# Patient Record
Sex: MALE | Race: WHITE | NOT HISPANIC OR LATINO | Employment: OTHER | URBAN - METROPOLITAN AREA
[De-identification: names, ages, dates, MRNs, and addresses within clinical notes are randomized per-mention and may not be internally consistent; named-entity substitution may affect disease eponyms.]

---

## 2021-12-28 VITALS
RESPIRATION RATE: 18 BRPM | DIASTOLIC BLOOD PRESSURE: 98 MMHG | HEART RATE: 84 BPM | SYSTOLIC BLOOD PRESSURE: 193 MMHG | TEMPERATURE: 97.9 F | OXYGEN SATURATION: 95 %

## 2021-12-28 LAB
ALBUMIN SERPL BCP-MCNC: 3.2 G/DL (ref 3.5–5)
ALP SERPL-CCNC: 111 U/L (ref 46–116)
ALT SERPL W P-5'-P-CCNC: 23 U/L (ref 12–78)
ANION GAP SERPL CALCULATED.3IONS-SCNC: 6 MMOL/L (ref 4–13)
APTT PPP: 32 SECONDS (ref 23–37)
AST SERPL W P-5'-P-CCNC: 19 U/L (ref 5–45)
BASOPHILS # BLD AUTO: 0.04 THOUSANDS/ΜL (ref 0–0.1)
BASOPHILS NFR BLD AUTO: 0 % (ref 0–1)
BILIRUB SERPL-MCNC: 0.4 MG/DL (ref 0.2–1)
BUN SERPL-MCNC: 15 MG/DL (ref 5–25)
CALCIUM ALBUM COR SERPL-MCNC: 8.8 MG/DL (ref 8.3–10.1)
CALCIUM SERPL-MCNC: 8.2 MG/DL (ref 8.3–10.1)
CHLORIDE SERPL-SCNC: 101 MMOL/L (ref 100–108)
CO2 SERPL-SCNC: 29 MMOL/L (ref 21–32)
CREAT SERPL-MCNC: 0.69 MG/DL (ref 0.6–1.3)
EOSINOPHIL # BLD AUTO: 0.12 THOUSAND/ΜL (ref 0–0.61)
EOSINOPHIL NFR BLD AUTO: 1 % (ref 0–6)
ERYTHROCYTE [DISTWIDTH] IN BLOOD BY AUTOMATED COUNT: 15.6 % (ref 11.6–15.1)
GFR SERPL CREATININE-BSD FRML MDRD: 104 ML/MIN/1.73SQ M
GLUCOSE SERPL-MCNC: 91 MG/DL (ref 65–140)
HCT VFR BLD AUTO: 38.8 % (ref 36.5–49.3)
HGB BLD-MCNC: 12.4 G/DL (ref 12–17)
IMM GRANULOCYTES # BLD AUTO: 0.02 THOUSAND/UL (ref 0–0.2)
IMM GRANULOCYTES NFR BLD AUTO: 0 % (ref 0–2)
INR PPP: 0.97 (ref 0.84–1.19)
LACTATE SERPL-SCNC: 1 MMOL/L (ref 0.5–2)
LYMPHOCYTES # BLD AUTO: 1.47 THOUSANDS/ΜL (ref 0.6–4.47)
LYMPHOCYTES NFR BLD AUTO: 16 % (ref 14–44)
MCH RBC QN AUTO: 27.6 PG (ref 26.8–34.3)
MCHC RBC AUTO-ENTMCNC: 32 G/DL (ref 31.4–37.4)
MCV RBC AUTO: 86 FL (ref 82–98)
MONOCYTES # BLD AUTO: 0.87 THOUSAND/ΜL (ref 0.17–1.22)
MONOCYTES NFR BLD AUTO: 9 % (ref 4–12)
NEUTROPHILS # BLD AUTO: 6.78 THOUSANDS/ΜL (ref 1.85–7.62)
NEUTS SEG NFR BLD AUTO: 74 % (ref 43–75)
NRBC BLD AUTO-RTO: 0 /100 WBCS
PLATELET # BLD AUTO: 366 THOUSANDS/UL (ref 149–390)
PMV BLD AUTO: 8.5 FL (ref 8.9–12.7)
POTASSIUM SERPL-SCNC: 3.1 MMOL/L (ref 3.5–5.3)
PROT SERPL-MCNC: 7.4 G/DL (ref 6.4–8.2)
PROTHROMBIN TIME: 12.8 SECONDS (ref 11.6–14.5)
RBC # BLD AUTO: 4.5 MILLION/UL (ref 3.88–5.62)
SODIUM SERPL-SCNC: 136 MMOL/L (ref 136–145)
WBC # BLD AUTO: 9.3 THOUSAND/UL (ref 4.31–10.16)

## 2021-12-28 PROCEDURE — 36415 COLL VENOUS BLD VENIPUNCTURE: CPT

## 2021-12-28 PROCEDURE — 87040 BLOOD CULTURE FOR BACTERIA: CPT

## 2021-12-28 PROCEDURE — 85730 THROMBOPLASTIN TIME PARTIAL: CPT

## 2021-12-28 PROCEDURE — 80053 COMPREHEN METABOLIC PANEL: CPT

## 2021-12-28 PROCEDURE — 85610 PROTHROMBIN TIME: CPT

## 2021-12-28 PROCEDURE — 84145 PROCALCITONIN (PCT): CPT

## 2021-12-28 PROCEDURE — 85025 COMPLETE CBC W/AUTO DIFF WBC: CPT

## 2021-12-28 PROCEDURE — 93005 ELECTROCARDIOGRAM TRACING: CPT

## 2021-12-28 PROCEDURE — 99283 EMERGENCY DEPT VISIT LOW MDM: CPT

## 2021-12-28 PROCEDURE — 83605 ASSAY OF LACTIC ACID: CPT

## 2021-12-29 ENCOUNTER — HOSPITAL ENCOUNTER (INPATIENT)
Facility: HOSPITAL | Age: 56
LOS: 71 days | Discharge: NURSING, REHAB OR LONG-TERM CARE | DRG: 603 | End: 2022-03-11
Attending: EMERGENCY MEDICINE | Admitting: STUDENT IN AN ORGANIZED HEALTH CARE EDUCATION/TRAINING PROGRAM
Payer: COMMERCIAL

## 2021-12-29 ENCOUNTER — HOSPITAL ENCOUNTER (EMERGENCY)
Facility: HOSPITAL | Age: 56
Discharge: HOME | End: 2021-12-29
Admitting: EMERGENCY MEDICINE
Payer: COMMERCIAL

## 2021-12-29 DIAGNOSIS — E87.6 HYPOKALEMIA: ICD-10-CM

## 2021-12-29 DIAGNOSIS — K59.00 CONSTIPATION, UNSPECIFIED CONSTIPATION TYPE: ICD-10-CM

## 2021-12-29 DIAGNOSIS — L40.9 PSORIASIS, UNSPECIFIED: ICD-10-CM

## 2021-12-29 DIAGNOSIS — H92.09 EAR PAIN: ICD-10-CM

## 2021-12-29 DIAGNOSIS — Z59.00 HOMELESS: ICD-10-CM

## 2021-12-29 DIAGNOSIS — B35.1 ONYCHOMYCOSIS: ICD-10-CM

## 2021-12-29 DIAGNOSIS — Z72.0 TOBACCO ABUSE: ICD-10-CM

## 2021-12-29 DIAGNOSIS — I10 ESSENTIAL HYPERTENSION: ICD-10-CM

## 2021-12-29 DIAGNOSIS — L03.90 CELLULITIS: Primary | ICD-10-CM

## 2021-12-29 DIAGNOSIS — N48.89 PENILE PAIN: ICD-10-CM

## 2021-12-29 DIAGNOSIS — R11.0 NAUSEA: ICD-10-CM

## 2021-12-29 DIAGNOSIS — R46.2 STRANGE AND INEXPLICABLE BEHAVIOR: ICD-10-CM

## 2021-12-29 DIAGNOSIS — L73.9 FOLLICULITIS: ICD-10-CM

## 2021-12-29 LAB
ALBUMIN SERPL BCP-MCNC: 3.8 G/DL (ref 3–5.2)
ALP SERPL-CCNC: 121 U/L (ref 43–122)
ALT SERPL W P-5'-P-CCNC: 16 U/L
ANION GAP SERPL CALCULATED.3IONS-SCNC: 3 MMOL/L (ref 5–14)
APTT PPP: 33 SECONDS (ref 23–37)
AST SERPL W P-5'-P-CCNC: 29 U/L (ref 17–59)
BASOPHILS # BLD AUTO: 0 THOUSANDS/ΜL (ref 0–0.1)
BASOPHILS NFR BLD AUTO: 1 % (ref 0–1)
BILIRUB SERPL-MCNC: 1.04 MG/DL
BUN SERPL-MCNC: 14 MG/DL (ref 5–25)
CALCIUM SERPL-MCNC: 8.5 MG/DL (ref 8.4–10.2)
CHLORIDE SERPL-SCNC: 100 MMOL/L (ref 97–108)
CO2 SERPL-SCNC: 33 MMOL/L (ref 22–30)
CREAT SERPL-MCNC: 1 MG/DL (ref 0.7–1.5)
EOSINOPHIL # BLD AUTO: 0.1 THOUSAND/ΜL (ref 0–0.4)
EOSINOPHIL NFR BLD AUTO: 1 % (ref 0–6)
ERYTHROCYTE [DISTWIDTH] IN BLOOD BY AUTOMATED COUNT: 16.2 %
GFR SERPL CREATININE-BSD FRML MDRD: 82 ML/MIN/1.73SQ M
GLUCOSE SERPL-MCNC: 92 MG/DL (ref 70–99)
HCT VFR BLD AUTO: 40.8 % (ref 41–53)
HGB BLD-MCNC: 13.2 G/DL (ref 13.5–17.5)
INR PPP: 1.08 (ref 0.84–1.19)
LACTATE SERPL-SCNC: 1.3 MMOL/L (ref 0.7–2)
LYMPHOCYTES # BLD AUTO: 1 THOUSANDS/ΜL (ref 0.5–4)
LYMPHOCYTES NFR BLD AUTO: 13 % (ref 25–45)
MCH RBC QN AUTO: 27.4 PG (ref 26–34)
MCHC RBC AUTO-ENTMCNC: 32.4 G/DL (ref 31–36)
MCV RBC AUTO: 85 FL (ref 80–100)
MONOCYTES # BLD AUTO: 0.7 THOUSAND/ΜL (ref 0.2–0.9)
MONOCYTES NFR BLD AUTO: 10 % (ref 1–10)
NEUTROPHILS # BLD AUTO: 5.8 THOUSANDS/ΜL (ref 1.8–7.8)
NEUTS SEG NFR BLD AUTO: 76 % (ref 45–65)
PLATELET # BLD AUTO: 367 THOUSANDS/UL (ref 150–450)
PMV BLD AUTO: 6.7 FL (ref 8.9–12.7)
POTASSIUM SERPL-SCNC: 3.6 MMOL/L (ref 3.6–5)
PROCALCITONIN SERPL-MCNC: <0.05 NG/ML
PROT SERPL-MCNC: 7.5 G/DL (ref 5.9–8.4)
PROTHROMBIN TIME: 13.6 SECONDS (ref 11.6–14.5)
RBC # BLD AUTO: 4.82 MILLION/UL (ref 4.5–5.9)
SODIUM SERPL-SCNC: 136 MMOL/L (ref 137–147)
WBC # BLD AUTO: 7.6 THOUSAND/UL (ref 4.5–11)

## 2021-12-29 PROCEDURE — 85025 COMPLETE CBC W/AUTO DIFF WBC: CPT | Performed by: EMERGENCY MEDICINE

## 2021-12-29 PROCEDURE — 87040 BLOOD CULTURE FOR BACTERIA: CPT | Performed by: EMERGENCY MEDICINE

## 2021-12-29 PROCEDURE — 99284 EMERGENCY DEPT VISIT MOD MDM: CPT

## 2021-12-29 PROCEDURE — 85730 THROMBOPLASTIN TIME PARTIAL: CPT | Performed by: EMERGENCY MEDICINE

## 2021-12-29 PROCEDURE — 80053 COMPREHEN METABOLIC PANEL: CPT | Performed by: EMERGENCY MEDICINE

## 2021-12-29 PROCEDURE — 96365 THER/PROPH/DIAG IV INF INIT: CPT

## 2021-12-29 PROCEDURE — 83605 ASSAY OF LACTIC ACID: CPT | Performed by: EMERGENCY MEDICINE

## 2021-12-29 PROCEDURE — 85610 PROTHROMBIN TIME: CPT | Performed by: EMERGENCY MEDICINE

## 2021-12-29 PROCEDURE — 93005 ELECTROCARDIOGRAM TRACING: CPT

## 2021-12-29 PROCEDURE — 99284 EMERGENCY DEPT VISIT MOD MDM: CPT | Performed by: EMERGENCY MEDICINE

## 2021-12-29 PROCEDURE — 84145 PROCALCITONIN (PCT): CPT | Performed by: EMERGENCY MEDICINE

## 2021-12-29 PROCEDURE — 36415 COLL VENOUS BLD VENIPUNCTURE: CPT | Performed by: EMERGENCY MEDICINE

## 2021-12-29 RX ORDER — POTASSIUM CHLORIDE 20 MEQ/1
20 TABLET, EXTENDED RELEASE ORAL ONCE
Status: COMPLETED | OUTPATIENT
Start: 2021-12-29 | End: 2021-12-29

## 2021-12-29 RX ORDER — CEPHALEXIN 500 MG/1
500 CAPSULE ORAL EVERY 6 HOURS SCHEDULED
Qty: 40 CAPSULE | Refills: 0 | Status: SHIPPED | OUTPATIENT
Start: 2021-12-29 | End: 2022-03-11 | Stop reason: HOSPADM

## 2021-12-29 RX ORDER — SULFAMETHOXAZOLE AND TRIMETHOPRIM 800; 160 MG/1; MG/1
1 TABLET ORAL ONCE
Status: COMPLETED | OUTPATIENT
Start: 2021-12-29 | End: 2021-12-29

## 2021-12-29 RX ORDER — CEPHALEXIN 250 MG/1
500 CAPSULE ORAL ONCE
Status: COMPLETED | OUTPATIENT
Start: 2021-12-29 | End: 2021-12-29

## 2021-12-29 RX ORDER — POTASSIUM CHLORIDE 20 MEQ/1
20 TABLET, EXTENDED RELEASE ORAL 2 TIMES DAILY
Qty: 20 TABLET | Refills: 0 | Status: SHIPPED | OUTPATIENT
Start: 2021-12-29 | End: 2022-03-11 | Stop reason: HOSPADM

## 2021-12-29 RX ORDER — SULFAMETHOXAZOLE AND TRIMETHOPRIM 800; 160 MG/1; MG/1
1 TABLET ORAL 2 TIMES DAILY
Qty: 14 TABLET | Refills: 0 | Status: SHIPPED | OUTPATIENT
Start: 2021-12-29 | End: 2022-03-11 | Stop reason: HOSPADM

## 2021-12-29 RX ADMIN — SULFAMETHOXAZOLE AND TRIMETHOPRIM 1 TABLET: 800; 160 TABLET ORAL at 01:27

## 2021-12-29 RX ADMIN — CEPHALEXIN 500 MG: 250 CAPSULE ORAL at 01:28

## 2021-12-29 RX ADMIN — POTASSIUM CHLORIDE 20 MEQ: 1500 TABLET, EXTENDED RELEASE ORAL at 01:28

## 2021-12-29 RX ADMIN — AMPICILLIN SODIUM AND SULBACTAM SODIUM 3 G: 2; 1 INJECTION, POWDER, FOR SOLUTION INTRAMUSCULAR; INTRAVENOUS at 19:57

## 2021-12-29 SDOH — ECONOMIC STABILITY - HOUSING INSECURITY: HOMELESSNESS UNSPECIFIED: Z59.00

## 2021-12-29 NOTE — CASE MANAGEMENT
Case Management Discharge Planning Note    Patient name Denny   Location Z1 H3/Z1 Mariola Da Silva MRN [id number]  : 1965 Date 2021       Current Admission Date: 2021  Current Admission Diagnosis:Multiple open wounds   There are no problems to display for this patient  LOS (days): 0  Geometric Mean LOS (GMLOS) (days):   Days to GMLOS:     OBJECTIVE:            Current admission status: Emergency   Preferred Pharmacy:   [facility], [address]  [address] [place] [address]  Phone: [phone] Fax: [phone]    Primary Care Provider: No primary care provider on file  Primary Insurance:   Secondary Insurance:     DISCHARGE DETAILS:    Additional Comments: Entry for today, 21  CM notified by security that Pt is homeless, has been waiting in the ED waiting room for 12 hours and has no place to go  211 was attemped but no response  Call placed to [facility], spoke with Danis Lee informed CM that he spoke with someone earlier who informed him that Pt needs assistance and would need commode for toileting  Danis Lee reports he is not sure who called him earlier about Pt  CM spoke with Pt and Pt reports that he can transfer independently from wheelchair and can dress and shower himself  Call placed to Danis Lee at [facility], informed Danis Lee of [address] conversation with Pt  Danis Lee reports that Pt can come to their facility with wheelchair but since there is a step into the door with the ramp, staff cannot always help Pt go into and out of facility  Danis Lee reports that Pt can come any time  Call placed to [facility] for [place] [facility], spoke with Damian Almazan who informed CM that  [employer] cannot transport wheelchairs  Referral sent to [facility] via [facility] for wheelchair transport  Await determination

## 2021-12-29 NOTE — CASE MANAGEMENT
Case Management Progress Note    Patient name Na Eduardo  Location Z1 H3/Z1 H3 MRN [id number]  : 1965 Date 2021       LOS (days): 0  Geometric Mean LOS (GMLOS) (days):   Days to GMLOS:        OBJECTIVE:        Current admission status: Emergency  Preferred Pharmacy:   [facility], [address]  [address] [facility] [place] [address]  Phone: [phone] Fax: [phone]    Primary Care Provider: No primary care provider on file  Primary Insurance:   Secondary Insurance:     PROGRESS NOTE:    Call to patient cell [phone]  Discussed with patient DC disposition  Patient reports his Richad Payor is picking him up at 1830  Call to [address] [place] [address] who refuses to  brother and hung up on nurse  Call to Booker Gracia [phone] who hung up on CM  Call to sister Sd Velasco [phone] and she is estranged from brother and lives in [facility]  Call received from sister Felton David who is asking why patient is not being placed in SNF however she is estranged from brother  Provided Zoie patient's cell number to discuss with him  Call to patient and notified same

## 2021-12-30 PROBLEM — Z59.00 HOMELESS: Status: ACTIVE | Noted: 2021-12-30

## 2021-12-30 PROBLEM — L03.113 RIGHT ARM CELLULITIS: Status: ACTIVE | Noted: 2021-12-30

## 2021-12-30 LAB
ATRIAL RATE: 67 BPM
ATRIAL RATE: 82 BPM
BILIRUB UR QL STRIP: NEGATIVE
CLARITY UR: ABNORMAL
COLOR UR: YELLOW
GLUCOSE UR STRIP-MCNC: NEGATIVE MG/DL
HGB UR QL STRIP.AUTO: NEGATIVE
KETONES UR STRIP-MCNC: ABNORMAL MG/DL
LEUKOCYTE ESTERASE UR QL STRIP: NEGATIVE
NITRITE UR QL STRIP: NEGATIVE
P AXIS: 69 DEGREES
P AXIS: 75 DEGREES
PH UR STRIP.AUTO: 7 [PH]
PR INTERVAL: 144 MS
PR INTERVAL: 146 MS
PROCALCITONIN SERPL-MCNC: <0.05 NG/ML
PROCALCITONIN SERPL-MCNC: <0.05 NG/ML
PROT UR STRIP-MCNC: NEGATIVE MG/DL
QRS AXIS: 28 DEGREES
QRS AXIS: 45 DEGREES
QRSD INTERVAL: 90 MS
QRSD INTERVAL: 98 MS
QT INTERVAL: 432 MS
QT INTERVAL: 484 MS
QTC INTERVAL: 504 MS
QTC INTERVAL: 511 MS
SP GR UR STRIP.AUTO: 1.02 (ref 1–1.04)
T WAVE AXIS: 64 DEGREES
T WAVE AXIS: 73 DEGREES
UROBILINOGEN UA: NEGATIVE MG/DL
VENTRICULAR RATE: 67 BPM
VENTRICULAR RATE: 82 BPM

## 2021-12-30 PROCEDURE — 93010 ELECTROCARDIOGRAM REPORT: CPT | Performed by: INTERNAL MEDICINE

## 2021-12-30 PROCEDURE — 99219 PR INITIAL OBSERVATION CARE/DAY 50 MINUTES: CPT | Performed by: STUDENT IN AN ORGANIZED HEALTH CARE EDUCATION/TRAINING PROGRAM

## 2021-12-30 PROCEDURE — 36415 COLL VENOUS BLD VENIPUNCTURE: CPT | Performed by: EMERGENCY MEDICINE

## 2021-12-30 PROCEDURE — 84145 PROCALCITONIN (PCT): CPT | Performed by: EMERGENCY MEDICINE

## 2021-12-30 PROCEDURE — 81003 URINALYSIS AUTO W/O SCOPE: CPT | Performed by: EMERGENCY MEDICINE

## 2021-12-30 RX ORDER — ACETAMINOPHEN 325 MG/1
650 TABLET ORAL EVERY 4 HOURS PRN
Status: DISCONTINUED | OUTPATIENT
Start: 2021-12-30 | End: 2022-03-11 | Stop reason: HOSPADM

## 2021-12-30 RX ORDER — ONDANSETRON 2 MG/ML
4 INJECTION INTRAMUSCULAR; INTRAVENOUS EVERY 6 HOURS PRN
Status: DISCONTINUED | OUTPATIENT
Start: 2021-12-30 | End: 2022-01-01

## 2021-12-30 RX ORDER — NICOTINE 21 MG/24HR
1 PATCH, TRANSDERMAL 24 HOURS TRANSDERMAL DAILY
Status: DISCONTINUED | OUTPATIENT
Start: 2021-12-30 | End: 2022-03-11 | Stop reason: HOSPADM

## 2021-12-30 RX ORDER — AMPICILLIN AND SULBACTAM 1; .5 G/1; G/1
INJECTION, POWDER, FOR SOLUTION INTRAMUSCULAR; INTRAVENOUS
Status: COMPLETED
Start: 2021-12-30 | End: 2021-12-30

## 2021-12-30 RX ORDER — HYDRALAZINE HYDROCHLORIDE 20 MG/ML
10 INJECTION INTRAMUSCULAR; INTRAVENOUS EVERY 6 HOURS PRN
Status: DISCONTINUED | OUTPATIENT
Start: 2021-12-30 | End: 2022-01-01

## 2021-12-30 RX ADMIN — ENOXAPARIN SODIUM 40 MG: 40 INJECTION SUBCUTANEOUS at 08:49

## 2021-12-30 RX ADMIN — ACETAMINOPHEN 650 MG: 325 TABLET ORAL at 17:22

## 2021-12-30 RX ADMIN — AMPICILLIN SODIUM AND SULBACTAM SODIUM 3 G: 2; 1 INJECTION, POWDER, FOR SOLUTION INTRAMUSCULAR; INTRAVENOUS at 17:18

## 2021-12-30 RX ADMIN — AMPICILLIN SODIUM AND SULBACTAM SODIUM 3 G: 2; 1 INJECTION, POWDER, FOR SOLUTION INTRAMUSCULAR; INTRAVENOUS at 11:36

## 2021-12-30 RX ADMIN — HYDRALAZINE HYDROCHLORIDE 10 MG: 20 INJECTION INTRAMUSCULAR; INTRAVENOUS at 06:08

## 2021-12-30 RX ADMIN — AMPICILLIN AND SULBACTAM 3000 MG: 1; .5 INJECTION, POWDER, FOR SOLUTION INTRAMUSCULAR; INTRAVENOUS at 05:30

## 2021-12-30 RX ADMIN — AMPICILLIN SODIUM AND SULBACTAM SODIUM 3 G: 2; 1 INJECTION, POWDER, FOR SOLUTION INTRAMUSCULAR; INTRAVENOUS at 22:57

## 2021-12-30 RX ADMIN — ACETAMINOPHEN 650 MG: 325 TABLET ORAL at 23:10

## 2021-12-30 RX ADMIN — ACETAMINOPHEN 650 MG: 325 TABLET ORAL at 06:07

## 2021-12-30 RX ADMIN — AMPICILLIN SODIUM AND SULBACTAM SODIUM 3 G: 2; 1 INJECTION, POWDER, FOR SOLUTION INTRAMUSCULAR; INTRAVENOUS at 05:31

## 2021-12-30 NOTE — ASSESSMENT & PLAN NOTE
· Patient was to be discharged to the [facility] however they would not take him back  · Case management for discharge planning

## 2021-12-30 NOTE — UTILIZATION REVIEW
Initial Clinical Review    Pt initially admitted as Observation on 12/29/21 @ 2124 Changed to Inpatient on 12/30/21 @ 1517  Pt requiring continued stay d/t cellulitis requiring IV ABX  Admission: Date/Time/Statement:   Admission Orders (From admission, onward)     Ordered        12/30/21 1517  Inpatient Admission  Once            12/29/21 2124  Place in Observation  Once                      Orders Placed This Encounter   Procedures    Inpatient Admission     Standing Status:   Standing     Number of Occurrences:   1     Order Specific Question:   Level of Care     Answer:   Med Surg [16]     Order Specific Question:   Estimated length of stay     Answer:   More than 2 Midnights     Order Specific Question:   Certification     Answer:   I certify that inpatient services are medically necessary for this patient for a duration of greater than two midnights  See H&P and MD Progress Notes for additional information about the patient's course of treatment  ED Arrival Information     Expected Arrival Acuity    - 12/29/2021 17:38 Urgent         Means of arrival Escorted by Service Admission type    [facility] [place] ([address]) Hospitalist Urgent         Arrival complaint    wound problem        Chief Complaint   Patient presents with    Wound Check     Pt reports he was discharged from [facility]  [facility] would not take pt back  Pt reports he has several wounds on arms and legs  Initial Presentation: 60 y o  male who presented by EMS to [address] [facility]  Admitted in observation status for evaluation and treatment of R arm cellulitis  PMHx: Arthritis, Hypertension, and Sciatica  Presented w/ redness & tenderness to R arm  Pt recently seen in another ED and discharged to [facility] who was unable to accept pt d/t wheelchair use  On exam, R forearm lesion w/ surrounding redness and tenderness to palpation, red streaking noted       Plan IV ABX,prn hydralazine, trend labs  Consult Case Management for disposition planning  12/30/21 Change to Inpatient Status  Internal Medicine: Pt w/ R arm cellullitis  On exam, R forearm lesion w/ redness and streaking  Plan: IV ABX, trend labs, follow blood cultures  Date:  12/31/21  Day 2  Internal Medicine: Pt w/ R arm cellulitis  On exam, lesion w/ redness  Plan: IV ABX, trend labs, follow blood cultures, start amlodipine for HTN  Consult ID  Infectious Disease Consult: Pt w/ hx of psoriasis presented w/ relapse of psoriasis and R forearm cellulitis  On exam, R forearm healing laceration w/ erythema, warmth and redness; diffuse scaly rash worse on legs  Plan: IV ABX, recommend follow-up outpatient w/ dermatology          ED Triage Vitals  12/29/21 1742   Temperature Pulse Respirations Blood Pressure SpO2   98 5 °F (36 9 °C) 75 16 (!) 198/105 97 %      Wt Readings from Last 1 Encounters:   12/29/21 77 3 kg (170 lb 6 4 oz)     Additional Vital Signs:   Date/Time Temp Pulse Resp BP SpO2 O2 Device   12/31/21 2250 97 5 °F (36 4 °C) 75 18 162/88 98 % None (Room air)   12/31/21 1557 97 7 °F (36 5 °C) 78 18 159/72 100 % None (Room air)   12/31/21 1014 -- -- -- 193/105 (Abnormal)   -- --   12/31/21 0732 97 7 °F (36 5 °C) 72 20 178/85 (Abnormal)   97 % None (Room air)   12/31/21 0224 97 8 °F (36 6 °C) 66 18 160/79 100 % None (Room air)   12/31/21 0113 -- 70 -- 176/90 (Abnormal)   -- --   12/30/21 2310 97 8 °F (36 6 °C) 73 18 177/92 (Abnormal)   99 % None (Room air)   12/30/21 1941 98 2 °F (36 8 °C) 78 18 152/79 -- --   12/30/21 1510 98 7 °F (37 1 °C) 54 (Abnormal)   18 164/74 99 % None (Room air)     Date/Time Temp Pulse Resp BP SpO2 O2 Device   12/30/21 0939 97 5 °F (36 4 °C) 73 20 168/87 99 % None (Room air)   12/30/21 0843 -- -- -- 167/90 -- --   12/30/21 0839 98 2 °F (36 8 °C) 75 16 187/100 Abnormal  100 % None (Room air)   12/30/21 0734 -- -- -- 166/92 -- --   12/30/21 0654 -- -- -- 180/98 Abnormal  -- -- 12/30/21 0559 -- 78 16 187/95 Abnormal  98 % None (Room air)   12/29/21 1954 -- 75 16 186/99 Abnormal  100 % None (Room air)       Pertinent Labs/Diagnostic Test Results:   12/29 EKG  Normal sinus rhythm  Left atrial abnormality  voltage lvh present  Nonspecific T wave abnormality  Prolonged QT    Results from last 7 days   Lab Units 12/31/21  0451 12/29/21  1823 12/28/21  2241   WBC Thousand/uL 7 20 7 60 9 30   HEMOGLOBIN g/dL 12 7* 13 2* 12 4   HEMATOCRIT % 37 7* 40 8* 38 8   PLATELETS Thousands/uL 353 367 366   NEUTROS ABS Thousands/µL  --  5 80 6 78     Results from last 7 days   Lab Units 12/31/21  0451 12/29/21 1823 12/28/21  2241   SODIUM mmol/L 136* 136* 136   POTASSIUM mmol/L 3 1* 3 6 3 1*   CHLORIDE mmol/L 103 100 101   CO2 mmol/L 30 33* 29   ANION GAP mmol/L 3* 3* 6   BUN mg/dL 13 14 15   CREATININE mg/dL 0 77 1 00 0 69   EGFR ml/min/1 73sq m 100 82 104   CALCIUM mg/dL 7 9* 8 5 8 2*   MAGNESIUM mg/dL  --   --   --    PHOSPHORUS mg/dL  --   --   --      Results from last 7 days   Lab Units 12/29/21 1823 12/28/21  2241   AST U/L 29 19   ALT U/L 16 23   ALK PHOS U/L 121 111   TOTAL PROTEIN g/dL 7 5 7 4   ALBUMIN g/dL 3 8 3 2*   TOTAL BILIRUBIN mg/dL 1 04 0 40     Results from last 7 days   Lab Units 12/31/21  0451 12/29/21  1823 12/28/21  2241   GLUCOSE RANDOM mg/dL 97 92 91     Results from last 7 days   Lab Units 12/29/21  1823 12/28/21  2241   PROTIME seconds 13 6 12 8   INR  1 08 0 97   PTT seconds 33 32     Results from last 7 days   Lab Units 12/30/21  0554 12/29/21  1823 12/28/21  2241   PROCALCITONIN ng/ml <0 05 <0 05 <0 05     Results from last 7 days   Lab Units 12/29/21 1823 12/28/21  2241   LACTIC ACID mmol/L 1 3 1 0     Results from last 7 days   Lab Units 12/30/21  0554   CLARITY UA  Slightly Cloudy*   COLOR UA  Yellow   SPEC GRAV UA  1 020   PH UA  7 0   GLUCOSE UA mg/dl Negative   KETONES UA mg/dl 5 (Trace)*   BLOOD UA  Negative   PROTEIN UA mg/dl Negative   NITRITE UA  Negative BILIRUBIN UA  Negative   UROBILINOGEN UA mg/dL Negative   LEUKOCYTES UA  Negative     Results from last 7 days   Lab Units 12/29/21  1845 12/29/21  1823 12/28/21  2241   BLOOD CULTURE  No Growth After 4 Days  No Growth After 4 Days  No Growth After 5 Days  No Growth After 5 Days  ED Treatment:   Medication Administration from 12/29/2021 1738 to 12/30/2021 0830       Date/Time Order Dose Route Action     12/29/2021 1957 ampicillin-sulbactam (UNASYN) 3 g in sodium chloride 0 9 % 100 mL IVPB 3 g Intravenous New Bag     12/30/2021 0527 acetaminophen (TYLENOL) tablet 650 mg 650 mg Oral Given     12/30/2021 0531 ampicillin-sulbactam (UNASYN) 3 g in sodium chloride 0 9 % 100 mL IVPB 3 g Intravenous New Bag     12/30/2021 0608 hydrALAZINE (APRESOLINE) injection 10 mg 10 mg Intravenous Given        Past Medical History:   Diagnosis Date    Arthritis     Hypertension     Sciatica      Present on Admission:   Right arm cellulitis      Admitting Diagnosis: Wound check, abscess [Z51 89]  Age/Sex: 60 y o  male  Admission Orders:   Regular Diet  Scheduled Medications:  amLODIPine, 10 mg, Oral, Daily   ampicillin-sulbactam, 3 g, Intravenous, Q6H  enoxaparin, 40 mg, Subcutaneous, Daily  nicotine, 1 patch, Transdermal, Daily    Continuous IV Infusions: none    PRN Meds:  acetaminophen, 650 mg, Oral, Q4H PRN; 12/30 x2, 12/31 x1  Diphenhydramine, 25 mg, Oral, Q6H PRN; 12/31 x1  hydrALAZINE, 10 mg, Intravenous, Q6H PRN; 12/31 x2  ondansetron, 4 mg, Intravenous, Q6H PRN  Potassium chloride, 40 mEq, Oral, 12/31 x1      Network Utilization Review Department  ATTENTION: Please call with any questions or concerns to [phone] and carefully listen to the prompts so that you are directed to the right person   All voicemails are confidential   Kacie Ornelas all requests for admission clinical reviews, approved or denied determinations and any other requests to dedicated fax number below belonging to the campus where the patient is receiving treatment   List of dedicated fax numbers for the Facilities:  [address] [place] [address] DENIALS (Administrative/Medical Necessity) [phone]   [address]  [facility] (Maternity/NICU/Pediatrics) [phone]   [address] [place] [address]  [address] [address] [place] [address] [address] [place] [address] [phone]

## 2021-12-30 NOTE — CASE MANAGEMENT
Case Management Progress Note    Patient name Jose Carlos Villareal  Location 7T /7T -01 MRN [id number]  : 1965 Date 2021       LOS (days): 0  Geometric Mean LOS (GMLOS) (days):   Days to GMLOS:        OBJECTIVE:    Current admission status: Observation  Preferred Pharmacy:   [address], [address]  [address]  [address]  Phone: [phone] Fax: [phone] [address] [facility],[address], [address]  [address]  Phone: [phone] Fax: [phone]    Primary Care Provider: Rachael Georges MD    Primary Insurance: [address] [place] [address] REP  Secondary Insurance: [address] [place] [address]    PROGRESS NOTE:      CM met w/ APS  Geronimo Mcdonald to discuss Pt-CM provided Latrice w/ H&P  Latrice has completed her assessment and opened her investigation  CM will assist as appropriate & collaborate Pts dcp w/ APS  CM will continue to follow

## 2021-12-30 NOTE — PLAN OF CARE
Problem: PAIN - ADULT  Goal: Verbalizes/displays adequate comfort level or baseline comfort level  Description: Interventions:  - Encourage patient to monitor pain and request assistance  - Assess pain using appropriate pain scale  - Administer analgesics based on type and severity of pain and evaluate response  - Implement non-pharmacological measures as appropriate and evaluate response  - Consider cultural and social influences on pain and pain management  - Notify physician/advanced practitioner if interventions unsuccessful or patient reports new pain  Outcome: Progressing     Problem: INFECTION - ADULT  Goal: Absence or prevention of progression during hospitalization  Description: INTERVENTIONS:  - Assess and monitor for signs and symptoms of infection  - Monitor lab/diagnostic results  - Monitor all insertion sites, i e  indwelling lines, tubes, and drains  - Monitor endotracheal if appropriate and nasal secretions for changes in amount and color  - Smoot appropriate cooling/warming therapies per order  - Administer medications as ordered  - Instruct and encourage patient and family to use good hand hygiene technique  - Identify and instruct in appropriate isolation precautions for identified infection/condition  Outcome: Progressing     Problem: SAFETY ADULT  Goal: Patient will remain free of falls  Description: INTERVENTIONS:  - Educate patient/family on patient safety including physical limitations  - Instruct patient to call for assistance with activity   - Consult OT/PT to assist with strengthening/mobility   - Keep Call bell within reach  - Keep bed low and locked with side rails adjusted as appropriate  - Keep care items and personal belongings within reach  - Initiate and maintain comfort rounds  - Make Fall Risk Sign visible to staff    - Apply yellow socks and bracelet for high fall risk patients  - Consider moving patient to room near nurses station  Outcome: Progressing     Problem: DISCHARGE PLANNING  Goal: Discharge to home or other facility with appropriate resources  Description: INTERVENTIONS:  - Identify barriers to discharge w/patient and caregiver  - Arrange for needed discharge resources and transportation as appropriate  - Identify discharge learning needs (meds, wound care, etc )  - Arrange for interpretive services to assist at discharge as needed  - Refer to Case Management Department for coordinating discharge planning if the patient needs post-hospital services based on physician/advanced practitioner order or complex needs related to functional status, cognitive ability, or social support system  Outcome: Progressing

## 2021-12-30 NOTE — ED NOTES
Pt given sandwich,water,ginger ale,chips and granola bar @ bedside  Tolerating well  Pt is comfor bell @ bedsidetable with no other needs @ this time  Call bell @ bedside       [address] [facility] [place] [facility]  12/30/21 6312

## 2021-12-30 NOTE — ED PROVIDER NOTES
History  No chief complaint on file  Patient with PMH chronic rashes, chronic ambulatory dysfunction presents with concern for infection all over his skin  Symptoms have been constant for 3 months now  He had cellulitis in his legs in the past and thinks that is what is going on now  It has moved to his abdomen and right forearm  He has difficulty ambulating that he states has been for 3 months now due to generalized weakness in the legs  Denies fever or chills  Denies pain  Did not take any medicine for these symptoms  Patient presents in his own wheelchair  None       Past Medical History:   Diagnosis Date    Arthritis     Hypertension     Sciatica        Past Surgical History:   Procedure Laterality Date    BACK SURGERY         History reviewed  No pertinent family history  I have reviewed and agree with the history as documented  E-Cigarette/Vaping     E-Cigarette/Vaping Substances     Social History     Tobacco Use    Smoking status: Current Every Day Smoker    Smokeless tobacco: Not on file   Substance Use Topics    Alcohol use: Yes    Drug use: Yes     Types: Heroin, Methamphetamines, Cocaine, Fentanyl       Review of Systems   Constitutional: Negative for chills and fever  HENT: Negative for rhinorrhea and sore throat  Respiratory: Negative for shortness of breath  Cardiovascular: Negative for chest pain  Gastrointestinal: Negative for constipation, diarrhea, nausea and vomiting  Genitourinary: Negative for dysuria and frequency  Skin: Positive for rash  All other systems reviewed and are negative  Physical Exam  Physical Exam  Vitals and nursing note reviewed  Constitutional:       Appearance: He is well-developed  HENT:      Head: Normocephalic and atraumatic        Right Ear: External ear normal       Left Ear: External ear normal       Nose: Nose normal    Eyes:      Conjunctiva/sclera: Conjunctivae normal       Pupils: Pupils are equal, round, and reactive to light  Cardiovascular:      Rate and Rhythm: Normal rate and regular rhythm  Heart sounds: Normal heart sounds  Pulmonary:      Effort: Pulmonary effort is normal  No respiratory distress  Breath sounds: Normal breath sounds  No wheezing  Abdominal:      General: Bowel sounds are normal  There is no distension  Palpations: Abdomen is soft  Tenderness: There is no abdominal tenderness  Musculoskeletal:         General: No deformity  Normal range of motion  Cervical back: Normal range of motion and neck supple  No spinous process tenderness  Right foot: Normal capillary refill  No tenderness  Left foot: Normal capillary refill  No tenderness  Comments: Generalized weakness lower extremity and torso    Bilateral lower extremities cool, pallor noted, +1 DP and PT pulses  Sensation intact to light touch  Skin:     General: Skin is warm and dry  Findings: Rash present  Comments: See media   Neurological:      General: No focal deficit present  Mental Status: He is alert and oriented to person, place, and time  GCS: GCS eye subscore is 4  GCS verbal subscore is 5  GCS motor subscore is 6  Sensory: No sensory deficit     Psychiatric:         Mood and Affect: Mood normal          Vital Signs  ED Triage Vitals [12/28/21 2201]   Temperature Pulse Respirations Blood Pressure SpO2   97 9 °F (36 6 °C) 84 18 (!) 193/98 95 %      Temp Source Heart Rate Source Patient Position - Orthostatic VS BP Location FiO2 (%)   Temporal Monitor Sitting Right arm --      Pain Score       --           Vitals:    12/28/21 2201   BP: (!) 193/98   Pulse: 84   Patient Position - Orthostatic VS: Sitting         Visual Acuity      ED Medications  Medications   cephalexin (KEFLEX) capsule 500 mg (500 mg Oral Given 12/29/21 0128)   sulfamethoxazole-trimethoprim (BACTRIM DS) 800-160 mg per tablet 1 tablet (1 tablet Oral Given 12/29/21 0127)   potassium chloride (K-DUR,KLOR-CON) CR tablet 20 mEq (20 mEq Oral Given 12/29/21 0128)       Diagnostic Studies  Results Reviewed     Procedure Component Value Units Date/Time    Blood culture #1 [[id number]] Collected: 12/28/21 2241    Lab Status: Preliminary result Specimen: Blood from Arm, Right Updated: 12/30/21 0701     Blood Culture No Growth at 24 hrs  Blood culture #2 [[id number]] Collected: 12/28/21 2241    Lab Status: Preliminary result Specimen: Blood from Arm, Right Updated: 12/30/21 0701     Blood Culture No Growth at 24 hrs  Procalcitonin with AM Reflex [[id number]]  (Normal) Collected: 12/28/21 2241    Lab Status: Final result Specimen: Blood from Arm, Right Updated: 12/29/21 0753     Procalcitonin <0 05 ng/ml     Procalcitonin Reflex [[id number]]     Lab Status: No result Specimen: Blood     Lactic acid [[id number]]  (Normal) Collected: 12/28/21 2241    Lab Status: Final result Specimen: Blood from Arm, Right Updated: 12/28/21 2325     LACTIC ACID 1 0 mmol/L     Narrative:      Result may be elevated if tourniquet was used during collection      Comprehensive metabolic panel [[id number]]  (Abnormal) Collected: 12/28/21 2241    Lab Status: Final result Specimen: Blood from Arm, Right Updated: 12/28/21 2322     Sodium 136 mmol/L      Potassium 3 1 mmol/L      Chloride 101 mmol/L      CO2 29 mmol/L      ANION GAP 6 mmol/L      BUN 15 mg/dL      Creatinine 0 69 mg/dL      Glucose 91 mg/dL      Calcium 8 2 mg/dL      Corrected Calcium 8 8 mg/dL      AST 19 U/L      ALT 23 U/L      Alkaline Phosphatase 111 U/L      Total Protein 7 4 g/dL      Albumin 3 2 g/dL      Total Bilirubin 0 40 mg/dL      eGFR 104 ml/min/1 73sq m     Narrative:      [place] guidelines for Chronic Kidney Disease (CKD):     Stage 1 with normal or high GFR (GFR > 90 mL/min/1 73 square meters)    Stage 2 Mild CKD (GFR = 60-89 mL/min/1 73 square meters)    Stage 3A Moderate CKD (GFR = 45-59 mL/min/1 73 square meters)    Stage 3B Moderate CKD (GFR = 30-44 mL/min/1 73 square meters)    Stage 4 Severe CKD (GFR = 15-29 mL/min/1 73 square meters)    Stage 5 End Stage CKD (GFR <15 mL/min/1 73 square meters)  Note: GFR calculation is accurate only with a steady state creatinine    APTT [[id number]]  (Normal) Collected: 12/28/21 2241    Lab Status: Final result Specimen: Blood from Arm, Right Updated: 12/28/21 2318     PTT 32 seconds     Protime-INR [[id number]]  (Normal) Collected: 12/28/21 2241    Lab Status: Final result Specimen: Blood from Arm, Right Updated: 12/28/21 2318     Protime 12 8 seconds      INR 0 97    CBC and differential [[id number]]  (Abnormal) Collected: 12/28/21 2241    Lab Status: Final result Specimen: Blood from Arm, Right Updated: 12/28/21 2302     WBC 9 30 Thousand/uL      RBC 4 50 Million/uL      Hemoglobin 12 4 g/dL      Hematocrit 38 8 %      MCV 86 fL      MCH 27 6 pg      MCHC 32 0 g/dL      RDW 15 6 %      MPV 8 5 fL      Platelets 193 Thousands/uL      nRBC 0 /100 WBCs      Neutrophils Relative 74 %      Immat GRANS % 0 %      Lymphocytes Relative 16 %      Monocytes Relative 9 %      Eosinophils Relative 1 %      Basophils Relative 0 %      Neutrophils Absolute 6 78 Thousands/µL      Immature Grans Absolute 0 02 Thousand/uL      Lymphocytes Absolute 1 47 Thousands/µL      Monocytes Absolute 0 87 Thousand/µL      Eosinophils Absolute 0 12 Thousand/µL      Basophils Absolute 0 04 Thousands/µL                  No orders to display              Procedures  Procedures         ED Course                                             MDM  Number of Diagnoses or Management Options  Cellulitis: new and requires workup  Folliculitis: new and requires workup  Hypokalemia: new and requires workup     Amount and/or Complexity of Data Reviewed  Clinical lab tests: reviewed  Obtain history from someone other than the patient: yes    Patient Progress  Patient progress: stable      Disposition  Final diagnoses:   Cellulitis - perineal and right forearm   Folliculitis - abdomen and chest   Hypokalemia     Time reflects when diagnosis was documented in both MDM as applicable and the Disposition within this note     Time User Action Codes Description Comment    12/29/2021 12:15 AM Mosetta Lank Add [L03 90] Cellulitis     12/29/2021 12:15 AM Mosetta Lank Modify [L03 90] Cellulitis systemic    12/29/2021 12:16 AM Mosetta Lank Modify [L03 90] Cellulitis perineal and right forearm    12/29/2021 12:16 AM Mosetta Lank Add [E67 2] Folliculitis     50/04/4572 12:16 AM Mosetta Lank Modify [N53 6] Folliculitis abdomen and chest    12/29/2021 12:17 AM Mosetta Lank Add [E87 6] Hypokalemia       ED Disposition     ED Disposition Condition Date/Time Comment    Discharge  Wed Dec 29, 2021  1:51 AM Marissa Nunes discharge to home/self care  Follow-up Information     Follow up With Specialties Details Why Contact Info    [facility]  Call   [phone]            Discharge Medication List as of 12/29/2021 12:19 AM      START taking these medications    Details   cephalexin (KEFLEX) 500 mg capsule Take 1 capsule (500 mg total) by mouth every 6 (six) hours for 10 days, Starting Wed 12/29/2021, Until Sat 1/8/2022, Normal      potassium chloride (K-DUR,KLOR-CON) 20 mEq tablet Take 1 tablet (20 mEq total) by mouth 2 (two) times a day, Starting Wed 12/29/2021, Normal      sulfamethoxazole-trimethoprim (BACTRIM DS) 800-160 mg per tablet Take 1 tablet by mouth 2 (two) times a day for 7 days smx-tmp DS (BACTRIM) 800-160 mg tabs (1tab q12 D10), Starting Wed 12/29/2021, Until Wed 1/5/2022, Normal             No discharge procedures on file      PDMP Review     None          ED Provider  Electronically Signed by           Irma Giraldo DO  12/30/21 2726

## 2021-12-30 NOTE — ASSESSMENT & PLAN NOTE
· Patient with right forearm lesion with redness and streaking noted in ED  · Started on Unasyn  · Lactic acid normal  · WBC normal  · Check procalcitonin level

## 2021-12-30 NOTE — ED PROVIDER NOTES
History  Chief Complaint   Patient presents with    Wound Check     Pt reports he was discharged from [address] [place] [address] to the [facility]  [facility] would not take pt back  Pt reports he has several wounds on arms and legs  History provided by:  Patient  Rash  Location: right arm redness and pain   Severity:  Mild  Onset quality:  Gradual  Timing:  Constant  Progression:  Worsening  Chronicity:  New  Relieved by:  Nothing  Worsened by:  Nothing  Ineffective treatments:  None tried  Associated symptoms: no abdominal pain, no diarrhea, no fever, no headaches, no myalgias, no nausea, no shortness of breath, no sore throat and not wheezing        Prior to Admission Medications   Prescriptions Last Dose Informant Patient Reported? Taking? cephalexin (KEFLEX) 500 mg capsule Not Taking at Unknown time  No No   Sig: Take 1 capsule (500 mg total) by mouth every 6 (six) hours for 10 days   Patient not taking: Reported on 12/29/2021    potassium chloride (K-DUR,KLOR-CON) 20 mEq tablet Not Taking at Unknown time  No No   Sig: Take 1 tablet (20 mEq total) by mouth 2 (two) times a day   Patient not taking: Reported on 12/29/2021    sulfamethoxazole-trimethoprim (BACTRIM DS) 800-160 mg per tablet Not Taking at Unknown time  No No   Sig: Take 1 tablet by mouth 2 (two) times a day for 7 days smx-tmp DS (BACTRIM) 800-160 mg tabs (1tab q12 D10)   Patient not taking: Reported on 12/29/2021       Facility-Administered Medications: None       Past Medical History:   Diagnosis Date    Arthritis     Hypertension     Sciatica        Past Surgical History:   Procedure Laterality Date    BACK SURGERY         History reviewed  No pertinent family history  I have reviewed and agree with the history as documented  E-Cigarette/Vaping     E-Cigarette/Vaping Substances     Social History     Tobacco Use    Smoking status: Current Every Day Smoker    Smokeless tobacco: Not on file   Substance Use Topics    Alcohol use:  Yes    Drug use: Yes     Types: Heroin, Methamphetamines, Cocaine, Fentanyl       Review of Systems   Constitutional: Negative for chills and fever  HENT: Negative for rhinorrhea, sore throat and trouble swallowing  Eyes: Negative for pain  Respiratory: Negative for cough, shortness of breath, wheezing and stridor  Cardiovascular: Negative for chest pain and leg swelling  Gastrointestinal: Negative for abdominal pain, diarrhea and nausea  Endocrine: Negative for polyuria  Genitourinary: Negative for dysuria, flank pain and urgency  Musculoskeletal: Negative for joint swelling, myalgias and neck stiffness  Skin: Positive for rash  Allergic/Immunologic: Negative for immunocompromised state  Neurological: Negative for dizziness, syncope, weakness, numbness and headaches  Psychiatric/Behavioral: Negative for confusion and suicidal ideas  All other systems reviewed and are negative  Physical Exam  Physical Exam  Vitals and nursing note reviewed  Constitutional:       Appearance: He is well-developed  HENT:      Head: Normocephalic and atraumatic  Eyes:      Pupils: Pupils are equal, round, and reactive to light  Cardiovascular:      Rate and Rhythm: Normal rate and regular rhythm  Heart sounds: Normal heart sounds  No murmur heard  No friction rub  Pulmonary:      Effort: Pulmonary effort is normal  No respiratory distress  Breath sounds: No wheezing or rales  Abdominal:      General: Bowel sounds are normal       Palpations: Abdomen is soft  There is no mass  Tenderness: There is no abdominal tenderness  There is no guarding  Musculoskeletal:         General: Tenderness present  Cervical back: Normal range of motion and neck supple  Comments: Right forearm lesion with noted surrounding redness and tenderness to palpation consistent with cellulitis some red streaking noted  Skin:     General: Skin is warm  Findings: No rash     Neurological:      Mental Status: He is alert and oriented to person, place, and time  Vital Signs  ED Triage Vitals   Temperature Pulse Respirations Blood Pressure SpO2   12/29/21 1742 12/29/21 1954 12/29/21 1954 12/29/21 1742 12/29/21 1742   98 5 °F (36 9 °C) 75 16 (!) 198/105 97 %      Temp Source Heart Rate Source Patient Position - Orthostatic VS BP Location FiO2 (%)   12/29/21 1742 12/29/21 1954 12/29/21 1742 12/29/21 1742 --   Oral Monitor Lying Right arm       Pain Score       --                  Vitals:    12/29/21 1742 12/29/21 1954   BP: (!) 198/105 (!) 186/99   Pulse:  75   Patient Position - Orthostatic VS: Lying Lying         Visual Acuity      ED Medications  Medications   ampicillin-sulbactam (UNASYN) 3 g in sodium chloride 0 9 % 100 mL IVPB (0 g Intravenous Stopped 12/29/21 2144)       Diagnostic Studies  Results Reviewed     Procedure Component Value Units Date/Time    Blood culture #2 [[id number]] Collected: 12/29/21 1845    Lab Status: In process Specimen: Blood from Arm, Left Updated: 12/29/21 1919    Lactic acid [[id number]]  (Normal) Collected: 12/29/21 1823    Lab Status: Final result Specimen: Blood from Arm, Right Updated: 12/29/21 1855     LACTIC ACID 1 3 mmol/L     Narrative:      Result may be elevated if tourniquet was used during collection      Comprehensive metabolic panel [[id number]]  (Abnormal) Collected: 12/29/21 1823    Lab Status: Final result Specimen: Blood from Arm, Right Updated: 12/29/21 1847     Sodium 136 mmol/L      Potassium 3 6 mmol/L      Chloride 100 mmol/L      CO2 33 mmol/L      ANION GAP 3 mmol/L      BUN 14 mg/dL      Creatinine 1 00 mg/dL      Glucose 92 mg/dL      Calcium 8 5 mg/dL      AST 29 U/L      ALT 16 U/L      Alkaline Phosphatase 121 U/L      Total Protein 7 5 g/dL      Albumin 3 8 g/dL      Total Bilirubin 1 04 mg/dL      eGFR 82 ml/min/1 73sq m     Narrative:      [place] guidelines for Chronic Kidney Disease (CKD):     Stage 1 with normal or high GFR (GFR > 90 mL/min/1 73 square meters)    Stage 2 Mild CKD (GFR = 60-89 mL/min/1 73 square meters)    Stage 3A Moderate CKD (GFR = 45-59 mL/min/1 73 square meters)    Stage 3B Moderate CKD (GFR = 30-44 mL/min/1 73 square meters)    Stage 4 Severe CKD (GFR = 15-29 mL/min/1 73 square meters)    Stage 5 End Stage CKD (GFR <15 mL/min/1 73 square meters)  Note: GFR calculation is accurate only with a steady state creatinine    Protime-INR [[id number]]  (Normal) Collected: 12/29/21 1823    Lab Status: Final result Specimen: Blood from Arm, Right Updated: 12/29/21 1842     Protime 13 6 seconds      INR 1 08    APTT [[id number]]  (Normal) Collected: 12/29/21 1823    Lab Status: Final result Specimen: Blood from Arm, Right Updated: 12/29/21 1842     PTT 33 seconds     CBC and differential [[id number]]  (Abnormal) Collected: 12/29/21 1823    Lab Status: Final result Specimen: Blood from Arm, Right Updated: 12/29/21 1832     WBC 7 60 Thousand/uL      RBC 4 82 Million/uL      Hemoglobin 13 2 g/dL      Hematocrit 40 8 %      MCV 85 fL      MCH 27 4 pg      MCHC 32 4 g/dL      RDW 16 2 %      MPV 6 7 fL      Platelets 690 Thousands/uL      Neutrophils Relative 76 %      Lymphocytes Relative 13 %      Monocytes Relative 10 %      Eosinophils Relative 1 %      Basophils Relative 1 %      Neutrophils Absolute 5 80 Thousands/µL      Lymphocytes Absolute 1 00 Thousands/µL      Monocytes Absolute 0 70 Thousand/µL      Eosinophils Absolute 0 10 Thousand/µL      Basophils Absolute 0 00 Thousands/µL     Blood culture #1 [[id number]] Collected: 12/29/21 1823    Lab Status: In process Specimen: Blood from Arm, Right Updated: 12/29/21 1829    Procalcitonin with AM Reflex [[id number]] Collected: 12/29/21 1823    Lab Status:  In process Specimen: Blood from Arm, Right Updated: 12/29/21 1829    UA w Reflex to Microscopic w Reflex to Culture [[id number]]     Lab Status: No result Specimen: Urine                  No orders to display Procedures  Procedures         ED Course  ED Course as of 12/29/21 2149   Wed Dec 29, 2021   1924 Right arm cellultitis noted, will start IV abx  Admit and need case management  SBIRT 22yo+      Most Recent Value   SBIRT (22 yo +)    In order to provide better care to our patients, we are screening all of our patients for alcohol and drug use  Would it be okay to ask you these screening questions? No Filed at: 12/29/2021 2793                    [facility]  Number of Diagnoses or Management Options  Cellulitis: new and requires workup  Homeless: new and requires workup  Diagnosis management comments: 25-year-old male presents emergency department currently homeless right arm pain have redness tenderness to palpation recently seen at [facility] emergency department in sent to [facility] but unfortunately was not able to be admitted there secondary to being in a chronic wheelchair  He was sent to the nearest emergency department here in the Emergency Department was evaluated will need IV antibiotics and placement to long-term nursing facility         Amount and/or Complexity of Data Reviewed  Clinical lab tests: ordered and reviewed  Decide to obtain previous medical records or to obtain history from someone other than the patient: yes  Review and summarize past medical records: yes  Independent visualization of images, tracings, or specimens: yes        Disposition  Final diagnoses:   Cellulitis   Homeless     Time reflects when diagnosis was documented in both MDM as applicable and the Disposition within this note     Time User Action Codes Description Comment    12/29/2021  9:24 PM Americo Pallas Add [L03 90] Cellulitis     12/29/2021  9:24 PM Americo Pallas Add [Z59 00] Homeless       ED Disposition     ED Disposition Condition Date/Time Comment    Admit Stable Wed Dec 29, 2021  9:24 PM         Follow-up Information    None         Patient's Medications   Discharge Prescriptions    No medications on file       No discharge procedures on file      PDMP Review     None          ED Provider  Electronically Signed by           Taty Casper DO  12/29/21 8582

## 2021-12-30 NOTE — H&P
[facility]  H&P- Michael Guillen 1965, 60 y o  male MRN: [id number]  Unit/Bed#: ED 03 Encounter: [id number]  Primary Care Provider: Eric Tyler MD   Date and time admitted to hospital: 12/29/2021  5:38 PM    * Right arm cellulitis  Assessment & Plan  · Patient with right forearm lesion with redness and streaking noted in ED  · Started on Unasyn  · Lactic acid normal  · WBC normal  · Check procalcitonin level    Homeless  Assessment & Plan  · Patient was to be discharged to the [facility] however they would not take him back  · Case management for discharge planning    H&P - [facility] Internal Medicine    Patient Information: Michael Guillen 60 y o  male MRN: [id number]  Unit/Bed#: ED 03 Encounter: [id number]  Admitting Physician: Helen Palma DO  PCP: Eric Tyler MD  Date of Admission:  12/30/21      VTE Prophylaxis: Enoxaparin (Lovenox)    Code Status: full code  Discussion with patient    Anticipated Length of Stay:  Patient will be admitted on an Observation basis with an anticipated length of stay of  < 2 midnights  Justification for Hospital Stay: IV abx, d/c planning    Chief Complaint:   Wound check    History of Present Illness:    Michael Guillen is a 60 y o  male who has past medical history of hypertension, drug abuse, homeless presents with wound check  Patient was seen at [facility] ED 12/28  And d/c 12/29  and was prescribed antibiotics for arm cellulitis  He was homeless and had case management arrange for discharge to the [facility]  When he arrived at the facility, was reported they would not take the patient he then returned to [facility] ER  There he was given IV antibiotics  Blood cultures from 12/28 pending    Review of Systems:    Review of Systems   Constitutional: Negative  HENT: Negative  Eyes: Negative  Respiratory: Negative  Cardiovascular: Negative  Gastrointestinal: Negative      Genitourinary: Negative  Musculoskeletal: Negative  Skin: Positive for rash and wound  Neurological: Negative  Hematological: Negative  Psychiatric/Behavioral: Negative  Past Medical and Surgical History:     Past Medical History:   Diagnosis Date    Arthritis     Hypertension     Sciatica        Past Surgical History:   Procedure Laterality Date    BACK SURGERY         Meds/Allergies:    Prior to Admission medications    Medication Sig Start Date End Date Taking?  Authorizing Provider   cephalexin (KEFLEX) 500 mg capsule Take 1 capsule (500 mg total) by mouth every 6 (six) hours for 10 days  Patient not taking: Reported on 12/29/2021 12/29/21 1/8/22  Pammariangel Fane, DO   potassium chloride (K-DUR,KLOR-CON) 20 mEq tablet Take 1 tablet (20 mEq total) by mouth 2 (two) times a day  Patient not taking: Reported on 12/29/2021 12/29/21   Wendy Fane, DO   sulfamethoxazole-trimethoprim (BACTRIM DS) 800-160 mg per tablet Take 1 tablet by mouth 2 (two) times a day for 7 days smx-tmp DS (BACTRIM) 800-160 mg tabs (1tab q12 D10)  Patient not taking: Reported on 12/29/2021 12/29/21 1/5/22  Pammariangel Dorantes, DO     all medications and allergies reviewed    Allergies: No Known Allergies    Social History:     Marital Status:    Occupation:  Unemployed  Patient Pre-hospital Living Situation:  Homeless  Patient Pre-hospital Level of Mobility:  wheelchair  Patient Pre-hospital Diet Restrictions:  None  Substance Use History:   Social History     Substance and Sexual Activity   Alcohol Use Yes     Social History     Tobacco Use   Smoking Status Current Every Day Smoker   Smokeless Tobacco Not on file     Social History     Substance and Sexual Activity   Drug Use Yes    Types: Heroin, Methamphetamines, Cocaine, Fentanyl       Family History:  I have reviewed the patients family history     Physical Exam:     Vitals:   Blood Pressure: (!) 186/99 (12/29/21 1954)  Pulse: 75 (12/29/21 1954)  Temperature: 98 5 °F (36 9 °C) (12/29/21 1742)  Temp Source: Oral (12/29/21 1742)  Respirations: 16 (12/29/21 1954)  Weight - Scale: 77 3 kg (170 lb 6 4 oz) (12/29/21 1742)  SpO2: 100 % (12/29/21 1954)    Physical Exam  Vitals reviewed  Constitutional:       General: He is not in acute distress  Appearance: Normal appearance  HENT:      Head: Normocephalic and atraumatic  Right Ear: External ear normal       Left Ear: External ear normal       Nose: Nose normal    Eyes:      General:         Right eye: No discharge  Left eye: No discharge  Conjunctiva/sclera: Conjunctivae normal    Cardiovascular:      Rate and Rhythm: Normal rate and regular rhythm  Heart sounds: Normal heart sounds  No murmur heard  Pulmonary:      Effort: Pulmonary effort is normal  No respiratory distress  Breath sounds: Normal breath sounds  No wheezing or rales  Abdominal:      General: Bowel sounds are normal  There is no distension  Palpations: Abdomen is soft  Tenderness: There is no abdominal tenderness  There is no guarding  Musculoskeletal:         General: Normal range of motion  Arms:       Cervical back: Normal range of motion  Skin:     General: Skin is warm and dry  Findings: Lesion present  Neurological:      Mental Status: He is alert and oriented to person, place, and time  Mental status is at baseline  Psychiatric:         Mood and Affect: Mood normal          Behavior: Behavior normal          Thought Content: Thought content normal          Judgment: Judgment normal            Additional Data:     Lab Results: I have personally reviewed pertinent reports        Results from last 7 days   Lab Units 12/29/21  1823   WBC Thousand/uL 7 60   HEMOGLOBIN g/dL 13 2*   HEMATOCRIT % 40 8*   PLATELETS Thousands/uL 367   NEUTROS PCT % 76*   LYMPHS PCT % 13*   MONOS PCT % 10   EOS PCT % 1     Results from last 7 days   Lab Units 12/29/21  1823   SODIUM mmol/L 136*   POTASSIUM mmol/L 3 6   CHLORIDE mmol/L 100   CO2 mmol/L 33*   BUN mg/dL 14   CREATININE mg/dL 1 00   ANION GAP mmol/L 3*   CALCIUM mg/dL 8 5   ALBUMIN g/dL 3 8   TOTAL BILIRUBIN mg/dL 1 04   ALK PHOS U/L 121   ALT U/L 16   AST U/L 29   GLUCOSE RANDOM mg/dL 92     Results from last 7 days   Lab Units 12/29/21  1823   INR  1 08             Results from last 7 days   Lab Units 12/29/21  1823 12/28/21  2241   LACTIC ACID mmol/L 1 3 1 0   PROCALCITONIN ng/ml <0 05 <0 05       Epic / Care Everywhere Records Reviewed: Yes    ** Please Note: This note has been constructed using a voice recognition system   **

## 2021-12-30 NOTE — CASE MANAGEMENT
Case Management Assessment & Discharge Planning Note    Patient name Sacha Baker  Location 7T [facility] 706/7T [facility] 706-01 MRN [id number]  : 1965 Date 2021       Current Admission Date: 2021  Current Admission Diagnosis:Right arm cellulitis   Patient Active Problem List    Diagnosis Date Noted    Right arm cellulitis 2021    Homeless 2021      LOS (days): 0  Geometric Mean LOS (GMLOS) (days):   Days to GMLOS:     OBJECTIVE:    Risk of Unplanned Readmission Score: 8         Current admission status: Inpatient  Referral Reason:  (Discharge Planning)    Preferred Pharmacy:   [address]  [address] [facility]  [address] [facility] [address]  Phone: [phone] Fax: [phone] [address] [facility],[address], [address]  [address] [address]  Phone: [phone] Fax: [phone]    Primary Care Provider: Nabil Jett MD    Primary Insurance: [facility]  Secondary Insurance: [address] [place] [address]:  Active Health Care Agents    There are no active Health Care Agents on file  Readmission Root Cause  30 Day Readmission: No    Patient Information  Admitted from[de-identified] Other (comment) (Community-Pt is homeless)  Mental Status: Alert  During Assessment patient was accompanied by: Not accompanied during assessment  Assessment information provided by[de-identified] Patient  Primary Caregiver: Self  Support Systems: [address] [facility] Residence: [address] [place] [address] do you live in?: [address] residing in [place] living at the [facility]Ev[facility] for drug use  Home entry access options   Select all that apply : Other access (Comment) (Homeless)  Type of Current Residence: Homeless  Living Arrangements: Other (Comment) (Homeless)    Activities of Daily Living Prior to Admission  Functional Status: Independent  Completes ADLs independently?: Yes  Ambulates independently?: No  Level of ambulatory dependence: Total Dependent  Does patient use assisted devices?: Yes  Assisted Devices (DME) used: Wheelchair  Does patient currently own DME?: Yes  What DME does the patient currently own?: Wheelchair  Does patient have a history of Outpatient Therapy (PT/OT)?: No  Does the patient have a history of Short-Term Rehab?: Yes [facility]-Does not want to return to this facility)  Does patient have a history of [address]?: No  Does patient currently have [address]?: No    Patient Information Continued  Income Source: SSI/SSD  Does patient have prescription coverage?: Yes  Does patient receive dialysis treatments?: No  Does patient have a history of substance abuse?: Yes  Historical substance use preference: Marijuana  History of Withdrawal Symptoms: Denies past symptoms  Is patient currently in treatment for substance abuse?: No  Patient declined treatment information  Does patient have a history of Mental Health Diagnosis?: No    Means of Transportation  Means of Transport to Appts[de-identified] Friends  In the past 12 months, has lack of transportation kept you from medical appointments or from getting medications?: Patient refused  In the past 12 months, has lack of transportation kept you from meetings, work, or from getting things needed for daily living?: Patient refused  Was application for public transport provided?: No        DISCHARGE DETAILS:    Discharge planning discussed with[de-identified] Patient  Freedom of Choice: Yes  Comments - Freedom of Choice: Pt reports he wants to participate in IP PT/OT program  Pt wants to walk again  Pt states that at this time he is wheelchair dependent and wants to change his mobility  Pt also identified his ultimate goal-to acquire permanent housing in the community    CM contacted family/caregiver?: No- see comments (No family available to assist Pt with care-although Pt states he does communicate with his adult children )    Requested [address]         [facility] the [facility] at [facility]?: No    DME Referral Provided  Referral made for DME?: No    Other Referral/Resources/Interventions Provided:  Interventions: Short Term Rehab  Referral Comments: Pt is requesting STR-CM will confirm there are PT/OT orders in place

## 2021-12-31 LAB
ANION GAP SERPL CALCULATED.3IONS-SCNC: 3 MMOL/L (ref 5–14)
BUN SERPL-MCNC: 13 MG/DL (ref 5–25)
CALCIUM SERPL-MCNC: 7.9 MG/DL (ref 8.4–10.2)
CHLORIDE SERPL-SCNC: 103 MMOL/L (ref 97–108)
CO2 SERPL-SCNC: 30 MMOL/L (ref 22–30)
CREAT SERPL-MCNC: 0.77 MG/DL (ref 0.7–1.5)
ERYTHROCYTE [DISTWIDTH] IN BLOOD BY AUTOMATED COUNT: 16.6 %
GFR SERPL CREATININE-BSD FRML MDRD: 100 ML/MIN/1.73SQ M
GLUCOSE SERPL-MCNC: 97 MG/DL (ref 70–99)
HCT VFR BLD AUTO: 37.7 % (ref 41–53)
HGB BLD-MCNC: 12.7 G/DL (ref 13.5–17.5)
MCH RBC QN AUTO: 28.6 PG (ref 26–34)
MCHC RBC AUTO-ENTMCNC: 33.7 G/DL (ref 31–36)
MCV RBC AUTO: 85 FL (ref 80–100)
PLATELET # BLD AUTO: 353 THOUSANDS/UL (ref 150–450)
PMV BLD AUTO: 7 FL (ref 8.9–12.7)
POTASSIUM SERPL-SCNC: 3.1 MMOL/L (ref 3.6–5)
RBC # BLD AUTO: 4.44 MILLION/UL (ref 4.5–5.9)
SODIUM SERPL-SCNC: 136 MMOL/L (ref 137–147)
WBC # BLD AUTO: 7.2 THOUSAND/UL (ref 4.5–11)

## 2021-12-31 PROCEDURE — 80048 BASIC METABOLIC PNL TOTAL CA: CPT | Performed by: STUDENT IN AN ORGANIZED HEALTH CARE EDUCATION/TRAINING PROGRAM

## 2021-12-31 PROCEDURE — 99222 1ST HOSP IP/OBS MODERATE 55: CPT | Performed by: INTERNAL MEDICINE

## 2021-12-31 PROCEDURE — 97167 OT EVAL HIGH COMPLEX 60 MIN: CPT

## 2021-12-31 PROCEDURE — 99232 SBSQ HOSP IP/OBS MODERATE 35: CPT | Performed by: STUDENT IN AN ORGANIZED HEALTH CARE EDUCATION/TRAINING PROGRAM

## 2021-12-31 PROCEDURE — 85027 COMPLETE CBC AUTOMATED: CPT | Performed by: STUDENT IN AN ORGANIZED HEALTH CARE EDUCATION/TRAINING PROGRAM

## 2021-12-31 PROCEDURE — 87389 HIV-1 AG W/HIV-1&-2 AB AG IA: CPT | Performed by: STUDENT IN AN ORGANIZED HEALTH CARE EDUCATION/TRAINING PROGRAM

## 2021-12-31 PROCEDURE — 97163 PT EVAL HIGH COMPLEX 45 MIN: CPT

## 2021-12-31 RX ORDER — CEFAZOLIN SODIUM 1 G/50ML
1000 SOLUTION INTRAVENOUS EVERY 8 HOURS
Status: COMPLETED | OUTPATIENT
Start: 2021-12-31 | End: 2022-01-01

## 2021-12-31 RX ORDER — CEFAZOLIN SODIUM 1 G/50ML
1000 SOLUTION INTRAVENOUS EVERY 8 HOURS
Status: DISCONTINUED | OUTPATIENT
Start: 2021-12-31 | End: 2021-12-31

## 2021-12-31 RX ORDER — POTASSIUM CHLORIDE 20 MEQ/1
40 TABLET, EXTENDED RELEASE ORAL ONCE
Status: COMPLETED | OUTPATIENT
Start: 2021-12-31 | End: 2021-12-31

## 2021-12-31 RX ORDER — CEPHALEXIN 500 MG/1
500 CAPSULE ORAL EVERY 6 HOURS SCHEDULED
Status: COMPLETED | OUTPATIENT
Start: 2022-01-01 | End: 2022-01-05

## 2021-12-31 RX ORDER — DIPHENHYDRAMINE HCL 25 MG
25 TABLET ORAL EVERY 6 HOURS PRN
Status: DISCONTINUED | OUTPATIENT
Start: 2021-12-31 | End: 2022-03-11 | Stop reason: HOSPADM

## 2021-12-31 RX ORDER — AMLODIPINE BESYLATE 10 MG/1
10 TABLET ORAL DAILY
Status: DISCONTINUED | OUTPATIENT
Start: 2021-12-31 | End: 2022-03-11 | Stop reason: HOSPADM

## 2021-12-31 RX ADMIN — ACETAMINOPHEN 650 MG: 325 TABLET ORAL at 16:41

## 2021-12-31 RX ADMIN — CEFAZOLIN SODIUM 1000 MG: 1 SOLUTION INTRAVENOUS at 11:40

## 2021-12-31 RX ADMIN — HYDRALAZINE HYDROCHLORIDE 10 MG: 20 INJECTION INTRAMUSCULAR; INTRAVENOUS at 01:16

## 2021-12-31 RX ADMIN — HYDRALAZINE HYDROCHLORIDE 10 MG: 20 INJECTION INTRAMUSCULAR; INTRAVENOUS at 10:15

## 2021-12-31 RX ADMIN — AMPICILLIN SODIUM AND SULBACTAM SODIUM 3 G: 2; 1 INJECTION, POWDER, FOR SOLUTION INTRAMUSCULAR; INTRAVENOUS at 10:15

## 2021-12-31 RX ADMIN — CEFAZOLIN SODIUM 1000 MG: 1 SOLUTION INTRAVENOUS at 18:00

## 2021-12-31 RX ADMIN — POTASSIUM CHLORIDE 40 MEQ: 1500 TABLET, EXTENDED RELEASE ORAL at 16:41

## 2021-12-31 RX ADMIN — AMLODIPINE BESYLATE 10 MG: 10 TABLET ORAL at 10:14

## 2021-12-31 RX ADMIN — DIPHENHYDRAMINE HCL 25 MG: 25 TABLET ORAL at 11:43

## 2021-12-31 RX ADMIN — AMPICILLIN SODIUM AND SULBACTAM SODIUM 3 G: 2; 1 INJECTION, POWDER, FOR SOLUTION INTRAMUSCULAR; INTRAVENOUS at 04:09

## 2021-12-31 NOTE — PLAN OF CARE
Problem: OCCUPATIONAL THERAPY ADULT  Goal: Performs self-care activities at highest level of function for planned discharge setting  See evaluation for individualized goals  Description: Treatment Interventions: ADL retraining,Functional transfer training,UE strengthening/ROM,Endurance training,Cognitive reorientation,Patient/family training,Equipment evaluation/education,Compensatory technique education,Fine motor coordination activities,Continued evaluation,Energy conservation,Activityengagement          See flowsheet documentation for full assessment, interventions and recommendations     Outcome: Progressing  Note: Limitation: Decreased ADL status,Decreased UE ROM,Decreased UE strength,Decreased Safe judgement during ADL,Decreased cognition,Decreased endurance,Decreased self-care trans,Decreased high-level ADLs (SEVERE PAIN )  Prognosis: Fair,Good  Assessment: see note      OT Discharge Recommendation: Post acute rehabilitation services

## 2021-12-31 NOTE — PLAN OF CARE
Problem: PAIN - ADULT  Goal: Verbalizes/displays adequate comfort level or baseline comfort level  Description: Interventions:  - Encourage patient to monitor pain and request assistance  - Assess pain using appropriate pain scale  - Administer analgesics based on type and severity of pain and evaluate response  - Implement non-pharmacological measures as appropriate and evaluate response  - Consider cultural and social influences on pain and pain management  - Notify physician/advanced practitioner if interventions unsuccessful or patient reports new pain  Outcome: Progressing     Problem: INFECTION - ADULT  Goal: Absence or prevention of progression during hospitalization  Description: INTERVENTIONS:  - Assess and monitor for signs and symptoms of infection  - Monitor lab/diagnostic results  - Monitor all insertion sites, i e  indwelling lines, tubes, and drains  - Monitor endotracheal if appropriate and nasal secretions for changes in amount and color  - [place] appropriate cooling/warming therapies per order  - Administer medications as ordered  - Instruct and encourage patient and family to use good hand hygiene technique  - Identify and instruct in appropriate isolation precautions for identified infection/condition  Outcome: Progressing     Problem: SAFETY ADULT  Goal: Patient will remain free of falls  Description: INTERVENTIONS:  - Educate patient/family on patient safety including physical limitations  - Instruct patient to call for assistance with activity   - Consult OT/PT to assist with strengthening/mobility   - Keep Call bell within reach  - Keep bed low and locked with side rails adjusted as appropriate  - Keep care items and personal belongings within reach  - Initiate and maintain comfort rounds  - Make Fall Risk Sign visible to staff    - Apply yellow socks and bracelet for high fall risk patients  - Consider moving patient to room near nurses station  Outcome: Progressing     Problem: DISCHARGE PLANNING  Goal: Discharge to home or other facility with appropriate resources  Description: INTERVENTIONS:  - Identify barriers to discharge w/patient and caregiver  - Arrange for needed discharge resources and transportation as appropriate  - Identify discharge learning needs (meds, wound care, etc )  - Arrange for interpretive services to assist at discharge as needed  - Refer to Case Management Department for coordinating discharge planning if the patient needs post-hospital services based on physician/advanced practitioner order or complex needs related to functional status, cognitive ability, or social support system  Outcome: Progressing

## 2021-12-31 NOTE — PLAN OF CARE
Problem: PAIN - ADULT  Goal: Verbalizes/displays adequate comfort level or baseline comfort level  Description: Interventions:  - Encourage patient to monitor pain and request assistance  - Assess pain using appropriate pain scale  - Administer analgesics based on type and severity of pain and evaluate response  - Implement non-pharmacological measures as appropriate and evaluate response  - Consider cultural and social influences on pain and pain management  - Notify physician/advanced practitioner if interventions unsuccessful or patient reports new pain  Outcome: Progressing     Problem: INFECTION - ADULT  Goal: Absence or prevention of progression during hospitalization  Description: INTERVENTIONS:  - Assess and monitor for signs and symptoms of infection  - Monitor lab/diagnostic results  - Monitor all insertion sites, i e  indwelling lines, tubes, and drains  - Monitor endotracheal if appropriate and nasal secretions for changes in amount and color  - [place] appropriate cooling/warming therapies per order  - Administer medications as ordered  - Instruct and encourage patient and family to use good hand hygiene technique  - Identify and instruct in appropriate isolation precautions for identified infection/condition  Outcome: Progressing  Goal: Absence of fever/infection during neutropenic period  Description: INTERVENTIONS:  - Monitor WBC    Outcome: Progressing     Problem: SAFETY ADULT  Goal: Patient will remain free of falls  Description: INTERVENTIONS:  - Educate patient/family on patient safety including physical limitations  - Instruct patient to call for assistance with activity   - Consult OT/PT to assist with strengthening/mobility   - Keep Call bell within reach  - Keep bed low and locked with side rails adjusted as appropriate  - Keep care items and personal belongings within reach  - Initiate and maintain comfort rounds  - Make Fall Risk Sign visible to staff  - Offer Toileting every 2 Hours, in advance of need  - Initiate/Maintain   - Obtain necessary fall risk management equipment:   - Apply yellow socks and bracelet for high fall risk patients  - Consider moving patient to room near nurses station  Outcome: Progressing  Goal: Maintain or return to baseline ADL function  Description: INTERVENTIONS:  -  Assess patient's ability to carry out ADLs; assess patient's baseline for ADL function and identify physical deficits which impact ability to perform ADLs (bathing, care of mouth/teeth, toileting, grooming, dressing, etc )  - Assess/evaluate cause of self-care deficits   - Assess range of motion  - Assess patient's mobility; develop plan if impaired  - Assess patient's need for assistive devices and provide as appropriate  - Encourage maximum independence but intervene and supervise when necessary  - Involve family in performance of ADLs  - Assess for home care needs following discharge   - Consider OT consult to assist with ADL evaluation and planning for discharge  - Provide patient education as appropriate  Outcome: Progressing  Goal: Maintains/Returns to pre admission functional level  Description: INTERVENTIONS:  - Perform BMAT or MOVE assessment daily    - Set and communicate daily mobility goal to care team and patient/family/caregiver  - Collaborate with rehabilitation services on mobility goals if consulted  - Perform Range of Motion 2 times a day  - Reposition patient every 2 hours    - Dangle patient 2 times a day  - Stand patient 2 times a day  - Out of bed to chair 2 times a day   - Out of bed for meals 2 times a day  - Out of bed for toileting  - Record patient progress and toleration of activity level   Outcome: Progressing     Problem: DISCHARGE PLANNING  Goal: Discharge to home or other facility with appropriate resources  Description: INTERVENTIONS:  - Identify barriers to discharge w/patient and caregiver  - Arrange for needed discharge resources and transportation as appropriate  - Identify discharge learning needs (meds, wound care, etc )  - Arrange for interpretive services to assist at discharge as needed  - Refer to Case Management Department for coordinating discharge planning if the patient needs post-hospital services based on physician/advanced practitioner order or complex needs related to functional status, cognitive ability, or social support system  Outcome: Progressing     Problem: Knowledge Deficit  Goal: Patient/family/caregiver demonstrates understanding of disease process, treatment plan, medications, and discharge instructions  Description: Complete learning assessment and assess knowledge base  Interventions:  - Provide teaching at level of understanding  - Provide teaching via preferred learning methods  Outcome: Progressing     Problem: MOBILITY - ADULT  Goal: Maintain or return to baseline ADL function  Description: INTERVENTIONS:  -  Assess patient's ability to carry out ADLs; assess patient's baseline for ADL function and identify physical deficits which impact ability to perform ADLs (bathing, care of mouth/teeth, toileting, grooming, dressing, etc )  - Assess/evaluate cause of self-care deficits   - Assess range of motion  - Assess patient's mobility; develop plan if impaired  - Assess patient's need for assistive devices and provide as appropriate  - Encourage maximum independence but intervene and supervise when necessary  - Involve family in performance of ADLs  - Assess for home care needs following discharge   - Consider OT consult to assist with ADL evaluation and planning for discharge  - Provide patient education as appropriate  Outcome: Progressing  Goal: Maintains/Returns to pre admission functional level  Description: INTERVENTIONS:  - Perform BMAT or MOVE assessment daily    - Set and communicate daily mobility goal to care team and patient/family/caregiver     - Collaborate with rehabilitation services on mobility goals if consulted  - Perform Range of Motion 2 times a day  - Reposition patient every 2 hours    - Dangle patient 2 times a day  - Stand patient 2 times a day  - Out of bed to chair 2 times a day   - Out of bed for meals 2 times a day  - Out of bed for toileting  - Record patient progress and toleration of activity level   Outcome: Progressing     Problem: Potential for Falls  Goal: Patient will remain free of falls  Description: INTERVENTIONS:  - Educate patient/family on patient safety including physical limitations  - Instruct patient to call for assistance with activity   - Consult OT/PT to assist with strengthening/mobility   - Keep Call bell within reach  - Keep bed low and locked with side rails adjusted as appropriate  - Keep care items and personal belongings within reach  - Initiate and maintain comfort rounds  - Make Fall Risk Sign visible to staff  - Offer Toileting every 2 Hours, in advance of need  - Initiate/Maintain   - Obtain necessary fall risk management equipment:   - Apply yellow socks and bracelet for high fall risk patients  - Consider moving patient to room near nurses station  Outcome: Progressing     Problem: Prexisting or High Potential for Compromised Skin Integrity  Goal: Skin integrity is maintained or improved  Description: INTERVENTIONS:  - Identify patients at risk for skin breakdown  - Assess and monitor skin integrity  - Assess and monitor nutrition and hydration status  - Monitor labs   - Assess for incontinence   - Turn and reposition patient  - Assist with mobility/ambulation  - Relieve pressure over bony prominences  - Avoid friction and shearing  - Provide appropriate hygiene as needed including keeping skin clean and dry  - Evaluate need for skin moisturizer/barrier cream  - Collaborate with interdisciplinary team   - Patient/family teaching  - Consider wound care consult   Outcome: Progressing

## 2021-12-31 NOTE — OCCUPATIONAL THERAPY NOTE
Occupational Therapy Evaluation     Patient Name: Ama Turner  Today's Date: 12/31/2021  Problem List  Principal Problem:    Right arm cellulitis  Active Problems:    Homeless    Past Medical History  Past Medical History:   Diagnosis Date    Arthritis     Hypertension     Sciatica      Past Surgical History  Past Surgical History:   Procedure Laterality Date    BACK SURGERY          12/31/21 1315   OT Last Visit   OT Visit Date 12/31/21   Note Type   Note type Evaluation   Restrictions/Precautions   Other Precautions Fall Risk;Pain;Multiple lines   Pain Assessment   Pain Assessment Tool 0-10   Pain Score 10 - Worst Possible Pain   Pain Location/Orientation Location: Generalized   Home Living   Type of [address]   Prior Function   Level of [place] Independent with ADLs and functional mobility; Needs assistance with IADLs  (Independent until recent functional decline )   ADL Assistance Independent   IADLs Needs assistance   Comments Pt reports functional decline over the last few months; recent utilization of w/c    Lifestyle   Reciprocal Relationships no home support    Subjective   Subjective "The infection is coming out of me"    ADL   Eating Assistance 4  Minimal Assistance  (2* FM limitations )   Grooming Assistance 4  Minimal Assistance  (2* FM limitations )   UB Bathing Assistance 3  Moderate Assistance   LB Bathing Assistance 2  Maximal Assistance   UB Dressing Assistance 3  Moderate Assistance   LB Dressing Assistance 2  Maximal [address]  3  Moderate Assistance   Bed Mobility   Rolling R 5  Supervision   Additional items Bedrails; Increased time required   Rolling L 5  Supervision   Additional items Bedrails; Increased time required   Supine to Sit Unable to assess   Sit to Supine Unable to assess   Additional Comments Pt declined sup<>sit 2* pain    Transfers   Sit to Stand Unable to assess   Stand to Sit Unable to assess   Additional Comments unable to tolerate 2* severe pain    Functional Mobility   Additional Comments unable to assess - pt unable to tolerate 2* pain    Activity Tolerance   Activity Tolerance Patient limited by pain   Medical Staff Made Aware spoke to [address] to RN    RUE Assessment   RUE Assessment X  (unable to formally assess 2* pain)   LUE Assessment   LUE Assessment X  (ROM pain limited; able to adjust position in bed )   Hand Function   Gross Motor Coordination Impaired   Fine Motor Coordination Impaired   Perception   Inattention/Neglect Appears intact   Cognition   Overall Cognitive Status Impaired   Arousal/Participation Alert   Attention Attends with cues to redirect   Orientation Level Oriented X4   Memory Decreased recall of precautions;Decreased recall of recent events;Decreased short term memory   Following Commands Follows one step commands with increased time or repetition   Comments poor health literacy; decreased insight    Assessment   Limitation Decreased ADL status; Decreased UE ROM; Decreased UE strength;Decreased Safe judgement during ADL;Decreased cognition;Decreased endurance;Decreased self-care trans;Decreased high-level ADLs  (SEVERE PAIN )   Prognosis Fair;Good   Assessment Pt is a 60 y o  male seen for OT evaluation s/p admit to [facility] on 12/29/2021 w/ Right arm cellulitis  OT completed an extensive review of pt's medical and social history  Pt  has a past medical history of Arthritis, Hypertension, and Sciatica  See additional PMH in pt chart  Personal factors affecting pt at time of IE include:limited home support, difficulty performing ADLS, difficulty performing IADLS , limited insight into deficits, health management  and homeless  As per pt report, Independent with ADLs/mobility until recent functional decline caused by severe pain, utilizing w/c over the last few months  Pt does not have a permanent residence, homeless with no social support   Upon evaluation, pt with c/o severe pain, unable to tolerate any OOB activity  Exhibits decreased strength to B/L UE's limiting performance in ADLs/transfers  Unable to formally assess RUE ROM 2* pain, noted FM deficits  Based on pt's current limitations, Pt currently  requires Mod A UB ADLs, Max A LB ADLs, and Mod A toileting  Pt presents to OT below baseline due to the following performance deficits: weakness, decreased ROM, decreased strength, decreased balance, decreased tolerance, impaired GMC, impaired [address], decreased safety awareness, increased pain and impaired cognition   Pt to benefit from continued skilled OT services while in the hospital to address deficits as defined above and maximize level of functional independence w ADL's and functional mobility  Occupational performance areas to address include: grooming, bathing/shower, toilet hygiene, dressing, health maintenance, functional mobility, community mobility and clothing management  The patient's raw score on the -PAC Daily Activity inpatient short form is 14, standardized score is 33 39, less than 39 4  Patients at this level are likely to benefit from discharge to post-acute rehabilitation services  Please refer to the recommendation of the Occupational Therapist for safe discharge planning  Based on OT evaluation, assessment(s), performance deficits listed, and current level of function, pt identified as a HIGH complexity evaluation  From OT standpoint, recommendation at time of d/c would be IP rehab to further progress independence with ADLs, mobility, and safety at home to reduce risk of falls/readmisison  Goals   Patient Goals to have less pain    Plan   Treatment Interventions ADL retraining;Functional transfer training;UE strengthening/ROM; Endurance training;Cognitive reorientation;Patient/family training;Equipment evaluation/education; Compensatory technique education; Fine motor coordination activities;Continued evaluation; Energy conservation; Activityengagement Goal Expiration Date 01/07/22   OT Treatment Day 0   OT Frequency 2-3x/wk   Recommendation   OT Discharge Recommendation Post acute rehabilitation services   AM-PAC Daily Activity Inpatient   Lower Body Dressing 2   Bathing 2   Toileting 2   Upper Body Dressing 2   Grooming 3   Eating 3   Daily Activity Raw Score 14   Daily Activity Standardized Score (Calc for Raw Score >=11) 33 39       Pt will complete the following goals in 7 days        1) Pt will complete LB dressing/self care MI using adaptive device and DME as needed     2) Pt will complete bathroom mobility MI using AD as needed  3) Pt will complete toileting MI w/ G hygiene/thoroughness using DME as needed     4) Pt will improve functional transfers to MI on/off all surfaces using DME as needed w/ G balance/safety      5) Pt will complete bed mobility Ind  with HOB flat/no BRs       6) Pt will demonstrate F+ dynamic standing balance and increase standing tolerance to 4-7 min for inc'd safety with standing purposeful tasks           Manuel Guthrie, MS, OTR/L

## 2021-12-31 NOTE — CONSULTS
Consultation - Infectious Disease   Heaven Brand 60 y o  male MRN: [id number]  Unit/Bed#: 7T [facility] 706-01 Encounter: [id number]      IMPRESSION & RECOMMENDATIONS:   Impression/Recommendations: This is a 60 y o  male, with history of relatively severe psoriasis, with no treatment recently, presented with relapse of psoriasis and right forearm cellulitis  1  Right forearm cellulitis, secondary to laceration versus skin breakage from psoriasis rash  Cellulitis appears to be quite mild and superficial   No evidence of involvement of deeper structures on exam   Patient has no fever or leukocytosis  He is clinically and systemically well  Change antibiotic to IV cefazolin  Treat x 24 hours  Transition to p o  Keflex in a m  Kenton Ingles Treat x7 days total antibiotic    2  Psoriasis flare  Patient has history of extensive psoriasis, previously on Enbrel but has been lost to follow up on these for years  He may benefit from outpatient dermatology evaluation  Recommend outpatient dermatology evaluation  3  Homelessness  ER records reviewed in detail  Discussed with patient in detail regarding above plan  Discussed with Dr Carla Velazco from [facility]  Thank you for this consultation  We will follow along with you  HISTORY OF PRESENT ILLNESS:  Reason for Consult:  Right arm cellulitis  HPI: Heaven Brand is a 60 y o  male, homeless with no medical care, presented to [facility] ED on 12/29 with 3 month history of progressing a rash and a few day history of swelling and pain in right arm  Patient was discharged to [facility] on p o  Keflex  However, on presenting to [facility], due to open wound, patient was sent to [facility] ER later that day  He was admitted and started on IV Unasyn for cellulitis  We are asked to evaluate the patient  At present, patient states that the pain and swelling in this right arm is a little better  No fever or chills  Patient does not know how he had the laceration on that arm  Patient has longstanding history of psoriasis, previously on Enbrel  However, he has been lost to follow-up and has not taken any medication for psoriasis for at least 4 years  For last 3 months, he started having psoriasis rash again, worse in legs and arms  Patient is currently homeless  REVIEW OF SYSTEMS:  A complete system-based review was done  Except for what is noted in HPI above, ROS of systems is otherwise negative  PAST MEDICAL HISTORY:  Past Medical History:   Diagnosis Date    Arthritis     Hypertension     Sciatica      Past Surgical History:   Procedure Laterality Date    BACK SURGERY       Problem list reviewed  FAMILY HISTORY:  Non-contributory    SOCIAL HISTORY:  Social History     Substance and Sexual Activity   Alcohol Use Yes     Social History     Substance and Sexual Activity   Drug Use Yes    Types: Heroin, Methamphetamines, Cocaine, Fentanyl     Social History     Tobacco Use   Smoking Status Current Every Day Smoker   Smokeless Tobacco Not on file       ALLERGIES:  No Known Allergies    MEDICATIONS:  All current active medications have been reviewed  Patient is currently on IV Unasyn  PHYSICAL EXAM:  Vitals:  Temp:  [97 7 °F (36 5 °C)-98 7 °F (37 1 °C)] 97 7 °F (36 5 °C)  HR:  [54-78] 72  Resp:  [18-20] 20  BP: (152-178)/(74-92) 178/85  SpO2:  [97 %-100 %] 97 %  Temp (24hrs), Av °F (36 7 °C), Min:97 7 °F (36 5 °C), Max:98 7 °F (37 1 °C)  Current: Temperature: 97 7 °F (36 5 °C)     Physical Exam:  General:  Well-nourished, well-developed, in no acute distress  Awake, alert and oriented x 3    Eyes:  Conjunctive clear with no hemorrhages or effusions  Oropharynx:  No ulcers, no lesions, pharynx benign, no tonsillitis  Neck:  Supple, no lymphadenopathy, no mass, nontender  Lungs:  Expansion symmetric, no rales, no wheezing, no accessory muscle use  Cardiac:  Regular rate and rhythm, normal S1, normal S2, no murmurs  Abdomen:  Soft, nondistended, non-tender, no HSM  Extremities:  Right forearm with healing laceration  Mild surrounding edema, erythema, warmth and tenderness  No fluctuance  No drainage  Skin:  Diffuse scaly rash, worse on legs  No purulent drainage  No erythema/warmth, other than right arm above  Nontender  Neurological:  Moves all four extremities spontaneously, sensation grossly intact    LABS, IMAGING, & OTHER STUDIES:  Lab Results:  I have personally reviewed pertinent labs  Results from last 7 days   Lab Units 12/31/21  0451 12/29/21  1823 12/28/21  2241   POTASSIUM mmol/L 3 1* 3 6 3 1*   CHLORIDE mmol/L 103 100 101   CO2 mmol/L 30 33* 29   BUN mg/dL 13 14 15   CREATININE mg/dL 0 77 1 00 0 69   EGFR ml/min/1 73sq m 100 82 104   CALCIUM mg/dL 7 9* 8 5 8 2*   AST U/L  --  29 19   ALT U/L  --  16 23   ALK PHOS U/L  --  121 111     Results from last 7 days   Lab Units 12/31/21  0451 12/29/21  1823 12/28/21  2241   WBC Thousand/uL 7 20 7 60 9 30   HEMOGLOBIN g/dL 12 7* 13 2* 12 4   PLATELETS Thousands/uL 353 367 366     Results from last 7 days   Lab Units 12/29/21  1845 12/29/21  1823 12/28/21  2241   BLOOD CULTURE  No Growth at 24 hrs  No Growth at 24 hrs  No Growth at 48 hrs  No Growth at 48 hrs  Imaging Studies:   I have personally reviewed pertinent imaging study reports and images in PACS  EKG, Pathology, and Other Studies:   I have personally reviewed pertinent reports

## 2022-01-01 PROBLEM — L40.9 PSORIASIS, UNSPECIFIED: Status: ACTIVE | Noted: 2022-01-01

## 2022-01-01 PROBLEM — R26.2 AMBULATORY DYSFUNCTION: Status: ACTIVE | Noted: 2022-01-01

## 2022-01-01 PROCEDURE — 99232 SBSQ HOSP IP/OBS MODERATE 35: CPT | Performed by: STUDENT IN AN ORGANIZED HEALTH CARE EDUCATION/TRAINING PROGRAM

## 2022-01-01 RX ORDER — ONDANSETRON 4 MG/1
4 TABLET, ORALLY DISINTEGRATING ORAL EVERY 6 HOURS PRN
Status: DISCONTINUED | OUTPATIENT
Start: 2022-01-01 | End: 2022-03-11 | Stop reason: HOSPADM

## 2022-01-01 RX ADMIN — ACETAMINOPHEN 650 MG: 325 TABLET ORAL at 09:46

## 2022-01-01 RX ADMIN — CEFAZOLIN SODIUM 1000 MG: 1 SOLUTION INTRAVENOUS at 03:04

## 2022-01-01 RX ADMIN — CEPHALEXIN 500 MG: 500 CAPSULE ORAL at 17:57

## 2022-01-01 RX ADMIN — AMLODIPINE BESYLATE 10 MG: 10 TABLET ORAL at 09:46

## 2022-01-01 RX ADMIN — CEPHALEXIN 500 MG: 500 CAPSULE ORAL at 12:01

## 2022-01-01 RX ADMIN — DIPHENHYDRAMINE HCL 25 MG: 25 TABLET ORAL at 17:59

## 2022-01-01 RX ADMIN — ACETAMINOPHEN 650 MG: 325 TABLET ORAL at 17:57

## 2022-01-01 RX ADMIN — CEPHALEXIN 500 MG: 500 CAPSULE ORAL at 05:18

## 2022-01-01 NOTE — PROGRESS NOTES
[facility]  Progress Note - Na Eduardo 1965, 60 y o  male MRN: [id number]  Unit/Bed#: 7T [facility] 706-01 Encounter: [id number]  Primary Care Provider: Myles Cm MD   Date and time admitted to hospital: 2021  5:38 PM    * Right arm cellulitis  Assessment & Plan  · Patient with right forearm lesion with redness and streaking noted in ED  · Id consulted, appreciate recommendations  · Patient will be transitioned to Keflex today    Ambulatory dysfunction  Assessment & Plan  PT OT recommending post acute rehab    Psoriasis, unspecified  Assessment & Plan  Patient with psoriasis flare, was on Enbrel on past however was lost to follow-up  -recommend Dermatology referral on outpatient basis      [address] [facility]  · Patient was to be discharged to the [facility] however they would not take him back  · Case management for discharge planning        VTE Pharmacologic Prophylaxis:   Moderate Risk (Score 3-4) - Pharmacological DVT Prophylaxis Ordered: enoxaparin (Lovenox)  Patient Centered Rounds: I performed bedside rounds with nursing staff today  Discussions with Specialists or Other Care Team Provider:  None    Education and Discussions with Family / Patient: Patient declined call to   Time Spent for Care: 30 minutes  More than 50% of total time spent on counseling and coordination of care as described above  Current Length of Stay: 2 day(s)  Current Patient Status: Inpatient   Certification Statement: The patient will continue to require additional inpatient hospital stay due to Pending rehab placement  Discharge Plan: Pending placement to post acute rehab    Code Status: Level 1 - Full Code    Subjective:   Patient seen examined at bedside  Patient reports right arm cellulitis appears improved today    States he still has difficulty ambulating    Objective:     Vitals:   Temp (24hrs), Av 3 °F (36 3 °C), Min:96 6 °F (35 9 °C), Max:97 7 °F (36 5 °C)    Temp:  [96 6 °F (35 9 °C)-97 7 °F (36 5 °C)] 96 6 °F (35 9 °C)  HR:  [72-78] 72  Resp:  [18-20] 20  BP: (159-169)/(72-88) 169/83  SpO2:  [98 %-100 %] 98 %  Body mass index is 23 82 kg/m²  Input and Output Summary (last 24 hours): Intake/Output Summary (Last 24 hours) at 1/1/2022 1102  Last data filed at 1/1/2022 0701  Gross per 24 hour   Intake 600 ml   Output 1200 ml   Net -600 ml       Physical Exam:   Physical Exam  HENT:      Head: Normocephalic and atraumatic  Cardiovascular:      Rate and Rhythm: Normal rate and regular rhythm  Pulses: Normal pulses  Heart sounds: Normal heart sounds  Pulmonary:      Effort: Pulmonary effort is normal       Breath sounds: Normal breath sounds  Abdominal:      General: Abdomen is flat  Bowel sounds are normal       Palpations: Abdomen is soft  Musculoskeletal:      Cervical back: Normal range of motion  Comments: Generalized weakness   Skin:     General: Skin is warm  Comments: Sporadic rash on extremities consistent with psoriasis   Neurological:      General: No focal deficit present  Mental Status: He is oriented to person, place, and time  Mental status is at baseline  Psychiatric:         Mood and Affect: Mood normal          Behavior: Behavior normal          Thought Content: Thought content normal          Judgment: Judgment normal           Additional Data:     Labs:  Results from last 7 days   Lab Units 12/31/21 0451 12/29/21 1823 12/29/21 1823   WBC Thousand/uL 7 20   < > 7 60   HEMOGLOBIN g/dL 12 7*   < > 13 2*   HEMATOCRIT % 37 7*   < > 40 8*   PLATELETS Thousands/uL 353   < > 367   NEUTROS PCT %  --   --  76*   LYMPHS PCT %  --   --  13*   MONOS PCT %  --   --  10   EOS PCT %  --   --  1    < > = values in this interval not displayed       Results from last 7 days   Lab Units 12/31/21 0451 12/29/21 1823 12/29/21 1823   SODIUM mmol/L 136*   < > 136*   POTASSIUM mmol/L 3 1*   < > 3 6 CHLORIDE mmol/L 103   < > 100   CO2 mmol/L 30   < > 33*   BUN mg/dL 13   < > 14   CREATININE mg/dL 0 77   < > 1 00   ANION GAP mmol/L 3*   < > 3*   CALCIUM mg/dL 7 9*   < > 8 5   ALBUMIN g/dL  --   --  3 8   TOTAL BILIRUBIN mg/dL  --   --  1 04   ALK PHOS U/L  --   --  121   ALT U/L  --   --  16   AST U/L  --   --  29   GLUCOSE RANDOM mg/dL 97   < > 92    < > = values in this interval not displayed  Results from last 7 days   Lab Units 12/29/21  1823   INR  1 08             Results from last 7 days   Lab Units 12/30/21  0554 12/29/21  1823 12/28/21  2241   LACTIC ACID mmol/L  --  1 3 1 0   PROCALCITONIN ng/ml <0 05 <0 05 <0 05       Lines/Drains:  Invasive Devices  Report    Peripheral Intravenous Line            Peripheral IV 12/30/21 Left Hand 2 days                      Imaging: No pertinent imaging reviewed  Recent Cultures (last 7 days):   Results from last 7 days   Lab Units 12/29/21  1845 12/29/21  1823 12/28/21  2241   BLOOD CULTURE  No Growth at 48 hrs  No Growth at 48 hrs  No Growth at 72 hrs  No Growth at 72 hrs  Last 24 Hours Medication List:   Current Facility-Administered Medications   Medication Dose Route Frequency Provider Last Rate    acetaminophen  650 mg Oral Q4H PRN Lorna Miles PA-C      amLODIPine  10 mg Oral Daily Lj Regan DO      cephalexin  500 mg Oral Q6H [address] Shahzad Keyes MD      diphenhydrAMINE  25 mg Oral Q6H PRN Lj Regan DO      enoxaparin  40 mg Subcutaneous Daily Billy Powell      nicotine  1 patch Transdermal Daily [facility] [place], [place]      ondansetron  4 mg Oral Q6H PRN Lj Regan DO          Today, Patient Was Seen By: Lj Regan DO    **Please Note: This note may have been constructed using a voice recognition system  **

## 2022-01-01 NOTE — ASSESSMENT & PLAN NOTE
· Patient with right forearm lesion with redness and streaking noted in ED  · Id consulted, appreciate recommendations  · Patient will be transitioned to Keflex today

## 2022-01-01 NOTE — ASSESSMENT & PLAN NOTE
· Patient was to be discharged to the [facility] however they would not take him back  · Case management for discharge planning

## 2022-01-01 NOTE — PHYSICAL THERAPY NOTE
Physical Therapy Evaluation    Patient's Name: Sonido Wilson    Admitting Diagnosis  Cellulitis [L03 90]  Homeless [Z59 00]  Wound check, abscess [Z51 89]    Problem List  Patient Active Problem List   Diagnosis    Right arm cellulitis    Homeless    Psoriasis, unspecified    Ambulatory dysfunction       Past Medical History  Past Medical History:   Diagnosis Date    Arthritis     Hypertension     Sciatica        Past Surgical History  Past Surgical History:   Procedure Laterality Date    BACK SURGERY         Recent Imaging  No orders to display       Recent Vital Signs  Vitals:    12/31/21 1557 12/31/21 2250 01/01/22 0743 01/01/22 0946   BP: 159/72 162/88 167/76 169/83   BP Location: Right arm Right arm Right arm    Pulse: 78 75 72    Resp: 18 18 20    Temp: 97 7 °F (36 5 °C) 97 5 °F (36 4 °C) (!) 96 6 °F (35 9 °C)    TempSrc: Temporal Temporal Temporal    SpO2: 100% 98% 98%    Weight:       Height:            12/31/21 1310   PT Last Visit   PT Visit Date 12/31/21   Note Type   Note type Evaluation   Pain Assessment   Pain Assessment Tool 0-10   Pain Score 10 - Worst Possible Pain   Restrictions/Precautions   Other Precautions Pain; Fall Risk   Home Living   Type of [address]   Prior Function   Level of [place] Independent with ADLs and functional mobility; Needs assistance with IADLs   ADL Assistance Independent   IADLs Needs assistance   Comments Pt reports functional decline over the last few months; recent utilization of w/c    Cognition   Overall Cognitive Status Impaired   Arousal/Participation Arousable   Attention Attends with cues to redirect   Orientation Level Oriented X4   Memory Decreased recall of recent events;Decreased recall of precautions   Following Commands Follows one step commands without difficulty   Comments poor health literacy; decreased insight    RLE Assessment   RLE Assessment   (3-/5)   LLE Assessment   LLE Assessment   (3-/5) Coordination   Movements are Fluid and Coordinated 0   Coordination and Movement Description very stiff and antalgic movements   Light Touch   RLE Light Touch Impaired   LLE Light Touch Impaired   Bed Mobility   Rolling R 5  Supervision   Rolling L 5  Supervision   Supine to Sit Unable to assess   Sit to Supine Unable to assess   Activity Tolerance   Activity Tolerance Patient limited by pain; Patient limited by fatigue   Medical Staff Made Aware spoke to [facility]   Nurse Made Aware spoke to RN   Assessment   Prognosis Fair   Problem List Decreased strength;Decreased range of motion;Decreased endurance; Impaired balance;Decreased mobility; Decreased coordination;Decreased cognition; Impaired judgement;Decreased safety awareness; Impaired sensation;Pain;Decreased skin integrity   Barriers to Discharge Inaccessible home environment;Decreased caregiver support   Goals   Patient Goals less pain   STG Expiration Date 01/11/22   Short Term Goal #1 see eval note   Plan   Treatment/Interventions ADL retraining;Functional transfer training;LE strengthening/ROM; Elevations; Therapeutic exercise; Endurance training;Patient/family training;Equipment eval/education; Bed mobility;Gait training;Spoke to nursing;Spoke to case management;OT   PT Frequency 2-3x/wk   Recommendation   PT Discharge Recommendation Post acute rehabilitation services   AM-PAC Basic Mobility Inpatient   Turning in Bed Without Bedrails 4   Lying on Back to Sitting on Edge of Flat Bed 2   Moving Bed to Chair 2   Standing Up From Chair 2   Walk in Room 2   Climb 3-5 Stairs 1   Basic Mobility Inpatient Raw Score 13   Basic Mobility Standardized Score 33 99   Highest Level Of Mobility   JH-HLM Goal 4: Move to chair/commode   JH-HLM Highest Level of Mobility 2: Bed activities/Dependent transfer   JH-HLM Goal Achieved No   End of Consult   Patient Position at End of Consult Supine; All needs within reach         ASSESSMENT Lalo Tovar is a 60 y o  male admitted to [facility] on 12/29/2021 for Right arm cellulitis  Pt  has a past medical history of Arthritis, Hypertension, and Sciatica    PT was consulted and pt was seen on 1/1/2022 for mobility assessment and d/c planning  Pt presents supine in bed alert and agreeable to assessment  He is reporting severe pain generally  Demonstrates weakness and stiffness in BLE and UE  Endurance is limited due to pain and fatigue  Unable to assess mobility due to pt not tolerating due to pain  The patient's AM-PAC Basic Mobility Inpatient Short Form Raw Score is 13  A Raw score of less than or equal to 16 suggests the patient may benefit from discharge to post-acute rehabilitation services  Please also refer to the recommendation of the Physical Therapist for safe discharge planning  Goals                                                                                                                                    1) Bed mobility skills with modified independent assistance to facilitate safe return to previous living environment 2) Functional transfers with modified independent assistance to facilitate safe return to previous living environment  3) Ambulation with least restrictive AD modified independent assistance without LOB and stable vitals for safe ambulation home/ community distances  5)Improve LE strength grades by 1 to increase independence w/ transfers and gait  6) PT for ongoing pt and family education; DME needs and D/C planning to promote highest level of function in least restrictive environment  Recommendations                                                                                                              Pt will benefit from continued skilled IP PT to address the above mentioned impairments in order to maximize recovery and increase functional independence when completing mobility and ADLs   See flow sheet for goals and POC       DME:  manual wheelchair    Discharge Disposition:  Post Acute Rehab Services      Maxine Larkin PT, DPT

## 2022-01-01 NOTE — ASSESSMENT & PLAN NOTE
· Patient with right forearm lesion with redness and streaking noted in ED  · Started on Unasyn  · Lactic acid normal  · WBC normal  · Procal negative

## 2022-01-01 NOTE — ASSESSMENT & PLAN NOTE
Patient with psoriasis flare, was on Enbrel on past however was lost to follow-up  -recommend Dermatology referral on outpatient basis

## 2022-01-01 NOTE — PLAN OF CARE
Problem: PHYSICAL THERAPY ADULT  Goal: Performs mobility at highest level of function for planned discharge setting  See evaluation for individualized goals  Description: Treatment/Interventions: ADL retraining,Functional transfer training,LE strengthening/ROM,Elevations,Therapeutic exercise,Endurance training,Patient/family training,Equipment eval/education,Bed mobility,Gait training,Spoke to nursing,Spoke to case management,OT          See flowsheet documentation for full assessment, interventions and recommendations  Outcome: Progressing  Note: Prognosis: Fair  Problem List: Decreased strength,Decreased range of motion,Decreased endurance,Impaired balance,Decreased mobility,Decreased coordination,Decreased cognition,Impaired judgement,Decreased safety awareness,Impaired sensation,Pain,Decreased skin integrity     Barriers to Discharge: Inaccessible home environment,Decreased caregiver support        PT Discharge Recommendation: Post acute rehabilitation services          See flowsheet documentation for full assessment

## 2022-01-01 NOTE — ASSESSMENT & PLAN NOTE
· Patient was to be discharged to the [facility] however they would not take him back  · Case management for discharge planning

## 2022-01-01 NOTE — PROGRESS NOTES
[facility]  Progress Note - [facility] 1965, 60 y o  male MRN: [id number]  Unit/Bed#: 7T [facility] 706-01 Encounter: [id number]  Primary Care Provider: Sebastian Simons MD   Date and time admitted to hospital: 2021  5:38 PM    [address]  · Patient was to be discharged to the [facility] however they would not take him back  · Case management for discharge planning    * Right arm cellulitis  Assessment & Plan  · Patient with right forearm lesion with redness and streaking noted in ED  · Started on Unasyn  · Lactic acid normal  · WBC normal  · Procal negative          VTE Pharmacologic Prophylaxis:   Low Risk (Score 0-2) - Encourage Ambulation  Patient Centered Rounds: I performed bedside rounds with nursing staff today  Discussions with Specialists or Other Care Team Provider: ID    Education and Discussions with Family / Patient: Patient declined call to   Time Spent for Care: 45 minutes  More than 50% of total time spent on counseling and coordination of care as described above  Current Length of Stay: 2 day(s)  Current Patient Status: Inpatient   Certification Statement: The patient will continue to require additional inpatient hospital stay due to Pending placement IV antibiotics  Discharge Plan: Anticipate discharge in 48-72 hrs to group home  Code Status: Level 1 - Full Code    Subjective:   Patient seen examined bedside  Patient no acute events    Objective:     Vitals:   Temp (24hrs), Av 6 °F (36 4 °C), Min:97 5 °F (36 4 °C), Max:97 7 °F (36 5 °C)    Temp:  [97 5 °F (36 4 °C)-97 7 °F (36 5 °C)] 97 5 °F (36 4 °C)  HR:  [72-78] 75  Resp:  [18-20] 18  BP: (159-193)/() 162/88  SpO2:  [97 %-100 %] 98 %  Body mass index is 23 82 kg/m²  Input and Output Summary (last 24 hours):      Intake/Output Summary (Last 24 hours) at 2022 0728  Last data filed at 2021 1706  Gross per 24 hour   Intake 1080 ml   Output 650 ml   Net 430 ml       Physical Exam:   Physical Exam  Cardiovascular:      Rate and Rhythm: Normal rate and regular rhythm  Pulses: Normal pulses  Heart sounds: Normal heart sounds  Pulmonary:      Effort: Pulmonary effort is normal    Abdominal:      General: Abdomen is flat  Bowel sounds are normal    Musculoskeletal:         General: Normal range of motion  Cervical back: Normal range of motion  Skin:     General: Skin is warm  Neurological:      General: No focal deficit present  Mental Status: He is alert and oriented to person, place, and time  Mental status is at baseline  Psychiatric:         Mood and Affect: Mood normal          Behavior: Behavior normal          Thought Content: Thought content normal          Judgment: Judgment normal           Additional Data:     Labs:  Results from last 7 days   Lab Units 12/31/21 0451 12/29/21 1823 12/29/21 1823   WBC Thousand/uL 7 20   < > 7 60   HEMOGLOBIN g/dL 12 7*   < > 13 2*   HEMATOCRIT % 37 7*   < > 40 8*   PLATELETS Thousands/uL 353   < > 367   NEUTROS PCT %  --   --  76*   LYMPHS PCT %  --   --  13*   MONOS PCT %  --   --  10   EOS PCT %  --   --  1    < > = values in this interval not displayed  Results from last 7 days   Lab Units 12/31/21 0451 12/29/21 1823 12/29/21 1823   SODIUM mmol/L 136*   < > 136*   POTASSIUM mmol/L 3 1*   < > 3 6   CHLORIDE mmol/L 103   < > 100   CO2 mmol/L 30   < > 33*   BUN mg/dL 13   < > 14   CREATININE mg/dL 0 77   < > 1 00   ANION GAP mmol/L 3*   < > 3*   CALCIUM mg/dL 7 9*   < > 8 5   ALBUMIN g/dL  --   --  3 8   TOTAL BILIRUBIN mg/dL  --   --  1 04   ALK PHOS U/L  --   --  121   ALT U/L  --   --  16   AST U/L  --   --  29   GLUCOSE RANDOM mg/dL 97   < > 92    < > = values in this interval not displayed       Results from last 7 days   Lab Units 12/29/21 1823   INR  1 08             Results from last 7 days   Lab Units 12/30/21  0554 12/29/21 1823 12/28/21  2241 LACTIC ACID mmol/L  --  1 3 1 0   PROCALCITONIN ng/ml <0 05 <0 05 <0 05       Lines/Drains:  Invasive Devices  Report    Peripheral Intravenous Line            Peripheral IV 12/30/21 Left Hand 2 days                      Imaging: No pertinent imaging reviewed  Recent Cultures (last 7 days):   Results from last 7 days   Lab Units 12/29/21  1845 12/29/21  1823 12/28/21  2241   BLOOD CULTURE  No Growth at 48 hrs  No Growth at 48 hrs  No Growth at 72 hrs  No Growth at 72 hrs  Last 24 Hours Medication List:   Current Facility-Administered Medications   Medication Dose Route Frequency Provider Last Rate    acetaminophen  650 mg Oral Q4H PRN Marry West PA-C      amLODIPine  10 mg Oral Daily Mariana Prather DO      cephalexin  500 mg Oral Q6H [address] Dillan Krause MD      diphenhydrAMINE  25 mg Oral Q6H PRN Mariana Prather DO      enoxaparin  40 mg Subcutaneous Daily [place], [place]      hydrALAZINE  10 mg Intravenous Q6H PRN Marry West PA-C      nicotine  1 patch Transdermal Daily [place], [place]      ondansetron  4 mg Intravenous Q6H PRN Marry West PA-C          Today, Patient Was Seen By: Mariana Prather DO    **Please Note: This note may have been constructed using a voice recognition system  **

## 2022-01-01 NOTE — PLAN OF CARE
Problem: PAIN - ADULT  Goal: Verbalizes/displays adequate comfort level or baseline comfort level  Description: Interventions:  - Encourage patient to monitor pain and request assistance  - Assess pain using appropriate pain scale  - Administer analgesics based on type and severity of pain and evaluate response  - Implement non-pharmacological measures as appropriate and evaluate response  - Consider cultural and social influences on pain and pain management  - Notify physician/advanced practitioner if interventions unsuccessful or patient reports new pain  Outcome: Progressing     Problem: INFECTION - ADULT  Goal: Absence or prevention of progression during hospitalization  Description: INTERVENTIONS:  - Assess and monitor for signs and symptoms of infection  - Monitor lab/diagnostic results  - Monitor all insertion sites, i e  indwelling lines, tubes, and drains  - Monitor endotracheal if appropriate and nasal secretions for changes in amount and color  - [place] appropriate cooling/warming therapies per order  - Administer medications as ordered  - Instruct and encourage patient and family to use good hand hygiene technique  - Identify and instruct in appropriate isolation precautions for identified infection/condition  Outcome: Progressing  Goal: Absence of fever/infection during neutropenic period  Description: INTERVENTIONS:  - Monitor WBC    Outcome: Progressing     Problem: SAFETY ADULT  Goal: Patient will remain free of falls  Description: INTERVENTIONS:  - Educate patient/family on patient safety including physical limitations  - Instruct patient to call for assistance with activity   - Consult OT/PT to assist with strengthening/mobility   - Keep Call bell within reach  - Keep bed low and locked with side rails adjusted as appropriate  - Keep care items and personal belongings within reach  - Initiate and maintain comfort rounds  - Make Fall Risk Sign visible to staff  - Apply yellow socks and bracelet for high fall risk patients  - Consider moving patient to room near nurses station  Outcome: Progressing  Goal: Maintain or return to baseline ADL function  Description: INTERVENTIONS:  -  Assess patient's ability to carry out ADLs; assess patient's baseline for ADL function and identify physical deficits which impact ability to perform ADLs (bathing, care of mouth/teeth, toileting, grooming, dressing, etc )  - Assess/evaluate cause of self-care deficits   - Assess range of motion  - Assess patient's mobility; develop plan if impaired  - Assess patient's need for assistive devices and provide as appropriate  - Encourage maximum independence but intervene and supervise when necessary  - Involve family in performance of ADLs  - Assess for home care needs following discharge   - Consider OT consult to assist with ADL evaluation and planning for discharge  - Provide patient education as appropriate  Outcome: Progressing  Goal: Maintains/Returns to pre admission functional level  Description: INTERVENTIONS:  - Perform BMAT or MOVE assessment daily    - Set and communicate daily mobility goal to care team and patient/family/caregiver     - Collaborate with rehabilitation services on mobility goals if consulted  - Out of bed for toileting  - Record patient progress and toleration of activity level   Outcome: Progressing     Problem: DISCHARGE PLANNING  Goal: Discharge to home or other facility with appropriate resources  Description: INTERVENTIONS:  - Identify barriers to discharge w/patient and caregiver  - Arrange for needed discharge resources and transportation as appropriate  - Identify discharge learning needs (meds, wound care, etc )  - Arrange for interpretive services to assist at discharge as needed  - Refer to Case Management Department for coordinating discharge planning if the patient needs post-hospital services based on physician/advanced practitioner order or complex needs related to functional status, cognitive ability, or social support system  Outcome: Progressing     Problem: Knowledge Deficit  Goal: Patient/family/caregiver demonstrates understanding of disease process, treatment plan, medications, and discharge instructions  Description: Complete learning assessment and assess knowledge base  Interventions:  - Provide teaching at level of understanding  - Provide teaching via preferred learning methods  Outcome: Progressing     Problem: MOBILITY - ADULT  Goal: Maintain or return to baseline ADL function  Description: INTERVENTIONS:  -  Assess patient's ability to carry out ADLs; assess patient's baseline for ADL function and identify physical deficits which impact ability to perform ADLs (bathing, care of mouth/teeth, toileting, grooming, dressing, etc )  - Assess/evaluate cause of self-care deficits   - Assess range of motion  - Assess patient's mobility; develop plan if impaired  - Assess patient's need for assistive devices and provide as appropriate  - Encourage maximum independence but intervene and supervise when necessary  - Involve family in performance of ADLs  - Assess for home care needs following discharge   - Consider OT consult to assist with ADL evaluation and planning for discharge  - Provide patient education as appropriate  Outcome: Progressing  Goal: Maintains/Returns to pre admission functional level  Description: INTERVENTIONS:  - Perform BMAT or MOVE assessment daily    - Set and communicate daily mobility goal to care team and patient/family/caregiver     - Collaborate with rehabilitation services on mobility goals if consulted  - Out of bed for toileting  - Record patient progress and toleration of activity level   Outcome: Progressing     Problem: Potential for Falls  Goal: Patient will remain free of falls  Description: INTERVENTIONS:  - Educate patient/family on patient safety including physical limitations  - Instruct patient to call for assistance with activity   - Consult OT/PT to assist with strengthening/mobility   - Keep Call bell within reach  - Keep bed low and locked with side rails adjusted as appropriate  - Keep care items and personal belongings within reach  - Initiate and maintain comfort rounds  - Make Fall Risk Sign visible to staff  - Apply yellow socks and bracelet for high fall risk patients  - Consider moving patient to room near nurses station  Outcome: Progressing     Problem: Prexisting or High Potential for Compromised Skin Integrity  Goal: Skin integrity is maintained or improved  Description: INTERVENTIONS:  - Identify patients at risk for skin breakdown  - Assess and monitor skin integrity  - Assess and monitor nutrition and hydration status  - Monitor labs   - Assess for incontinence   - Turn and reposition patient  - Assist with mobility/ambulation  - Relieve pressure over bony prominences  - Avoid friction and shearing  - Provide appropriate hygiene as needed including keeping skin clean and dry  - Evaluate need for skin moisturizer/barrier cream  - Collaborate with interdisciplinary team   - Patient/family teaching  - Consider wound care consult   Outcome: Progressing

## 2022-01-02 LAB
ANION GAP SERPL CALCULATED.3IONS-SCNC: 3 MMOL/L (ref 5–14)
BUN SERPL-MCNC: 11 MG/DL (ref 5–25)
CALCIUM SERPL-MCNC: 8.2 MG/DL (ref 8.4–10.2)
CHLORIDE SERPL-SCNC: 102 MMOL/L (ref 97–108)
CO2 SERPL-SCNC: 32 MMOL/L (ref 22–30)
CREAT SERPL-MCNC: 0.69 MG/DL (ref 0.7–1.5)
GFR SERPL CREATININE-BSD FRML MDRD: 104 ML/MIN/1.73SQ M
GLUCOSE SERPL-MCNC: 106 MG/DL (ref 70–99)
MAGNESIUM SERPL-MCNC: 2 MG/DL (ref 1.6–2.3)
PHOSPHATE SERPL-MCNC: 3.1 MG/DL (ref 2.5–4.8)
POTASSIUM SERPL-SCNC: 3.7 MMOL/L (ref 3.6–5)
SODIUM SERPL-SCNC: 137 MMOL/L (ref 137–147)

## 2022-01-02 PROCEDURE — 84100 ASSAY OF PHOSPHORUS: CPT | Performed by: STUDENT IN AN ORGANIZED HEALTH CARE EDUCATION/TRAINING PROGRAM

## 2022-01-02 PROCEDURE — 83735 ASSAY OF MAGNESIUM: CPT | Performed by: STUDENT IN AN ORGANIZED HEALTH CARE EDUCATION/TRAINING PROGRAM

## 2022-01-02 PROCEDURE — 99232 SBSQ HOSP IP/OBS MODERATE 35: CPT | Performed by: STUDENT IN AN ORGANIZED HEALTH CARE EDUCATION/TRAINING PROGRAM

## 2022-01-02 PROCEDURE — 80048 BASIC METABOLIC PNL TOTAL CA: CPT | Performed by: STUDENT IN AN ORGANIZED HEALTH CARE EDUCATION/TRAINING PROGRAM

## 2022-01-02 RX ADMIN — DIPHENHYDRAMINE HCL 25 MG: 25 TABLET ORAL at 10:59

## 2022-01-02 RX ADMIN — CEPHALEXIN 500 MG: 500 CAPSULE ORAL at 17:12

## 2022-01-02 RX ADMIN — CEPHALEXIN 500 MG: 500 CAPSULE ORAL at 11:46

## 2022-01-02 RX ADMIN — AMLODIPINE BESYLATE 10 MG: 10 TABLET ORAL at 09:00

## 2022-01-02 RX ADMIN — CEPHALEXIN 500 MG: 500 CAPSULE ORAL at 00:04

## 2022-01-02 RX ADMIN — CEPHALEXIN 500 MG: 500 CAPSULE ORAL at 05:11

## 2022-01-02 NOTE — ASSESSMENT & PLAN NOTE
· Patient was to be discharged to the [facility] however they would not take him back  · Case management for discharge planning

## 2022-01-02 NOTE — ASSESSMENT & PLAN NOTE
· Patient with right forearm lesion with redness and streaking noted in ED  · Id consulted, appreciate recommendations  · Continue Keflex through 1/5

## 2022-01-02 NOTE — PLAN OF CARE
Problem: INFECTION - ADULT  Goal: Absence or prevention of progression during hospitalization  Description: INTERVENTIONS:  - Assess and monitor for signs and symptoms of infection  - Monitor lab/diagnostic results  - [place] appropriate cooling/warming therapies per order  - Administer medications as ordered  - Instruct and encourage patient and family to use good hand hygiene technique  - Identify and instruct in appropriate isolation precautions for identified infection/condition  Outcome: Progressing  Goal: Absence of fever/infection during neutropenic period  Description: INTERVENTIONS:  - Monitor WBC    Outcome: Progressing     Problem: Prexisting or High Potential for Compromised Skin Integrity  Goal: Skin integrity is maintained or improved  Description: INTERVENTIONS:  - Identify patients at risk for skin breakdown  - Assess and monitor skin integrity  - Assess and monitor nutrition and hydration status  - Monitor labs   - Assess for incontinence   - Turn and reposition patient  - Assist with mobility/ambulation  - Relieve pressure over bony prominences  - Avoid friction and shearing  - Provide appropriate hygiene as needed including keeping skin clean and dry  - Evaluate need for skin moisturizer/barrier cream  - Collaborate with interdisciplinary team   - Patient/family teaching  - Consider wound care consult   Outcome: Progressing

## 2022-01-02 NOTE — PROGRESS NOTES
[facility]  Progress Note - Roberto Haywood 1965, 60 y o  male MRN: [id number]  Unit/Bed#: 7T [facility] 706-01 Encounter: [id number]  Primary Care Provider: Supriya Chan MD   Date and time admitted to hospital: 2021  5:38 PM    * Ambulatory dysfunction  Assessment & Plan  PT OT recommending post acute rehab    Right arm cellulitis  Assessment & Plan  · Patient with right forearm lesion with redness and streaking noted in ED  · Id consulted, appreciate recommendations  · Continue Keflex through     Psoriasis, unspecified  Assessment & Plan  Patient with psoriasis flare, was on Enbrel on past however was lost to follow-up  -recommend Dermatology referral on outpatient basis      [facility]  · Patient was to be discharged to the [facility] however they would not take him back  · Case management for discharge planning          VTE Pharmacologic Prophylaxis:   Moderate Risk (Score 3-4) - Pharmacological DVT Prophylaxis Ordered: enoxaparin (Lovenox)  Patient Centered Rounds: I performed bedside rounds with nursing staff today  Discussions with Specialists or Other Care Team Provider: none    Education and Discussions with Family / Patient: Patient declined call to   Time Spent for Care: 30 minutes  More than 50% of total time spent on counseling and coordination of care as described above  Current Length of Stay: 3 day(s)  Current Patient Status: Inpatient   Certification Statement: The patient will continue to require additional inpatient hospital stay due to Pending placement into inpatient rehab  Discharge Plan: Pending placement into inpatient rehab    Code Status: Level 1 - Full Code    Subjective:   Patient seen and examined at bedside  Patient resting comfortably in bed in no apparent distress    Patient with no acute events overnight    Objective:     Vitals:   Temp (24hrs), Av 6 °F (36 4 °C), Min:97 3 °F (36 3 °C), Max:98 °F (36 7 °C)    Temp:  [97 3 °F (36 3 °C)-98 °F (36 7 °C)] 97 3 °F (36 3 °C)  HR:  [68-72] 72  Resp:  [18-20] 20  BP: (149-167)/(80-87) 149/84  SpO2:  [99 %-100 %] 100 %  Body mass index is 23 82 kg/m²  Input and Output Summary (last 24 hours): Intake/Output Summary (Last 24 hours) at 1/2/2022 1058  Last data filed at 1/2/2022 0900  Gross per 24 hour   Intake 840 ml   Output 1300 ml   Net -460 ml       Physical Exam:   Physical Exam  Cardiovascular:      Rate and Rhythm: Normal rate and regular rhythm  Pulses: Normal pulses  Heart sounds: Normal heart sounds  Pulmonary:      Effort: Pulmonary effort is normal       Breath sounds: Normal breath sounds  Abdominal:      General: Abdomen is flat  Bowel sounds are normal       Palpations: Abdomen is soft  Musculoskeletal:         General: Normal range of motion  Cervical back: Normal range of motion  Skin:     General: Skin is warm and dry  Comments: Cellulitis right distal upper extremity  Sporadic psoriatic rash   Neurological:      General: No focal deficit present  Mental Status: He is alert and oriented to person, place, and time  Mental status is at baseline  Psychiatric:         Mood and Affect: Mood normal           Additional Data:     Labs:  Results from last 7 days   Lab Units 12/31/21  0451 12/29/21  1823 12/29/21  1823   WBC Thousand/uL 7 20   < > 7 60   HEMOGLOBIN g/dL 12 7*   < > 13 2*   HEMATOCRIT % 37 7*   < > 40 8*   PLATELETS Thousands/uL 353   < > 367   NEUTROS PCT %  --   --  76*   LYMPHS PCT %  --   --  13*   MONOS PCT %  --   --  10   EOS PCT %  --   --  1    < > = values in this interval not displayed       Results from last 7 days   Lab Units 01/02/22  0503 12/31/21  0451 12/29/21  1823   SODIUM mmol/L 137   < > 136*   POTASSIUM mmol/L 3 7   < > 3 6   CHLORIDE mmol/L 102   < > 100   CO2 mmol/L 32*   < > 33*   BUN mg/dL 11   < > 14   CREATININE mg/dL 0 69*   < > 1 00   ANION GAP mmol/L 3*   < > 3*   CALCIUM mg/dL 8 2*   < > 8 5   ALBUMIN g/dL  --   --  3 8   TOTAL BILIRUBIN mg/dL  --   --  1 04   ALK PHOS U/L  --   --  121   ALT U/L  --   --  16   AST U/L  --   --  29   GLUCOSE RANDOM mg/dL 106*   < > 92    < > = values in this interval not displayed  Results from last 7 days   Lab Units 12/29/21  1823   INR  1 08             Results from last 7 days   Lab Units 12/30/21  0554 12/29/21  1823 12/28/21  2241   LACTIC ACID mmol/L  --  1 3 1 0   PROCALCITONIN ng/ml <0 05 <0 05 <0 05       Lines/Drains:  Invasive Devices  Report    None                       Imaging: No pertinent imaging reviewed  Recent Cultures (last 7 days):   Results from last 7 days   Lab Units 12/29/21  1845 12/29/21  1823 12/28/21  2241   BLOOD CULTURE  No Growth at 72 hrs  No Growth at 72 hrs  No Growth After 4 Days  No Growth After 4 Days  Last 24 Hours Medication List:   Current Facility-Administered Medications   Medication Dose Route Frequency Provider Last Rate    acetaminophen  650 mg Oral Q4H PRN Mellissa Alcaraz PA-C      amLODIPine  10 mg Oral Daily Peggy Vaca DO      cephalexin  500 mg Oral Q6H [facility] Peggy Vaca DO      diphenhydrAMINE  25 mg Oral Q6H PRN Peggy Vaca DO      enoxaparin  40 mg Subcutaneous Daily [place]      nicotine  1 patch Transdermal Daily [place], [place]      ondansetron  4 mg Oral Q6H PRN Peggy Vaca DO          Today, Patient Was Seen By: Peggy Vaca DO    **Please Note: This note may have been constructed using a voice recognition system  **

## 2022-01-02 NOTE — PLAN OF CARE
Problem: PAIN - ADULT  Goal: Verbalizes/displays adequate comfort level or baseline comfort level  Description: Interventions:  - Encourage patient to monitor pain and request assistance  - Assess pain using appropriate pain scale  - Administer analgesics based on type and severity of pain and evaluate response  - Implement non-pharmacological measures as appropriate and evaluate response  - Consider cultural and social influences on pain and pain management  - Notify physician/advanced practitioner if interventions unsuccessful or patient reports new pain  Outcome: Progressing     Problem: INFECTION - ADULT  Goal: Absence or prevention of progression during hospitalization  Description: INTERVENTIONS:  - Assess and monitor for signs and symptoms of infection  - Monitor lab/diagnostic results  - Monitor all insertion sites, i e  indwelling lines, tubes, and drains  - Monitor endotracheal if appropriate and nasal secretions for changes in amount and color  - [place] appropriate cooling/warming therapies per order  - Administer medications as ordered  - Instruct and encourage patient and family to use good hand hygiene technique  - Identify and instruct in appropriate isolation precautions for identified infection/condition  Outcome: Progressing  Goal: Absence of fever/infection during neutropenic period  Description: INTERVENTIONS:  - Monitor WBC    Outcome: Progressing     Problem: SAFETY ADULT  Goal: Patient will remain free of falls  Description: INTERVENTIONS:  - Educate patient/family on patient safety including physical limitations  - Instruct patient to call for assistance with activity   - Consult OT/PT to assist with strengthening/mobility   - Keep Call bell within reach  - Keep bed low and locked with side rails adjusted as appropriate  - Keep care items and personal belongings within reach  - Initiate and maintain comfort rounds  - Make Fall Risk Sign visible to staff  - Apply yellow socks and bracelet for high fall risk patients  - Consider moving patient to room near nurses station  Outcome: Progressing  Goal: Maintain or return to baseline ADL function  Description: INTERVENTIONS:  -  Assess patient's ability to carry out ADLs; assess patient's baseline for ADL function and identify physical deficits which impact ability to perform ADLs (bathing, care of mouth/teeth, toileting, grooming, dressing, etc )  - Assess/evaluate cause of self-care deficits   - Assess range of motion  - Assess patient's mobility; develop plan if impaired  - Assess patient's need for assistive devices and provide as appropriate  - Encourage maximum independence but intervene and supervise when necessary  - Involve family in performance of ADLs  - Assess for home care needs following discharge   - Consider OT consult to assist with ADL evaluation and planning for discharge  - Provide patient education as appropriate  Outcome: Progressing  Goal: Maintains/Returns to pre admission functional level  Description: INTERVENTIONS:  - Perform BMAT or MOVE assessment daily    - Set and communicate daily mobility goal to care team and patient/family/caregiver     - Collaborate with rehabilitation services on mobility goals if consulted  - Out of bed for toileting  - Record patient progress and toleration of activity level   Outcome: Progressing     Problem: DISCHARGE PLANNING  Goal: Discharge to home or other facility with appropriate resources  Description: INTERVENTIONS:  - Identify barriers to discharge w/patient and caregiver  - Arrange for needed discharge resources and transportation as appropriate  - Identify discharge learning needs (meds, wound care, etc )  - Arrange for interpretive services to assist at discharge as needed  - Refer to Case Management Department for coordinating discharge planning if the patient needs post-hospital services based on physician/advanced practitioner order or complex needs related to functional status, cognitive ability, or social support system  Outcome: Progressing     Problem: Knowledge Deficit  Goal: Patient/family/caregiver demonstrates understanding of disease process, treatment plan, medications, and discharge instructions  Description: Complete learning assessment and assess knowledge base  Interventions:  - Provide teaching at level of understanding  - Provide teaching via preferred learning methods  Outcome: Progressing     Problem: MOBILITY - ADULT  Goal: Maintain or return to baseline ADL function  Description: INTERVENTIONS:  -  Assess patient's ability to carry out ADLs; assess patient's baseline for ADL function and identify physical deficits which impact ability to perform ADLs (bathing, care of mouth/teeth, toileting, grooming, dressing, etc )  - Assess/evaluate cause of self-care deficits   - Assess range of motion  - Assess patient's mobility; develop plan if impaired  - Assess patient's need for assistive devices and provide as appropriate  - Encourage maximum independence but intervene and supervise when necessary  - Involve family in performance of ADLs  - Assess for home care needs following discharge   - Consider OT consult to assist with ADL evaluation and planning for discharge  - Provide patient education as appropriate  Outcome: Progressing  Goal: Maintains/Returns to pre admission functional level  Description: INTERVENTIONS:  - Perform BMAT or MOVE assessment daily    - Set and communicate daily mobility goal to care team and patient/family/caregiver     - Collaborate with rehabilitation services on mobility goals if consulted  - Out of bed for toileting  - Record patient progress and toleration of activity level   Outcome: Progressing     Problem: Potential for Falls  Goal: Patient will remain free of falls  Description: INTERVENTIONS:  - Educate patient/family on patient safety including physical limitations  - Instruct patient to call for assistance with activity   - Consult OT/PT to assist with strengthening/mobility   - Keep Call bell within reach  - Keep bed low and locked with side rails adjusted as appropriate  - Keep care items and personal belongings within reach  - Initiate and maintain comfort rounds  - Make Fall Risk Sign visible to staff  - Apply yellow socks and bracelet for high fall risk patients  - Consider moving patient to room near nurses station  Outcome: Progressing     Problem: Prexisting or High Potential for Compromised Skin Integrity  Goal: Skin integrity is maintained or improved  Description: INTERVENTIONS:  - Identify patients at risk for skin breakdown  - Assess and monitor skin integrity  - Assess and monitor nutrition and hydration status  - Monitor labs   - Assess for incontinence   - Turn and reposition patient  - Assist with mobility/ambulation  - Relieve pressure over bony prominences  - Avoid friction and shearing  - Provide appropriate hygiene as needed including keeping skin clean and dry  - Evaluate need for skin moisturizer/barrier cream  - Collaborate with interdisciplinary team   - Patient/family teaching  - Consider wound care consult   Outcome: Progressing

## 2022-01-03 LAB
BACTERIA BLD CULT: NORMAL
BACTERIA BLD CULT: NORMAL
HIV 1+2 AB+HIV1 P24 AG SERPL QL IA: NORMAL

## 2022-01-03 PROCEDURE — 99232 SBSQ HOSP IP/OBS MODERATE 35: CPT | Performed by: INTERNAL MEDICINE

## 2022-01-03 PROCEDURE — 99231 SBSQ HOSP IP/OBS SF/LOW 25: CPT | Performed by: INTERNAL MEDICINE

## 2022-01-03 PROCEDURE — 97535 SELF CARE MNGMENT TRAINING: CPT

## 2022-01-03 RX ADMIN — CEPHALEXIN 500 MG: 500 CAPSULE ORAL at 00:16

## 2022-01-03 RX ADMIN — DIPHENHYDRAMINE HCL 25 MG: 25 TABLET ORAL at 23:42

## 2022-01-03 RX ADMIN — DIPHENHYDRAMINE HCL 25 MG: 25 TABLET ORAL at 17:40

## 2022-01-03 RX ADMIN — CEPHALEXIN 500 MG: 500 CAPSULE ORAL at 05:25

## 2022-01-03 RX ADMIN — ACETAMINOPHEN 650 MG: 325 TABLET ORAL at 23:42

## 2022-01-03 RX ADMIN — ACETAMINOPHEN 650 MG: 325 TABLET ORAL at 17:40

## 2022-01-03 RX ADMIN — ACETAMINOPHEN 650 MG: 325 TABLET ORAL at 09:37

## 2022-01-03 RX ADMIN — DIPHENHYDRAMINE HCL 25 MG: 25 TABLET ORAL at 09:37

## 2022-01-03 RX ADMIN — CEPHALEXIN 500 MG: 500 CAPSULE ORAL at 17:39

## 2022-01-03 RX ADMIN — CEPHALEXIN 500 MG: 500 CAPSULE ORAL at 13:00

## 2022-01-03 RX ADMIN — CEPHALEXIN 500 MG: 500 CAPSULE ORAL at 23:10

## 2022-01-03 RX ADMIN — AMLODIPINE BESYLATE 10 MG: 10 TABLET ORAL at 09:37

## 2022-01-03 NOTE — PROGRESS NOTES
[facility]  Progress Note - Marissa Nunes 1965, 60 y o  male MRN: [id number]  Unit/Bed#: 7T [facility] 706-01 Encounter: [id number]  Primary Care Provider: Yolanda Haywood MD   Date and time admitted to hospital: 12/29/2021  5:38 PM    * Ambulatory dysfunction  Assessment & Plan  PT OT recommending post acute rehab    Psoriasis, unspecified  Assessment & Plan  Patient with psoriasis flare, was on Enbrel on past however was lost to follow-up  -recommend Dermatology referral on outpatient basis      [address]  · Patient was to be discharged to the [facility] however they would not take him back  · Case management for discharge planning    Right arm cellulitis  Assessment & Plan  · Patient with right forearm lesion with redness and streaking noted in ED  · Id consulted, appreciate recommendations  · Continue Keflex through 1/5      VTE Pharmacologic Prophylaxis:   Pharmacologic: Enoxaparin (Lovenox)  Mechanical VTE Prophylaxis in Place: Yes    Patient Centered Rounds: I have performed bedside rounds with nursing staff today  Discussions with Specialists or Other Care Team Provider:  Case Management, nursing, PT/OT    Education and Discussions with Family / Patient:  Spoke with patient ; will call patient's family later today    Time Spent for Care: 45 minutes  More than 50% of total time spent on counseling and coordination of care as described above  Current Length of Stay: 4 day(s)    Current Patient Status: Inpatient   Certification Statement: The patient will continue to require additional inpatient hospital stay due to Pending placement to short-term rehab    Discharge Plan:  Medically stable for discharge to short-term rehab    Code Status: Level 1 - Full Code      Subjective:   Patient seen and examined at bedside  No acute events overnight  Denies chest pain, SOB, diaphoresis, nausea/vomiting/diarrhea, fevers/chills       Objective: Vitals:   Temp (24hrs), Av 9 °F (36 6 °C), Min:97 1 °F (36 2 °C), Max:99 °F (37 2 °C)    Temp:  [97 1 °F (36 2 °C)-99 °F (37 2 °C)] 99 °F (37 2 °C)  HR:  [71-75] 72  Resp:  [18-20] 18  BP: (153-170)/(88-95) 153/91  SpO2:  [99 %] 99 %  Body mass index is 23 82 kg/m²  Input and Output Summary (last 24 hours): Intake/Output Summary (Last 24 hours) at 1/3/2022 0837  Last data filed at 2022 1900  Gross per 24 hour   Intake 2271 ml   Output 2200 ml   Net 71 ml       Physical Exam:     Physical Exam  Vitals and nursing note reviewed  Constitutional:       Appearance: He is well-developed  HENT:      Head: Normocephalic and atraumatic  Eyes:      Conjunctiva/sclera: Conjunctivae normal    Cardiovascular:      Rate and Rhythm: Normal rate and regular rhythm  Heart sounds: No murmur heard  Pulmonary:      Effort: Pulmonary effort is normal  No respiratory distress  Breath sounds: Normal breath sounds  Abdominal:      Palpations: Abdomen is soft  Tenderness: There is no abdominal tenderness  Musculoskeletal:      Cervical back: Neck supple  Skin:     General: Skin is warm and dry  Neurological:      Mental Status: He is alert  Additional Data:     Labs: I have reviewed pertinent results     Results from last 7 days   Lab Units 21  0451 21  1823 21  1823   WBC Thousand/uL 7 20   < > 7 60   HEMOGLOBIN g/dL 12 7*   < > 13 2*   HEMATOCRIT % 37 7*   < > 40 8*   PLATELETS Thousands/uL 353   < > 367   NEUTROS PCT %  --   --  76*   LYMPHS PCT %  --   --  13*   MONOS PCT %  --   --  10   EOS PCT %  --   --  1    < > = values in this interval not displayed       Results from last 7 days   Lab Units 22  0503 21  0451 21  1823   SODIUM mmol/L 137   < > 136*   POTASSIUM mmol/L 3 7   < > 3 6   CHLORIDE mmol/L 102   < > 100   CO2 mmol/L 32*   < > 33*   BUN mg/dL 11   < > 14   CREATININE mg/dL 0 69*   < > 1 00   ANION GAP mmol/L 3*   < > 3* CALCIUM mg/dL 8 2*   < > 8 5   ALBUMIN g/dL  --   --  3 8   TOTAL BILIRUBIN mg/dL  --   --  1 04   ALK PHOS U/L  --   --  121   ALT U/L  --   --  16   AST U/L  --   --  29   GLUCOSE RANDOM mg/dL 106*   < > 92    < > = values in this interval not displayed  Results from last 7 days   Lab Units 12/29/21  1823   INR  1 08             Results from last 7 days   Lab Units 12/30/21  0554 12/29/21  1823 12/28/21  2241   LACTIC ACID mmol/L  --  1 3 1 0   PROCALCITONIN ng/ml <0 05 <0 05 <0 05         Imaging: I have reviewed pertinent imaging       Recent Cultures (last 7 days):     Results from last 7 days   Lab Units 12/29/21  1845 12/29/21  1823 12/28/21  2241   BLOOD CULTURE  No Growth After 4 Days  No Growth After 4 Days  No Growth After 5 Days  No Growth After 5 Days  Last 24 Hours Medication List:   Current Facility-Administered Medications   Medication Dose Route Frequency Provider Last Rate    acetaminophen  650 mg Oral Q4H PRN Milad Frey PA-C      amLODIPine  10 mg Oral Daily Lisa Henrry, DO      cephalexin  500 mg Oral Q6H [address] Lisa Henrry, DO      diphenhydrAMINE  25 mg Oral Q6H PRN Lisa Henrry, DO      enoxaparin  40 mg Subcutaneous Daily Tara Barbosa Means      nicotine  1 patch Transdermal Daily Rollen Tara Greenwood Means      ondansetron  4 mg Oral Q6H PRN Lisa Henrry DO          Today, Patient Was Seen By: Selina Mckinnye DO    ** Please Note: Dictation voice to text software may have been used in the creation of this document   **

## 2022-01-03 NOTE — OCCUPATIONAL THERAPY NOTE
Occupational Therapy Treatment Note    Name:  Sadi Nazario   MRN:   [id number]  Age:     60 y o  Patient Active Problem List   Diagnosis    Right arm cellulitis    Homeless    Psoriasis, unspecified    Ambulatory dysfunction     Cellulitis [L03 90]  Homeless [Z59 00]  Wound check, abscess [Z51 89]      Subjective/Goals: "infection is leaving me"    Vitals: appears stable     Pain: patient without expression of pain but rather fixated on infection and needing to leave his body before being able to do things again     Treatment Time: (7881-0037) 16 minutes    Assessment: Patient seen this date for OT with focus on goals as set by OTR  Patient agreeable to skilled OT session with focus on ADL's (bathing, dressing, toileting), Strength/ROM/HEP, Cognition, Activity tolerance, Education on safety, fall prevention and energy conservation techniques and Fine motor coordination/hand strength  Barriers to treatment include fatigue, pain, volition, cognition, safety, home environment (management in/out or throughout home), social/family support, decreased strength/coordination, balance , activity tolerance and standing tolerance  Patient educated on safe functional transfers techniques, the appropriate use of AE/DME to improve functional performance, and activity modification techniques for energy conservation  Plans for d/c are post acute rehab  Patient is making gains toward OT goals with continued OT recommended at this time to max tolerance, safety and function for appropriate d/c planning  At end of session patient remains in bed with all needs within reach  01/03/22 1436   OT Last Visit   OT Visit Date 01/03/22   Note Type   Note Type Treatment   Restrictions/Precautions   Other Precautions Cognitive; Fall Risk;Pain   Pain Assessment   Pain Assessment Tool 0-10   Pain Score 3   Pain Location/Orientation Location: Generalized;Orientation: Bilateral  (no specific pain expressed stating "its getting out of me")   ADL   Where Assessed Supine, bed  (sitting in long set at bed)   Eating Comments patient asked for a beverage and a snack  Difficulty noted with [address] bilaterally for management of wrappers on crackers and neded additional time to manage crackers therefore he shoved entire cracker in mouth as a result  Ed provided on risk overall of choking and modifications he can complete to increase management- patient not 100% receptive stating again he will be good when "the infection gets out"-- he rubbs bilateral UE arms and LE legs numerious times then indicating "see it is coming out"-- CAMPA noted just flakes of skin however no insight by patient  For beverages he needed increased cues to attempt to  and manage cup needing mod assist   Small cup then provided with increased management noted however would beneot from a sturdy cup due to gross grap and increased potential for spillage  With beverages he will also drink an entire glass while he was able to manage vs smaller sips  Grooming Comments refused   UB Bathing Comments patient naked in bed upin arrival stating clothing sticks to him and unable to stand (sheet used only)  He was able to rub bilateral arms simulating bath needs with supervision however due to  and grasp question ablility to squeeze or hold washcloth     LB Bathing Comments supervision with simulated motions to legs only-- question full management with adriane/backside management and throughness along with ability to squeeze and manage washcloth with decreased [address] and strength   UB Dressing Comments refused 2* clothes sicking to him   LB Dressing Comments refused 2* reports of clothing sticking to him    Toileting Comments refused need   Functional Standing Tolerance   Comments unable to asses- patient refused stating he has not got out of bed since he got here    Bed Mobility   Supine to Sit 5  Supervision   Sit to Supine 5  Supervision   Additional Comments patient declined to get out of bed but sit sit up in long sit  Transfers   Sit to Stand Unable to assess   Stand to Sit Unable to assess   Stand pivot Unable to assess   Additional Comments patient refused at this time    Functional Mobility   Additional Comments unable to assess    Therapeutic Excerise-Strength   UE Strength   (ref UE exercise-- [facility] appears intact, [facility] impaired)   Coordination   Gross Motor appears intact    Fine Motor impaired    Dexterity impaired   In Hand Manipulation impaired   Cognition   Overall Cognitive Status Impaired   Arousal/Participation Alert   Attention Attends with cues to redirect   Orientation Level Oriented to person;Oriented to place   Memory Decreased recall of recent events;Decreased recall of precautions;Decreased short term memory   Following Commands Follows one step commands inconsistently   Comments preservates on infection needing to leave him-- rubbing UE and LE arms and legs with great friction indicating "see you saw that it is leaving me, that is good"   Additional Activities   Additional Activities Comments sitting balance appears fair    Assessment   Assessment see note    Plan   Treatment Interventions ADL retraining; Activityengagement; Energy conservation;UE strengthening/ROM   Goal Expiration Date 01/07/22   OT Treatment Day 1   OT Frequency 2-3x/wk   Recommendation   OT Discharge Recommendation Post acute rehabilitation services   AM-PAC Daily Activity Inpatient   Lower Body Dressing 2   Bathing 2   Toileting 2   Upper Body Dressing 2   Grooming 2   Eating 2   Daily Activity Raw Score 12   Daily Activity Standardized Score (Calc for Raw Score >=11) 30 6   Marjered   1/3/2022

## 2022-01-03 NOTE — ASSESSMENT & PLAN NOTE
· Patient was to be discharged to the [facility] however they would not take him back  · Case management for discharge planning

## 2022-01-03 NOTE — PLAN OF CARE
Problem: PAIN - ADULT  Goal: Verbalizes/displays adequate comfort level or baseline comfort level  Description: Interventions:  - Encourage patient to monitor pain and request assistance  - Assess pain using appropriate pain scale  - Administer analgesics based on type and severity of pain and evaluate response  - Implement non-pharmacological measures as appropriate and evaluate response  - Consider cultural and social influences on pain and pain management  - Notify physician/advanced practitioner if interventions unsuccessful or patient reports new pain  Outcome: Progressing     Problem: INFECTION - ADULT  Goal: Absence or prevention of progression during hospitalization  Description: INTERVENTIONS:  - Assess and monitor for signs and symptoms of infection  - Monitor lab/diagnostic results  - Monitor all insertion sites, i e  indwelling lines, tubes, and drains  - Monitor endotracheal if appropriate and nasal secretions for changes in amount and color  - [place] appropriate cooling/warming therapies per order  - Administer medications as ordered  - Instruct and encourage patient and family to use good hand hygiene technique  - Identify and instruct in appropriate isolation precautions for identified infection/condition  Outcome: Progressing  Goal: Absence of fever/infection during neutropenic period  Description: INTERVENTIONS:  - Monitor WBC    Outcome: Progressing     Problem: SAFETY ADULT  Goal: Patient will remain free of falls  Description: INTERVENTIONS:  - Educate patient/family on patient safety including physical limitations  - Instruct patient to call for assistance with activity   - Consult OT/PT to assist with strengthening/mobility   - Keep Call bell within reach  - Keep bed low and locked with side rails adjusted as appropriate  - Keep care items and personal belongings within reach  - Initiate and maintain comfort rounds  - Make Fall Risk Sign visible to staff  - Apply yellow socks and bracelet for high fall risk patients  - Consider moving patient to room near nurses station  Outcome: Progressing  Goal: Maintain or return to baseline ADL function  Description: INTERVENTIONS:  -  Assess patient's ability to carry out ADLs; assess patient's baseline for ADL function and identify physical deficits which impact ability to perform ADLs (bathing, care of mouth/teeth, toileting, grooming, dressing, etc )  - Assess/evaluate cause of self-care deficits   - Assess range of motion  - Assess patient's mobility; develop plan if impaired  - Assess patient's need for assistive devices and provide as appropriate  - Encourage maximum independence but intervene and supervise when necessary  - Involve family in performance of ADLs  - Assess for home care needs following discharge   - Consider OT consult to assist with ADL evaluation and planning for discharge  - Provide patient education as appropriate  Outcome: Progressing  Goal: Maintains/Returns to pre admission functional level  Description: INTERVENTIONS:  - Perform BMAT or MOVE assessment daily    - Set and communicate daily mobility goal to care team and patient/family/caregiver     - Collaborate with rehabilitation services on mobility goals if consulted  - Out of bed for toileting  - Record patient progress and toleration of activity level   Outcome: Progressing     Problem: DISCHARGE PLANNING  Goal: Discharge to home or other facility with appropriate resources  Description: INTERVENTIONS:  - Identify barriers to discharge w/patient and caregiver  - Arrange for needed discharge resources and transportation as appropriate  - Identify discharge learning needs (meds, wound care, etc )  - Arrange for interpretive services to assist at discharge as needed  - Refer to Case Management Department for coordinating discharge planning if the patient needs post-hospital services based on physician/advanced practitioner order or complex needs related to functional status, cognitive ability, or social support system  Outcome: Progressing     Problem: Knowledge Deficit  Goal: Patient/family/caregiver demonstrates understanding of disease process, treatment plan, medications, and discharge instructions  Description: Complete learning assessment and assess knowledge base  Interventions:  - Provide teaching at level of understanding  - Provide teaching via preferred learning methods  Outcome: Progressing     Problem: MOBILITY - ADULT  Goal: Maintain or return to baseline ADL function  Description: INTERVENTIONS:  -  Assess patient's ability to carry out ADLs; assess patient's baseline for ADL function and identify physical deficits which impact ability to perform ADLs (bathing, care of mouth/teeth, toileting, grooming, dressing, etc )  - Assess/evaluate cause of self-care deficits   - Assess range of motion  - Assess patient's mobility; develop plan if impaired  - Assess patient's need for assistive devices and provide as appropriate  - Encourage maximum independence but intervene and supervise when necessary  - Involve family in performance of ADLs  - Assess for home care needs following discharge   - Consider OT consult to assist with ADL evaluation and planning for discharge  - Provide patient education as appropriate  Outcome: Progressing  Goal: Maintains/Returns to pre admission functional level  Description: INTERVENTIONS:  - Perform BMAT or MOVE assessment daily    - Set and communicate daily mobility goal to care team and patient/family/caregiver     - Collaborate with rehabilitation services on mobility goals if consulted  - Out of bed for toileting  - Record patient progress and toleration of activity level   Outcome: Progressing     Problem: Potential for Falls  Goal: Patient will remain free of falls  Description: INTERVENTIONS:  - Educate patient/family on patient safety including physical limitations  - Instruct patient to call for assistance with activity   - Consult OT/PT to assist with strengthening/mobility   - Keep Call bell within reach  - Keep bed low and locked with side rails adjusted as appropriate  - Keep care items and personal belongings within reach  - Initiate and maintain comfort rounds  - Make Fall Risk Sign visible to staff  - Apply yellow socks and bracelet for high fall risk patients  - Consider moving patient to room near nurses station  Outcome: Progressing     Problem: Prexisting or High Potential for Compromised Skin Integrity  Goal: Skin integrity is maintained or improved  Description: INTERVENTIONS:  - Identify patients at risk for skin breakdown  - Assess and monitor skin integrity  - Assess and monitor nutrition and hydration status  - Monitor labs   - Assess for incontinence   - Turn and reposition patient  - Assist with mobility/ambulation  - Relieve pressure over bony prominences  - Avoid friction and shearing  - Provide appropriate hygiene as needed including keeping skin clean and dry  - Evaluate need for skin moisturizer/barrier cream  - Collaborate with interdisciplinary team   - Patient/family teaching  - Consider wound care consult   Outcome: Progressing

## 2022-01-03 NOTE — PLAN OF CARE
Problem: INFECTION - ADULT  Goal: Absence or prevention of progression during hospitalization  Description: INTERVENTIONS:  - Assess and monitor for signs and symptoms of infection  - Monitor lab/diagnostic results  - Monitor all insertion sites, i e  indwelling lines, tubes, and drains  - Monitor endotracheal if appropriate and nasal secretions for changes in amount and color  - [place] appropriate cooling/warming therapies per order  - Administer medications as ordered  - Instruct and encourage patient and family to use good hand hygiene technique  - Identify and instruct in appropriate isolation precautions for identified infection/condition  Outcome: Progressing     Problem: SAFETY ADULT  Goal: Patient will remain free of falls  Description: INTERVENTIONS:  - Educate patient/family on patient safety including physical limitations  - Instruct patient to call for assistance with activity   - Consult OT/PT to assist with strengthening/mobility   - Keep Call bell within reach  - Keep bed low and locked with side rails adjusted as appropriate  - Keep care items and personal belongings within reach  - Initiate and maintain comfort rounds  - Make Fall Risk Sign visible to staff    - Apply yellow socks and bracelet for high fall risk patients  - Consider moving patient to room near nurses station  Outcome: Progressing     Problem: DISCHARGE PLANNING  Goal: Discharge to home or other facility with appropriate resources  Description: INTERVENTIONS:  - Identify barriers to discharge w/patient and caregiver  - Arrange for needed discharge resources and transportation as appropriate  - Identify discharge learning needs (meds, wound care, etc )  - Arrange for interpretive services to assist at discharge as needed  - Refer to Case Management Department for coordinating discharge planning if the patient needs post-hospital services based on physician/advanced practitioner order or complex needs related to functional status, cognitive ability, or social support system  Outcome: Progressing

## 2022-01-03 NOTE — PROGRESS NOTES
Progress Note - Infectious Disease   Jose Carlos Villareal 60 y o  male MRN: [id number]  Unit/Bed#: 7T [facility] 706-01 Encounter: [id number]      IMPRESSION & RECOMMENDATIONS:   Impression/Recommendations: This is a 60 y o  male, with history of relatively severe psoriasis, with no treatment recently, presented with relapse of psoriasis and right forearm cellulitis      1  Right forearm cellulitis, secondary to laceration versus skin breakage from psoriasis rash  Cellulitis appears to be quite mild and superficial   No evidence of involvement of deeper structures on exam   Patient has no fever or leukocytosis  He is clinically and systemically well  Clinically improving on oral Keflex  Continue Keflex  Treat x7 days total antibiotic, through       2  Psoriasis flare  Patient has history of extensive psoriasis, previously on Enbrel but has been lost to follow up on these for years  He may benefit from outpatient dermatology evaluation  Recommend outpatient dermatology evaluation      3  Homelessness  Antibiotic 5  Keflex 3    I discussed above plan with Dr Satinder Bazan from Internal Medicine Service  Subjective:  Patient states his arm is still bothering him  Denies fevers, chills  Tolerating oral intake  Objective:  Vitals:  Temp:  [97 1 °F (36 2 °C)-99 °F (37 2 °C)] 99 °F (37 2 °C)  HR:  [71-75] 72  Resp:  [18-20] 18  BP: (153-170)/(88-95) 153/91  SpO2:  [99 %] 99 %  Temp (24hrs), Av 9 °F (36 6 °C), Min:97 1 °F (36 2 °C), Max:99 °F (37 2 °C)  Current: Temperature: 99 °F (37 2 °C)    Physical Exam:   General:  No acute distress, poor general hygiene  HEENT:  Atraumatic normocephalic  Psychiatric:  Awake and alert  Pulmonary:  Normal respiratory excursion without accessory muscle use  Abdomen:  Soft, nontender  Extremities:  No edema  Right arm scabbed over lesion with no surrounding erythema, fluctuance, tenderness or drainage  Skin:  No rashes  Neuro: Moves all extremities spontaneously    Lab Results:   I have personally reviewed pertinent labs  Results from last 7 days   Lab Units 01/02/22  0503 12/31/21  0451 12/29/21  1823 12/28/21  2241 12/28/21  2241   POTASSIUM mmol/L 3 7 3 1* 3 6   < > 3 1*   CHLORIDE mmol/L 102 103 100   < > 101   CO2 mmol/L 32* 30 33*   < > 29   BUN mg/dL 11 13 14   < > 15   CREATININE mg/dL 0 69* 0 77 1 00   < > 0 69   EGFR ml/min/1 73sq m 104 100 82   < > 104   CALCIUM mg/dL 8 2* 7 9* 8 5   < > 8 2*   AST U/L  --   --  29  --  19   ALT U/L  --   --  16  --  23   ALK PHOS U/L  --   --  121  --  111    < > = values in this interval not displayed  Results from last 7 days   Lab Units 12/31/21  0451 12/29/21  1823 12/28/21  2241   WBC Thousand/uL 7 20 7 60 9 30   HEMOGLOBIN g/dL 12 7* 13 2* 12 4   PLATELETS Thousands/uL 353 367 366     Results from last 7 days   Lab Units 12/29/21  1845 12/29/21  1823 12/28/21  2241   BLOOD CULTURE  No Growth After 4 Days  No Growth After 4 Days  No Growth After 5 Days  No Growth After 5 Days  Imaging Studies:   I have personally reviewed pertinent imaging study reports and images in PACS  EKG, Pathology, and Other Studies:   I have personally reviewed pertinent reports

## 2022-01-03 NOTE — UTILIZATION REVIEW
Inpatient Admission Authorization Request   NOTIFICATION OF INPATIENT ADMISSION/INPATIENT AUTHORIZATION REQUEST   SERVICING FACILITY:   [address] [place] [address]  [address] , [address], [address] [facility]  Tax ID: [id number]  NPI: [id number]  Place of Service: Inpatient [address] [facility]  [address]  Place of Service Code: 24     ATTENDING PROVIDER:  Attending Name and NPI#: Ryanne Araiza [[id number]]  Address: [address] , [address], [address] [place] [address]  Phone: [phone]     UTILIZATION REVIEW CONTACT:  [facility] Utilization   Network Utilization Review Department  Phone: [phone]  Fax [phone]  Email: [email or web address]     PHYSICIAN ADVISORY SERVICES:  FOR NFUB-KD-KDIB REVIEW - MEDICAL NECESSITY DENIAL  Phone: [phone]  Fax: [phone]  Email: [email or web address]     TYPE OF REQUEST:  Inpatient Status     ADMISSION INFORMATION:  ADMISSION DATE/TIME: 12/30/21  3:17 PM  PATIENT DIAGNOSIS CODE/DESCRIPTION:  Cellulitis [S66 14]  Homeless [Z59 00]  Wound check, abscess [Z51 89]  DISCHARGE DATE/TIME: No discharge date for patient encounter  DISCHARGE DISPOSITION (IF DISCHARGED): Home/Self Care     IMPORTANT INFORMATION:  Please contact the [facility] directly with any questions or concerns regarding this request  Department voicemails are confidential     Send requests for admission clinical reviews, concurrent reviews, approvals, and administrative denials due to lack of clinical to fax [phone]

## 2022-01-04 LAB
ANION GAP SERPL CALCULATED.3IONS-SCNC: 2 MMOL/L (ref 5–14)
ANISOCYTOSIS BLD QL SMEAR: PRESENT
BACTERIA BLD CULT: NORMAL
BACTERIA BLD CULT: NORMAL
BASOPHILS # BLD AUTO: 0.05 THOUSAND/UL (ref 0–0.1)
BASOPHILS NFR MAR MANUAL: 1 % (ref 0–1)
BUN SERPL-MCNC: 15 MG/DL (ref 5–25)
CALCIUM SERPL-MCNC: 8.2 MG/DL (ref 8.4–10.2)
CHLORIDE SERPL-SCNC: 104 MMOL/L (ref 97–108)
CO2 SERPL-SCNC: 31 MMOL/L (ref 22–30)
CREAT SERPL-MCNC: 0.71 MG/DL (ref 0.7–1.5)
EOSINOPHIL # BLD AUTO: 0.22 THOUSAND/UL (ref 0–0.4)
EOSINOPHIL NFR BLD MANUAL: 4 % (ref 0–6)
ERYTHROCYTE [DISTWIDTH] IN BLOOD BY AUTOMATED COUNT: 16.8 %
GFR SERPL CREATININE-BSD FRML MDRD: 103 ML/MIN/1.73SQ M
GLUCOSE SERPL-MCNC: 95 MG/DL (ref 70–99)
HCT VFR BLD AUTO: 38.6 % (ref 41–53)
HGB BLD-MCNC: 12.8 G/DL (ref 13.5–17.5)
LYMPHOCYTES # BLD AUTO: 1.19 THOUSAND/UL (ref 0.5–4)
LYMPHOCYTES # BLD AUTO: 22 % (ref 25–45)
MCH RBC QN AUTO: 28.3 PG (ref 26–34)
MCHC RBC AUTO-ENTMCNC: 33.3 G/DL (ref 31–36)
MCV RBC AUTO: 85 FL (ref 80–100)
MONOCYTES # BLD AUTO: 0.86 THOUSAND/UL (ref 0.2–0.9)
MONOCYTES NFR BLD AUTO: 16 % (ref 1–10)
NEUTS SEG # BLD: 3.08 THOUSAND/UL (ref 1.8–7.8)
NEUTS SEG NFR BLD AUTO: 57 %
PLATELET # BLD AUTO: 347 THOUSANDS/UL (ref 150–450)
PLATELET BLD QL SMEAR: ADEQUATE
PMV BLD AUTO: 6.9 FL (ref 8.9–12.7)
POTASSIUM SERPL-SCNC: 3.8 MMOL/L (ref 3.6–5)
RBC # BLD AUTO: 4.54 MILLION/UL (ref 4.5–5.9)
RBC MORPH BLD: PRESENT
SODIUM SERPL-SCNC: 137 MMOL/L (ref 137–147)
TOTAL CELLS COUNTED SPEC: 100
WBC # BLD AUTO: 5.4 THOUSAND/UL (ref 4.5–11)

## 2022-01-04 PROCEDURE — 99232 SBSQ HOSP IP/OBS MODERATE 35: CPT | Performed by: INTERNAL MEDICINE

## 2022-01-04 PROCEDURE — 99231 SBSQ HOSP IP/OBS SF/LOW 25: CPT | Performed by: INTERNAL MEDICINE

## 2022-01-04 PROCEDURE — 97530 THERAPEUTIC ACTIVITIES: CPT

## 2022-01-04 PROCEDURE — 85007 BL SMEAR W/DIFF WBC COUNT: CPT | Performed by: INTERNAL MEDICINE

## 2022-01-04 PROCEDURE — 80048 BASIC METABOLIC PNL TOTAL CA: CPT | Performed by: INTERNAL MEDICINE

## 2022-01-04 PROCEDURE — 85027 COMPLETE CBC AUTOMATED: CPT | Performed by: INTERNAL MEDICINE

## 2022-01-04 RX ORDER — POLYETHYLENE GLYCOL 3350 17 G/17G
17 POWDER, FOR SOLUTION ORAL DAILY PRN
Status: DISCONTINUED | OUTPATIENT
Start: 2022-01-04 | End: 2022-01-28

## 2022-01-04 RX ADMIN — CEPHALEXIN 500 MG: 500 CAPSULE ORAL at 05:41

## 2022-01-04 RX ADMIN — DIPHENHYDRAMINE HCL 25 MG: 25 TABLET ORAL at 09:01

## 2022-01-04 RX ADMIN — POLYETHYLENE GLYCOL 3350 17 G: 17 POWDER, FOR SOLUTION ORAL at 09:07

## 2022-01-04 RX ADMIN — CEPHALEXIN 500 MG: 500 CAPSULE ORAL at 19:01

## 2022-01-04 RX ADMIN — AMLODIPINE BESYLATE 10 MG: 10 TABLET ORAL at 09:01

## 2022-01-04 RX ADMIN — CEPHALEXIN 500 MG: 500 CAPSULE ORAL at 12:38

## 2022-01-04 RX ADMIN — DIPHENHYDRAMINE HCL 25 MG: 25 TABLET ORAL at 19:01

## 2022-01-04 RX ADMIN — ACETAMINOPHEN 650 MG: 325 TABLET ORAL at 19:01

## 2022-01-04 RX ADMIN — ACETAMINOPHEN 650 MG: 325 TABLET ORAL at 09:01

## 2022-01-04 NOTE — PROGRESS NOTES
Progress Note - Infectious Disease   Ama Turner 60 y o  male MRN: [id number]  Unit/Bed#: 7T [facility] 706-01 Encounter: [id number]       IMPRESSION & RECOMMENDATIONS:   Impression/Recommendations: This is a 57 y  o  male, with history of relatively severe psoriasis, with no treatment recently, presented with relapse of psoriasis and right forearm cellulitis      1  Right forearm cellulitis, secondary to laceration versus skin breakage from psoriasis rash   Cellulitis appears to be quite mild and superficial   No evidence of involvement of deeper structures on exam   Patient has no fever or leukocytosis   He is clinically and systemically well  Clinically improving on oral Keflex  Continue Keflex  Treat x7 days total antibiotic, through   Last day is tomorrow      2  Psoriasis flare   Patient has history of extensive psoriasis, previously on Enbrel but has been lost to follow up on these for years  Kendy Lavernepatti may benefit from outpatient dermatology evaluation  Recommend outpatient dermatology evaluation      3  Homelessness      Antibiotic 6  Keflex 4     I discussed with Dr Abida Rush from Internal Medicine Service  Pending placement  Subjective:  No reported acute overnight events or complaints  No fevers  Objective:  Vitals:  Temp:  [97 4 °F (36 3 °C)-98 4 °F (36 9 °C)] 97 4 °F (36 3 °C)  HR:  [69-72] 69  Resp:  [18] 18  BP: (160-170)/(78-79) 160/79  SpO2:  [97 %-100 %] 100 %  Temp (24hrs), Av °F (36 7 °C), Min:97 4 °F (36 3 °C), Max:98 4 °F (36 9 °C)  Current: Temperature: (!) 97 4 °F (36 3 °C)    Physical Exam:   General:  No acute distress, nontoxic  Psychiatric:  Awake and alert  Pulmonary:  Normal respiratory excursion without accessory muscle use  Abdomen:  Soft, nontender  Extremities:  No edema  Stable right arm scabbed lesion with no fluctuance, surrounding erythema or drainage  Skin:  No new rashes  No new ulceration    Lab Results:  I have personally reviewed pertinent labs    Results from last 7 days   Lab Units 01/04/22  0502 01/02/22  0503 12/31/21  0451 12/29/21  1823 12/29/21  1823 12/28/21  2241 12/28/21  2241   POTASSIUM mmol/L 3 8 3 7 3 1*   < > 3 6   < > 3 1*   CHLORIDE mmol/L 104 102 103   < > 100   < > 101   CO2 mmol/L 31* 32* 30   < > 33*   < > 29   BUN mg/dL 15 11 13   < > 14   < > 15   CREATININE mg/dL 0 71 0 69* 0 77   < > 1 00   < > 0 69   EGFR ml/min/1 73sq m 103 104 100   < > 82   < > 104   CALCIUM mg/dL 8 2* 8 2* 7 9*   < > 8 5   < > 8 2*   AST U/L  --   --   --   --  29  --  19   ALT U/L  --   --   --   --  16  --  23   ALK PHOS U/L  --   --   --   --  121  --  111    < > = values in this interval not displayed  Results from last 7 days   Lab Units 01/04/22  0502 12/31/21 0451 12/29/21  1823   WBC Thousand/uL 5 40 7 20 7 60   HEMOGLOBIN g/dL 12 8* 12 7* 13 2*   PLATELETS Thousands/uL 347 353 367     Results from last 7 days   Lab Units 12/29/21  1845 12/29/21  1823 12/28/21  2241   BLOOD CULTURE  No Growth After 5 Days  No Growth After 5 Days  No Growth After 5 Days  No Growth After 5 Days  Imaging Studies:   I have personally reviewed pertinent imaging study reports and images in PACS  EKG, Pathology, and Other Studies:   I have personally reviewed pertinent reports

## 2022-01-04 NOTE — PLAN OF CARE
Problem: PAIN - ADULT  Goal: Verbalizes/displays adequate comfort level or baseline comfort level  Description: Interventions:  - Encourage patient to monitor pain and request assistance  - Assess pain using appropriate pain scale  - Administer analgesics based on type and severity of pain and evaluate response  - Implement non-pharmacological measures as appropriate and evaluate response  - Consider cultural and social influences on pain and pain management  - Notify physician/advanced practitioner if interventions unsuccessful or patient reports new pain  Outcome: Progressing     Problem: INFECTION - ADULT  Goal: Absence or prevention of progression during hospitalization  Description: INTERVENTIONS:  - Assess and monitor for signs and symptoms of infection  - Monitor lab/diagnostic results  - Monitor all insertion sites, i e  indwelling lines, tubes, and drains  - Monitor endotracheal if appropriate and nasal secretions for changes in amount and color  - [place] appropriate cooling/warming therapies per order  - Administer medications as ordered  - Instruct and encourage patient and family to use good hand hygiene technique  - Identify and instruct in appropriate isolation precautions for identified infection/condition  Outcome: Progressing  Goal: Absence of fever/infection during neutropenic period  Description: INTERVENTIONS:  - Monitor WBC    Outcome: Progressing     Problem: SAFETY ADULT  Goal: Patient will remain free of falls  Description: INTERVENTIONS:  - Educate patient/family on patient safety including physical limitations  - Instruct patient to call for assistance with activity   - Consult OT/PT to assist with strengthening/mobility   - Keep Call bell within reach  - Keep bed low and locked with side rails adjusted as appropriate  - Keep care items and personal belongings within reach  - Initiate and maintain comfort rounds  - Make Fall Risk Sign visible to staff    - Apply yellow socks and bracelet for high fall risk patients  - Consider moving patient to room near nurses station  Outcome: Progressing  Goal: Maintain or return to baseline ADL function  Description: INTERVENTIONS:  -  Assess patient's ability to carry out ADLs; assess patient's baseline for ADL function and identify physical deficits which impact ability to perform ADLs (bathing, care of mouth/teeth, toileting, grooming, dressing, etc )  - Assess/evaluate cause of self-care deficits   - Assess range of motion  - Assess patient's mobility; develop plan if impaired  - Assess patient's need for assistive devices and provide as appropriate  - Encourage maximum independence but intervene and supervise when necessary  - Involve family in performance of ADLs  - Assess for home care needs following discharge   - Consider OT consult to assist with ADL evaluation and planning for discharge  - Provide patient education as appropriate  Outcome: Progressing  Goal: Maintains/Returns to pre admission functional level  Description: INTERVENTIONS:  - Perform BMAT or MOVE assessment daily    - Set and communicate daily mobility goal to care team and patient/family/caregiver     - Collaborate with rehabilitation services on mobility goals if consulted    - Out of bed for toileting  - Record patient progress and toleration of activity level   Outcome: Progressing     Problem: DISCHARGE PLANNING  Goal: Discharge to home or other facility with appropriate resources  Description: INTERVENTIONS:  - Identify barriers to discharge w/patient and caregiver  - Arrange for needed discharge resources and transportation as appropriate  - Identify discharge learning needs (meds, wound care, etc )  - Arrange for interpretive services to assist at discharge as needed  - Refer to Case Management Department for coordinating discharge planning if the patient needs post-hospital services based on physician/advanced practitioner order or complex needs related to functional status, cognitive ability, or social support system  Outcome: Progressing     Problem: Knowledge Deficit  Goal: Patient/family/caregiver demonstrates understanding of disease process, treatment plan, medications, and discharge instructions  Description: Complete learning assessment and assess knowledge base  Interventions:  - Provide teaching at level of understanding  - Provide teaching via preferred learning methods  Outcome: Progressing     Problem: MOBILITY - ADULT  Goal: Maintain or return to baseline ADL function  Description: INTERVENTIONS:  -  Assess patient's ability to carry out ADLs; assess patient's baseline for ADL function and identify physical deficits which impact ability to perform ADLs (bathing, care of mouth/teeth, toileting, grooming, dressing, etc )  - Assess/evaluate cause of self-care deficits   - Assess range of motion  - Assess patient's mobility; develop plan if impaired  - Assess patient's need for assistive devices and provide as appropriate  - Encourage maximum independence but intervene and supervise when necessary  - Involve family in performance of ADLs  - Assess for home care needs following discharge   - Consider OT consult to assist with ADL evaluation and planning for discharge  - Provide patient education as appropriate  Outcome: Progressing  Goal: Maintains/Returns to pre admission functional level  Description: INTERVENTIONS:  - Perform BMAT or MOVE assessment daily    - Set and communicate daily mobility goal to care team and patient/family/caregiver     - Collaborate with rehabilitation services on mobility goals if consulted    - Out of bed for toileting  - Record patient progress and toleration of activity level   Outcome: Progressing     Problem: Potential for Falls  Goal: Patient will remain free of falls  Description: INTERVENTIONS:  - Educate patient/family on patient safety including physical limitations  - Instruct patient to call for assistance with activity   - Consult OT/PT to assist with strengthening/mobility   - Keep Call bell within reach  - Keep bed low and locked with side rails adjusted as appropriate  - Keep care items and personal belongings within reach  - Initiate and maintain comfort rounds  - Make Fall Risk Sign visible to staff    - Apply yellow socks and bracelet for high fall risk patients  - Consider moving patient to room near nurses station  Outcome: Progressing     Problem: Prexisting or High Potential for Compromised Skin Integrity  Goal: Skin integrity is maintained or improved  Description: INTERVENTIONS:  - Identify patients at risk for skin breakdown  - Assess and monitor skin integrity  - Assess and monitor nutrition and hydration status  - Monitor labs   - Assess for incontinence   - Turn and reposition patient  - Assist with mobility/ambulation  - Relieve pressure over bony prominences  - Avoid friction and shearing  - Provide appropriate hygiene as needed including keeping skin clean and dry  - Evaluate need for skin moisturizer/barrier cream  - Collaborate with interdisciplinary team   - Patient/family teaching  - Consider wound care consult   Outcome: Progressing

## 2022-01-04 NOTE — ASSESSMENT & PLAN NOTE
· Patient was to be discharged to the [facility] however they would not take him back  · Case management for discharge planning

## 2022-01-04 NOTE — PLAN OF CARE
Problem: PHYSICAL THERAPY ADULT  Goal: Performs mobility at highest level of function for planned discharge setting  See evaluation for individualized goals  Outcome: Progressing  Note: Prognosis: Fair  Problem List: Decreased strength,Decreased endurance,Impaired balance,Decreased mobility,Impaired sensation,Pain  Assessment: Pt in bed- reports fatigue but agreeable to participate - tamiko to get OOB with sit pivot transfer with min A then to BR for the same - pt stood at bedrail with S/ CG for safety but luna at R LE when lifting L   Pt able to propel wc around room with all 4 extrem but with some difficulty - decrease use of hands - needed A with hygiene after BM   Pt encouraged to stay OOB for a little while   Barriers to Discharge: Inaccessible home environment,Decreased caregiver support        PT Discharge Recommendation: Post acute rehabilitation services          See flowsheet documentation for full assessment

## 2022-01-04 NOTE — PROGRESS NOTES
[facility]  Progress Note - Denny  1965, 60 y o  male MRN: [id number]  Unit/Bed#: 7T [facility] 706-01 Encounter: [id number]  Primary Care Provider: Bettie Howard MD   Date and time admitted to hospital: 12/29/2021  5:38 PM    * Ambulatory dysfunction  Assessment & Plan  PT/OT recommending post acute rehab  Medically stable for discharge    Right arm cellulitis  Assessment & Plan  · Patient with right forearm lesion with redness and streaking noted in ED  · ID consulted, appreciate recommendations  · Continue Keflex through 1/5/2022    Homeless  Assessment & Plan  · Patient was to be discharged to the [facility] however they would not take him back  · Case management for discharge planning    Psoriasis, unspecified  Assessment & Plan  Patient with psoriasis flare, was on Enbrel on past however was lost to follow-up  -recommend Dermatology referral on outpatient basis      VTE Pharmacologic Prophylaxis:   Pharmacologic: Enoxaparin (Lovenox)  Mechanical VTE Prophylaxis in Place: Yes    Patient Centered Rounds: I have performed bedside rounds with nursing staff today  Discussions with Specialists or Other Care Team Provider:  Case Management, nursing, PT/OT    Education and Discussions with Family / Patient:  Spoke with patient ; will call patient's family later today    Time Spent for Care: 45 minutes  More than 50% of total time spent on counseling and coordination of care as described above  Current Length of Stay: 5 day(s)    Current Patient Status: Inpatient   Certification Statement: The patient will continue to require additional inpatient hospital stay due to Pending placement to short-term rehab    Discharge Plan:  Medically stable for discharge to short-term rehab    Code Status: Level 1 - Full Code      Subjective:   Patient seen and examined at bedside  No acute events overnight   Denies chest pain, SOB, diaphoresis, nausea/vomiting/diarrhea, fevers/chills  Objective:     Vitals:   Temp (24hrs), Av °F (36 7 °C), Min:97 4 °F (36 3 °C), Max:98 4 °F (36 9 °C)    Temp:  [97 4 °F (36 3 °C)-98 4 °F (36 9 °C)] 97 4 °F (36 3 °C)  HR:  [69-72] 69  Resp:  [18] 18  BP: (160-170)/(78-79) 160/79  SpO2:  [97 %-100 %] 100 %  Body mass index is 23 82 kg/m²  Input and Output Summary (last 24 hours): Intake/Output Summary (Last 24 hours) at 2022 0856  Last data filed at 2022 0001  Gross per 24 hour   Intake 1077 ml   Output 1500 ml   Net -423 ml       Physical Exam:     Physical Exam  Vitals and nursing note reviewed  Constitutional:       Appearance: He is well-developed  HENT:      Head: Normocephalic and atraumatic  Eyes:      Conjunctiva/sclera: Conjunctivae normal    Cardiovascular:      Rate and Rhythm: Normal rate and regular rhythm  Heart sounds: No murmur heard  Pulmonary:      Effort: Pulmonary effort is normal  No respiratory distress  Breath sounds: Normal breath sounds  Abdominal:      Palpations: Abdomen is soft  Tenderness: There is no abdominal tenderness  Musculoskeletal:      Cervical back: Neck supple  Skin:     General: Skin is warm and dry  Neurological:      Mental Status: He is alert  Additional Data:     Labs: I have reviewed pertinent results     Results from last 7 days   Lab Units 22  0502 21  1823   WBC Thousand/uL 5 40   < > 7 60   HEMOGLOBIN g/dL 12 8*   < > 13 2*   HEMATOCRIT % 38 6*   < > 40 8*   PLATELETS Thousands/uL 347   < > 367   NEUTROS PCT %  --   --  76*   LYMPHS PCT %  --   --  13*   LYMPHO PCT % 22*  --   --    MONOS PCT %  --   --  10   MONO PCT % 16*  --   --    EOS PCT % 4  --  1    < > = values in this interval not displayed       Results from last 7 days   Lab Units 22  0502 21  0451 21  1823   SODIUM mmol/L 137   < > 136*   POTASSIUM mmol/L 3 8   < > 3 6   CHLORIDE mmol/L 104   < > 100   CO2 mmol/L 31*   < > 33* BUN mg/dL 15   < > 14   CREATININE mg/dL 0 71   < > 1 00   ANION GAP mmol/L 2*   < > 3*   CALCIUM mg/dL 8 2*   < > 8 5   ALBUMIN g/dL  --   --  3 8   TOTAL BILIRUBIN mg/dL  --   --  1 04   ALK PHOS U/L  --   --  121   ALT U/L  --   --  16   AST U/L  --   --  29   GLUCOSE RANDOM mg/dL 95   < > 92    < > = values in this interval not displayed  Results from last 7 days   Lab Units 12/29/21  1823   INR  1 08             Results from last 7 days   Lab Units 12/30/21  0554 12/29/21  1823 12/28/21  2241   LACTIC ACID mmol/L  --  1 3 1 0   PROCALCITONIN ng/ml <0 05 <0 05 <0 05         Imaging: I have reviewed pertinent imaging       Recent Cultures (last 7 days):     Results from last 7 days   Lab Units 12/29/21  1845 12/29/21  1823 12/28/21  2241   BLOOD CULTURE  No Growth After 5 Days  No Growth After 5 Days  No Growth After 5 Days  No Growth After 5 Days  Last 24 Hours Medication List:   Current Facility-Administered Medications   Medication Dose Route Frequency Provider Last Rate    acetaminophen  650 mg Oral Q4H PRN Marc Headley PA-C      amLODIPine  10 mg Oral Daily [facility], DO      cephalexin  500 mg Oral Q6H [facility],       diphenhydrAMINE  25 mg Oral Q6H PRN [facility], DO      enoxaparin  40 mg Subcutaneous Daily [place], [place]      nicotine  1 patch Transdermal Daily [place], [place]      ondansetron  4 mg Oral Q6H PRN [facility], DO      polyethylene glycol  17 g Oral Daily PRN Donnell Robins PA-C          Today, Patient Was Seen By: Thelma Chandler DO    ** Please Note: Dictation voice to text software may have been used in the creation of this document   **

## 2022-01-04 NOTE — ASSESSMENT & PLAN NOTE
PT/OT recommending post acute rehab  [facility] deny the patient, Pending placement  Medically stable for discharge

## 2022-01-04 NOTE — PHYSICAL THERAPY NOTE
44' session     01/04/22 1424   PT Last Visit   PT Visit Date 01/04/22   Note Type   Note Type Treatment   Pain Assessment   Pain Assessment Tool 0-10   Pain Score 7   Pain Location/Orientation Location: Groin   Pain Onset/Description Descriptor: Sharp   Restrictions/Precautions   Other Precautions Pain; Fall Risk  (depression)   General   Chart Reviewed Yes   Cognition   Overall Cognitive Status WFL   Arousal/Participation Alert; Cooperative   Attention Within functional limits   Following Commands Follows one step commands without difficulty   Subjective   Subjective feels good about getting up  today - started to cry - but still reports feeling very despondent   Bed Mobility   Supine to Sit 6  Modified independent   Additional items Bedrails   Transfers   Sit to Stand 5  Supervision   Stand to Sit   (CG)   Sit pivot 4  Minimal assistance   Additional items   (from bed to w c to toilet  to wc)   Additional Comments pt stood at bed rail for 30" then sat adn rested then stod again for march with CG when lifting R LE nad mod A when lifting R as R knee buckled x 2   Wheelchair Activities   Propulsion   (pt propelled to window then to BR  to bed w/ 4 extrem - S )   Balance   Static Sitting Fair +   Dynamic Sitting Fair   Static Standing Fair -   Dynamic Standing Poor   Activity Tolerance   Activity Tolerance Patient limited by fatigue;Patient limited by pain   Exercises   Hip Flexion Bilateral;AROM;10 reps   Knee AROM Long Arc Quad Bilateral;AROM;20 reps   Assessment   Prognosis Fair   Problem List Decreased strength;Decreased endurance; Impaired balance;Decreased mobility; Impaired sensation;Pain   Assessment Pt in bed- reports fatigue but agreeable to participate - tamiko to get OOB with sit pivot transfer with min A then to BR for the same - pt stood at bedrail with S/ CG for safety but luna at R LE when lifting L    Pt able to propel wc around room with all 4 extrem but with some difficulty - decrease use of hands - needed A with hygiene after BM    Pt encouraged to stay OOB for a little while    Barriers to Discharge Inaccessible home environment;Decreased caregiver support   Goals   Patient Goals be able to get to rehab- maybe walk again   STG Expiration Date 01/11/22   PT Treatment Day 1   Plan   Treatment/Interventions   (cont as per pOC)   Progress Progressing toward goals   PT Frequency 2-3x/wk   Recommendation   PT Discharge Recommendation Post acute rehabilitation services   Equipment Recommended   (wc)   [place] 435   Turning in Bed Without Bedrails 4   Lying on Back to Sitting on Edge of Flat Bed 4   Moving Bed to Chair 3   Standing Up From Chair 3   Walk in Room 2   Climb 3-5 Stairs 1   Basic Mobility Inpatient Raw Score 17   Basic Mobility Standardized Score 39 67   Highest Level Of Mobility   -[facility] Goal 5: Stand one or more mins

## 2022-01-04 NOTE — ASSESSMENT & PLAN NOTE
· Resolved  · Patient with right forearm lesion with redness and streaking noted in ED  · ID consulted, appreciate recommendations  · Completed Keflex course  · Antibiotics have been discontinued

## 2022-01-04 NOTE — PLAN OF CARE
Problem: INFECTION - ADULT  Goal: Absence or prevention of progression during hospitalization  Description: INTERVENTIONS:  - Assess and monitor for signs and symptoms of infection  - Monitor lab/diagnostic results  - Monitor all insertion sites, i e  indwelling lines, tubes, and drains  - Monitor endotracheal if appropriate and nasal secretions for changes in amount and color  - [place] appropriate cooling/warming therapies per order  - Administer medications as ordered  - Instruct and encourage patient and family to use good hand hygiene technique  - Identify and instruct in appropriate isolation precautions for identified infection/condition  Outcome: Progressing     Problem: DISCHARGE PLANNING  Goal: Discharge to home or other facility with appropriate resources  Description: INTERVENTIONS:  - Identify barriers to discharge w/patient and caregiver  - Arrange for needed discharge resources and transportation as appropriate  - Identify discharge learning needs (meds, wound care, etc )  - Arrange for interpretive services to assist at discharge as needed  - Refer to Case Management Department for coordinating discharge planning if the patient needs post-hospital services based on physician/advanced practitioner order or complex needs related to functional status, cognitive ability, or social support system  Outcome: Progressing     Problem: Knowledge Deficit  Goal: Patient/family/caregiver demonstrates understanding of disease process, treatment plan, medications, and discharge instructions  Description: Complete learning assessment and assess knowledge base    Interventions:  - Provide teaching at level of understanding  - Provide teaching via preferred learning methods  Outcome: Progressing

## 2022-01-05 PROCEDURE — 99231 SBSQ HOSP IP/OBS SF/LOW 25: CPT | Performed by: INTERNAL MEDICINE

## 2022-01-05 PROCEDURE — 99232 SBSQ HOSP IP/OBS MODERATE 35: CPT | Performed by: INTERNAL MEDICINE

## 2022-01-05 RX ADMIN — CEPHALEXIN 500 MG: 500 CAPSULE ORAL at 17:28

## 2022-01-05 RX ADMIN — CEPHALEXIN 500 MG: 500 CAPSULE ORAL at 00:10

## 2022-01-05 RX ADMIN — DIPHENHYDRAMINE HCL 25 MG: 25 TABLET ORAL at 08:22

## 2022-01-05 RX ADMIN — CEPHALEXIN 500 MG: 500 CAPSULE ORAL at 05:37

## 2022-01-05 RX ADMIN — ACETAMINOPHEN 650 MG: 325 TABLET ORAL at 05:37

## 2022-01-05 RX ADMIN — AMLODIPINE BESYLATE 10 MG: 10 TABLET ORAL at 08:22

## 2022-01-05 RX ADMIN — POLYETHYLENE GLYCOL 3350 17 G: 17 POWDER, FOR SOLUTION ORAL at 08:22

## 2022-01-05 RX ADMIN — CEPHALEXIN 500 MG: 500 CAPSULE ORAL at 11:49

## 2022-01-05 RX ADMIN — ACETAMINOPHEN 650 MG: 325 TABLET ORAL at 00:11

## 2022-01-05 NOTE — PLAN OF CARE
Problem: PAIN - ADULT  Goal: Verbalizes/displays adequate comfort level or baseline comfort level  Description: Interventions:  - Encourage patient to monitor pain and request assistance  - Assess pain using appropriate pain scale  - Administer analgesics based on type and severity of pain and evaluate response  - Implement non-pharmacological measures as appropriate and evaluate response  - Consider cultural and social influences on pain and pain management  - Notify physician/advanced practitioner if interventions unsuccessful or patient reports new pain  Outcome: Progressing     Problem: INFECTION - ADULT  Goal: Absence or prevention of progression during hospitalization  Description: INTERVENTIONS:  - Assess and monitor for signs and symptoms of infection  - Monitor lab/diagnostic results  - Monitor all insertion sites, i e  indwelling lines, tubes, and drains  - Monitor endotracheal if appropriate and nasal secretions for changes in amount and color  - [place] appropriate cooling/warming therapies per order  - Administer medications as ordered  - Instruct and encourage patient and family to use good hand hygiene technique  - Identify and instruct in appropriate isolation precautions for identified infection/condition  Outcome: Progressing     Problem: DISCHARGE PLANNING  Goal: Discharge to home or other facility with appropriate resources  Description: INTERVENTIONS:  - Identify barriers to discharge w/patient and caregiver  - Arrange for needed discharge resources and transportation as appropriate  - Identify discharge learning needs (meds, wound care, etc )  - Arrange for interpretive services to assist at discharge as needed  - Refer to Case Management Department for coordinating discharge planning if the patient needs post-hospital services based on physician/advanced practitioner order or complex needs related to functional status, cognitive ability, or social support system  Outcome: Progressing

## 2022-01-05 NOTE — PROGRESS NOTES
[facility]  Progress Note - Evon Diallo 1965, 60 y o  male MRN: [id number]  Unit/Bed#: 7T [facility] 706-01 Encounter: [id number]  Primary Care Provider: Veronica Lewis MD   Date and time admitted to hospital: 12/29/2021  5:38 PM    * Ambulatory dysfunction  Assessment & Plan  PT/OT recommending post acute rehab  Medically stable for discharge    Right arm cellulitis  Assessment & Plan  · Patient with right forearm lesion with redness and streaking noted in ED  · ID consulted, appreciate recommendations  · Continue Keflex through 1/5/2022    Homeless  Assessment & Plan  · Patient was to be discharged to the [facility] however they would not take him back  · Case management for discharge planning    Psoriasis, unspecified  Assessment & Plan  Patient with psoriasis flare, was on Enbrel on past however was lost to follow-up  -recommend Dermatology referral on outpatient basis      VTE Pharmacologic Prophylaxis:   Pharmacologic: Enoxaparin (Lovenox)  Mechanical VTE Prophylaxis in Place: Yes    Patient Centered Rounds: I have performed bedside rounds with nursing staff today  Discussions with Specialists or Other Care Team Provider:  Case Management, nursing, PT/OT    Education and Discussions with Family / Patient:  Spoke with patient ; will call patient's family later today    Time Spent for Care: 45 minutes  More than 50% of total time spent on counseling and coordination of care as described above  Current Length of Stay: 6 day(s)    Current Patient Status: Inpatient   Certification Statement: The patient will continue to require additional inpatient hospital stay due to Pending placement to short-term rehab    Discharge Plan:  Medically stable for discharge to short-term rehab    Code Status: Level 1 - Full Code      Subjective:   Patient seen and examined at bedside  No acute events overnight   Denies chest pain, SOB, diaphoresis, nausea/vomiting/diarrhea, fevers/chills  Objective:     Vitals:   Temp (24hrs), Av 9 °F (36 6 °C), Min:97 4 °F (36 3 °C), Max:98 3 °F (36 8 °C)    Temp:  [97 4 °F (36 3 °C)-98 3 °F (36 8 °C)] 98 3 °F (36 8 °C)  HR:  [69-75] 75  Resp:  [18] 18  BP: (152-167)/(75-95) 152/75  SpO2:  [98 %-100 %] 99 %  Body mass index is 23 82 kg/m²  Input and Output Summary (last 24 hours): Intake/Output Summary (Last 24 hours) at 2022 0714  Last data filed at 2022 0501  Gross per 24 hour   Intake 1180 ml   Output 1950 ml   Net -770 ml       Physical Exam:     Physical Exam  Vitals and nursing note reviewed  Constitutional:       Appearance: He is well-developed  HENT:      Head: Normocephalic and atraumatic  Eyes:      Conjunctiva/sclera: Conjunctivae normal    Cardiovascular:      Rate and Rhythm: Normal rate and regular rhythm  Heart sounds: No murmur heard  Pulmonary:      Effort: Pulmonary effort is normal  No respiratory distress  Breath sounds: Normal breath sounds  Abdominal:      Palpations: Abdomen is soft  Tenderness: There is no abdominal tenderness  Musculoskeletal:      Cervical back: Neck supple  Skin:     General: Skin is warm and dry  Neurological:      Mental Status: He is alert  Additional Data:     Labs: I have reviewed pertinent results     Results from last 7 days   Lab Units 22  0502 21  04521  1823   WBC Thousand/uL 5 40   < > 7 60   HEMOGLOBIN g/dL 12 8*   < > 13 2*   HEMATOCRIT % 38 6*   < > 40 8*   PLATELETS Thousands/uL 347   < > 367   NEUTROS PCT %  --   --  76*   LYMPHS PCT %  --   --  13*   LYMPHO PCT % 22*  --   --    MONOS PCT %  --   --  10   MONO PCT % 16*  --   --    EOS PCT % 4  --  1    < > = values in this interval not displayed       Results from last 7 days   Lab Units 22  0502 21  0451 21  1823   SODIUM mmol/L 137   < > 136*   POTASSIUM mmol/L 3 8   < > 3 6   CHLORIDE mmol/L 104   < > 100   CO2 mmol/L 31*   < > 33* BUN mg/dL 15   < > 14   CREATININE mg/dL 0 71   < > 1 00   ANION GAP mmol/L 2*   < > 3*   CALCIUM mg/dL 8 2*   < > 8 5   ALBUMIN g/dL  --   --  3 8   TOTAL BILIRUBIN mg/dL  --   --  1 04   ALK PHOS U/L  --   --  121   ALT U/L  --   --  16   AST U/L  --   --  29   GLUCOSE RANDOM mg/dL 95   < > 92    < > = values in this interval not displayed  Results from last 7 days   Lab Units 12/29/21  1823   INR  1 08             Results from last 7 days   Lab Units 12/30/21  0554 12/29/21  1823   LACTIC ACID mmol/L  --  1 3   PROCALCITONIN ng/ml <0 05 <0 05         Imaging: I have reviewed pertinent imaging       Recent Cultures (last 7 days):     Results from last 7 days   Lab Units 12/29/21  1845 12/29/21  1823   BLOOD CULTURE  No Growth After 5 Days  No Growth After 5 Days  Last 24 Hours Medication List:   Current Facility-Administered Medications   Medication Dose Route Frequency Provider Last Rate    acetaminophen  650 mg Oral Q4H PRN Emily Austin PA-C      amLODIPine  10 mg Oral Daily Jorge Bhat DO      cephalexin  500 mg Oral Q6H [facility] Mery [place],       diphenhydrAMINE  25 mg Oral Q6H PRN Jorge Bhat DO      enoxaparin  40 mg Subcutaneous Daily [place] PA-C      nicotine  1 patch Transdermal Daily [place], [place]      ondansetron  4 mg Oral Q6H PRN Jorge Bhat DO      polyethylene glycol  17 g Oral Daily PRN Kitty Duong PA-C          Today, Patient Was Seen By: Mamie Cedillo DO    ** Please Note: Dictation voice to text software may have been used in the creation of this document   **

## 2022-01-05 NOTE — CASE MANAGEMENT
Case Management Progress Note    Patient name Eveline Garcia  Location 7T [facility] 706/7T [facility] 706-01 MRN [id number]  : 1965 Date 2022       LOS (days): 6  Geometric Mean LOS (GMLOS) (days): 3 20  Days to GMLOS:-2 6        OBJECTIVE:        Current admission status: Inpatient  Preferred Pharmacy:   [address]  [address] [facility]  [address]  Phone: [phone] Fax: [phone] [address] [facility],[address], [address]  [address]  Phone: [phone] Fax: [phone]    Primary Care Provider: Amish Talavera MD    Primary Insurance: [address] [place] [address]  Secondary Insurance:     PROGRESS NOTE:    Patient medical stable for discharge  Only provider considering acceptance is [facility] message left on admission voice mail requesting an update   ,

## 2022-01-05 NOTE — PLAN OF CARE
Problem: PAIN - ADULT  Goal: Verbalizes/displays adequate comfort level or baseline comfort level  Description: Interventions:  - Encourage patient to monitor pain and request assistance  - Assess pain using appropriate pain scale  - Administer analgesics based on type and severity of pain and evaluate response  - Implement non-pharmacological measures as appropriate and evaluate response  - Consider cultural and social influences on pain and pain management  - Notify physician/advanced practitioner if interventions unsuccessful or patient reports new pain  Outcome: Progressing     Problem: INFECTION - ADULT  Goal: Absence or prevention of progression during hospitalization  Description: INTERVENTIONS:  - Assess and monitor for signs and symptoms of infection  - Monitor lab/diagnostic results  - Monitor all insertion sites, i e  indwelling lines, tubes, and drains  - Monitor endotracheal if appropriate and nasal secretions for changes in amount and color  - [place] appropriate cooling/warming therapies per order  - Administer medications as ordered  - Instruct and encourage patient and family to use good hand hygiene technique  - Identify and instruct in appropriate isolation precautions for identified infection/condition  Outcome: Progressing  Goal: Absence of fever/infection during neutropenic period  Description: INTERVENTIONS:  - Monitor WBC    Outcome: Progressing     Problem: SAFETY ADULT  Goal: Patient will remain free of falls  Description: INTERVENTIONS:  - Educate patient/family on patient safety including physical limitations  - Instruct patient to call for assistance with activity   - Consult OT/PT to assist with strengthening/mobility   - Keep Call bell within reach  - Keep bed low and locked with side rails adjusted as appropriate  - Keep care items and personal belongings within reach  - Initiate and maintain comfort rounds  - Make Fall Risk Sign visible to staff    - Apply yellow socks and bracelet for high fall risk patients  - Consider moving patient to room near nurses station  Outcome: Progressing  Goal: Maintain or return to baseline ADL function  Description: INTERVENTIONS:  -  Assess patient's ability to carry out ADLs; assess patient's baseline for ADL function and identify physical deficits which impact ability to perform ADLs (bathing, care of mouth/teeth, toileting, grooming, dressing, etc )  - Assess/evaluate cause of self-care deficits   - Assess range of motion  - Assess patient's mobility; develop plan if impaired  - Assess patient's need for assistive devices and provide as appropriate  - Encourage maximum independence but intervene and supervise when necessary  - Involve family in performance of ADLs  - Assess for home care needs following discharge   - Consider OT consult to assist with ADL evaluation and planning for discharge  - Provide patient education as appropriate  Outcome: Progressing  Goal: Maintains/Returns to pre admission functional level  Description: INTERVENTIONS:  - Perform BMAT or MOVE assessment daily    - Set and communicate daily mobility goal to care team and patient/family/caregiver     - Collaborate with rehabilitation services on mobility goals if consulted    - Out of bed for toileting  - Record patient progress and toleration of activity level   Outcome: Progressing     Problem: DISCHARGE PLANNING  Goal: Discharge to home or other facility with appropriate resources  Description: INTERVENTIONS:  - Identify barriers to discharge w/patient and caregiver  - Arrange for needed discharge resources and transportation as appropriate  - Identify discharge learning needs (meds, wound care, etc )  - Arrange for interpretive services to assist at discharge as needed  - Refer to Case Management Department for coordinating discharge planning if the patient needs post-hospital services based on physician/advanced practitioner order or complex needs related to functional status, cognitive ability, or social support system  Outcome: Progressing     Problem: Knowledge Deficit  Goal: Patient/family/caregiver demonstrates understanding of disease process, treatment plan, medications, and discharge instructions  Description: Complete learning assessment and assess knowledge base  Interventions:  - Provide teaching at level of understanding  - Provide teaching via preferred learning methods  Outcome: Progressing     Problem: MOBILITY - ADULT  Goal: Maintain or return to baseline ADL function  Description: INTERVENTIONS:  -  Assess patient's ability to carry out ADLs; assess patient's baseline for ADL function and identify physical deficits which impact ability to perform ADLs (bathing, care of mouth/teeth, toileting, grooming, dressing, etc )  - Assess/evaluate cause of self-care deficits   - Assess range of motion  - Assess patient's mobility; develop plan if impaired  - Assess patient's need for assistive devices and provide as appropriate  - Encourage maximum independence but intervene and supervise when necessary  - Involve family in performance of ADLs  - Assess for home care needs following discharge   - Consider OT consult to assist with ADL evaluation and planning for discharge  - Provide patient education as appropriate  Outcome: Progressing  Goal: Maintains/Returns to pre admission functional level  Description: INTERVENTIONS:  - Perform BMAT or MOVE assessment daily    - Set and communicate daily mobility goal to care team and patient/family/caregiver     - Collaborate with rehabilitation services on mobility goals if consulted    - Out of bed for toileting  - Record patient progress and toleration of activity level   Outcome: Progressing     Problem: Potential for Falls  Goal: Patient will remain free of falls  Description: INTERVENTIONS:  - Educate patient/family on patient safety including physical limitations  - Instruct patient to call for assistance with activity   - Consult OT/PT to assist with strengthening/mobility   - Keep Call bell within reach  - Keep bed low and locked with side rails adjusted as appropriate  - Keep care items and personal belongings within reach  - Initiate and maintain comfort rounds  - Make Fall Risk Sign visible to staff    - Apply yellow socks and bracelet for high fall risk patients  - Consider moving patient to room near nurses station  Outcome: Progressing     Problem: Prexisting or High Potential for Compromised Skin Integrity  Goal: Skin integrity is maintained or improved  Description: INTERVENTIONS:  - Identify patients at risk for skin breakdown  - Assess and monitor skin integrity  - Assess and monitor nutrition and hydration status  - Monitor labs   - Assess for incontinence   - Turn and reposition patient  - Assist with mobility/ambulation  - Relieve pressure over bony prominences  - Avoid friction and shearing  - Provide appropriate hygiene as needed including keeping skin clean and dry  - Evaluate need for skin moisturizer/barrier cream  - Collaborate with interdisciplinary team   - Patient/family teaching  - Consider wound care consult   Outcome: Progressing

## 2022-01-05 NOTE — PROGRESS NOTES
Progress Note - Infectious Disease   Constance Cardona 60 y o  male MRN: [id number]  Unit/Bed#: 7T [facility] 706-01 Encounter: [id number]      IMPRESSION & RECOMMENDATIONS:   Impression/Recommendations: This is a 57 y  o  male, with history of relatively severe psoriasis, with no treatment recently, presented with relapse of psoriasis and right forearm cellulitis      1  Right forearm cellulitis, secondary to laceration versus skin breakage from psoriasis rash   Cellulitis appears to be quite mild and superficial   No evidence of involvement of deeper structures on exam   Patient has no fever or leukocytosis   He is clinically and systemically well   Clinically improved, remains on oral Keflex  Continue Keflex  Treat x7 days total antibiotic  Last day of antibiotic is today      2  Psoriasis flare   Patient has history of extensive psoriasis, previously on Enbrel but has been lost to follow up on these for years  [facility] may benefit from outpatient dermatology evaluation  Recommend outpatient dermatology evaluation      3  Homelessness      Antibiotic 7  Keflex 5    No ongoing acute ID issues  We will sign off  Please call us with any new questions  Subjective:  No reported acute overnight events  No fevers  Tolerating oral intake  Objective:  Vitals:  Temp:  [98 1 °F (36 7 °C)-98 3 °F (36 8 °C)] 98 3 °F (36 8 °C)  HR:  [69-75] 74  Resp:  [18] 18  BP: (152-174)/(75-95) 174/89  SpO2:  [98 %-99 %] 98 %  Temp (24hrs), Av 2 °F (36 8 °C), Min:98 1 °F (36 7 °C), Max:98 3 °F (36 8 °C)  Current: Temperature: 98 3 °F (36 8 °C)    Physical Exam:   General:  No acute distress  Psychiatric:  Awake and alert  Pulmonary:  Normal respiratory excursion without accessory muscle use  Abdomen:  Soft, nontender  Extremities:  No edema  Improved right upper extremity the redness  No fluctuance or drainage  Skin:  No rashes    Lab Results:  I have personally reviewed pertinent labs    Results from last 7 days   Lab Units 01/04/22  0502 01/02/22  0503 12/31/21  0451 12/29/21  1823 12/29/21  1823   POTASSIUM mmol/L 3 8 3 7 3 1*   < > 3 6   CHLORIDE mmol/L 104 102 103   < > 100   CO2 mmol/L 31* 32* 30   < > 33*   BUN mg/dL 15 11 13   < > 14   CREATININE mg/dL 0 71 0 69* 0 77   < > 1 00   EGFR ml/min/1 73sq m 103 104 100   < > 82   CALCIUM mg/dL 8 2* 8 2* 7 9*   < > 8 5   AST U/L  --   --   --   --  29   ALT U/L  --   --   --   --  16   ALK PHOS U/L  --   --   --   --  121    < > = values in this interval not displayed  Results from last 7 days   Lab Units 01/04/22  0502 12/31/21  0451 12/29/21  1823   WBC Thousand/uL 5 40 7 20 7 60   HEMOGLOBIN g/dL 12 8* 12 7* 13 2*   PLATELETS Thousands/uL 347 353 367     Results from last 7 days   Lab Units 12/29/21  1845 12/29/21  1823   BLOOD CULTURE  No Growth After 5 Days  No Growth After 5 Days  Imaging Studies:   I have personally reviewed pertinent imaging study reports and images in PACS  EKG, Pathology, and Other Studies:   I have personally reviewed pertinent reports

## 2022-01-06 PROCEDURE — 99232 SBSQ HOSP IP/OBS MODERATE 35: CPT | Performed by: INTERNAL MEDICINE

## 2022-01-06 RX ORDER — LANOLIN ALCOHOL/MO/W.PET/CERES
CREAM (GRAM) TOPICAL 3 TIMES DAILY PRN
Status: DISCONTINUED | OUTPATIENT
Start: 2022-01-06 | End: 2022-03-11 | Stop reason: HOSPADM

## 2022-01-06 RX ADMIN — AMLODIPINE BESYLATE 10 MG: 10 TABLET ORAL at 08:53

## 2022-01-06 RX ADMIN — DIPHENHYDRAMINE HCL 25 MG: 25 TABLET ORAL at 08:53

## 2022-01-06 NOTE — PLAN OF CARE
Problem: PAIN - ADULT  Goal: Verbalizes/displays adequate comfort level or baseline comfort level  Description: Interventions:  - Encourage patient to monitor pain and request assistance  - Assess pain using appropriate pain scale  - Administer analgesics based on type and severity of pain and evaluate response  - Implement non-pharmacological measures as appropriate and evaluate response  - Consider cultural and social influences on pain and pain management  - Notify physician/advanced practitioner if interventions unsuccessful or patient reports new pain  Outcome: Progressing     Problem: INFECTION - ADULT  Goal: Absence or prevention of progression during hospitalization  Description: INTERVENTIONS:  - Assess and monitor for signs and symptoms of infection  - Monitor lab/diagnostic results  - Monitor all insertion sites, i e  indwelling lines, tubes, and drains  - Monitor endotracheal if appropriate and nasal secretions for changes in amount and color  - [place] appropriate cooling/warming therapies per order  - Administer medications as ordered  - Instruct and encourage patient and family to use good hand hygiene technique  - Identify and instruct in appropriate isolation precautions for identified infection/condition  Outcome: Progressing  Goal: Absence of fever/infection during neutropenic period  Description: INTERVENTIONS:  - Monitor WBC    Outcome: Progressing     Problem: SAFETY ADULT  Goal: Patient will remain free of falls  Description: INTERVENTIONS:  - Educate patient/family on patient safety including physical limitations  - Instruct patient to call for assistance with activity   - Consult OT/PT to assist with strengthening/mobility   - Keep Call bell within reach  - Keep bed low and locked with side rails adjusted as appropriate  - Keep care items and personal belongings within reach  - Initiate and maintain comfort rounds  - Make Fall Risk Sign visible to staff  - Offer Toileting every 2 Hours, in advance of need  - Obtain necessary fall risk management equipment:   - Apply yellow socks and bracelet for high fall risk patients  - Consider moving patient to room near nurses station  Outcome: Progressing  Goal: Maintain or return to baseline ADL function  Description: INTERVENTIONS:  -  Assess patient's ability to carry out ADLs; assess patient's baseline for ADL function and identify physical deficits which impact ability to perform ADLs (bathing, care of mouth/teeth, toileting, grooming, dressing, etc )  - Assess/evaluate cause of self-care deficits   - Assess range of motion  - Assess patient's mobility; develop plan if impaired  - Assess patient's need for assistive devices and provide as appropriate  - Encourage maximum independence but intervene and supervise when necessary  - Involve family in performance of ADLs  - Assess for home care needs following discharge   - Consider OT consult to assist with ADL evaluation and planning for discharge  - Provide patient education as appropriate  Outcome: Progressing  Goal: Maintains/Returns to pre admission functional level  Description: INTERVENTIONS:  - Perform BMAT or MOVE assessment daily    - Set and communicate daily mobility goal to care team and patient/family/caregiver  - Collaborate with rehabilitation services on mobility goals if consulted  - Perform Range of Motion 2 times a day  - Reposition patient every 2 hours    - Dangle patient 2 times a day  - Stand patient 2 times a day  - Ambulate patient    - Out of bed to chair 2 times a day   - Out of bed for meal  - Out of bed for toileting  - Record patient progress and toleration of activity level   Outcome: Progressing     Problem: DISCHARGE PLANNING  Goal: Discharge to home or other facility with appropriate resources  Description: INTERVENTIONS:  - Identify barriers to discharge w/patient and caregiver  - Arrange for needed discharge resources and transportation as appropriate  - Identify discharge learning needs (meds, wound care, etc )  - Arrange for interpretive services to assist at discharge as needed  - Refer to Case Management Department for coordinating discharge planning if the patient needs post-hospital services based on physician/advanced practitioner order or complex needs related to functional status, cognitive ability, or social support system  Outcome: Progressing     Problem: Knowledge Deficit  Goal: Patient/family/caregiver demonstrates understanding of disease process, treatment plan, medications, and discharge instructions  Description: Complete learning assessment and assess knowledge base  Interventions:  - Provide teaching at level of understanding  - Provide teaching via preferred learning methods  Outcome: Progressing     Problem: MOBILITY - ADULT  Goal: Maintain or return to baseline ADL function  Description: INTERVENTIONS:  -  Assess patient's ability to carry out ADLs; assess patient's baseline for ADL function and identify physical deficits which impact ability to perform ADLs (bathing, care of mouth/teeth, toileting, grooming, dressing, etc )  - Assess/evaluate cause of self-care deficits   - Assess range of motion  - Assess patient's mobility; develop plan if impaired  - Assess patient's need for assistive devices and provide as appropriate  - Encourage maximum independence but intervene and supervise when necessary  - Involve family in performance of ADLs  - Assess for home care needs following discharge   - Consider OT consult to assist with ADL evaluation and planning for discharge  - Provide patient education as appropriate  Outcome: Progressing  Goal: Maintains/Returns to pre admission functional level  Description: INTERVENTIONS:  - Perform BMAT or MOVE assessment daily    - Set and communicate daily mobility goal to care team and patient/family/caregiver     - Collaborate with rehabilitation services on mobility goals if consulted  - Perform Range of Motion 2 times a day  - Reposition patient every 2 hours    - Dangle patient 2 times a day  - Stand patient 2 times a day  - Ambulate patient   - Out of bed to chair  - Out of bed for meals   - Out of bed for toileting  - Record patient progress and toleration of activity level   Outcome: Progressing     Problem: Potential for Falls  Goal: Patient will remain free of falls  Description: INTERVENTIONS:  - Educate patient/family on patient safety including physical limitations  - Instruct patient to call for assistance with activity   - Consult OT/PT to assist with strengthening/mobility   - Keep Call bell within reach  - Keep bed low and locked with side rails adjusted as appropriate  - Keep care items and personal belongings within reach  - Initiate and maintain comfort rounds  - Make Fall Risk Sign visible to staff  - Offer Toileting every 2 Hours, in advance of need  - Obtain necessary fall risk management equipment:   - Apply yellow socks and bracelet for high fall risk patients  - Consider moving patient to room near nurses station  Outcome: Progressing     Problem: Prexisting or High Potential for Compromised Skin Integrity  Goal: Skin integrity is maintained or improved  Description: INTERVENTIONS:  - Identify patients at risk for skin breakdown  - Assess and monitor skin integrity  - Assess and monitor nutrition and hydration status  - Monitor labs   - Assess for incontinence   - Turn and reposition patient  - Assist with mobility/ambulation  - Relieve pressure over bony prominences  - Avoid friction and shearing  - Provide appropriate hygiene as needed including keeping skin clean and dry  - Evaluate need for skin moisturizer/barrier cream  - Collaborate with interdisciplinary team   - Patient/family teaching  - Consider wound care consult   Outcome: Progressing

## 2022-01-06 NOTE — CASE MANAGEMENT
[facility] only SNF willing to accept when bed available bat this point- no beds this day    SW/CM to follow up in a m with same

## 2022-01-06 NOTE — PLAN OF CARE
Problem: PAIN - ADULT  Goal: Verbalizes/displays adequate comfort level or baseline comfort level  Description: Interventions:  - Encourage patient to monitor pain and request assistance  - Assess pain using appropriate pain scale  - Administer analgesics based on type and severity of pain and evaluate response  - Implement non-pharmacological measures as appropriate and evaluate response  - Consider cultural and social influences on pain and pain management  - Notify physician/advanced practitioner if interventions unsuccessful or patient reports new pain  Outcome: Progressing     Problem: INFECTION - ADULT  Goal: Absence or prevention of progression during hospitalization  Description: INTERVENTIONS:  - Assess and monitor for signs and symptoms of infection  - Monitor lab/diagnostic results  - Monitor all insertion sites, i e  indwelling lines, tubes, and drains  - Monitor endotracheal if appropriate and nasal secretions for changes in amount and color  - [place] appropriate cooling/warming therapies per order  - Administer medications as ordered  - Instruct and encourage patient and family to use good hand hygiene technique  - Identify and instruct in appropriate isolation precautions for identified infection/condition  Outcome: Progressing     Problem: DISCHARGE PLANNING  Goal: Discharge to home or other facility with appropriate resources  Description: INTERVENTIONS:  - Identify barriers to discharge w/patient and caregiver  - Arrange for needed discharge resources and transportation as appropriate  - Identify discharge learning needs (meds, wound care, etc )  - Arrange for interpretive services to assist at discharge as needed  - Refer to Case Management Department for coordinating discharge planning if the patient needs post-hospital services based on physician/advanced practitioner order or complex needs related to functional status, cognitive ability, or social support system  Outcome: Progressing

## 2022-01-06 NOTE — PROGRESS NOTES
[facility]  Progress Note - Tejinder Hercules 1965, 60 y o  male MRN: [id number]  Unit/Bed#: 7T [facility] 706-01 Encounter: [id number]  Primary Care Provider: Shyam Lion MD   Date and time admitted to hospital: 12/29/2021  5:38 PM    * Ambulatory dysfunction  Assessment & Plan  PT/OT recommending post acute rehab  Medically stable for discharge    Right arm cellulitis  Assessment & Plan  · Patient with right forearm lesion with redness and streaking noted in ED  · ID consulted, appreciate recommendations  · Continue Keflex through 1/5/2022    Homeless  Assessment & Plan  · Patient was to be discharged to the [facility] however they would not take him back  · Case management for discharge planning    Psoriasis, unspecified  Assessment & Plan  Patient with psoriasis flare, was on Enbrel on past however was lost to follow-up  -recommend Dermatology referral on outpatient basis      VTE Pharmacologic Prophylaxis:   Pharmacologic: Enoxaparin (Lovenox)  Mechanical VTE Prophylaxis in Place: Yes    Patient Centered Rounds: I have performed bedside rounds with nursing staff today  Discussions with Specialists or Other Care Team Provider:  Case Management, nursing, PT/OT    Education and Discussions with Family / Patient:  Spoke with patient ; will call patient's family later today    Time Spent for Care: 45 minutes  More than 50% of total time spent on counseling and coordination of care as described above  Current Length of Stay: 7 day(s)    Current Patient Status: Inpatient   Certification Statement: The patient will continue to require additional inpatient hospital stay due to Pending placement to short-term rehab    Discharge Plan:  Medically stable for discharge to short-term rehab    Code Status: Level 1 - Full Code      Subjective:   Patient seen and examined at bedside  No acute events overnight   Denies chest pain, SOB, diaphoresis, nausea/vomiting/diarrhea, fevers/chills  Objective:     Vitals:   Temp (24hrs), Av 8 °F (36 6 °C), Min:96 6 °F (35 9 °C), Max:99 1 °F (37 3 °C)    Temp:  [96 6 °F (35 9 °C)-99 1 °F (37 3 °C)] 96 6 °F (35 9 °C)  HR:  [67-76] 76  Resp:  [20] 20  BP: (144-165)/(74-92) 144/80  SpO2:  [97 %-100 %] 100 %  Body mass index is 23 82 kg/m²  Input and Output Summary (last 24 hours): Intake/Output Summary (Last 24 hours) at 2022 0750  Last data filed at 2022 0000  Gross per 24 hour   Intake 1680 ml   Output 800 ml   Net 880 ml       Physical Exam:     Physical Exam  Vitals and nursing note reviewed  Constitutional:       Appearance: He is well-developed  HENT:      Head: Normocephalic and atraumatic  Eyes:      Conjunctiva/sclera: Conjunctivae normal    Cardiovascular:      Rate and Rhythm: Normal rate and regular rhythm  Heart sounds: No murmur heard  Pulmonary:      Effort: Pulmonary effort is normal  No respiratory distress  Breath sounds: Normal breath sounds  Abdominal:      Palpations: Abdomen is soft  Tenderness: There is no abdominal tenderness  Musculoskeletal:      Cervical back: Neck supple  Skin:     General: Skin is warm and dry  Neurological:      Mental Status: He is alert  Additional Data:     Labs:  I have reviewed pertinent results     Results from last 7 days   Lab Units 22  0502   WBC Thousand/uL 5 40   HEMOGLOBIN g/dL 12 8*   HEMATOCRIT % 38 6*   PLATELETS Thousands/uL 347   LYMPHO PCT % 22*   MONO PCT % 16*   EOS PCT % 4     Results from last 7 days   Lab Units 22  0502   SODIUM mmol/L 137   POTASSIUM mmol/L 3 8   CHLORIDE mmol/L 104   CO2 mmol/L 31*   BUN mg/dL 15   CREATININE mg/dL 0 71   ANION GAP mmol/L 2*   CALCIUM mg/dL 8 2*   GLUCOSE RANDOM mg/dL 95                         Imaging: I have reviewed pertinent imaging       Recent Cultures (last 7 days):           Last 24 Hours Medication List:   Current Facility-Administered Medications   Medication Dose Route Frequency Provider Last Rate    acetaminophen  650 mg Oral Q4H PRN Tejinder Manzanares PA-C      amLODIPine  10 mg Oral Daily Yuliana Huddle, DO      diphenhydrAMINE  25 mg Oral Q6H PRN Yuliana Huddle, DO      enoxaparin  40 mg Subcutaneous Daily [place], [place]      nicotine  1 patch Transdermal Daily [place], [place]      ondansetron  4 mg Oral Q6H PRN Yuliana Huddle, DO      polyethylene glycol  17 g Oral Daily PRN Sreekanth Gutierrez PA-C          Today, Patient Was Seen By: Vicente Rodrigues DO    ** Please Note: Dictation voice to text software may have been used in the creation of this document   **

## 2022-01-07 PROCEDURE — 99232 SBSQ HOSP IP/OBS MODERATE 35: CPT | Performed by: INTERNAL MEDICINE

## 2022-01-07 RX ADMIN — Medication: at 13:42

## 2022-01-07 RX ADMIN — DIPHENHYDRAMINE HCL 25 MG: 25 TABLET ORAL at 15:02

## 2022-01-07 RX ADMIN — ACETAMINOPHEN 650 MG: 325 TABLET ORAL at 15:02

## 2022-01-07 RX ADMIN — AMLODIPINE BESYLATE 10 MG: 10 TABLET ORAL at 08:16

## 2022-01-07 NOTE — PROGRESS NOTES
[facility]  Progress Note - Vijaya Hadley 1965, 60 y o  male MRN: [id number]  Unit/Bed#: 7T [facility] 706-01 Encounter: [id number]  Primary Care Provider: Steph Barone MD   Date and time admitted to hospital: 12/29/2021  5:38 PM    * Ambulatory dysfunction  Assessment & Plan  PT/OT recommending post acute rehab  Medically stable for discharge    Right arm cellulitis  Assessment & Plan  · Patient with right forearm lesion with redness and streaking noted in ED  · ID consulted, appreciate recommendations  · Continue Keflex through 1/5/2022    Homeless  Assessment & Plan  · Patient was to be discharged to [facility] however they would not take him back  · Case management for discharge planning    Psoriasis, unspecified  Assessment & Plan  Patient with psoriasis flare, was on Enbrel on past however was lost to follow-up  -recommend Dermatology referral on outpatient basis      VTE Pharmacologic Prophylaxis:   Pharmacologic: Enoxaparin (Lovenox)  Mechanical VTE Prophylaxis in Place: Yes    Patient Centered Rounds: I have performed bedside rounds with nursing staff today  Discussions with Specialists or Other Care Team Provider:  Case Management, nursing, PT/OT    Education and Discussions with Family / Patient:  Spoke with patient ; will call patient's family later today    Time Spent for Care: 45 minutes  More than 50% of total time spent on counseling and coordination of care as described above  Current Length of Stay: 8 day(s)    Current Patient Status: Inpatient   Certification Statement: The patient will continue to require additional inpatient hospital stay due to Pending placement to short-term rehab    Discharge Plan:  Medically stable for discharge to short-term rehab    Code Status: Level 1 - Full Code      Subjective:   Patient seen and examined at bedside  No acute events overnight   Denies chest pain, SOB, diaphoresis, nausea/vomiting/diarrhea, fevers/chills  Objective:     Vitals:   Temp (24hrs), Av °F (36 7 °C), Min:97 5 °F (36 4 °C), Max:98 5 °F (36 9 °C)    Temp:  [97 5 °F (36 4 °C)-98 5 °F (36 9 °C)] 97 7 °F (36 5 °C)  HR:  [67-80] 68  Resp:  [16-18] 18  BP: (140-169)/(73-82) 146/78  SpO2:  [97 %-99 %] 99 %  Body mass index is 23 82 kg/m²  Input and Output Summary (last 24 hours): Intake/Output Summary (Last 24 hours) at 2022 1008  Last data filed at 2022 0737  Gross per 24 hour   Intake 720 ml   Output 2050 ml   Net -1330 ml       Physical Exam:     Physical Exam  Vitals and nursing note reviewed  Constitutional:       Appearance: He is well-developed  HENT:      Head: Normocephalic and atraumatic  Eyes:      Conjunctiva/sclera: Conjunctivae normal    Cardiovascular:      Rate and Rhythm: Normal rate and regular rhythm  Heart sounds: No murmur heard  Pulmonary:      Effort: Pulmonary effort is normal  No respiratory distress  Breath sounds: Normal breath sounds  Abdominal:      Palpations: Abdomen is soft  Tenderness: There is no abdominal tenderness  Musculoskeletal:      Cervical back: Neck supple  Skin:     General: Skin is warm and dry  Neurological:      Mental Status: He is alert  Additional Data:     Labs:  I have reviewed pertinent results     Results from last 7 days   Lab Units 22  0502   WBC Thousand/uL 5 40   HEMOGLOBIN g/dL 12 8*   HEMATOCRIT % 38 6*   PLATELETS Thousands/uL 347   LYMPHO PCT % 22*   MONO PCT % 16*   EOS PCT % 4     Results from last 7 days   Lab Units 22  0502   SODIUM mmol/L 137   POTASSIUM mmol/L 3 8   CHLORIDE mmol/L 104   CO2 mmol/L 31*   BUN mg/dL 15   CREATININE mg/dL 0 71   ANION GAP mmol/L 2*   CALCIUM mg/dL 8 2*   GLUCOSE RANDOM mg/dL 95                         Imaging: I have reviewed pertinent imaging       Recent Cultures (last 7 days):           Last 24 Hours Medication List:   Current Facility-Administered Medications Medication Dose Route Frequency Provider Last Rate    acetaminophen  650 mg Oral Q4H PRN Iwona Olivas PA-C      amLODIPine  10 mg Oral Daily Frederick Regulus, DO      diphenhydrAMINE  25 mg Oral Q6H PRN Frederick Regulus, DO      enoxaparin  40 mg Subcutaneous Daily [place], [place]      nicotine  1 patch Transdermal Daily [place], [place]      ondansetron  4 mg Oral Q6H PRN Frederick Regulus, DO      polyethylene glycol  17 g Oral Daily PRN Juana Mejía PA-C      white petrolatum-mineral oil   Topical TID PRN Raisa Hernandez DO          Today, Patient Was Seen By: Raisa Hernandez DO    ** Please Note: Dictation voice to text software may have been used in the creation of this document   **

## 2022-01-07 NOTE — PLAN OF CARE
Problem: INFECTION - ADULT  Goal: Absence or prevention of progression during hospitalization  Description: INTERVENTIONS:  - Assess and monitor for signs and symptoms of infection  - Monitor lab/diagnostic results  - Monitor all insertion sites, i e  indwelling lines, tubes, and drains  - Monitor endotracheal if appropriate and nasal secretions for changes in amount and color  - [place] appropriate cooling/warming therapies per order  - Administer medications as ordered  - Instruct and encourage patient and family to use good hand hygiene technique  - Identify and instruct in appropriate isolation precautions for identified infection/condition  Outcome: Progressing     Problem: INFECTION - ADULT  Goal: Absence of fever/infection during neutropenic period  Description: INTERVENTIONS:  - Monitor WBC    Outcome: Completed

## 2022-01-08 LAB
ANION GAP SERPL CALCULATED.3IONS-SCNC: 3 MMOL/L (ref 5–14)
BASOPHILS # BLD AUTO: 0.1 THOUSANDS/ΜL (ref 0–0.1)
BASOPHILS NFR BLD AUTO: 1 % (ref 0–1)
BUN SERPL-MCNC: 13 MG/DL (ref 5–25)
CALCIUM SERPL-MCNC: 8.4 MG/DL (ref 8.4–10.2)
CHLORIDE SERPL-SCNC: 105 MMOL/L (ref 97–108)
CO2 SERPL-SCNC: 30 MMOL/L (ref 22–30)
CREAT SERPL-MCNC: 0.66 MG/DL (ref 0.7–1.5)
EOSINOPHIL # BLD AUTO: 0.2 THOUSAND/ΜL (ref 0–0.4)
EOSINOPHIL NFR BLD AUTO: 3 % (ref 0–6)
ERYTHROCYTE [DISTWIDTH] IN BLOOD BY AUTOMATED COUNT: 16.4 %
GFR SERPL CREATININE-BSD FRML MDRD: 106 ML/MIN/1.73SQ M
GLUCOSE SERPL-MCNC: 90 MG/DL (ref 70–99)
HCT VFR BLD AUTO: 39.5 % (ref 41–53)
HGB BLD-MCNC: 12.8 G/DL (ref 13.5–17.5)
LYMPHOCYTES # BLD AUTO: 1.5 THOUSANDS/ΜL (ref 0.5–4)
LYMPHOCYTES NFR BLD AUTO: 25 % (ref 25–45)
MAGNESIUM SERPL-MCNC: 1.9 MG/DL (ref 1.6–2.3)
MCH RBC QN AUTO: 27.8 PG (ref 26–34)
MCHC RBC AUTO-ENTMCNC: 32.4 G/DL (ref 31–36)
MCV RBC AUTO: 86 FL (ref 80–100)
MONOCYTES # BLD AUTO: 0.6 THOUSAND/ΜL (ref 0.2–0.9)
MONOCYTES NFR BLD AUTO: 10 % (ref 1–10)
NEUTROPHILS # BLD AUTO: 3.7 THOUSANDS/ΜL (ref 1.8–7.8)
NEUTS SEG NFR BLD AUTO: 62 % (ref 45–65)
PHOSPHATE SERPL-MCNC: 3.6 MG/DL (ref 2.5–4.8)
PLATELET # BLD AUTO: 347 THOUSANDS/UL (ref 150–450)
PMV BLD AUTO: 7.6 FL (ref 8.9–12.7)
POTASSIUM SERPL-SCNC: 3.7 MMOL/L (ref 3.6–5)
RBC # BLD AUTO: 4.61 MILLION/UL (ref 4.5–5.9)
SODIUM SERPL-SCNC: 138 MMOL/L (ref 137–147)
TSH SERPL DL<=0.05 MIU/L-ACNC: 1.99 UIU/ML (ref 0.47–4.68)
WBC # BLD AUTO: 6 THOUSAND/UL (ref 4.5–11)

## 2022-01-08 PROCEDURE — 84100 ASSAY OF PHOSPHORUS: CPT | Performed by: INTERNAL MEDICINE

## 2022-01-08 PROCEDURE — 85025 COMPLETE CBC W/AUTO DIFF WBC: CPT | Performed by: INTERNAL MEDICINE

## 2022-01-08 PROCEDURE — 83735 ASSAY OF MAGNESIUM: CPT | Performed by: INTERNAL MEDICINE

## 2022-01-08 PROCEDURE — 80048 BASIC METABOLIC PNL TOTAL CA: CPT | Performed by: INTERNAL MEDICINE

## 2022-01-08 PROCEDURE — 99232 SBSQ HOSP IP/OBS MODERATE 35: CPT | Performed by: INTERNAL MEDICINE

## 2022-01-08 PROCEDURE — 84443 ASSAY THYROID STIM HORMONE: CPT | Performed by: INTERNAL MEDICINE

## 2022-01-08 RX ADMIN — Medication: at 08:11

## 2022-01-08 RX ADMIN — ACETAMINOPHEN 650 MG: 325 TABLET ORAL at 08:09

## 2022-01-08 RX ADMIN — AMLODIPINE BESYLATE 10 MG: 10 TABLET ORAL at 08:10

## 2022-01-08 RX ADMIN — DIPHENHYDRAMINE HCL 25 MG: 25 TABLET ORAL at 08:09

## 2022-01-08 NOTE — PROGRESS NOTES
51 [facility]  Progress Note - Jatinder Flow 1965, 60 y o  male MRN: [id number]  Unit/Bed#: 7T [facility] 706-01 Encounter: [id number]  Primary Care Provider: Roxy Read MD   Date and time admitted to hospital: 12/29/2021  5:38 PM    * Ambulatory dysfunction  Assessment & Plan  PT/OT recommending post acute rehab  [facility] ; awaiting bed availability  Medically stable for discharge    Right arm cellulitis  Assessment & Plan  · Resolved  · Patient with right forearm lesion with redness and streaking noted in ED  · ID consulted, appreciate recommendations  · Continue Keflex through 1/5/2022  · Antibiotics have been discontinued    [address] [facility]  · Patient was to be discharged to the [facility] however they would not take him back  · Case management for discharge planning    Psoriasis, unspecified  Assessment & Plan  Patient with psoriasis flare, was on Enbrel on past however was lost to follow-up  -recommend Dermatology referral on outpatient basis      VTE Pharmacologic Prophylaxis:   Pharmacologic: Enoxaparin (Lovenox)  Mechanical VTE Prophylaxis in Place: Yes    Patient Centered Rounds: I have performed bedside rounds with nursing staff today  Discussions with Specialists or Other Care Team Provider:  Case Management, nursing, PT/OT    Education and Discussions with Family / Patient:  Spoke with patient ; will call patient's family later today    Time Spent for Care: 45 minutes  More than 50% of total time spent on counseling and coordination of care as described above      Current Length of Stay: 9 day(s)    Current Patient Status: Inpatient   Certification Statement: The patient will continue to require additional inpatient hospital stay due to Pending placement to short-term rehab    Discharge Plan:  Medically stable for discharge to short-term rehab    Code Status: Level 1 - Full Code      Subjective:   Patient seen and examined at bedside  No acute events overnight  Denies chest pain, SOB, diaphoresis, nausea/vomiting/diarrhea, fevers/chills  Objective:     Vitals:   Temp (24hrs), Av 6 °F (36 4 °C), Min:97 5 °F (36 4 °C), Max:97 7 °F (36 5 °C)    Temp:  [97 5 °F (36 4 °C)-97 7 °F (36 5 °C)] 97 5 °F (36 4 °C)  HR:  [68-77] 77  Resp:  [17-18] 17  BP: (146-162)/(78-97) 153/87  SpO2:  [97 %-99 %] 99 %  Body mass index is 23 82 kg/m²  Input and Output Summary (last 24 hours): Intake/Output Summary (Last 24 hours) at 2022 2411  Last data filed at 2022 1700  Gross per 24 hour   Intake 1440 ml   Output 1100 ml   Net 340 ml       Physical Exam:     Physical Exam  Vitals and nursing note reviewed  Constitutional:       Appearance: He is well-developed  HENT:      Head: Normocephalic and atraumatic  Eyes:      Conjunctiva/sclera: Conjunctivae normal    Cardiovascular:      Rate and Rhythm: Normal rate and regular rhythm  Heart sounds: No murmur heard  Pulmonary:      Effort: Pulmonary effort is normal  No respiratory distress  Breath sounds: Normal breath sounds  Abdominal:      Palpations: Abdomen is soft  Tenderness: There is no abdominal tenderness  Musculoskeletal:      Cervical back: Neck supple  Skin:     General: Skin is warm and dry  Neurological:      Mental Status: He is alert  Additional Data:     Labs:  I have reviewed pertinent results     Results from last 7 days   Lab Units 22  0436   WBC Thousand/uL 6 00   HEMOGLOBIN g/dL 12 8*   HEMATOCRIT % 39 5*   PLATELETS Thousands/uL 347   NEUTROS PCT % 62   LYMPHS PCT % 25   MONOS PCT % 10   EOS PCT % 3     Results from last 7 days   Lab Units 22  0502   SODIUM mmol/L 137   POTASSIUM mmol/L 3 8   CHLORIDE mmol/L 104   CO2 mmol/L 31*   BUN mg/dL 15   CREATININE mg/dL 0 71   ANION GAP mmol/L 2*   CALCIUM mg/dL 8 2*   GLUCOSE RANDOM mg/dL 95                         Imaging: I have reviewed pertinent imaging Recent Cultures (last 7 days):           Last 24 Hours Medication List:   Current Facility-Administered Medications   Medication Dose Route Frequency Provider Last Rate    acetaminophen  650 mg Oral Q4H PRN Marry Perez PA-C      amLODIPine  10 mg Oral Daily Tova Chawla DO      diphenhydrAMINE  25 mg Oral Q6H PRN Tova Chawla, DO      enoxaparin  40 mg Subcutaneous Daily [facility]      nicotine  1 patch Transdermal Daily [place], [place]      ondansetron  4 mg Oral Q6H PRN Tova Chawla, DO      polyethylene glycol  17 g Oral Daily PRN Wilfredo Arroyo PA-C      white petrolatum-mineral oil   Topical TID PRN Adrien Lipscomb DO          Today, Patient Was Seen By: Adrien Lipscomb DO    ** Please Note: Dictation voice to text software may have been used in the creation of this document   **

## 2022-01-09 PROBLEM — I10 HYPERTENSION: Status: ACTIVE | Noted: 2022-01-09

## 2022-01-09 PROCEDURE — 99232 SBSQ HOSP IP/OBS MODERATE 35: CPT | Performed by: INTERNAL MEDICINE

## 2022-01-09 RX ADMIN — ACETAMINOPHEN 650 MG: 325 TABLET ORAL at 15:51

## 2022-01-09 RX ADMIN — DIPHENHYDRAMINE HCL 25 MG: 25 TABLET ORAL at 15:51

## 2022-01-09 RX ADMIN — DIPHENHYDRAMINE HCL 25 MG: 25 TABLET ORAL at 08:42

## 2022-01-09 RX ADMIN — AMLODIPINE BESYLATE 10 MG: 10 TABLET ORAL at 08:43

## 2022-01-09 RX ADMIN — ACETAMINOPHEN 650 MG: 325 TABLET ORAL at 08:42

## 2022-01-09 RX ADMIN — ENOXAPARIN SODIUM 40 MG: 40 INJECTION SUBCUTANEOUS at 08:42

## 2022-01-09 NOTE — PLAN OF CARE
Problem: SAFETY ADULT  Goal: Patient will remain free of falls  Description: INTERVENTIONS:  - Educate patient/family on patient safety including physical limitations  - Instruct patient to call for assistance with activity   - Consult OT/PT to assist with strengthening/mobility   - Keep Call bell within reach  - Keep bed low and locked with side rails adjusted as appropriate  - Keep care items and personal belongings within reach  - Initiate and maintain comfort rounds  - Make Fall Risk Sign visible to staff  - Apply yellow socks and bracelet for high fall risk patients  - Consider moving patient to room near nurses station  Outcome: Progressing     Problem: DISCHARGE PLANNING  Goal: Discharge to home or other facility with appropriate resources  Description: INTERVENTIONS:  - Identify barriers to discharge w/patient and caregiver  - Arrange for needed discharge resources and transportation as appropriate  - Identify discharge learning needs (meds, wound care, etc )  - Arrange for interpretive services to assist at discharge as needed  - Refer to Case Management Department for coordinating discharge planning if the patient needs post-hospital services based on physician/advanced practitioner order or complex needs related to functional status, cognitive ability, or social support system  Outcome: Progressing     Problem: Knowledge Deficit  Goal: Patient/family/caregiver demonstrates understanding of disease process, treatment plan, medications, and discharge instructions  Description: Complete learning assessment and assess knowledge base    Interventions:  - Provide teaching at level of understanding  - Provide teaching via preferred learning methods  Outcome: Progressing     Problem: MOBILITY - ADULT  Goal: Maintain or return to baseline ADL function  Description: INTERVENTIONS:  -  Assess patient's ability to carry out ADLs; assess patient's baseline for ADL function and identify physical deficits which impact ability to perform ADLs (bathing, care of mouth/teeth, toileting, grooming, dressing, etc )  - Assess/evaluate cause of self-care deficits   - Assess range of motion  - Assess patient's mobility; develop plan if impaired  - Assess patient's need for assistive devices and provide as appropriate  - Encourage maximum independence but intervene and supervise when necessary  - Involve family in performance of ADLs  - Assess for home care needs following discharge   - Consider OT consult to assist with ADL evaluation and planning for discharge  - Provide patient education as appropriate  Outcome: Progressing     Problem: Prexisting or High Potential for Compromised Skin Integrity  Goal: Skin integrity is maintained or improved  Description: INTERVENTIONS:  - Identify patients at risk for skin breakdown  - Assess and monitor skin integrity  - Assess and monitor nutrition and hydration status  - Monitor labs   - Assess for incontinence   - Turn and reposition patient  - Assist with mobility/ambulation  - Relieve pressure over bony prominences  - Avoid friction and shearing  - Provide appropriate hygiene as needed including keeping skin clean and dry  - Evaluate need for skin moisturizer/barrier cream  - Collaborate with interdisciplinary team   - Patient/family teaching  - Consider wound care consult   Outcome: Progressing

## 2022-01-09 NOTE — PROGRESS NOTES
51 [facility]  Progress Note - Manju Ley 1965, 60 y o  male MRN: [id number]  Unit/Bed#: 7T [facility] 706-01 Encounter: [id number]  Primary Care Provider: Reyna Blancas MD   Date and time admitted to hospital: 12/29/2021  5:38 PM    * Ambulatory dysfunction  Assessment & Plan  PT/OT recommending post acute rehab  [facility] to [facility] ; awaiting bed availability  Medically stable for discharge    Right arm cellulitis  Assessment & Plan  · Resolved  · Patient with right forearm lesion with redness and streaking noted in ED  · ID consulted, appreciate recommendations  · Continue Keflex through 1/5/2022  · Antibiotics have been discontinued    [address] [facility]  · Patient was to be discharged to the [facility] however they would not take him back  · Case management for discharge planning    Psoriasis, unspecified  Assessment & Plan  Patient with psoriasis flare, was on Enbrel on past however was lost to follow-up  -recommend Dermatology referral on outpatient basis      Hypertension  Assessment & Plan  Improved on current regimen  · Amlodipine 10 mg PO daily  · Monitor blood pressures    VTE Pharmacologic Prophylaxis:   Pharmacologic: Enoxaparin (Lovenox)  Mechanical VTE Prophylaxis in Place: Yes    Patient Centered Rounds: I have performed bedside rounds with nursing staff today  Discussions with Specialists or Other Care Team Provider:  Case Management, nursing, PT/OT    Education and Discussions with Family / Patient:  Spoke with patient ; will call patient's family later today    Time Spent for Care: 45 minutes  More than 50% of total time spent on counseling and coordination of care as described above      Current Length of Stay: 10 day(s)    Current Patient Status: Inpatient   Certification Statement: The patient will continue to require additional inpatient hospital stay due to Pending placement to short-term rehab    Discharge Plan: Medically stable for discharge to short-term rehab    Code Status: Level 1 - Full Code      Subjective:   Patient seen and examined at bedside  No acute events overnight  Denies chest pain, SOB, diaphoresis, nausea/vomiting/diarrhea, fevers/chills  Objective:     Vitals:   Temp (24hrs), Av 3 °F (37 4 °C), Min:98 7 °F (37 1 °C), Max:99 8 °F (37 7 °C)    Temp:  [98 7 °F (37 1 °C)-99 8 °F (37 7 °C)] 99 8 °F (37 7 °C)  HR:  [73-77] 77  Resp:  [16-18] 16  BP: (148-157)/(77-82) 148/77  SpO2:  [98 %-100 %] 100 %  Body mass index is 23 82 kg/m²  Input and Output Summary (last 24 hours): Intake/Output Summary (Last 24 hours) at 2022 6385  Last data filed at 2022 0601  Gross per 24 hour   Intake 1080 ml   Output 600 ml   Net 480 ml       Physical Exam:     Physical Exam  Vitals and nursing note reviewed  Constitutional:       Appearance: He is well-developed  HENT:      Head: Normocephalic and atraumatic  Eyes:      Conjunctiva/sclera: Conjunctivae normal    Cardiovascular:      Rate and Rhythm: Normal rate and regular rhythm  Heart sounds: No murmur heard  Pulmonary:      Effort: Pulmonary effort is normal  No respiratory distress  Breath sounds: Normal breath sounds  Abdominal:      Palpations: Abdomen is soft  Tenderness: There is no abdominal tenderness  Musculoskeletal:      Cervical back: Neck supple  Skin:     General: Skin is warm and dry  Neurological:      Mental Status: He is alert  Additional Data:     Labs:  I have reviewed pertinent results     Results from last 7 days   Lab Units 22  0436   WBC Thousand/uL 6 00   HEMOGLOBIN g/dL 12 8*   HEMATOCRIT % 39 5*   PLATELETS Thousands/uL 347   NEUTROS PCT % 62   LYMPHS PCT % 25   MONOS PCT % 10   EOS PCT % 3     Results from last 7 days   Lab Units 22  0448   SODIUM mmol/L 138   POTASSIUM mmol/L 3 7   CHLORIDE mmol/L 105   CO2 mmol/L 30   BUN mg/dL 13   CREATININE mg/dL 0 66*   ANION GAP mmol/L 3*   CALCIUM mg/dL 8 4   GLUCOSE RANDOM mg/dL 90                         Imaging: I have reviewed pertinent imaging       Recent Cultures (last 7 days):           Last 24 Hours Medication List:   Current Facility-Administered Medications   Medication Dose Route Frequency Provider Last Rate    acetaminophen  650 mg Oral Q4H PRN Martha Odell PA-C      amLODIPine  10 mg Oral Daily Dinah Leash, DO      diphenhydrAMINE  25 mg Oral Q6H PRN Dinah Leash, DO      enoxaparin  40 mg Subcutaneous [facility]      nicotine  1 patch Transdermal Daily [place], [place]      ondansetron  4 mg Oral Q6H PRN Dinah Leash, DO      polyethylene glycol  17 g Oral Daily PRN Ila March PA-C      white petrolatum-mineral oil   Topical TID PRN Shiv Dejesus DO          Today, Patient Was Seen By: Shiv Dejesus DO    ** Please Note: Dictation voice to text software may have been used in the creation of this document   **

## 2022-01-10 PROCEDURE — 97535 SELF CARE MNGMENT TRAINING: CPT

## 2022-01-10 PROCEDURE — 99232 SBSQ HOSP IP/OBS MODERATE 35: CPT | Performed by: INTERNAL MEDICINE

## 2022-01-10 RX ADMIN — ENOXAPARIN SODIUM 40 MG: 40 INJECTION SUBCUTANEOUS at 08:45

## 2022-01-10 RX ADMIN — AMLODIPINE BESYLATE 10 MG: 10 TABLET ORAL at 08:40

## 2022-01-10 RX ADMIN — POLYETHYLENE GLYCOL 3350 17 G: 17 POWDER, FOR SOLUTION ORAL at 08:45

## 2022-01-10 NOTE — PLAN OF CARE
Problem: SAFETY ADULT  Goal: Patient will remain free of falls  Description: INTERVENTIONS:  - Educate patient/family on patient safety including physical limitations  - Instruct patient to call for assistance with activity   - Consult OT/PT to assist with strengthening/mobility   - Keep Call bell within reach  - Keep bed low and locked with side rails adjusted as appropriate  - Keep care items and personal belongings within reach  - Initiate and maintain comfort rounds  - Make Fall Risk Sign visible to staff  - Apply yellow socks and bracelet for high fall risk patients  - Consider moving patient to room near nurses station  Outcome: Progressing     Problem: DISCHARGE PLANNING  Goal: Discharge to home or other facility with appropriate resources  Description: INTERVENTIONS:  - Identify barriers to discharge w/patient and caregiver  - Arrange for needed discharge resources and transportation as appropriate  - Identify discharge learning needs (meds, wound care, etc )  - Arrange for interpretive services to assist at discharge as needed  - Refer to Case Management Department for coordinating discharge planning if the patient needs post-hospital services based on physician/advanced practitioner order or complex needs related to functional status, cognitive ability, or social support system  Outcome: Progressing     Problem: Knowledge Deficit  Goal: Patient/family/caregiver demonstrates understanding of disease process, treatment plan, medications, and discharge instructions  Description: Complete learning assessment and assess knowledge base    Interventions:  - Provide teaching at level of understanding  - Provide teaching via preferred learning methods  Outcome: Progressing     Problem: Prexisting or High Potential for Compromised Skin Integrity  Goal: Skin integrity is maintained or improved  Description: INTERVENTIONS:  - Identify patients at risk for skin breakdown  - Assess and monitor skin integrity  - Assess and monitor nutrition and hydration status  - Monitor labs   - Assess for incontinence   - Turn and reposition patient  - Assist with mobility/ambulation  - Relieve pressure over bony prominences  - Avoid friction and shearing  - Provide appropriate hygiene as needed including keeping skin clean and dry  - Evaluate need for skin moisturizer/barrier cream  - Collaborate with interdisciplinary team   - Patient/family teaching  - Consider wound care consult   Outcome: Progressing

## 2022-01-10 NOTE — PROGRESS NOTES
51 [facility]  Progress Note Aamir Patient 1965, 60 y o  male MRN: [id number]  Unit/Bed#: 7T [facility] 706-01 Encounter: [id number]  Primary Care Provider: Supriya Chan MD   Date and time admitted to hospital: 12/29/2021  5:38 PM    * Ambulatory dysfunction  Assessment & Plan  PT/OT recommending post acute rehab  [facility], Pending placement  Medically stable for discharge    Hypertension  Assessment & Plan  Improved on current regimen  · Amlodipine 10 mg PO daily  · Monitor blood pressures    Psoriasis, unspecified  Assessment & Plan  Patient with psoriasis flare, was on Enbrel on past however was lost to follow-up  -recommend Dermatology referral on outpatient basis      [address] [facility]  · Patient was to be [facility] however they would not take him back  · Case management for discharge planning    Right arm cellulitis  Assessment & Plan  · Resolved  · Patient with right forearm lesion with redness and streaking noted in ED  · ID consulted, appreciate recommendations  · Completed Keflex course  · Antibiotics have been discontinued      VTE Pharmacologic Prophylaxis:   Pharmacologic: Enoxaparin (Lovenox)  Mechanical VTE Prophylaxis in Place: Yes    Patient Centered Rounds: I have performed bedside rounds with nursing staff today  Discussions with Specialists or Other Care Team Provider:  Discussed with Case Management, nurse    Education and Discussions with Family / Patient:  Discussed with patient    Time Spent for Care: 45 minutes  More than 50% of total time spent on counseling and coordination of care as described above      Current Length of Stay: 11 day(s)    Current Patient Status: Inpatient   Certification Statement: The patient will continue to require additional inpatient hospital stay due to Pending placement    Discharge Plan / Estimated Discharge Date:  Pending      Code Status: Level 1 - Full Code      Subjective:   Patient was seen and examined at bedside  The patient stated he is weak and would like to get into rehab  He denies any pain, headache, blurry vision, chest pain, palpitation, shortness of breath, N/V, abd pain  Objective:     Vitals:   Temp (24hrs), Av 8 °F (36 6 °C), Min:97 4 °F (36 3 °C), Max:98 1 °F (36 7 °C)    Temp:  [97 4 °F (36 3 °C)-98 1 °F (36 7 °C)] 98 1 °F (36 7 °C)  HR:  [61-84] 84  Resp:  [18] 18  BP: (136-179)/(58-96) 179/96  SpO2:  [96 %-98 %] 98 %  Body mass index is 23 82 kg/m²  Input and Output Summary (last 24 hours): Intake/Output Summary (Last 24 hours) at 1/10/2022 1808  Last data filed at 1/10/2022 1513  Gross per 24 hour   Intake 520 ml   Output 1500 ml   Net -980 ml       Physical Exam:     Physical Exam  General:  breathing well on room air, no acute distress  HEENT: NC/AT, PERRL, EOM - normal  Neck: Supple  Pulm/Chest: Normal chest wall expansion, clear breath sounds on both side, no wheezing/rhonchi or crackles appreciated  CVS: RRR, normal S1&S2, no murmur appreciated, capillary refill <2s  Abd: soft, non tender, non distended, bowel sounds +  MSK: move all 4 extremities spontaneously  Skin: warm  CNS: no acute focal neuro deficit      Additional Data:     Labs:    Results from last 7 days   Lab Units 22  0436   WBC Thousand/uL 6 00   HEMOGLOBIN g/dL 12 8*   HEMATOCRIT % 39 5*   PLATELETS Thousands/uL 347   NEUTROS PCT % 62   LYMPHS PCT % 25   MONOS PCT % 10   EOS PCT % 3     Results from last 7 days   Lab Units 22  0448   POTASSIUM mmol/L 3 7   CHLORIDE mmol/L 105   CO2 mmol/L 30   BUN mg/dL 13   CREATININE mg/dL 0 66*   CALCIUM mg/dL 8 4           * I Have Reviewed All Lab Data Listed Above  * Additional Pertinent Lab Tests Reviewed:  All Labs For Current Hospital Admission Reviewed    Imaging:    Imaging Reports Reviewed Today Include: None  Imaging Personally Reviewed by Myself Includes:  None      Recent Cultures (last 7 days): Last 24 Hours Medication List:   Current Facility-Administered Medications   Medication Dose Route Frequency Provider Last Rate    acetaminophen  650 mg Oral Q4H PRN Milad Frey PA-C      amLODIPine  10 mg Oral Daily Lisa Henrry, DO      diphenhydrAMINE  25 mg Oral Q6H PRN Lisa Henrry, DO      enoxaparin  40 mg Subcutaneous Daily [place], [place]      nicotine  1 patch Transdermal Daily [place], [place]      ondansetron  4 mg Oral Q6H PRN Lisa Henrry, DO      polyethylene glycol  17 g Oral Daily PRN Connie Mejia PA-C      white petrolatum-mineral oil   Topical TID PRN Marval [place], DO          Today, Patient Was Seen By: Michelle Mccullough MD    ** Please Note: Dragon 360 Dictation voice to text software may have been used in the creation of this document   **

## 2022-01-10 NOTE — OCCUPATIONAL THERAPY NOTE
Occupational Therapy         Patient Name: Trenton Leon  Today's Date: 1/10/2022            Patient's plan of care and case reviewed, OT goals/POC extended from 1/7/22 to 1/14/22 due to Pt progress

## 2022-01-10 NOTE — OCCUPATIONAL THERAPY NOTE
Occupational Therapy Treatment Note    Name:  Anil Gilbert   MRN:   [id number]  Age:     60 y o  Patient Active Problem List   Diagnosis    Right arm cellulitis    Homeless    Psoriasis, unspecified    Ambulatory dysfunction    Hypertension     Cellulitis [L03 90]  Homeless [Z59 00]  Wound check, abscess [Z51 89]      Subjective/Goals: "I would love to be able to walk out of here"    Vitals: stable     Pain: no pain reported this PM    Treatment Time: (0336-3781) 26 minutes     Assessment:  Patient seen this date for OT with focus on goals as set by OTR  Patient agreeable to skilled OT session with focus on ADL's (bathing, dressing, toileting), Transfers (STS, SPT), Standing tolerance/balance, Strength/ROM/HEP, Activity tolerance and Education on safety, fall prevention and energy conservation techniques  Barriers to treatment include fatigue, safety, home environment (management in/out or throughout home), social/family support, decreased strength/coordination, balance , activity tolerance and standing tolerance  Patient educated on safe functional transfers techniques, the appropriate use of AE/DME to improve functional performance, and activity modification techniques for energy conservation  Plans for d/c are post acute rehab  Patient is making gains toward OT goals with continued OT recommended at this time to max tolerance, safety and function for appropriate d/c planning    At end of session patient remains in bed with all needs within reach        01/10/22 6876   OT Last Visit   OT Visit Date 01/10/22   Note Type   Note Type Treatment   Restrictions/Precautions   Other Precautions Fall Risk;Pain   Pain Assessment   Pain Assessment Tool 0-10   Pain Score No Pain   ADL   Where Assessed Edge of bed   Grooming Comments refused need at this time    UB Bathing Comments patient applied lotion to simulate bathing with supervision with good thruoghness noted   LB Bathing Comments patient's skin very dry therefore to simulate bathing he was agreeable to application of lotion thtoughout UE and LE-- LE with supervision including management of feet    UB Dressing Comments patient naked upon arrival and agreeable to therapy and use of gown-- patient able to manage gown with min assist (needed assist to manage ties)   LB Dressing Comments patient able to manage bilateral socks with MI (increased time needed for management)   Toileting Comments refused toileting needs    Functional Standing Tolerance   Time 1 min tolerating 2 trails at walker    Activity static stance    Comments patient cooperative however when asked to take a step away from bed he dot a bit more nervous and doon sat    Bed Mobility   Rolling R 6  Modified independent   Rolling L 6  Modified independent   Supine to Sit 6  Modified independent   Sit to Supine 6  Modified independent   Additional Comments patient managed without issue this date    Transfers   Sit to Stand 5  Supervision   Stand to Sit 5  Supervision   Stand pivot Unable to assess   Sit pivot Unable to assess   Additional Comments stood at walker 2x this session    Functional Mobility   Additional Comments unable to assess as patient got nervous with attempts to take 1 step away from bed    Therapeutic Exercise - ROM   UE-ROM   (decreased hand strength and dexterity noted)   Therapeutic Excerise-Strength   UE Strength   (no formal exercise tolerated post "lotion" bath (fatigued))   Cognition   Overall Cognitive Status [facility]   Arousal/Participation Alert; Cooperative   Attention Within functional limits   Orientation Level Oriented X4   Memory Decreased recall of recent events   Following Commands Follows one step commands with increased time or repetition   Comments patient needs some encouragement for tasks and when he accomplishes something then he is proud of self  Patient ed on importance to daily activity    Patient also reports being hopeful for a rehab bed somewhere soon-- ed on goals and focus for therapy including increased activity tolerance and daily ADL's    Additional Activities   Additional Activities Comments sitting balance good, static stance at walker [place]   Activity Tolerance   Activity Tolerance Patient tolerated treatment well   Assessment   Assessment see note    Plan   Treatment Interventions ADL retraining;Functional transfer training;UE strengthening/ROM; Energy conservation; Activityengagement   Goal Expiration Date 01/10/22   OT Treatment Day 2   OT Frequency 2-3x/wk   Recommendation   OT Discharge Recommendation Post acute rehabilitation services   AM-PAC Daily Activity Inpatient   Lower Body Dressing 3   Bathing 3   Toileting 2   Upper Body Dressing 3   Grooming 2   Eating 2   Daily Activity Raw Score 15   Daily Activity Standardized Score (Calc for Raw Score >=11) 34 69   Maya Ellison  1/10/2022

## 2022-01-10 NOTE — PLAN OF CARE
Problem: PAIN - ADULT  Goal: Verbalizes/displays adequate comfort level or baseline comfort level  Description: Interventions:  - Encourage patient to monitor pain and request assistance  - Assess pain using appropriate pain scale  - Administer analgesics based on type and severity of pain and evaluate response  - Implement non-pharmacological measures as appropriate and evaluate response  - Consider cultural and social influences on pain and pain management  - Notify physician/advanced practitioner if interventions unsuccessful or patient reports new pain  Outcome: Progressing     Problem: INFECTION - ADULT  Goal: Absence or prevention of progression during hospitalization  Description: INTERVENTIONS:  - Assess and monitor for signs and symptoms of infection  - Monitor lab/diagnostic results  - Monitor all insertion sites, i e  indwelling lines, tubes, and drains  - Monitor endotracheal if appropriate and nasal secretions for changes in amount and color  - [place] appropriate cooling/warming therapies per order  - Administer medications as ordered  - Instruct and encourage patient and family to use good hand hygiene technique  - Identify and instruct in appropriate isolation precautions for identified infection/condition  Outcome: Progressing     Problem: DISCHARGE PLANNING  Goal: Discharge to home or other facility with appropriate resources  Description: INTERVENTIONS:  - Identify barriers to discharge w/patient and caregiver  - Arrange for needed discharge resources and transportation as appropriate  - Identify discharge learning needs (meds, wound care, etc )  - Arrange for interpretive services to assist at discharge as needed  - Refer to Case Management Department for coordinating discharge planning if the patient needs post-hospital services based on physician/advanced practitioner order or complex needs related to functional status, cognitive ability, or social support system  Outcome: Progressing Problem: Knowledge Deficit  Goal: Patient/family/caregiver demonstrates understanding of disease process, treatment plan, medications, and discharge instructions  Description: Complete learning assessment and assess knowledge base    Interventions:  - Provide teaching at level of understanding  - Provide teaching via preferred learning methods  Outcome: Progressing

## 2022-01-10 NOTE — OCCUPATIONAL THERAPY NOTE
Patient attempted this AM for therapy and found in room curled up in a ball in bed stating he is unable to tolerate therapy at this time  Patient asked [facility] to return later indicating pain in groin  Nursing made aware  Will return for therapy in PM schedule permitting        Meir Jett  1/10/2022

## 2022-01-10 NOTE — CASE MANAGEMENT
DISCHARGE DETAILS: [facility]  CM spoke with pt's sister Srinivasa Odell (Sister) [phone], to discuss discharge plan  Sunil Sherman stated that pt does not have a suitable place to return  She is worried about pt's disposition and stated she cannot accept the pt at her home, as they work full time and  no one is available to provide care for him  FOC was discussed PT recommendation is STR: CM expanded search to [address], pending bed availability  CM provided pt's sister Sunil Sherman with multiple phone numbers of [employer] and [employer]  Sunil Sherman said she will try to fill application for housing on behalf of the pt      CM department will continue to follow through pt's D/C

## 2022-01-11 PROBLEM — L30.4 INTERTRIGO: Status: ACTIVE | Noted: 2022-01-11

## 2022-01-11 PROCEDURE — 99232 SBSQ HOSP IP/OBS MODERATE 35: CPT | Performed by: INTERNAL MEDICINE

## 2022-01-11 RX ORDER — NYSTATIN 100000 [USP'U]/G
POWDER TOPICAL 2 TIMES DAILY
Status: DISCONTINUED | OUTPATIENT
Start: 2022-01-11 | End: 2022-03-11 | Stop reason: HOSPADM

## 2022-01-11 RX ORDER — TRIAMCINOLONE ACETONIDE 1 MG/G
CREAM TOPICAL 2 TIMES DAILY
Status: DISCONTINUED | OUTPATIENT
Start: 2022-01-11 | End: 2022-03-11 | Stop reason: HOSPADM

## 2022-01-11 RX ADMIN — AMLODIPINE BESYLATE 10 MG: 10 TABLET ORAL at 08:49

## 2022-01-11 RX ADMIN — HYDROCORTISONE: 25 CREAM TOPICAL at 18:17

## 2022-01-11 RX ADMIN — POLYETHYLENE GLYCOL 3350 17 G: 17 POWDER, FOR SOLUTION ORAL at 08:49

## 2022-01-11 RX ADMIN — ENOXAPARIN SODIUM 40 MG: 40 INJECTION SUBCUTANEOUS at 08:49

## 2022-01-11 RX ADMIN — NYSTATIN: 100000 POWDER TOPICAL at 18:16

## 2022-01-11 RX ADMIN — NYSTATIN: 100000 POWDER TOPICAL at 13:15

## 2022-01-11 RX ADMIN — TRIAMCINOLONE ACETONIDE: 1 CREAM TOPICAL at 18:17

## 2022-01-11 NOTE — PLAN OF CARE
Problem: PAIN - ADULT  Goal: Verbalizes/displays adequate comfort level or baseline comfort level  Description: Interventions:  - Encourage patient to monitor pain and request assistance  - Assess pain using appropriate pain scale  - Administer analgesics based on type and severity of pain and evaluate response  - Implement non-pharmacological measures as appropriate and evaluate response  - Consider cultural and social influences on pain and pain management  - Notify physician/advanced practitioner if interventions unsuccessful or patient reports new pain  Outcome: Progressing     Problem: INFECTION - ADULT  Goal: Absence or prevention of progression during hospitalization  Description: INTERVENTIONS:  - Assess and monitor for signs and symptoms of infection  - Monitor lab/diagnostic results  - Monitor all insertion sites, i e  indwelling lines, tubes, and drains  - Monitor endotracheal if appropriate and nasal secretions for changes in amount and color  - [place] appropriate cooling/warming therapies per order  - Administer medications as ordered  - Instruct and encourage patient and family to use good hand hygiene technique  - Identify and instruct in appropriate isolation precautions for identified infection/condition  Outcome: Progressing     Problem: DISCHARGE PLANNING  Goal: Discharge to home or other facility with appropriate resources  Description: INTERVENTIONS:  - Identify barriers to discharge w/patient and caregiver  - Arrange for needed discharge resources and transportation as appropriate  - Identify discharge learning needs (meds, wound care, etc )  - Arrange for interpretive services to assist at discharge as needed  - Refer to Case Management Department for coordinating discharge planning if the patient needs post-hospital services based on physician/advanced practitioner order or complex needs related to functional status, cognitive ability, or social support system  Outcome: Progressing Problem: Knowledge Deficit  Goal: Patient/family/caregiver demonstrates understanding of disease process, treatment plan, medications, and discharge instructions  Description: Complete learning assessment and assess knowledge base    Interventions:  - Provide teaching at level of understanding  - Provide teaching via preferred learning methods  Outcome: Progressing

## 2022-01-11 NOTE — ASSESSMENT & PLAN NOTE
· Patient was to be discharged to the [facility] however they would not take him back  · Case management for discharge planning

## 2022-01-11 NOTE — ASSESSMENT & PLAN NOTE
· Patient with psoriasis flare, was on Enbrel on past however was lost to follow-up  · Outpatient Dermatology follow-up

## 2022-01-11 NOTE — ASSESSMENT & PLAN NOTE
· Resolved  · Patient with right forearm lesion with redness and streaking noted in ED  · ID consulted, Completed Keflex course  · Antibiotics have been discontinued

## 2022-01-11 NOTE — ASSESSMENT & PLAN NOTE
· PT/OT recommending post acute rehab  · [facility] denied the patient as they are out of bed capacity, Pending placement  · Medically stable for discharge

## 2022-01-11 NOTE — PLAN OF CARE
Problem: PAIN - ADULT  Goal: Verbalizes/displays adequate comfort level or baseline comfort level  Description: Interventions:  - Encourage patient to monitor pain and request assistance  - Assess pain using appropriate pain scale  - Administer analgesics based on type and severity of pain and evaluate response  - Implement non-pharmacological measures as appropriate and evaluate response  - Consider cultural and social influences on pain and pain management  - Notify physician/advanced practitioner if interventions unsuccessful or patient reports new pain  Outcome: Progressing     Problem: INFECTION - ADULT  Goal: Absence or prevention of progression during hospitalization  Description: INTERVENTIONS:  - Assess and monitor for signs and symptoms of infection  - Monitor lab/diagnostic results  - Monitor all insertion sites, i e  indwelling lines, tubes, and drains  - Monitor endotracheal if appropriate and nasal secretions for changes in amount and color  - [place] appropriate cooling/warming therapies per order  - Administer medications as ordered  - Instruct and encourage patient and family to use good hand hygiene technique  - Identify and instruct in appropriate isolation precautions for identified infection/condition  Outcome: Progressing     Problem: SAFETY ADULT  Goal: Patient will remain free of falls  Description: INTERVENTIONS:  - Educate patient/family on patient safety including physical limitations  - Instruct patient to call for assistance with activity   - Consult OT/PT to assist with strengthening/mobility   - Keep Call bell within reach  - Keep bed low and locked with side rails adjusted as appropriate  - Keep care items and personal belongings within reach  - Initiate and maintain comfort rounds  - Make Fall Risk Sign visible to staff  - Apply yellow socks and bracelet for high fall risk patients  - Consider moving patient to room near nurses station  Outcome: Progressing  Goal: Maintain or return to baseline ADL function  Description: INTERVENTIONS:  -  Assess patient's ability to carry out ADLs; assess patient's baseline for ADL function and identify physical deficits which impact ability to perform ADLs (bathing, care of mouth/teeth, toileting, grooming, dressing, etc )  - Assess/evaluate cause of self-care deficits   - Assess range of motion  - Assess patient's mobility; develop plan if impaired  - Assess patient's need for assistive devices and provide as appropriate  - Encourage maximum independence but intervene and supervise when necessary  - Involve family in performance of ADLs  - Assess for home care needs following discharge   - Consider OT consult to assist with ADL evaluation and planning for discharge  - Provide patient education as appropriate  Outcome: Progressing  Goal: Maintains/Returns to pre admission functional level  Description: INTERVENTIONS:  - Perform BMAT or MOVE assessment daily    - Set and communicate daily mobility goal to care team and patient/family/caregiver     - Collaborate with rehabilitation services on mobility goals if consulted  - Out of bed for toileting  - Record patient progress and toleration of activity level   Outcome: Progressing     Problem: DISCHARGE PLANNING  Goal: Discharge to home or other facility with appropriate resources  Description: INTERVENTIONS:  - Identify barriers to discharge w/patient and caregiver  - Arrange for needed discharge resources and transportation as appropriate  - Identify discharge learning needs (meds, wound care, etc )  - Arrange for interpretive services to assist at discharge as needed  - Refer to Case Management Department for coordinating discharge planning if the patient needs post-hospital services based on physician/advanced practitioner order or complex needs related to functional status, cognitive ability, or social support system  Outcome: Progressing     Problem: Knowledge Deficit  Goal: Patient/family/caregiver demonstrates understanding of disease process, treatment plan, medications, and discharge instructions  Description: Complete learning assessment and assess knowledge base  Interventions:  - Provide teaching at level of understanding  - Provide teaching via preferred learning methods  Outcome: Progressing     Problem: MOBILITY - ADULT  Goal: Maintain or return to baseline ADL function  Description: INTERVENTIONS:  -  Assess patient's ability to carry out ADLs; assess patient's baseline for ADL function and identify physical deficits which impact ability to perform ADLs (bathing, care of mouth/teeth, toileting, grooming, dressing, etc )  - Assess/evaluate cause of self-care deficits   - Assess range of motion  - Assess patient's mobility; develop plan if impaired  - Assess patient's need for assistive devices and provide as appropriate  - Encourage maximum independence but intervene and supervise when necessary  - Involve family in performance of ADLs  - Assess for home care needs following discharge   - Consider OT consult to assist with ADL evaluation and planning for discharge  - Provide patient education as appropriate  Outcome: Progressing  Goal: Maintains/Returns to pre admission functional level  Description: INTERVENTIONS:  - Perform BMAT or MOVE assessment daily    - Set and communicate daily mobility goal to care team and patient/family/caregiver     - Collaborate with rehabilitation services on mobility goals if consulted  - Out of bed for toileting  - Record patient progress and toleration of activity level   Outcome: Progressing     Problem: Potential for Falls  Goal: Patient will remain free of falls  Description: INTERVENTIONS:  - Educate patient/family on patient safety including physical limitations  - Instruct patient to call for assistance with activity   - Consult OT/PT to assist with strengthening/mobility   - Keep Call bell within reach  - Keep bed low and locked with side rails adjusted as appropriate  - Keep care items and personal belongings within reach  - Initiate and maintain comfort rounds  - Make Fall Risk Sign visible to staff  - Apply yellow socks and bracelet for high fall risk patients  - Consider moving patient to room near nurses station  Outcome: Progressing     Problem: Prexisting or High Potential for Compromised Skin Integrity  Goal: Skin integrity is maintained or improved  Description: INTERVENTIONS:  - Identify patients at risk for skin breakdown  - Assess and monitor skin integrity  - Assess and monitor nutrition and hydration status  - Monitor labs   - Assess for incontinence   - Turn and reposition patient  - Assist with mobility/ambulation  - Relieve pressure over bony prominences  - Avoid friction and shearing  - Provide appropriate hygiene as needed including keeping skin clean and dry  - Evaluate need for skin moisturizer/barrier cream  - Collaborate with interdisciplinary team   - Patient/family teaching  - Consider wound care consult   Outcome: Progressing

## 2022-01-12 LAB
ANION GAP SERPL CALCULATED.3IONS-SCNC: 2 MMOL/L (ref 5–14)
BASOPHILS # BLD AUTO: 0.1 THOUSANDS/ΜL (ref 0–0.1)
BASOPHILS NFR BLD AUTO: 1 % (ref 0–1)
BUN SERPL-MCNC: 11 MG/DL (ref 5–25)
CALCIUM SERPL-MCNC: 8.3 MG/DL (ref 8.4–10.2)
CHLORIDE SERPL-SCNC: 103 MMOL/L (ref 97–108)
CO2 SERPL-SCNC: 32 MMOL/L (ref 22–30)
CREAT SERPL-MCNC: 0.59 MG/DL (ref 0.7–1.5)
EOSINOPHIL # BLD AUTO: 0.2 THOUSAND/ΜL (ref 0–0.4)
EOSINOPHIL NFR BLD AUTO: 3 % (ref 0–6)
ERYTHROCYTE [DISTWIDTH] IN BLOOD BY AUTOMATED COUNT: 16.1 %
GFR SERPL CREATININE-BSD FRML MDRD: 111 ML/MIN/1.73SQ M
GLUCOSE SERPL-MCNC: 98 MG/DL (ref 70–99)
HCT VFR BLD AUTO: 35.8 % (ref 41–53)
HGB BLD-MCNC: 12.3 G/DL (ref 13.5–17.5)
LYMPHOCYTES # BLD AUTO: 1.6 THOUSANDS/ΜL (ref 0.5–4)
LYMPHOCYTES NFR BLD AUTO: 21 % (ref 25–45)
MCH RBC QN AUTO: 29.1 PG (ref 26–34)
MCHC RBC AUTO-ENTMCNC: 34.4 G/DL (ref 31–36)
MCV RBC AUTO: 85 FL (ref 80–100)
MONOCYTES # BLD AUTO: 0.8 THOUSAND/ΜL (ref 0.2–0.9)
MONOCYTES NFR BLD AUTO: 10 % (ref 1–10)
NEUTROPHILS # BLD AUTO: 5 THOUSANDS/ΜL (ref 1.8–7.8)
NEUTS SEG NFR BLD AUTO: 65 % (ref 45–65)
PLATELET # BLD AUTO: 322 THOUSANDS/UL (ref 150–450)
PMV BLD AUTO: 7.1 FL (ref 8.9–12.7)
POTASSIUM SERPL-SCNC: 3.6 MMOL/L (ref 3.6–5)
RBC # BLD AUTO: 4.22 MILLION/UL (ref 4.5–5.9)
SODIUM SERPL-SCNC: 137 MMOL/L (ref 137–147)
WBC # BLD AUTO: 7.7 THOUSAND/UL (ref 4.5–11)

## 2022-01-12 PROCEDURE — 99232 SBSQ HOSP IP/OBS MODERATE 35: CPT | Performed by: INTERNAL MEDICINE

## 2022-01-12 PROCEDURE — 85025 COMPLETE CBC W/AUTO DIFF WBC: CPT | Performed by: INTERNAL MEDICINE

## 2022-01-12 PROCEDURE — 80048 BASIC METABOLIC PNL TOTAL CA: CPT | Performed by: INTERNAL MEDICINE

## 2022-01-12 RX ADMIN — AMLODIPINE BESYLATE 10 MG: 10 TABLET ORAL at 09:51

## 2022-01-12 RX ADMIN — TRIAMCINOLONE ACETONIDE: 1 CREAM TOPICAL at 17:52

## 2022-01-12 RX ADMIN — HYDROCORTISONE: 25 CREAM TOPICAL at 17:52

## 2022-01-12 RX ADMIN — Medication: at 17:59

## 2022-01-12 RX ADMIN — ENOXAPARIN SODIUM 40 MG: 40 INJECTION SUBCUTANEOUS at 09:51

## 2022-01-12 RX ADMIN — NYSTATIN: 100000 POWDER TOPICAL at 17:52

## 2022-01-12 RX ADMIN — Medication: at 09:51

## 2022-01-12 NOTE — PROGRESS NOTES
[facility]  Progress Note - Kari Chaparro 1965, 60 y o  male MRN: [id number]  Unit/Bed#: 7T [facility] 706-01 Encounter: [id number]  Primary Care Provider: Kevin Rascon MD   Date and time admitted to hospital: 12/29/2021  5:38 PM    * Ambulatory dysfunction  Assessment & Plan  · PT/OT recommending post acute rehab  · [facility] denied the patient as they are out of bed capacity, Pending placement  · Medically stable for discharge    Intertrigo  Assessment & Plan  · Patient has intertrigo of the groin area  · Continue nystatin powder b i d  Hypertension  Assessment & Plan  BP acceptable  · Amlodipine 10 mg PO daily  · Monitor blood pressures    Psoriasis, unspecified  Assessment & Plan  · Patient with psoriasis flare, was on Enbrel on past however was lost to follow-up  · Discussed with dermatology - appreciate recommendation for topical creams  · Patient will need outpatient Dermatology follow-up      Homeless  Assessment & Plan  · Patient was to be [facility] the [facility] however they would not take him back  · Case management for discharge planning    Right arm cellulitis  Assessment & Plan  · Resolved  · Patient with right forearm lesion with redness and streaking noted in ED  · ID consulted, Completed Keflex course  · Antibiotics have been discontinued      VTE Pharmacologic Prophylaxis:   Pharmacologic: Enoxaparin (Lovenox)  Mechanical VTE Prophylaxis in Place: Yes    Patient Centered Rounds: I have performed bedside rounds with nursing staff today  Discussions with Specialists or Other Care Team Provider:  Discussed with Case Management, nurse, dermatologist    Education and Discussions with Family / Patient:  Discussed with patient    Time Spent for Care: 45 minutes  More than 50% of total time spent on counseling and coordination of care as described above      Current Length of Stay: 13 day(s)    Current Patient Status: Inpatient   Certification Statement: The patient will continue to require additional inpatient hospital stay due to Pending placement    Discharge Plan / Estimated Discharge Date:  Pending      Code Status: Level 1 - Full Code      Subjective:   Patient was seen and examined at bedside  The patient is sleepy this morning and seems to be comfortable  No acute overnight changes  Objective:     Vitals:   Temp (24hrs), Av 4 °F (36 9 °C), Min:97 9 °F (36 6 °C), Max:98 7 °F (37 1 °C)    Temp:  [97 9 °F (36 6 °C)-98 7 °F (37 1 °C)] 98 6 °F (37 °C)  HR:  [66-79] 66  Resp:  [16-18] 18  BP: (136-170)/(63-80) 152/80  SpO2:  [99 %-100 %] 100 %  Body mass index is 23 82 kg/m²  Input and Output Summary (last 24 hours): Intake/Output Summary (Last 24 hours) at 2022 1105  Last data filed at 2022 0900  Gross per 24 hour   Intake 900 ml   Output 1650 ml   Net -750 ml       Physical Exam:     Physical Exam  General: breathing well on room air, no acute distress  HEENT: NC/AT, PERRL, EOM - normal  Neck: Supple  Pulm/Chest: Normal chest wall expansion, clear breath sounds on both side, no wheezing/rhonchi or crackles appreciated  CVS: RRR, normal S1&S2, no murmur appreciated, capillary refill <2s  Abd: soft, non tender, non distended, bowel sounds +  MSK: move all 4 extremities spontaneously, psoriatic rash over face, body and extremities  Skin: warm  CNS: no acute focal neuro deficit      Additional Data:     Labs:    Results from last 7 days   Lab Units 22  0500   WBC Thousand/uL 7 70   HEMOGLOBIN g/dL 12 3*   HEMATOCRIT % 35 8*   PLATELETS Thousands/uL 322   NEUTROS PCT % 65   LYMPHS PCT % 21*   MONOS PCT % 10   EOS PCT % 3     Results from last 7 days   Lab Units 22  0500   POTASSIUM mmol/L 3 6   CHLORIDE mmol/L 103   CO2 mmol/L 32*   BUN mg/dL 11   CREATININE mg/dL 0 59*   CALCIUM mg/dL 8 3*           * I Have Reviewed All Lab Data Listed Above  * Additional Pertinent Lab Tests Reviewed:  Bola Torrez Admission Reviewed    Imaging:    Imaging Reports Reviewed Today Include: None  Imaging Personally Reviewed by Myself Includes:  None      Recent Cultures (last 7 days):           Last 24 Hours Medication List:   Current Facility-Administered Medications   Medication Dose Route Frequency Provider Last Rate    acetaminophen  650 mg Oral Q4H PRN Jennifer Currie PA-C      amLODIPine  10 mg Oral Daily Jazmine Nando, DO      diphenhydrAMINE  25 mg Oral Q6H PRN Jazmineyareli Collier, DO      enoxaparin  40 mg Subcutaneous Daily Jennifer Currie PA-C      hydrocortisone   Topical BID Jack Samuels MD      nicotine  1 patch Transdermal Daily [place], [place]      nystatin   Topical BID Jack Samuels MD      ondansetron  4 mg Oral Q6H PRN Jazmineyareli Collier, DO      polyethylene glycol  17 g Oral Daily PRN Charissa Phipps PA-C      triamcinolone   Topical BID Jcak Samuels MD      white petrolatum-mineral oil   Topical TID PRN Carrie Del Rio DO          Today, Patient Was Seen By: Jack Samuels MD    ** Please Note: Dragon 360 Dictation voice to text software may have been used in the creation of this document   **

## 2022-01-12 NOTE — PLAN OF CARE
Problem: PAIN - ADULT  Goal: Verbalizes/displays adequate comfort level or baseline comfort level  Description: Interventions:  - Encourage patient to monitor pain and request assistance  - Assess pain using appropriate pain scale  - Administer analgesics based on type and severity of pain and evaluate response  - Implement non-pharmacological measures as appropriate and evaluate response  - Consider cultural and social influences on pain and pain management  - Notify physician/advanced practitioner if interventions unsuccessful or patient reports new pain  Outcome: Progressing     Problem: INFECTION - ADULT  Goal: Absence or prevention of progression during hospitalization  Description: INTERVENTIONS:  - Assess and monitor for signs and symptoms of infection  - Monitor lab/diagnostic results  - Monitor all insertion sites, i e  indwelling lines, tubes, and drains  - Monitor endotracheal if appropriate and nasal secretions for changes in amount and color  - [place] appropriate cooling/warming therapies per order  - Administer medications as ordered  - Instruct and encourage patient and family to use good hand hygiene technique  - Identify and instruct in appropriate isolation precautions for identified infection/condition  Outcome: Progressing     Problem: DISCHARGE PLANNING  Goal: Discharge to home or other facility with appropriate resources  Description: INTERVENTIONS:  - Identify barriers to discharge w/patient and caregiver  - Arrange for needed discharge resources and transportation as appropriate  - Identify discharge learning needs (meds, wound care, etc )  - Arrange for interpretive services to assist at discharge as needed  - Refer to Case Management Department for coordinating discharge planning if the patient needs post-hospital services based on physician/advanced practitioner order or complex needs related to functional status, cognitive ability, or social support system  Outcome: Progressing Problem: Knowledge Deficit  Goal: Patient/family/caregiver demonstrates understanding of disease process, treatment plan, medications, and discharge instructions  Description: Complete learning assessment and assess knowledge base    Interventions:  - Provide teaching at level of understanding  - Provide teaching via preferred learning methods  Outcome: Progressing

## 2022-01-12 NOTE — ASSESSMENT & PLAN NOTE
· Patient was to be discharged to the [facility] however they would not take him back  · Case management for discharge planning

## 2022-01-12 NOTE — QUICK NOTE
e-Consult (IP[facility])   Rachel Copeland 60 y o  male MRN: [id number]  Unit/Bed#: 7T [facility] 706-01 Encounter: [id number]      Reason for Consult / Principal Problem: psoriasis  Consults  01/12/22   Requested by Dr Castle Hair:  psoriasis    RECOMMENDATIONS:  Treatment recommendations limited 2/2 social situation (homeless) and follow up  Do not recommend starting anything systemic as an inpatient  Can start triamcinolone 0 1% cream 2x/day to body  Ketoconazole 2% cream 2x/day to face (not available in hospital --> so hydrocortisone 2 5% cream 2x/day for 7 days only to face)  Outpatient follow up    Total time spent in review of data, discussion with requesting provider and rendering advice was 5 minutes       Cristi Pinedo MD

## 2022-01-12 NOTE — ASSESSMENT & PLAN NOTE
· Patient with psoriasis flare, was on Enbrel on past however was lost to follow-up  · Discussed with dermatology - appreciate recommendation for topical creams  · Patient will need outpatient Dermatology follow-up

## 2022-01-13 PROCEDURE — 99232 SBSQ HOSP IP/OBS MODERATE 35: CPT | Performed by: INTERNAL MEDICINE

## 2022-01-13 RX ADMIN — ENOXAPARIN SODIUM 40 MG: 40 INJECTION SUBCUTANEOUS at 08:44

## 2022-01-13 RX ADMIN — AMLODIPINE BESYLATE 10 MG: 10 TABLET ORAL at 08:44

## 2022-01-13 RX ADMIN — Medication: at 08:43

## 2022-01-13 RX ADMIN — NYSTATIN: 100000 POWDER TOPICAL at 17:48

## 2022-01-13 RX ADMIN — Medication: at 17:53

## 2022-01-13 RX ADMIN — HYDROCORTISONE: 25 CREAM TOPICAL at 17:48

## 2022-01-13 RX ADMIN — ACETAMINOPHEN 650 MG: 325 TABLET ORAL at 17:57

## 2022-01-13 RX ADMIN — TRIAMCINOLONE ACETONIDE: 1 CREAM TOPICAL at 17:48

## 2022-01-13 RX ADMIN — HYDROCORTISONE: 25 CREAM TOPICAL at 08:44

## 2022-01-13 RX ADMIN — TRIAMCINOLONE ACETONIDE: 1 CREAM TOPICAL at 08:44

## 2022-01-13 RX ADMIN — NYSTATIN: 100000 POWDER TOPICAL at 08:45

## 2022-01-13 NOTE — PLAN OF CARE
Problem: PAIN - ADULT  Goal: Verbalizes/displays adequate comfort level or baseline comfort level  Description: Interventions:  - Encourage patient to monitor pain and request assistance  - Assess pain using appropriate pain scale  - Administer analgesics based on type and severity of pain and evaluate response  - Implement non-pharmacological measures as appropriate and evaluate response  - Consider cultural and social influences on pain and pain management  - Notify physician/advanced practitioner if interventions unsuccessful or patient reports new pain  Outcome: Progressing     Problem: INFECTION - ADULT  Goal: Absence or prevention of progression during hospitalization  Description: INTERVENTIONS:  - Assess and monitor for signs and symptoms of infection  - Monitor lab/diagnostic results  - Monitor all insertion sites, i e  indwelling lines, tubes, and drains  - Monitor endotracheal if appropriate and nasal secretions for changes in amount and color  - [place] appropriate cooling/warming therapies per order  - Administer medications as ordered  - Instruct and encourage patient and family to use good hand hygiene technique  - Identify and instruct in appropriate isolation precautions for identified infection/condition  Outcome: Progressing     Problem: SAFETY ADULT  Goal: Patient will remain free of falls  Description: INTERVENTIONS:  - Educate patient/family on patient safety including physical limitations  - Instruct patient to call for assistance with activity   - Consult OT/PT to assist with strengthening/mobility   - Keep Call bell within reach  - Keep bed low and locked with side rails adjusted as appropriate  - Keep care items and personal belongings within reach  - Initiate and maintain comfort rounds  - Make Fall Risk Sign visible to staff  - Apply yellow socks and bracelet for high fall risk patients  - Consider moving patient to room near nurses station  Outcome: Progressing  Goal: Maintain or return to baseline ADL function  Description: INTERVENTIONS:  -  Assess patient's ability to carry out ADLs; assess patient's baseline for ADL function and identify physical deficits which impact ability to perform ADLs (bathing, care of mouth/teeth, toileting, grooming, dressing, etc )  - Assess/evaluate cause of self-care deficits   - Assess range of motion  - Assess patient's mobility; develop plan if impaired  - Assess patient's need for assistive devices and provide as appropriate  - Encourage maximum independence but intervene and supervise when necessary  - Involve family in performance of ADLs  - Assess for home care needs following discharge   - Consider OT consult to assist with ADL evaluation and planning for discharge  - Provide patient education as appropriate  Outcome: Progressing  Goal: Maintains/Returns to pre admission functional level  Description: INTERVENTIONS:  - Perform BMAT or MOVE assessment daily    - Set and communicate daily mobility goal to care team and patient/family/caregiver     - Collaborate with rehabilitation services on mobility goals if consulted  - Out of bed for toileting  - Record patient progress and toleration of activity level   Outcome: Progressing     Problem: DISCHARGE PLANNING  Goal: Discharge to home or other facility with appropriate resources  Description: INTERVENTIONS:  - Identify barriers to discharge w/patient and caregiver  - Arrange for needed discharge resources and transportation as appropriate  - Identify discharge learning needs (meds, wound care, etc )  - Arrange for interpretive services to assist at discharge as needed  - Refer to Case Management Department for coordinating discharge planning if the patient needs post-hospital services based on physician/advanced practitioner order or complex needs related to functional status, cognitive ability, or social support system  Outcome: Progressing     Problem: Knowledge Deficit  Goal: Patient/family/caregiver demonstrates understanding of disease process, treatment plan, medications, and discharge instructions  Description: Complete learning assessment and assess knowledge base  Interventions:  - Provide teaching at level of understanding  - Provide teaching via preferred learning methods  Outcome: Progressing     Problem: MOBILITY - ADULT  Goal: Maintain or return to baseline ADL function  Description: INTERVENTIONS:  -  Assess patient's ability to carry out ADLs; assess patient's baseline for ADL function and identify physical deficits which impact ability to perform ADLs (bathing, care of mouth/teeth, toileting, grooming, dressing, etc )  - Assess/evaluate cause of self-care deficits   - Assess range of motion  - Assess patient's mobility; develop plan if impaired  - Assess patient's need for assistive devices and provide as appropriate  - Encourage maximum independence but intervene and supervise when necessary  - Involve family in performance of ADLs  - Assess for home care needs following discharge   - Consider OT consult to assist with ADL evaluation and planning for discharge  - Provide patient education as appropriate  Outcome: Progressing  Goal: Maintains/Returns to pre admission functional level  Description: INTERVENTIONS:  - Perform BMAT or MOVE assessment daily    - Set and communicate daily mobility goal to care team and patient/family/caregiver     - Collaborate with rehabilitation services on mobility goals if consulted  - Out of bed for toileting  - Record patient progress and toleration of activity level   Outcome: Progressing     Problem: Potential for Falls  Goal: Patient will remain free of falls  Description: INTERVENTIONS:  - Educate patient/family on patient safety including physical limitations  - Instruct patient to call for assistance with activity   - Consult OT/PT to assist with strengthening/mobility   - Keep Call bell within reach  - Keep bed low and locked with side rails adjusted as appropriate  - Keep care items and personal belongings within reach  - Initiate and maintain comfort rounds  - Make Fall Risk Sign visible to staff  - Apply yellow socks and bracelet for high fall risk patients  - Consider moving patient to room near nurses station  Outcome: Progressing     Problem: Prexisting or High Potential for Compromised Skin Integrity  Goal: Skin integrity is maintained or improved  Description: INTERVENTIONS:  - Identify patients at risk for skin breakdown  - Assess and monitor skin integrity  - Assess and monitor nutrition and hydration status  - Monitor labs   - Assess for incontinence   - Turn and reposition patient  - Assist with mobility/ambulation  - Relieve pressure over bony prominences  - Avoid friction and shearing  - Provide appropriate hygiene as needed including keeping skin clean and dry  - Evaluate need for skin moisturizer/barrier cream  - Collaborate with interdisciplinary team   - Patient/family teaching  - Consider wound care consult   Outcome: Progressing

## 2022-01-13 NOTE — PROGRESS NOTES
[facility]  Progress Note - Rachel Copeland 1965, 60 y o  male MRN: [id number]  Unit/Bed#: 7T [facility] 713-01 Encounter: [id number]  Primary Care Provider: Hortencia Rosales MD   Date and time admitted to hospital: 12/29/2021  5:38 PM    * Ambulatory dysfunction  Assessment & Plan  · PT/OT recommending post acute rehab  · [facility] denied the patient as they are out of bed capacity, Pending placement  · Medically stable for discharge    Intertrigo  Assessment & Plan  · Patient has intertrigo of the groin area  · Continue nystatin powder b i d  Hypertension  Assessment & Plan  BP acceptable  · Amlodipine 10 mg PO daily  · Monitor blood pressures    Psoriasis, unspecified  Assessment & Plan  · Patient with psoriasis flare, was on Enbrel on past however was lost to follow-up  · Discussed with dermatology - appreciate recommendation for topical creams  · Patient will need outpatient Dermatology follow-up      Homeless  Assessment & Plan  · Patient was to be discharged to the [facility] however they would not take him back  · Case management for discharge planning    Right arm cellulitis  Assessment & Plan  · Resolved  · Patient with right forearm lesion with redness and streaking noted in ED  · ID consulted, Completed Keflex course  · Antibiotics have been discontinued      VTE Pharmacologic Prophylaxis:   Pharmacologic: Enoxaparin (Lovenox)  Mechanical VTE Prophylaxis in Place: Yes    Patient Centered Rounds: I have performed bedside rounds with nursing staff today  Discussions with Specialists or Other Care Team Provider:  Discussed with Case Management, nurse    Education and Discussions with Family / Patient:  Discussed with patient    Time Spent for Care: 45 minutes  More than 50% of total time spent on counseling and coordination of care as described above  Current Length of Stay: 14 day(s)    Current Patient Status: Inpatient   Certification Statement:  The patient will continue to require additional inpatient hospital stay due to Pending placement    Discharge Plan / Estimated Discharge Date:  Pending      Code Status: Level 1 - Full Code      Subjective:   Patient was seen and examined at bedside  The patient denies any headache, blurry vision, chest pain, palpitation, shortness of breath  No acute overnight changes  Objective:     Vitals:   Temp (24hrs), Av 7 °F (36 5 °C), Min:97 5 °F (36 4 °C), Max:98 1 °F (36 7 °C)    Temp:  [97 5 °F (36 4 °C)-98 1 °F (36 7 °C)] 97 7 °F (36 5 °C)  HR:  [64-83] 64  Resp:  [18-19] 19  BP: (105-164)/(68-86) 149/81  SpO2:  [97 %-99 %] 97 %  Body mass index is 23 82 kg/m²  Input and Output Summary (last 24 hours): Intake/Output Summary (Last 24 hours) at 2022 1223  Last data filed at 2022 0900  Gross per 24 hour   Intake 1440 ml   Output 2750 ml   Net -1310 ml       Physical Exam:     Physical Exam  General: breathing well on room air, no acute distress  HEENT: NC/AT, PERRL, EOM - normal  Neck: Supple  Pulm/Chest: Normal chest wall expansion, clear breath sounds on both side, no wheezing/rhonchi or crackles appreciated  CVS: RRR, normal S1&S2, no murmur appreciated, capillary refill <2s  Abd: soft, non tender, non distended, bowel sounds +  MSK: move all 4 extremities spontaneously, psoriatic rash on body and extremities  Skin: warm  CNS: no acute focal neuro deficit      Additional Data:     Labs:    Results from last 7 days   Lab Units 22  0500   WBC Thousand/uL 7 70   HEMOGLOBIN g/dL 12 3*   HEMATOCRIT % 35 8*   PLATELETS Thousands/uL 322   NEUTROS PCT % 65   LYMPHS PCT % 21*   MONOS PCT % 10   EOS PCT % 3     Results from last 7 days   Lab Units 22  0500   POTASSIUM mmol/L 3 6   CHLORIDE mmol/L 103   CO2 mmol/L 32*   BUN mg/dL 11   CREATININE mg/dL 0 59*   CALCIUM mg/dL 8 3*           * I Have Reviewed All Lab Data Listed Above  * Additional Pertinent Lab Tests Reviewed:  All Labs For Current Hospital Admission Reviewed    Imaging:    Imaging Reports Reviewed Today Include:  None  Imaging Personally Reviewed by Myself Includes:  None      Recent Cultures (last 7 days):           Last 24 Hours Medication List:   Current Facility-Administered Medications   Medication Dose Route Frequency Provider Last Rate    acetaminophen  650 mg Oral Q4H PRN Jessica Elaine PA-C      amLODIPine  10 mg Oral Daily Dola Ape, DO      diphenhydrAMINE  25 mg Oral Q6H PRN Niranjan Ape, DO      enoxaparin  40 mg Subcutaneous Daily Jessica Elaine PA-C      hydrocortisone   Topical BID Hai Beckford MD      nicotine  1 patch Transdermal Daily [place], [place]      nystatin   Topical BID Hai Beckford MD      ondansetron  4 mg Oral Q6H PRN Niranjan Ape, DO      polyethylene glycol  17 g Oral Daily PRN Jones Pacheco PA-C      triamcinolone   Topical BID Hai Beckford MD      white petrolatum-mineral oil   Topical TID PRN Maria M Wang DO          Today, Patient Was Seen By: Hai Beckford MD    ** Please Note: Dragon 360 Dictation voice to text software may have been used in the creation of this document   **

## 2022-01-13 NOTE — PLAN OF CARE
Problem: PAIN - ADULT  Goal: Verbalizes/displays adequate comfort level or baseline comfort level  Description: Interventions:  - Encourage patient to monitor pain and request assistance  - Assess pain using appropriate pain scale  - Administer analgesics based on type and severity of pain and evaluate response  - Implement non-pharmacological measures as appropriate and evaluate response  - Consider cultural and social influences on pain and pain management  - Notify physician/advanced practitioner if interventions unsuccessful or patient reports new pain  Outcome: Progressing     Problem: INFECTION - ADULT  Goal: Absence or prevention of progression during hospitalization  Description: INTERVENTIONS:  - Assess and monitor for signs and symptoms of infection  - Monitor lab/diagnostic results  - Monitor all insertion sites, i e  indwelling lines, tubes, and drains  - Monitor endotracheal if appropriate and nasal secretions for changes in amount and color  - [place] appropriate cooling/warming therapies per order  - Administer medications as ordered  - Instruct and encourage patient and family to use good hand hygiene technique  - Identify and instruct in appropriate isolation precautions for identified infection/condition  Outcome: Progressing     Problem: DISCHARGE PLANNING  Goal: Discharge to home or other facility with appropriate resources  Description: INTERVENTIONS:  - Identify barriers to discharge w/patient and caregiver  - Arrange for needed discharge resources and transportation as appropriate  - Identify discharge learning needs (meds, wound care, etc )  - Arrange for interpretive services to assist at discharge as needed  - Refer to Case Management Department for coordinating discharge planning if the patient needs post-hospital services based on physician/advanced practitioner order or complex needs related to functional status, cognitive ability, or social support system  Outcome: Progressing Problem: Knowledge Deficit  Goal: Patient/family/caregiver demonstrates understanding of disease process, treatment plan, medications, and discharge instructions  Description: Complete learning assessment and assess knowledge base    Interventions:  - Provide teaching at level of understanding  - Provide teaching via preferred learning methods  Outcome: Progressing

## 2022-01-13 NOTE — ASSESSMENT & PLAN NOTE
· Patient was to be discharged to the [facility] however they would not take him back  · Case management for discharge planning

## 2022-01-14 PROCEDURE — 99232 SBSQ HOSP IP/OBS MODERATE 35: CPT | Performed by: INTERNAL MEDICINE

## 2022-01-14 RX ADMIN — NYSTATIN: 100000 POWDER TOPICAL at 17:50

## 2022-01-14 RX ADMIN — TRIAMCINOLONE ACETONIDE: 1 CREAM TOPICAL at 09:16

## 2022-01-14 RX ADMIN — NYSTATIN: 100000 POWDER TOPICAL at 09:16

## 2022-01-14 RX ADMIN — AMLODIPINE BESYLATE 10 MG: 10 TABLET ORAL at 09:10

## 2022-01-14 RX ADMIN — HYDROCORTISONE: 25 CREAM TOPICAL at 17:50

## 2022-01-14 RX ADMIN — ENOXAPARIN SODIUM 40 MG: 40 INJECTION SUBCUTANEOUS at 09:10

## 2022-01-14 RX ADMIN — HYDROCORTISONE: 25 CREAM TOPICAL at 09:16

## 2022-01-14 RX ADMIN — TRIAMCINOLONE ACETONIDE: 1 CREAM TOPICAL at 17:50

## 2022-01-14 RX ADMIN — NICOTINE 1 PATCH: 21 PATCH, EXTENDED RELEASE TRANSDERMAL at 09:07

## 2022-01-14 NOTE — PLAN OF CARE
Problem: PAIN - ADULT  Goal: Verbalizes/displays adequate comfort level or baseline comfort level  Description: Interventions:  - Encourage patient to monitor pain and request assistance  - Assess pain using appropriate pain scale  - Administer analgesics based on type and severity of pain and evaluate response  - Implement non-pharmacological measures as appropriate and evaluate response  - Consider cultural and social influences on pain and pain management  - Notify physician/advanced practitioner if interventions unsuccessful or patient reports new pain  Outcome: Progressing     Problem: INFECTION - ADULT  Goal: Absence or prevention of progression during hospitalization  Description: INTERVENTIONS:  - Assess and monitor for signs and symptoms of infection  - Monitor lab/diagnostic results  - Monitor all insertion sites, i e  indwelling lines, tubes, and drains  - Monitor endotracheal if appropriate and nasal secretions for changes in amount and color  - [place] appropriate cooling/warming therapies per order  - Administer medications as ordered  - Instruct and encourage patient and family to use good hand hygiene technique  - Identify and instruct in appropriate isolation precautions for identified infection/condition  Outcome: Progressing     Problem: SAFETY ADULT  Goal: Patient will remain free of falls  Description: INTERVENTIONS:  - Educate patient/family on patient safety including physical limitations  - Instruct patient to call for assistance with activity   - Consult OT/PT to assist with strengthening/mobility   - Keep Call bell within reach  - Keep bed low and locked with side rails adjusted as appropriate  - Keep care items and personal belongings within reach  - Initiate and maintain comfort rounds  - Make Fall Risk Sign visible to staff  - Apply yellow socks and bracelet for high fall risk patients  - Consider moving patient to room near nurses station  Outcome: Progressing  Goal: Maintain or return to baseline ADL function  Description: INTERVENTIONS:  -  Assess patient's ability to carry out ADLs; assess patient's baseline for ADL function and identify physical deficits which impact ability to perform ADLs (bathing, care of mouth/teeth, toileting, grooming, dressing, etc )  - Assess/evaluate cause of self-care deficits   - Assess range of motion  - Assess patient's mobility; develop plan if impaired  - Assess patient's need for assistive devices and provide as appropriate  - Encourage maximum independence but intervene and supervise when necessary  - Involve family in performance of ADLs  - Assess for home care needs following discharge   - Consider OT consult to assist with ADL evaluation and planning for discharge  - Provide patient education as appropriate  Outcome: Progressing  Goal: Maintains/Returns to pre admission functional level  Description: INTERVENTIONS:  - Perform BMAT or MOVE assessment daily    - Set and communicate daily mobility goal to care team and patient/family/caregiver     - Collaborate with rehabilitation services on mobility goals if consulted  - Out of bed for toileting  - Record patient progress and toleration of activity level   Outcome: Progressing     Problem: DISCHARGE PLANNING  Goal: Discharge to home or other facility with appropriate resources  Description: INTERVENTIONS:  - Identify barriers to discharge w/patient and caregiver  - Arrange for needed discharge resources and transportation as appropriate  - Identify discharge learning needs (meds, wound care, etc )  - Arrange for interpretive services to assist at discharge as needed  - Refer to Case Management Department for coordinating discharge planning if the patient needs post-hospital services based on physician/advanced practitioner order or complex needs related to functional status, cognitive ability, or social support system  Outcome: Progressing     Problem: Knowledge Deficit  Goal: Patient/family/caregiver demonstrates understanding of disease process, treatment plan, medications, and discharge instructions  Description: Complete learning assessment and assess knowledge base  Interventions:  - Provide teaching at level of understanding  - Provide teaching via preferred learning methods  Outcome: Progressing     Problem: MOBILITY - ADULT  Goal: Maintain or return to baseline ADL function  Description: INTERVENTIONS:  -  Assess patient's ability to carry out ADLs; assess patient's baseline for ADL function and identify physical deficits which impact ability to perform ADLs (bathing, care of mouth/teeth, toileting, grooming, dressing, etc )  - Assess/evaluate cause of self-care deficits   - Assess range of motion  - Assess patient's mobility; develop plan if impaired  - Assess patient's need for assistive devices and provide as appropriate  - Encourage maximum independence but intervene and supervise when necessary  - Involve family in performance of ADLs  - Assess for home care needs following discharge   - Consider OT consult to assist with ADL evaluation and planning for discharge  - Provide patient education as appropriate  Outcome: Progressing  Goal: Maintains/Returns to pre admission functional level  Description: INTERVENTIONS:  - Perform BMAT or MOVE assessment daily    - Set and communicate daily mobility goal to care team and patient/family/caregiver     - Collaborate with rehabilitation services on mobility goals if consulted  - Out of bed for toileting  - Record patient progress and toleration of activity level   Outcome: Progressing     Problem: Potential for Falls  Goal: Patient will remain free of falls  Description: INTERVENTIONS:  - Educate patient/family on patient safety including physical limitations  - Instruct patient to call for assistance with activity   - Consult OT/PT to assist with strengthening/mobility   - Keep Call bell within reach  - Keep bed low and locked with side rails adjusted as appropriate  - Keep care items and personal belongings within reach  - Initiate and maintain comfort rounds  - Make Fall Risk Sign visible to staff  - Apply yellow socks and bracelet for high fall risk patients  - Consider moving patient to room near nurses station  Outcome: Progressing     Problem: Prexisting or High Potential for Compromised Skin Integrity  Goal: Skin integrity is maintained or improved  Description: INTERVENTIONS:  - Identify patients at risk for skin breakdown  - Assess and monitor skin integrity  - Assess and monitor nutrition and hydration status  - Monitor labs   - Assess for incontinence   - Turn and reposition patient  - Assist with mobility/ambulation  - Relieve pressure over bony prominences  - Avoid friction and shearing  - Provide appropriate hygiene as needed including keeping skin clean and dry  - Evaluate need for skin moisturizer/barrier cream  - Collaborate with interdisciplinary team   - Patient/family teaching  - Consider wound care consult   Outcome: Progressing

## 2022-01-14 NOTE — ASSESSMENT & PLAN NOTE
· Patient with psoriasis flare, was on Enbrel on past however was lost to follow-up  · Discussed with dermatology - appreciate recommendation for topical creams  · Patient stated he had scabies before  · No clinical evidence of scabies for now    As patient is clinically improving with topical creams and nystatin powder continue to monitor  · Patient will need outpatient Dermatology follow-up

## 2022-01-14 NOTE — PROGRESS NOTES
[facility]  Progress Note Celeste Severiano 1965, 60 y o  male MRN: [id number]  Unit/Bed#: 7T [facility] 713-01 Encounter: [id number]  Primary Care Provider: Shyam Lion MD   Date and time admitted to hospital: 12/29/2021  5:38 PM    * Ambulatory dysfunction  Assessment & Plan  · PT/OT recommending post acute rehab  · Pending placement  · Medically stable for discharge    Intertrigo  Assessment & Plan  · Patient has intertrigo of the groin area  · Continue nystatin powder b i d  Hypertension  Assessment & Plan  BP acceptable  · Amlodipine 10 mg PO daily  · Monitor blood pressures    Psoriasis, unspecified  Assessment & Plan  · Patient with psoriasis flare, was on Enbrel on past however was lost to follow-up  · Discussed with dermatology - appreciate recommendation for topical creams  · Patient stated he had scabies before  · No clinical evidence of scabies for now  As patient is clinically improving with topical creams and nystatin powder continue to monitor  · Patient will need outpatient Dermatology follow-up      Homeless  Assessment & Plan  · Patient was to be discharged to the [facility] however they would not take him back  · Case management for discharge planning    Right arm cellulitis  Assessment & Plan  · Resolved  · Patient with right forearm lesion with redness and streaking noted in ED  · ID consulted, Completed Keflex course  · Antibiotics have been discontinued    VTE Pharmacologic Prophylaxis:   Pharmacologic: Enoxaparin (Lovenox)  Mechanical VTE Prophylaxis in Place: Yes    Patient Centered Rounds: I have performed bedside rounds with nursing staff today  Discussions with Specialists or Other Care Team Provider:  Discussed with Case Management, nurse    Education and Discussions with Family / Patient:  Discussed with patient    Time Spent for Care: 45 minutes    More than 50% of total time spent on counseling and coordination of care as described above     Current Length of Stay: 15 day(s)    Current Patient Status: Inpatient   Certification Statement: The patient will continue to require additional inpatient hospital stay due to Pending placement    Discharge Plan / Estimated Discharge Date:  Pending      Code Status: Level 1 - Full Code      Subjective:   Patient was seen and examined at bedside  The patient stated his pain around the skin rashes are getting better with powder and topical cream   He denies any  chest pain, palpitation, shortness of breath, N/V, abd pain  Objective:     Vitals:   Temp (24hrs), Av 7 °F (36 5 °C), Min:97 6 °F (36 4 °C), Max:97 7 °F (36 5 °C)    Temp:  [97 6 °F (36 4 °C)-97 7 °F (36 5 °C)] 97 7 °F (36 5 °C)  HR:  [62-65] 65  Resp:  [18] 18  BP: (127-142)/(67-77) 142/77  SpO2:  [97 %-98 %] 97 %  Body mass index is 23 82 kg/m²  Input and Output Summary (last 24 hours): Intake/Output Summary (Last 24 hours) at 2022 1444  Last data filed at 2022 1223  Gross per 24 hour   Intake 1560 ml   Output 2625 ml   Net -1065 ml       Physical Exam:     Physical Exam  General: breathing well on room air, no acute distress  HEENT: NC/AT, PERRL, EOM - normal  Neck: Supple  Pulm/Chest: Normal chest wall expansion, clear breath sounds on both side, no wheezing/rhonchi or crackles appreciated  CVS: RRR, normal S1&S2, no murmur appreciated, capillary refill <2s  Abd: soft, non tender, non distended, bowel sounds +  MSK: move all 4 extremities spontaneously, psoriatic rashes over face body and extremities    Skin: warm  CNS: no acute focal neuro deficit      Additional Data:     Labs:    Results from last 7 days   Lab Units 22  0500   WBC Thousand/uL 7 70   HEMOGLOBIN g/dL 12 3*   HEMATOCRIT % 35 8*   PLATELETS Thousands/uL 322   NEUTROS PCT % 65   LYMPHS PCT % 21*   MONOS PCT % 10   EOS PCT % 3     Results from last 7 days   Lab Units 22  0500   POTASSIUM mmol/L 3 6   CHLORIDE mmol/L 103   CO2 mmol/L 32* BUN mg/dL 11   CREATININE mg/dL 0 59*   CALCIUM mg/dL 8 3*           * I Have Reviewed All Lab Data Listed Above  * Additional Pertinent Lab Tests Reviewed: [facility] 64 Admission Reviewed    Imaging:    Imaging Reports Reviewed Today Include: None  Imaging Personally Reviewed by Myself Includes:  None      Recent Cultures (last 7 days):           Last 24 Hours Medication List:   Current Facility-Administered Medications   Medication Dose Route Frequency Provider Last Rate    acetaminophen  650 mg Oral Q4H PRN Tomas Cadena PA-C      amLODIPine  10 mg Oral Daily Florentin Hdz,       diphenhydrAMINE  25 mg Oral Q6H PRN Florentin Hdz, DO      enoxaparin  40 mg Subcutaneous Daily Tomas Cadena PA-C      hydrocortisone   Topical BID Altagracia Razo MD      nicotine  1 patch Transdermal Daily [place], [place]      nystatin   Topical BID Altagracia Razo MD      ondansetron  4 mg Oral Q6H PRN Florentin Hdz,       polyethylene glycol  17 g Oral Daily PRN Cristo Sadler PA-C      triamcinolone   Topical BID Altagracia Razo MD      white petrolatum-mineral oil   Topical TID PRN She Gay DO          Today, Patient Was Seen By: Altagracia Razo MD    ** Please Note: Dragon 360 Dictation voice to text software may have been used in the creation of this document   **

## 2022-01-14 NOTE — ASSESSMENT & PLAN NOTE
· Patient was to be discharged to the [facility] however they would not take him back  · Case management for discharge planning

## 2022-01-15 LAB
ANION GAP SERPL CALCULATED.3IONS-SCNC: 4 MMOL/L (ref 5–14)
BASOPHILS # BLD AUTO: 0.1 THOUSANDS/ΜL (ref 0–0.1)
BASOPHILS NFR BLD AUTO: 1 % (ref 0–1)
BUN SERPL-MCNC: 12 MG/DL (ref 5–25)
CALCIUM SERPL-MCNC: 8.4 MG/DL (ref 8.4–10.2)
CHLORIDE SERPL-SCNC: 103 MMOL/L (ref 97–108)
CO2 SERPL-SCNC: 32 MMOL/L (ref 22–30)
CREAT SERPL-MCNC: 0.59 MG/DL (ref 0.7–1.5)
EOSINOPHIL # BLD AUTO: 0.2 THOUSAND/ΜL (ref 0–0.4)
EOSINOPHIL NFR BLD AUTO: 3 % (ref 0–6)
ERYTHROCYTE [DISTWIDTH] IN BLOOD BY AUTOMATED COUNT: 16.5 %
GFR SERPL CREATININE-BSD FRML MDRD: 111 ML/MIN/1.73SQ M
GLUCOSE SERPL-MCNC: 97 MG/DL (ref 70–99)
HCT VFR BLD AUTO: 37.9 % (ref 41–53)
HGB BLD-MCNC: 12.4 G/DL (ref 13.5–17.5)
LYMPHOCYTES # BLD AUTO: 1.7 THOUSANDS/ΜL (ref 0.5–4)
LYMPHOCYTES NFR BLD AUTO: 22 % (ref 25–45)
MCH RBC QN AUTO: 27.7 PG (ref 26–34)
MCHC RBC AUTO-ENTMCNC: 32.7 G/DL (ref 31–36)
MCV RBC AUTO: 85 FL (ref 80–100)
MONOCYTES # BLD AUTO: 0.7 THOUSAND/ΜL (ref 0.2–0.9)
MONOCYTES NFR BLD AUTO: 9 % (ref 1–10)
NEUTROPHILS # BLD AUTO: 5 THOUSANDS/ΜL (ref 1.8–7.8)
NEUTS SEG NFR BLD AUTO: 65 % (ref 45–65)
PLATELET # BLD AUTO: 327 THOUSANDS/UL (ref 150–450)
PMV BLD AUTO: 7.1 FL (ref 8.9–12.7)
POTASSIUM SERPL-SCNC: 3.7 MMOL/L (ref 3.6–5)
RBC # BLD AUTO: 4.47 MILLION/UL (ref 4.5–5.9)
SODIUM SERPL-SCNC: 139 MMOL/L (ref 137–147)
WBC # BLD AUTO: 7.7 THOUSAND/UL (ref 4.5–11)

## 2022-01-15 PROCEDURE — 80048 BASIC METABOLIC PNL TOTAL CA: CPT | Performed by: INTERNAL MEDICINE

## 2022-01-15 PROCEDURE — 99232 SBSQ HOSP IP/OBS MODERATE 35: CPT | Performed by: INTERNAL MEDICINE

## 2022-01-15 PROCEDURE — 85025 COMPLETE CBC W/AUTO DIFF WBC: CPT | Performed by: INTERNAL MEDICINE

## 2022-01-15 RX ADMIN — ACETAMINOPHEN 650 MG: 325 TABLET ORAL at 10:07

## 2022-01-15 RX ADMIN — ENOXAPARIN SODIUM 40 MG: 40 INJECTION SUBCUTANEOUS at 10:00

## 2022-01-15 RX ADMIN — NICOTINE 1 PATCH: 21 PATCH, EXTENDED RELEASE TRANSDERMAL at 09:59

## 2022-01-15 RX ADMIN — NYSTATIN: 100000 POWDER TOPICAL at 09:59

## 2022-01-15 RX ADMIN — DIPHENHYDRAMINE HCL 25 MG: 25 TABLET ORAL at 10:07

## 2022-01-15 RX ADMIN — TRIAMCINOLONE ACETONIDE: 1 CREAM TOPICAL at 09:59

## 2022-01-15 RX ADMIN — NYSTATIN: 100000 POWDER TOPICAL at 17:50

## 2022-01-15 RX ADMIN — AMLODIPINE BESYLATE 10 MG: 10 TABLET ORAL at 09:59

## 2022-01-15 RX ADMIN — HYDROCORTISONE: 25 CREAM TOPICAL at 09:59

## 2022-01-15 RX ADMIN — HYDROCORTISONE: 25 CREAM TOPICAL at 17:50

## 2022-01-15 NOTE — PLAN OF CARE
Problem: PAIN - ADULT  Goal: Verbalizes/displays adequate comfort level or baseline comfort level  Description: Interventions:  - Encourage patient to monitor pain and request assistance  - Assess pain using appropriate pain scale  - Administer analgesics based on type and severity of pain and evaluate response  - Implement non-pharmacological measures as appropriate and evaluate response  - Consider cultural and social influences on pain and pain management  - Notify physician/advanced practitioner if interventions unsuccessful or patient reports new pain  Outcome: Progressing     Problem: INFECTION - ADULT  Goal: Absence or prevention of progression during hospitalization  Description: INTERVENTIONS:  - Assess and monitor for signs and symptoms of infection  - Monitor lab/diagnostic results  - Monitor all insertion sites, i e  indwelling lines, tubes, and drains  - Monitor endotracheal if appropriate and nasal secretions for changes in amount and color  - [place] appropriate cooling/warming therapies per order  - Administer medications as ordered  - Instruct and encourage patient and family to use good hand hygiene technique  - Identify and instruct in appropriate isolation precautions for identified infection/condition  Outcome: Progressing     Problem: SAFETY ADULT  Goal: Patient will remain free of falls  Description: INTERVENTIONS:  - Educate patient/family on patient safety including physical limitations  - Instruct patient to call for assistance with activity   - Consult OT/PT to assist with strengthening/mobility   - Keep Call bell within reach  - Keep bed low and locked with side rails adjusted as appropriate  - Keep care items and personal belongings within reach  - Initiate and maintain comfort rounds  - Make Fall Risk Sign visible to staff  - Apply yellow socks and bracelet for high fall risk patients  - Consider moving patient to room near nurses station  Outcome: Progressing  Goal: Maintain or return to baseline ADL function  Description: INTERVENTIONS:  -  Assess patient's ability to carry out ADLs; assess patient's baseline for ADL function and identify physical deficits which impact ability to perform ADLs (bathing, care of mouth/teeth, toileting, grooming, dressing, etc )  - Assess/evaluate cause of self-care deficits   - Assess range of motion  - Assess patient's mobility; develop plan if impaired  - Assess patient's need for assistive devices and provide as appropriate  - Encourage maximum independence but intervene and supervise when necessary  - Involve family in performance of ADLs  - Assess for home care needs following discharge   - Consider OT consult to assist with ADL evaluation and planning for discharge  - Provide patient education as appropriate  Outcome: Progressing  Goal: Maintains/Returns to pre admission functional level  Description: INTERVENTIONS:  - Perform BMAT or MOVE assessment daily    - Set and communicate daily mobility goal to care team and patient/family/caregiver     - Collaborate with rehabilitation services on mobility goals if consulted  - Out of bed for toileting  - Record patient progress and toleration of activity level   Outcome: Progressing     Problem: DISCHARGE PLANNING  Goal: Discharge to home or other facility with appropriate resources  Description: INTERVENTIONS:  - Identify barriers to discharge w/patient and caregiver  - Arrange for needed discharge resources and transportation as appropriate  - Identify discharge learning needs (meds, wound care, etc )  - Arrange for interpretive services to assist at discharge as needed  - Refer to Case Management Department for coordinating discharge planning if the patient needs post-hospital services based on physician/advanced practitioner order or complex needs related to functional status, cognitive ability, or social support system  Outcome: Progressing     Problem: Knowledge Deficit  Goal: Patient/family/caregiver demonstrates understanding of disease process, treatment plan, medications, and discharge instructions  Description: Complete learning assessment and assess knowledge base  Interventions:  - Provide teaching at level of understanding  - Provide teaching via preferred learning methods  Outcome: Progressing     Problem: MOBILITY - ADULT  Goal: Maintain or return to baseline ADL function  Description: INTERVENTIONS:  -  Assess patient's ability to carry out ADLs; assess patient's baseline for ADL function and identify physical deficits which impact ability to perform ADLs (bathing, care of mouth/teeth, toileting, grooming, dressing, etc )  - Assess/evaluate cause of self-care deficits   - Assess range of motion  - Assess patient's mobility; develop plan if impaired  - Assess patient's need for assistive devices and provide as appropriate  - Encourage maximum independence but intervene and supervise when necessary  - Involve family in performance of ADLs  - Assess for home care needs following discharge   - Consider OT consult to assist with ADL evaluation and planning for discharge  - Provide patient education as appropriate  Outcome: Progressing  Goal: Maintains/Returns to pre admission functional level  Description: INTERVENTIONS:  - Perform BMAT or MOVE assessment daily    - Set and communicate daily mobility goal to care team and patient/family/caregiver     - Collaborate with rehabilitation services on mobility goals if consulted  - Out of bed for toileting  - Record patient progress and toleration of activity level   Outcome: Progressing     Problem: Potential for Falls  Goal: Patient will remain free of falls  Description: INTERVENTIONS:  - Educate patient/family on patient safety including physical limitations  - Instruct patient to call for assistance with activity   - Consult OT/PT to assist with strengthening/mobility   - Keep Call bell within reach  - Keep bed low and locked with side rails adjusted as appropriate  - Keep care items and personal belongings within reach  - Initiate and maintain comfort rounds  - Make Fall Risk Sign visible to staff  - Apply yellow socks and bracelet for high fall risk patients  - Consider moving patient to room near nurses station  Outcome: Progressing     Problem: Prexisting or High Potential for Compromised Skin Integrity  Goal: Skin integrity is maintained or improved  Description: INTERVENTIONS:  - Identify patients at risk for skin breakdown  - Assess and monitor skin integrity  - Assess and monitor nutrition and hydration status  - Monitor labs   - Assess for incontinence   - Turn and reposition patient  - Assist with mobility/ambulation  - Relieve pressure over bony prominences  - Avoid friction and shearing  - Provide appropriate hygiene as needed including keeping skin clean and dry  - Evaluate need for skin moisturizer/barrier cream  - Collaborate with interdisciplinary team   - Patient/family teaching  - Consider wound care consult   Outcome: Progressing

## 2022-01-15 NOTE — PROGRESS NOTES
51 [facility]  Progress Note Michael Toth 1965, 60 y o  male MRN: [id number]  Unit/Bed#: 7T [facility] 713-01 Encounter: [id number]  Primary Care Provider: Nabil Jett MD   Date and time admitted to hospital: 12/29/2021  5:38 PM    * Ambulatory dysfunction  Assessment & Plan  · PT/OT recommending post acute rehab  · Pending placement  · Medically stable for discharge    Intertrigo  Assessment & Plan  · Patient has intertrigo of the groin area  · Continue nystatin powder b i d  Hypertension  Assessment & Plan  BP acceptable  · Amlodipine 10 mg PO daily  · Monitor blood pressures    Psoriasis, unspecified  Assessment & Plan  · Patient with psoriasis flare, was on Enbrel on past however was lost to follow-up  · Discussed with dermatology - appreciate recommendation for topical creams  · Patient stated he had scabies before  · No clinical evidence of scabies for now  As patient is clinically improving with topical creams and nystatin powder continue to monitor  · Patient will need outpatient Dermatology follow-up      Homeless  Assessment & Plan  · Patient was to be discharged to the [facility] however they would not take him back  · Case management for discharge planning    Right arm cellulitis  Assessment & Plan  · Resolved  · Patient with right forearm lesion with redness and streaking noted in ED  · ID consulted, Completed Keflex course  · Antibiotics have been discontinued      VTE Pharmacologic Prophylaxis:   Pharmacologic: Enoxaparin (Lovenox)  Mechanical VTE Prophylaxis in Place: Yes    Patient Centered Rounds: I have performed bedside rounds with nursing staff today  Discussions with Specialists or Other Care Team Provider:  Discussed with nurse    Education and Discussions with Family / Patient:  Discussed with patient    Time Spent for Care: 45 minutes    More than 50% of total time spent on counseling and coordination of care as described above     Current Length of Stay: 16 day(s)    Current Patient Status: Inpatient   Certification Statement: The patient will continue to require additional inpatient hospital stay due to Pending placement    Discharge Plan / Estimated Discharge Date:  Pending      Code Status: Level 1 - Full Code      Subjective:   Patient was seen and examined at bedside  The patient said the nystatin powder has been helping with his skin condition  No acute overnight changes  Objective:     Vitals:   Temp (24hrs), Av 5 °F (36 9 °C), Min:97 8 °F (36 6 °C), Max:99 2 °F (37 3 °C)    Temp:  [97 8 °F (36 6 °C)-99 2 °F (37 3 °C)] 99 2 °F (37 3 °C)  HR:  [64-76] 76  Resp:  [17-18] 17  BP: (163-175)/(90-95) 175/95  SpO2:  [99 %-100 %] 99 %  Body mass index is 23 82 kg/m²  Input and Output Summary (last 24 hours):        Intake/Output Summary (Last 24 hours) at 1/15/2022 0813  Last data filed at 1/15/2022 7862  Gross per 24 hour   Intake 1680 ml   Output 1625 ml   Net 55 ml       Physical Exam:     Physical Exam  General: breathing well on room air, no acute distress  HEENT: NC/AT, PERRL, EOM - normal  Neck: Supple  Pulm/Chest: Normal chest wall expansion, clear breath sounds on both side, no wheezing/rhonchi or crackles appreciated  CVS: RRR, normal S1&S2, no murmur appreciated, capillary refill <2s  Abd: soft, non tender, non distended, bowel sounds +  MSK: move all 4 extremities spontaneously, psoriatic rash on face body and extremities  Skin: warm  CNS: no acute focal neuro deficit      Additional Data:     Labs:    Results from last 7 days   Lab Units 01/15/22  0526   WBC Thousand/uL 7 70   HEMOGLOBIN g/dL 12 4*   HEMATOCRIT % 37 9*   PLATELETS Thousands/uL 327   NEUTROS PCT % 65   LYMPHS PCT % 22*   MONOS PCT % 9   EOS PCT % 3     Results from last 7 days   Lab Units 01/15/22  0526   POTASSIUM mmol/L 3 7   CHLORIDE mmol/L 103   CO2 mmol/L 32*   BUN mg/dL 12   CREATININE mg/dL 0 59*   CALCIUM mg/dL 8 4           * I Have Reviewed All Lab Data Listed Above  * Additional Pertinent Lab Tests Reviewed: Bola 66 Admission Reviewed    Imaging:    Imaging Reports Reviewed Today Include: None  Imaging Personally Reviewed by Myself Includes:  None      Recent Cultures (last 7 days):           Last 24 Hours Medication List:   Current Facility-Administered Medications   Medication Dose Route Frequency Provider Last Rate    acetaminophen  650 mg Oral Q4H PRN Froilan Solis PA-C      amLODIPine  10 mg Oral Daily Helen Palma,       diphenhydrAMINE  25 mg Oral Q6H PRN Helen Palma,       enoxaparin  40 mg Subcutaneous Daily Froilan Solis PA-C      hydrocortisone   Topical BID Sanjiv Harris MD      nicotine  1 patch Transdermal Daily [place], [place]      nystatin   Topical BID Sanjiv Harris MD      ondansetron  4 mg Oral Q6H PRN Helen Palma,       polyethylene glycol  17 g Oral Daily PRN Tyrone Haley PA-C      triamcinolone   Topical BID Sanjiv Harris MD      white petrolatum-mineral oil   Topical TID PRN Giselle Ball DO          Today, Patient Was Seen By: Sanjiv Harris MD    ** Please Note: Dragon 360 Dictation voice to text software may have been used in the creation of this document   **

## 2022-01-15 NOTE — ASSESSMENT & PLAN NOTE
· Patient was to be discharged to the [facility] however they would not take him back  · Case management for discharge planning

## 2022-01-16 PROCEDURE — 99232 SBSQ HOSP IP/OBS MODERATE 35: CPT | Performed by: INTERNAL MEDICINE

## 2022-01-16 RX ADMIN — ENOXAPARIN SODIUM 40 MG: 40 INJECTION SUBCUTANEOUS at 08:54

## 2022-01-16 RX ADMIN — NYSTATIN: 100000 POWDER TOPICAL at 18:07

## 2022-01-16 RX ADMIN — HYDROCORTISONE: 25 CREAM TOPICAL at 08:55

## 2022-01-16 RX ADMIN — HYDROCORTISONE: 25 CREAM TOPICAL at 18:07

## 2022-01-16 RX ADMIN — TRIAMCINOLONE ACETONIDE: 1 CREAM TOPICAL at 08:54

## 2022-01-16 RX ADMIN — POLYETHYLENE GLYCOL 3350 17 G: 17 POWDER, FOR SOLUTION ORAL at 10:10

## 2022-01-16 RX ADMIN — AMLODIPINE BESYLATE 10 MG: 10 TABLET ORAL at 08:54

## 2022-01-16 RX ADMIN — TRIAMCINOLONE ACETONIDE: 1 CREAM TOPICAL at 18:06

## 2022-01-16 RX ADMIN — NYSTATIN: 100000 POWDER TOPICAL at 08:54

## 2022-01-16 NOTE — ASSESSMENT & PLAN NOTE
· Patient was to be discharged to the [facility] however they would not take him back  · Case management for discharge planning

## 2022-01-16 NOTE — PLAN OF CARE
Problem: PAIN - ADULT  Goal: Verbalizes/displays adequate comfort level or baseline comfort level  Description: Interventions:  - Encourage patient to monitor pain and request assistance  - Assess pain using appropriate pain scale  - Administer analgesics based on type and severity of pain and evaluate response  - Implement non-pharmacological measures as appropriate and evaluate response  - Consider cultural and social influences on pain and pain management  - Notify physician/advanced practitioner if interventions unsuccessful or patient reports new pain  1/16/2022 1026 by Sabrina Deleon RN  Outcome: Progressing  1/16/2022 1026 by Sabrina Deleon RN  Outcome: Progressing     Problem: INFECTION - ADULT  Goal: Absence or prevention of progression during hospitalization  Description: INTERVENTIONS:  - Assess and monitor for signs and symptoms of infection  - Monitor lab/diagnostic results  - Monitor all insertion sites, i e  indwelling lines, tubes, and drains  - Monitor endotracheal if appropriate and nasal secretions for changes in amount and color  - [place] appropriate cooling/warming therapies per order  - Administer medications as ordered  - Instruct and encourage patient and family to use good hand hygiene technique  - Identify and instruct in appropriate isolation precautions for identified infection/condition  1/16/2022 1026 by Sabrina Deleon RN  Outcome: Progressing  1/16/2022 1026 by Sabrina Deleon RN  Outcome: Progressing     Problem: SAFETY ADULT  Goal: Patient will remain free of falls  Description: INTERVENTIONS:  - Educate patient/family on patient safety including physical limitations  - Instruct patient to call for assistance with activity   - Consult OT/PT to assist with strengthening/mobility   - Keep Call bell within reach  - Keep bed low and locked with side rails adjusted as appropriate  - Keep care items and personal belongings within reach  - Initiate and maintain comfort rounds  - Make Fall Risk Sign visible to staff    - Apply yellow socks and bracelet for high fall risk patients  - Consider moving patient to room near nurses station  1/16/2022 1026 by Noble Cline RN  Outcome: Progressing  1/16/2022 1026 by Noble Cline RN  Outcome: Progressing     Problem: DISCHARGE PLANNING  Goal: Discharge to home or other facility with appropriate resources  Description: INTERVENTIONS:  - Identify barriers to discharge w/patient and caregiver  - Arrange for needed discharge resources and transportation as appropriate  - Identify discharge learning needs (meds, wound care, etc )  - Arrange for interpretive services to assist at discharge as needed  - Refer to Case Management Department for coordinating discharge planning if the patient needs post-hospital services based on physician/advanced practitioner order or complex needs related to functional status, cognitive ability, or social support system  1/16/2022 1026 by Noble Cline RN  Outcome: Progressing  1/16/2022 1026 by Noble Cline RN  Outcome: Progressing

## 2022-01-16 NOTE — PROGRESS NOTES
[facility]  Progress Note Ammy Loser 1965, 60 y o  male MRN: [id number]  Unit/Bed#: 7T [facility] 711-01 Encounter: [id number]  Primary Care Provider: Amish Talavera MD   Date and time admitted to hospital: 12/29/2021  5:38 PM    * Ambulatory dysfunction  Assessment & Plan  · PT/OT recommending post acute rehab  · Pending placement  · Medically stable for discharge    Intertrigo  Assessment & Plan  · Patient has intertrigo of the groin area  · Continue nystatin powder b i d  Hypertension  Assessment & Plan  BP acceptable  · Amlodipine 10 mg PO daily  · Monitor blood pressures    Psoriasis, unspecified  Assessment & Plan  · Patient with psoriasis flare, was on Enbrel on past however was lost to follow-up  · Discussed with dermatology - appreciate recommendation for topical creams  · Patient stated he had scabies before  · No clinical evidence of scabies for now  As patient is clinically improving with topical creams and nystatin powder continue to monitor  · Patient will need outpatient Dermatology follow-up      Homeless  Assessment & Plan  · Patient was to be discharged to the [facility] however they would not take him back  · Case management for discharge planning    Right arm cellulitis  Assessment & Plan  · Resolved  · Patient with right forearm lesion with redness and streaking noted in ED  · ID consulted, Completed Keflex course  · Antibiotics have been discontinued      VTE Pharmacologic Prophylaxis:   Pharmacologic: Enoxaparin (Lovenox)  Mechanical VTE Prophylaxis in Place: Yes    Patient Centered Rounds: I have performed bedside rounds with nursing staff today  Discussions with Specialists or Other Care Team Provider:  Discussed with nurse    Education and Discussions with Family / Patient:  Discussed with patient    Time Spent for Care: 45 minutes    More than 50% of total time spent on counseling and coordination of care as described above     Current Length of Stay: 17 day(s)    Current Patient Status: Inpatient   Certification Statement: The patient will continue to require additional inpatient hospital stay due to Pending placement    Discharge Plan / Estimated Discharge Date:  Pending      Code Status: Level 1 - Full Code      Subjective:   Patient was seen and examined at bedside  The patient is sleeping soundly  Per nurse, patient has no medical complaints  No acute overnight changes  Objective:     Vitals:   Temp (24hrs), Av 9 °F (36 6 °C), Min:97 5 °F (36 4 °C), Max:98 5 °F (36 9 °C)    Temp:  [97 5 °F (36 4 °C)-98 5 °F (36 9 °C)] 97 5 °F (36 4 °C)  HR:  [67-72] 68  Resp:  [18-20] 18  BP: (146-167)/(75-86) 146/75  SpO2:  [99 %-100 %] 100 %  Body mass index is 23 82 kg/m²  Input and Output Summary (last 24 hours):        Intake/Output Summary (Last 24 hours) at 2022 1024  Last data filed at 2022 0517  Gross per 24 hour   Intake 1197 ml   Output 1700 ml   Net -503 ml       Physical Exam:     Physical Exam  General: breathing well on room air, no acute distress  HEENT: NC/AT, PERRL, EOM - normal  Neck: Supple  Pulm/Chest: Normal chest wall expansion, clear breath sounds on both side, no wheezing/rhonchi or crackles appreciated  CVS: RRR, normal S1&S2, no murmur appreciated, capillary refill <2s  Abd: soft, non tender, non distended, bowel sounds +  MSK: move all 4 extremities spontaneously  Skin: warm, psoriatic rash on face, body in extremities  CNS: no acute focal neuro deficit      Additional Data:     Labs:    Results from last 7 days   Lab Units 01/15/22  0526   WBC Thousand/uL 7 70   HEMOGLOBIN g/dL 12 4*   HEMATOCRIT % 37 9*   PLATELETS Thousands/uL 327   NEUTROS PCT % 65   LYMPHS PCT % 22*   MONOS PCT % 9   EOS PCT % 3     Results from last 7 days   Lab Units 01/15/22  0526   POTASSIUM mmol/L 3 7   CHLORIDE mmol/L 103   CO2 mmol/L 32*   BUN mg/dL 12   CREATININE mg/dL 0 59*   CALCIUM mg/dL 8 4           * I Have Reviewed All Lab Data Listed Above  * Additional Pertinent Lab Tests Reviewed: Bola 66 Admission Reviewed    Imaging:    Imaging Reports Reviewed Today Include: None  Imaging Personally Reviewed by Myself Includes:  None      Recent Cultures (last 7 days):           Last 24 Hours Medication List:   Current Facility-Administered Medications   Medication Dose Route Frequency Provider Last Rate    acetaminophen  650 mg Oral Q4H PRN [place] RUKHSANA Smith-C      amLODIPine  10 mg Oral Daily Barnet Bio, DO      diphenhydrAMINE  25 mg Oral Q6H PRN Barnet Bio, DO      enoxaparin  40 mg Subcutaneous Daily [place] Smith PA-C      hydrocortisone   Topical BID Melchor Montes MD      nicotine  1 patch Transdermal Daily [place], [place]      nystatin   Topical BID Melchor Montes MD      ondansetron  4 mg Oral Q6H PRN Barnet Bio, DO      polyethylene glycol  17 g Oral Daily PRN Emily August PA-C      triamcinolone   Topical BID Melchor Montes MD      white petrolatum-mineral oil   Topical TID PRN Sara Gonzalez DO          Today, Patient Was Seen By: Melchor Montes MD    ** Please Note: Dragon 360 Dictation voice to text software may have been used in the creation of this document   **

## 2022-01-17 PROCEDURE — 97116 GAIT TRAINING THERAPY: CPT

## 2022-01-17 PROCEDURE — 97530 THERAPEUTIC ACTIVITIES: CPT

## 2022-01-17 PROCEDURE — 99232 SBSQ HOSP IP/OBS MODERATE 35: CPT | Performed by: INTERNAL MEDICINE

## 2022-01-17 RX ADMIN — NYSTATIN: 100000 POWDER TOPICAL at 18:04

## 2022-01-17 RX ADMIN — AMLODIPINE BESYLATE 10 MG: 10 TABLET ORAL at 08:11

## 2022-01-17 RX ADMIN — NYSTATIN: 100000 POWDER TOPICAL at 08:12

## 2022-01-17 RX ADMIN — TRIAMCINOLONE ACETONIDE: 1 CREAM TOPICAL at 18:05

## 2022-01-17 RX ADMIN — HYDROCORTISONE: 25 CREAM TOPICAL at 18:04

## 2022-01-17 RX ADMIN — TRIAMCINOLONE ACETONIDE: 1 CREAM TOPICAL at 08:12

## 2022-01-17 RX ADMIN — ENOXAPARIN SODIUM 40 MG: 40 INJECTION SUBCUTANEOUS at 08:11

## 2022-01-17 RX ADMIN — HYDROCORTISONE: 25 CREAM TOPICAL at 08:12

## 2022-01-17 NOTE — PROGRESS NOTES
51 [facility]  Progress Note Aren Quintana 1965, 60 y o  male MRN: [id number]  Unit/Bed#: 7T [facility] 711-01 Encounter: [id number]  Primary Care Provider: Yolanda Cazares MD   Date and time admitted to hospital: 12/29/2021  5:38 PM    * Ambulatory dysfunction  Assessment & Plan  · PT/OT recommending post acute rehab  · Pending placement  · Medically stable for discharge    Intertrigo  Assessment & Plan  · Patient has intertrigo of the groin area  · Continue nystatin powder b i d  Hypertension  Assessment & Plan  BP acceptable  · Amlodipine 10 mg PO daily  · Monitor blood pressures    Psoriasis, unspecified  Assessment & Plan  · Patient with psoriasis flare, was on Enbrel on past however was lost to follow-up  · Discussed with dermatology - appreciate recommendation for topical creams  · Patient stated he had scabies before  · No clinical evidence of scabies for now  As patient is clinically improving with topical creams and nystatin powder continue to monitor  · Patient will need outpatient Dermatology and Rheumatology follow-up      Homeless  Assessment & Plan  · Patient was to be discharged to the [facility] however they would not take him back  · Case management for discharge planning    Right arm cellulitis  Assessment & Plan  · Resolved  · Patient with right forearm lesion with redness and streaking noted in ED  · ID consulted, Completed Keflex course  · Antibiotics have been discontinued      VTE Pharmacologic Prophylaxis:   Pharmacologic: Enoxaparin (Lovenox)  Mechanical VTE Prophylaxis in Place: Yes    Patient Centered Rounds: I have performed bedside rounds with nursing staff today  Discussions with Specialists or Other Care Team Provider:  Discussed with Case Management, nurse    Education and Discussions with Family / Patient:  Discussed with patient    Time Spent for Care: 45 minutes    More than 50% of total time spent on counseling and coordination of care as described above  Current Length of Stay: 18 day(s)    Current Patient Status: Inpatient   Certification Statement: The patient will continue to require additional inpatient hospital stay due to Pending placement    Discharge Plan / Estimated Discharge Date:  Pending      Code Status: Level 1 - Full Code      Subjective:   Patient was seen and examined at bedside  The patient stated nystatin powder is working for him  No acute overnight changes  Objective:     Vitals:   Temp (24hrs), Av 6 °F (36 4 °C), Min:97 4 °F (36 3 °C), Max:97 8 °F (36 6 °C)    Temp:  [97 4 °F (36 3 °C)-97 8 °F (36 6 °C)] 97 6 °F (36 4 °C)  HR:  [66-72] 71  Resp:  [18] 18  BP: (144-149)/(72-92) 144/88  SpO2:  [97 %] 97 %  Body mass index is 23 82 kg/m²  Input and Output Summary (last 24 hours): Intake/Output Summary (Last 24 hours) at 2022 0834  Last data filed at 2022 2230  Gross per 24 hour   Intake 240 ml   Output 600 ml   Net -360 ml       Physical Exam:     Physical Exam  General: breathing well on room air, no acute distress  HEENT: NC/AT, PERRL, EOM - normal  Neck: Supple  Pulm/Chest: Normal chest wall expansion, clear breath sounds on both side, no wheezing/rhonchi or crackles appreciated  CVS: RRR, normal S1&S2, no murmur appreciated, capillary refill <2s  Abd: soft, non tender, non distended, bowel sounds +  MSK: move all 4 extremities spontaneously  Skin: warm  CNS: no acute focal neuro deficit      Additional Data:     Labs:    Results from last 7 days   Lab Units 01/15/22  0526   WBC Thousand/uL 7 70   HEMOGLOBIN g/dL 12 4*   HEMATOCRIT % 37 9*   PLATELETS Thousands/uL 327   NEUTROS PCT % 65   LYMPHS PCT % 22*   MONOS PCT % 9   EOS PCT % 3     Results from last 7 days   Lab Units 01/15/22  0526   POTASSIUM mmol/L 3 7   CHLORIDE mmol/L 103   CO2 mmol/L 32*   BUN mg/dL 12   CREATININE mg/dL 0 59*   CALCIUM mg/dL 8 4           * I Have Reviewed All Lab Data Listed Above    * Additional Pertinent Lab Tests Reviewed: [address]i 66 Admission Reviewed    Imaging:    Imaging Reports Reviewed Today Include: None  Imaging Personally Reviewed by Myself Includes:  None      Recent Cultures (last 7 days):           Last 24 Hours Medication List:   Current Facility-Administered Medications   Medication Dose Route Frequency Provider Last Rate    acetaminophen  650 mg Oral Q4H PRN Marc Headley PA-C      amLODIPine  10 mg Oral Daily [place]i [place], DO      diphenhydrAMINE  25 mg Oral Q6H PRN [place], DO      enoxaparin  40 mg Subcutaneous Daily Marc Headley PA-C      hydrocortisone   Topical BID Taco Farah MD      nicotine  1 patch Transdermal Daily [place], [place]      nystatin   Topical BID Taco Farah MD      ondansetron  4 mg Oral Q6H PRN Jenni [place], DO      polyethylene glycol  17 g Oral Daily PRN Donnell Robins PA-C      triamcinolone   Topical BID Taco Farah MD      white petrolatum-mineral oil   Topical TID PRN Thelma Chandler, DO          Today, Patient Was Seen By: Taco Farah MD    ** Please Note: Dragon 360 Dictation voice to text software may have been used in the creation of this document   **

## 2022-01-17 NOTE — ASSESSMENT & PLAN NOTE
· Patient was to be discharged to the [facility] however they would not take him back  · Case management for discharge planning

## 2022-01-17 NOTE — PLAN OF CARE
Problem: PAIN - ADULT  Goal: Verbalizes/displays adequate comfort level or baseline comfort level  Description: Interventions:  - Encourage patient to monitor pain and request assistance  - Assess pain using appropriate pain scale  - Administer analgesics based on type and severity of pain and evaluate response  - Implement non-pharmacological measures as appropriate and evaluate response  - Consider cultural and social influences on pain and pain management  - Notify physician/advanced practitioner if interventions unsuccessful or patient reports new pain  Outcome: Progressing     Problem: INFECTION - ADULT  Goal: Absence or prevention of progression during hospitalization  Description: INTERVENTIONS:  - Assess and monitor for signs and symptoms of infection  - Monitor lab/diagnostic results  - Monitor all insertion sites, i e  indwelling lines, tubes, and drains  - Monitor endotracheal if appropriate and nasal secretions for changes in amount and color  - [place] appropriate cooling/warming therapies per order  - Administer medications as ordered  - Instruct and encourage patient and family to use good hand hygiene technique  - Identify and instruct in appropriate isolation precautions for identified infection/condition  Outcome: Progressing     Problem: SAFETY ADULT  Goal: Patient will remain free of falls  Description: INTERVENTIONS:  - Educate patient/family on patient safety including physical limitations  - Instruct patient to call for assistance with activity   - Consult OT/PT to assist with strengthening/mobility   - Keep Call bell within reach  - Keep bed low and locked with side rails adjusted as appropriate  - Keep care items and personal belongings within reach  - Initiate and maintain comfort rounds  - Make Fall Risk Sign visible to staff  - Offer Toileting   - Obtain necessary fall risk management equipment:   - Apply yellow socks and bracelet for high fall risk patients  - Consider moving patient to room near nurses station  Outcome: Progressing  Goal: Maintain or return to baseline ADL function  Description: INTERVENTIONS:  -  Assess patient's ability to carry out ADLs; assess patient's baseline for ADL function and identify physical deficits which impact ability to perform ADLs (bathing, care of mouth/teeth, toileting, grooming, dressing, etc )  - Assess/evaluate cause of self-care deficits   - Assess range of motion  - Assess patient's mobility; develop plan if impaired  - Assess patient's need for assistive devices and provide as appropriate  - Encourage maximum independence but intervene and supervise when necessary  - Involve family in performance of ADLs  - Assess for home care needs following discharge   - Consider OT consult to assist with ADL evaluation and planning for discharge  - Provide patient education as appropriate  Outcome: Progressing  Goal: Maintains/Returns to pre admission functional level  Description: INTERVENTIONS:  - Perform BMAT or MOVE assessment daily    - Set and communicate daily mobility goal to care team and patient/family/caregiver  - Collaborate with rehabilitation services on mobility goals if consulted  - Perform Range of Motion 2 times a day  - Reposition patient every 2 hours    - Dangle patient 2 times a day  - Stand patient 2 times a day  - Ambulate patient 2 times a day  - Out of bed to chair 2 times a day   - Out of bed for meals 2 times a day  - Out of bed for toileting  - Record patient progress and toleration of activity level   Outcome: Progressing     Problem: DISCHARGE PLANNING  Goal: Discharge to home or other facility with appropriate resources  Description: INTERVENTIONS:  - Identify barriers to discharge w/patient and caregiver  - Arrange for needed discharge resources and transportation as appropriate  - Identify discharge learning needs (meds, wound care, etc )  - Arrange for interpretive services to assist at discharge as needed  - Refer to Case Management Department for coordinating discharge planning if the patient needs post-hospital services based on physician/advanced practitioner order or complex needs related to functional status, cognitive ability, or social support system  Outcome: Progressing     Problem: Knowledge Deficit  Goal: Patient/family/caregiver demonstrates understanding of disease process, treatment plan, medications, and discharge instructions  Description: Complete learning assessment and assess knowledge base  Interventions:  - Provide teaching at level of understanding  - Provide teaching via preferred learning methods  Outcome: Progressing     Problem: MOBILITY - ADULT  Goal: Maintain or return to baseline ADL function  Description: INTERVENTIONS:  -  Assess patient's ability to carry out ADLs; assess patient's baseline for ADL function and identify physical deficits which impact ability to perform ADLs (bathing, care of mouth/teeth, toileting, grooming, dressing, etc )  - Assess/evaluate cause of self-care deficits   - Assess range of motion  - Assess patient's mobility; develop plan if impaired  - Assess patient's need for assistive devices and provide as appropriate  - Encourage maximum independence but intervene and supervise when necessary  - Involve family in performance of ADLs  - Assess for home care needs following discharge   - Consider OT consult to assist with ADL evaluation and planning for discharge  - Provide patient education as appropriate  Outcome: Progressing  Goal: Maintains/Returns to pre admission functional level  Description: INTERVENTIONS:  - Perform BMAT or MOVE assessment daily    - Set and communicate daily mobility goal to care team and patient/family/caregiver  - Collaborate with rehabilitation services on mobility goals if consulted  - Perform Range of Motion 2 times a day  - Reposition patient every 2 hours    - Dangle patient 2 times a day  - Stand patient 2 times a day  - Ambulate patient 2 times a day  - Out of bed to chair 2 times a day   - Out of bed for meals 2 times a day  - Out of bed for toileting  - Record patient progress and toleration of activity level   Outcome: Progressing     Problem: Potential for Falls  Goal: Patient will remain free of falls  Description: INTERVENTIONS:  - Educate patient/family on patient safety including physical limitations  - Instruct patient to call for assistance with activity   - Consult OT/PT to assist with strengthening/mobility   - Keep Call bell within reach  - Keep bed low and locked with side rails adjusted as appropriate  - Keep care items and personal belongings within reach  - Initiate and maintain comfort rounds  - Make Fall Risk Sign visible to staff  - Offer Toileting  - Obtain necessary fall risk management equipment:   - Apply yellow socks and bracelet for high fall risk patients  - Consider moving patient to room near nurses station  Outcome: Progressing     Problem: Prexisting or High Potential for Compromised Skin Integrity  Goal: Skin integrity is maintained or improved  Description: INTERVENTIONS:  - Identify patients at risk for skin breakdown  - Assess and monitor skin integrity  - Assess and monitor nutrition and hydration status  - Monitor labs   - Assess for incontinence   - Turn and reposition patient  - Assist with mobility/ambulation  - Relieve pressure over bony prominences  - Avoid friction and shearing  - Provide appropriate hygiene as needed including keeping skin clean and dry  - Evaluate need for skin moisturizer/barrier cream  - Collaborate with interdisciplinary team   - Patient/family teaching  - Consider wound care consult   Outcome: Progressing

## 2022-01-17 NOTE — ASSESSMENT & PLAN NOTE
· Patient with psoriasis flare, was on Enbrel on past however was lost to follow-up  · Discussed with dermatology - appreciate recommendation for topical creams  · Patient stated he had scabies before  · No clinical evidence of scabies for now    As patient is clinically improving with topical creams and nystatin powder continue to monitor  · Patient will need outpatient Dermatology and Rheumatology follow-up

## 2022-01-17 NOTE — PLAN OF CARE
Problem: PHYSICAL THERAPY ADULT  Goal: Performs mobility at highest level of function for planned discharge setting  See evaluation for individualized goals  Description: Treatment/Interventions: ADL retraining,Functional transfer training,LE strengthening/ROM,Elevations,Therapeutic exercise,Endurance training,Patient/family training,Equipment eval/education,Bed mobility,Gait training,Spoke to nursing,Spoke to case management,OT  Equipment Recommended: Gregory Concepcion       See flowsheet documentation for full assessment, interventions and recommendations  Outcome: Progressing  Note: Prognosis: Fair  Problem List: Decreased strength,Decreased endurance,Impaired balance,Decreased mobility,Impaired sensation,Pain,Decreased coordination,Decreased cognition,Impaired judgement,Decreased safety awareness  Assessment: Pt is demonstrating good improvement with mobility today  He is able to complete bed mobility well without assistance  Able to stand and ambulate short distances with RW without assistance  Pt perseverates on being "wrapped up with fungus" which he says is causing him to not be able to stand and walk, pt is self limiting  Barriers to Discharge: Inaccessible home environment,Decreased caregiver support        PT Discharge Recommendation: Post acute rehabilitation services (maybe able to D/C with home PT if appropriate D/C location)          See flowsheet documentation for full assessment

## 2022-01-17 NOTE — PLAN OF CARE
Problem: PAIN - ADULT  Goal: Verbalizes/displays adequate comfort level or baseline comfort level  Description: Interventions:  - Encourage patient to monitor pain and request assistance  - Assess pain using appropriate pain scale  - Administer analgesics based on type and severity of pain and evaluate response  - Implement non-pharmacological measures as appropriate and evaluate response  - Consider cultural and social influences on pain and pain management  - Notify physician/advanced practitioner if interventions unsuccessful or patient reports new pain  Outcome: Progressing     Problem: INFECTION - ADULT  Goal: Absence or prevention of progression during hospitalization  Description: INTERVENTIONS:  - Assess and monitor for signs and symptoms of infection  - Monitor lab/diagnostic results  - Monitor all insertion sites, i e  indwelling lines, tubes, and drains  - Monitor endotracheal if appropriate and nasal secretions for changes in amount and color  - [place] appropriate cooling/warming therapies per order  - Administer medications as ordered  - Instruct and encourage patient and family to use good hand hygiene technique  - Identify and instruct in appropriate isolation precautions for identified infection/condition  Outcome: Progressing     Problem: DISCHARGE PLANNING  Goal: Discharge to home or other facility with appropriate resources  Description: INTERVENTIONS:  - Identify barriers to discharge w/patient and caregiver  - Arrange for needed discharge resources and transportation as appropriate  - Identify discharge learning needs (meds, wound care, etc )  - Arrange for interpretive services to assist at discharge as needed  - Refer to Case Management Department for coordinating discharge planning if the patient needs post-hospital services based on physician/advanced practitioner order or complex needs related to functional status, cognitive ability, or social support system  Outcome: Progressing Problem: Knowledge Deficit  Goal: Patient/family/caregiver demonstrates understanding of disease process, treatment plan, medications, and discharge instructions  Description: Complete learning assessment and assess knowledge base    Interventions:  - Provide teaching at level of understanding  - Provide teaching via preferred learning methods  Outcome: Progressing

## 2022-01-17 NOTE — PHYSICAL THERAPY NOTE
Physical TherapyTreatment Note    Patient's Name: Mal Kaur    Admitting Diagnosis  Cellulitis [L03 90]  Homeless [Z59 00]  Wound check, abscess [Z51 89]    Problem List  Patient Active Problem List   Diagnosis    Right arm cellulitis    Homeless    Psoriasis, unspecified    Ambulatory dysfunction    Hypertension    Intertrigo       Past Medical History  Past Medical History:   Diagnosis Date    Arthritis     Hypertension     Sciatica        Past Surgical History  Past Surgical History:   Procedure Laterality Date    BACK SURGERY         Recent Imaging  No orders to display       Recent Vital Signs  Vitals:    01/16/22 0700 01/16/22 1509 01/16/22 2332 01/17/22 0722   BP: 146/75 149/92 148/72 144/88   BP Location: Left arm Left arm Left arm Left arm   Pulse: 68 72 66 71   Resp: 18 18 18 18   Temp: 97 5 °F (36 4 °C) (!) 97 4 °F (36 3 °C) 97 8 °F (36 6 °C) 97 6 °F (36 4 °C)   TempSrc: Temporal Temporal Temporal Temporal   SpO2: 100% 97% 97% 97%   Weight:       Height:           PT Treatment Time: 40 minutes       01/17/22 1115   PT Last Visit   PT Visit Date 01/17/22   Pain Assessment   Pain Assessment Tool 0-10   Pain Score 3   Pain Location/Orientation Location: Groin   Restrictions/Precautions   Other Precautions Fall Risk;Pain   General   Chart Reviewed Yes   Response to Previous Treatment Patient with no complaints from previous session  Family/Caregiver Present No   Cognition   Overall Cognitive Status WFL   Arousal/Participation Alert; Cooperative   Attention Attends with cues to redirect   Orientation Level Oriented X4   Memory Decreased recall of recent events;Decreased recall of precautions   Following Commands Follows one step commands with increased time or repetition   Bed Mobility   Rolling R 6  Modified independent   Rolling L 6  Modified independent   Supine to Sit 6  Modified independent   Sit to Supine 6  Modified independent   Additional Comments pt moving sup to long sit, sup to sit EOB, rolling multiple times both directions, demonstrates simulated floor to w/c using quadruped technique   Transfers   Sit to Stand 5  Supervision   Additional items Increased time required   Stand to Sit 5  Supervision   Additional items Increased time required   Additional Comments with RW   Ambulation/Elevation   Gait pattern Step through pattern;Decreased toe off;Decreased heel strike; Short stride;Decreased foot clearance   Gait Assistance 5  Supervision   Additional items Verbal cues   Assistive Device Rolling walker   Distance 20ft   Balance   Static Sitting Fair +   Dynamic Sitting Fair +   Static Standing Fair   Dynamic Standing Fair -   Ambulatory Fair -   Endurance Deficit   Endurance Deficit Yes   Endurance Deficit Description pain, self limiting   Activity Tolerance   Activity Tolerance Patient limited by fatigue;Patient limited by pain   Medical Staff Made Aware spoke to CM   Nurse Made Aware spoke to RN   Assessment   Prognosis Fair   Problem List Decreased strength;Decreased endurance; Impaired balance;Decreased mobility; Impaired sensation;Pain;Decreased coordination;Decreased cognition; Impaired judgement;Decreased safety awareness   Assessment Pt is demonstrating good improvement with mobility today  He is able to complete bed mobility well without assistance  Able to stand and ambulate short distances with RW without assistance  Pt perseverates on being "wrapped up with fungus" which he says is causing him to not be able to stand and walk, pt is self limiting  Barriers to Discharge Inaccessible home environment;Decreased caregiver support   Goals   Patient Goals to be able to ambulate better   STG Expiration Date 01/24/22   Short Term Goal #1 see eval note   Plan   Treatment/Interventions ADL retraining;Functional transfer training;LE strengthening/ROM; Elevations; Therapeutic exercise; Endurance training;Patient/family training;Equipment eval/education; Bed mobility;Gait training;Spoke to nursing;Spoke to case management;OT   Progress Progressing toward goals   PT Frequency 2-3x/wk   Recommendation   PT Discharge Recommendation Post acute rehabilitation services  (maybe able to D/C with home PT if appropriate D/C location)   Equipment Recommended [place] 435   Turning in Bed Without Bedrails 4   Lying on Back to Sitting on Edge of Flat Bed 4   Moving Bed to Chair 3   Standing Up From Chair 3   Walk in Room 3   Climb 3-5 Stairs 2   Basic Mobility Inpatient Raw Score 19   Basic Mobility Standardized Score 42 48   Highest Level Of Mobility   JH-HLM Goal 6: Walk 10 steps or more   JH-HLM Highest Level of Mobility 6: Walk 10 steps or more   JH-HLM Goal Achieved Yes   Education   Education Provided Mobility training;Assistive device   Patient Explanation/teachback used;Demonstrates verbal understanding   End of Consult   Patient Position at End of Consult Supine; All needs within reach     [facility] PT, DPT

## 2022-01-18 LAB
ANION GAP SERPL CALCULATED.3IONS-SCNC: 8 MMOL/L (ref 5–14)
BASOPHILS # BLD AUTO: 0.1 THOUSANDS/ΜL (ref 0–0.1)
BASOPHILS NFR BLD AUTO: 1 % (ref 0–1)
BUN SERPL-MCNC: 18 MG/DL (ref 5–25)
CALCIUM SERPL-MCNC: 8.3 MG/DL (ref 8.4–10.2)
CHLORIDE SERPL-SCNC: 103 MMOL/L (ref 97–108)
CO2 SERPL-SCNC: 29 MMOL/L (ref 22–30)
CREAT SERPL-MCNC: 0.73 MG/DL (ref 0.7–1.5)
EOSINOPHIL # BLD AUTO: 0.2 THOUSAND/ΜL (ref 0–0.4)
EOSINOPHIL NFR BLD AUTO: 3 % (ref 0–6)
ERYTHROCYTE [DISTWIDTH] IN BLOOD BY AUTOMATED COUNT: 16.6 %
GFR SERPL CREATININE-BSD FRML MDRD: 102 ML/MIN/1.73SQ M
GLUCOSE SERPL-MCNC: 101 MG/DL (ref 70–99)
HCT VFR BLD AUTO: 38.3 % (ref 41–53)
HGB BLD-MCNC: 12.6 G/DL (ref 13.5–17.5)
LYMPHOCYTES # BLD AUTO: 1.5 THOUSANDS/ΜL (ref 0.5–4)
LYMPHOCYTES NFR BLD AUTO: 22 % (ref 25–45)
MCH RBC QN AUTO: 27.8 PG (ref 26–34)
MCHC RBC AUTO-ENTMCNC: 33 G/DL (ref 31–36)
MCV RBC AUTO: 84 FL (ref 80–100)
MONOCYTES # BLD AUTO: 0.7 THOUSAND/ΜL (ref 0.2–0.9)
MONOCYTES NFR BLD AUTO: 10 % (ref 1–10)
NEUTROPHILS # BLD AUTO: 4.5 THOUSANDS/ΜL (ref 1.8–7.8)
NEUTS SEG NFR BLD AUTO: 64 % (ref 45–65)
PLATELET # BLD AUTO: 354 THOUSANDS/UL (ref 150–450)
PMV BLD AUTO: 7.3 FL (ref 8.9–12.7)
POTASSIUM SERPL-SCNC: 3.9 MMOL/L (ref 3.6–5)
RBC # BLD AUTO: 4.54 MILLION/UL (ref 4.5–5.9)
SODIUM SERPL-SCNC: 140 MMOL/L (ref 137–147)
WBC # BLD AUTO: 7 THOUSAND/UL (ref 4.5–11)

## 2022-01-18 PROCEDURE — 80048 BASIC METABOLIC PNL TOTAL CA: CPT | Performed by: INTERNAL MEDICINE

## 2022-01-18 PROCEDURE — 97530 THERAPEUTIC ACTIVITIES: CPT

## 2022-01-18 PROCEDURE — 97535 SELF CARE MNGMENT TRAINING: CPT

## 2022-01-18 PROCEDURE — 99231 SBSQ HOSP IP/OBS SF/LOW 25: CPT | Performed by: INTERNAL MEDICINE

## 2022-01-18 PROCEDURE — 85025 COMPLETE CBC W/AUTO DIFF WBC: CPT | Performed by: INTERNAL MEDICINE

## 2022-01-18 RX ADMIN — HYDROCORTISONE: 25 CREAM TOPICAL at 08:39

## 2022-01-18 RX ADMIN — NYSTATIN: 100000 POWDER TOPICAL at 17:28

## 2022-01-18 RX ADMIN — NYSTATIN: 100000 POWDER TOPICAL at 08:39

## 2022-01-18 RX ADMIN — TRIAMCINOLONE ACETONIDE: 1 CREAM TOPICAL at 08:39

## 2022-01-18 RX ADMIN — ENOXAPARIN SODIUM 40 MG: 40 INJECTION SUBCUTANEOUS at 08:38

## 2022-01-18 RX ADMIN — POLYETHYLENE GLYCOL 3350 17 G: 17 POWDER, FOR SOLUTION ORAL at 08:38

## 2022-01-18 RX ADMIN — AMLODIPINE BESYLATE 10 MG: 10 TABLET ORAL at 08:38

## 2022-01-18 RX ADMIN — TRIAMCINOLONE ACETONIDE: 1 CREAM TOPICAL at 17:28

## 2022-01-18 NOTE — ASSESSMENT & PLAN NOTE
· Resolved  · Patient with right forearm lesion with redness and streaking noted in ED  · Completed Keflex course

## 2022-01-18 NOTE — ASSESSMENT & PLAN NOTE
· Patient was to be discharged to the [facility] rescue mission however they would not take him back  · PT recommending post acute rehab placement  · The patient could potentially stay with his sister if he was able to care for himself but at this time is not able to

## 2022-01-18 NOTE — PLAN OF CARE
Problem: INFECTION - ADULT  Goal: Absence or prevention of progression during hospitalization  Description: INTERVENTIONS:  - Assess and monitor for signs and symptoms of infection  - Monitor lab/diagnostic results  - Monitor all insertion sites, i e  indwelling lines, tubes, and drains  - Monitor endotracheal if appropriate and nasal secretions for changes in amount and color  - [place] appropriate cooling/warming therapies per order  - Administer medications as ordered  - Instruct and encourage patient and family to use good hand hygiene technique  - Identify and instruct in appropriate isolation precautions for identified infection/condition  Outcome: Progressing     Problem: DISCHARGE PLANNING  Goal: Discharge to home or other facility with appropriate resources  Description: INTERVENTIONS:  - Identify barriers to discharge w/patient and caregiver  - Arrange for needed discharge resources and transportation as appropriate  - Identify discharge learning needs (meds, wound care, etc )  - Arrange for interpretive services to assist at discharge as needed  - Refer to Case Management Department for coordinating discharge planning if the patient needs post-hospital services based on physician/advanced practitioner order or complex needs related to functional status, cognitive ability, or social support system  Outcome: Progressing     Problem: Knowledge Deficit  Goal: Patient/family/caregiver demonstrates understanding of disease process, treatment plan, medications, and discharge instructions  Description: Complete learning assessment and assess knowledge base    Interventions:  - Provide teaching at level of understanding  - Provide teaching via preferred learning methods  Outcome: Progressing

## 2022-01-18 NOTE — ASSESSMENT & PLAN NOTE
· PT/OT recommending post acute rehab  · Case management following to aide in discharge planning  · Medically stable for discharge

## 2022-01-18 NOTE — PROGRESS NOTES
[facility]  Progress Note - Moon Sebastian 1965, 60 y o  male MRN: [id number]  Unit/Bed#: 7T [facility] 711-01 Encounter: [id number]  Primary Care Provider: Shanon Franks MD   Date and time admitted to hospital: 12/29/2021  5:38 PM    * Ambulatory dysfunction  Assessment & Plan  · PT/OT recommending post acute rehab  · Case management following to aide in discharge planning  · Medically stable for discharge    [facility]  · [facility] however they would not take him back  · PT recommending post acute rehab placement  · The patient could potentially stay with his sister if he was able to care for himself but at this time is not able to    [address]  · Patient has intertrigo of the groin area  · Continue nystatin powder b i d  Right arm cellulitis  Assessment & Plan  · Resolved  · Patient with right forearm lesion with redness and streaking noted in ED  · Completed Keflex course      Psoriasis, unspecified  Assessment & Plan  · Patient with psoriasis flare, was on Enbrel in the past however was lost to follow-up  · Discussed with dermatology- appreciate recommendation for topical creams  · Patient stated he had scabies before  · No clinical evidence of scabies for now  As patient is clinically improving with topical creams and nystatin powder continue to monitor  · Patient will need outpatient Dermatology and Rheumatology follow-up      Essential hypertension  Assessment & Plan  · Continue amlodipine 10 mg daily      VTE Pharmacologic Prophylaxis: VTE Score: 1 Low Risk (Score 0-2) - Encourage Ambulation  Lovenox    Patient Centered Rounds: I performed bedside rounds with nursing staff today  Discussions with Specialists or Other Care Team Provider:  Nursing, PT, and case management    Education and Discussions with Family / Patient: Patient declined call to        Time Spent for Care: 30 minutes  More than 50% of total time spent on counseling and coordination of care as described above  Current Length of Stay: 19 day(s)  Current Patient Status: Inpatient   Certification Statement: The patient will continue to require additional inpatient hospital stay due to Awaiting placement  Discharge Plan: To be determined based on accepting facility and bed availability    Code Status: Level 1 - Full Code    Subjective:   Patient resting in bed  He has no acute complaints but continues to complain that he has a "fungus wrapped around him" that is prohibiting him from walking and causing flank and groin pain  Objective:     Vitals:   Temp (24hrs), Av 8 °F (36 6 °C), Min:97 2 °F (36 2 °C), Max:98 2 °F (36 8 °C)    Temp:  [97 2 °F (36 2 °C)-98 2 °F (36 8 °C)] 97 9 °F (36 6 °C)  HR:  [65-66] 66  Resp:  [18] 18  BP: (142-170)/(77-91) 170/91  SpO2:  [96 %-98 %] 98 %  Body mass index is 23 82 kg/m²  Input and Output Summary (last 24 hours): Intake/Output Summary (Last 24 hours) at 2022 1401  Last data filed at 2022 1229  Gross per 24 hour   Intake 1440 ml   Output 800 ml   Net 640 ml       Physical Exam:   Physical Exam  Vitals and nursing note reviewed  Constitutional:       General: He is not in acute distress  Appearance: Normal appearance  HENT:      Head: Normocephalic and atraumatic  Mouth/Throat:      Mouth: Mucous membranes are moist       Pharynx: Oropharynx is clear  Eyes:      Extraocular Movements: Extraocular movements intact  Conjunctiva/sclera: Conjunctivae normal    Cardiovascular:      Rate and Rhythm: Normal rate and regular rhythm  Pulses: Normal pulses  Heart sounds: Normal heart sounds  Pulmonary:      Effort: Pulmonary effort is normal  No respiratory distress  Breath sounds: Normal breath sounds  No wheezing  Abdominal:      General: Bowel sounds are normal  There is no distension  Palpations: Abdomen is soft  Tenderness: There is no abdominal tenderness  Musculoskeletal:         General: Normal range of motion  Cervical back: Normal range of motion and neck supple  Skin:     General: Skin is warm  Comments: Groin erythema, diffuse psoriatic rash on all 4 extremities   Neurological:      General: No focal deficit present  Mental Status: He is alert and oriented to person, place, and time  Mental status is at baseline  Psychiatric:         Mood and Affect: Mood normal          Behavior: Behavior normal           Labs:  Results from last 7 days   Lab Units 01/18/22  1019   WBC Thousand/uL 7 00   HEMOGLOBIN g/dL 12 6*   HEMATOCRIT % 38 3*   PLATELETS Thousands/uL 354   NEUTROS PCT % 64   LYMPHS PCT % 22*   MONOS PCT % 10   EOS PCT % 3     Results from last 7 days   Lab Units 01/18/22  1019   SODIUM mmol/L 140   POTASSIUM mmol/L 3 9   CHLORIDE mmol/L 103   CO2 mmol/L 29   BUN mg/dL 18   CREATININE mg/dL 0 73   ANION GAP mmol/L 8   CALCIUM mg/dL 8 3*   GLUCOSE RANDOM mg/dL 101*                       Lines/Drains:  Invasive Devices  Report    None               Imaging:  No new imaging  Recent Cultures (last 7 days):         Last 24 Hours Medication List:   Current Facility-Administered Medications   Medication Dose Route Frequency Provider Last Rate    acetaminophen  650 mg Oral Q4H PRN Elie Reyes PA-C      amLODIPine  10 mg Oral Daily Ronco Prows, DO      diphenhydrAMINE  25 mg Oral Q6H PRN Niranjan Progiovana, DO      hydrocortisone   Topical BID Laurita Rodas MD      nicotine  1 patch Transdermal Daily Kiki Elaine [place], [place]      nystatin   Topical BID Laurita Rodas MD      ondansetron  4 mg Oral Q6H PRN Niranjan Galaviz, DO      polyethylene glycol  17 g Oral Daily PRN Ellen Herrrea PA-C      triamcinolone   Topical BID Laurita Rodas MD      white petrolatum-mineral oil   Topical TID PRN Solange Carr DO          Today, Patient Was Seen By: Kristian Malone, DO    **Please Note: This note may have been constructed using a voice recognition system  **

## 2022-01-18 NOTE — PLAN OF CARE
Problem: PAIN - ADULT  Goal: Verbalizes/displays adequate comfort level or baseline comfort level  Description: Interventions:  - Encourage patient to monitor pain and request assistance  - Assess pain using appropriate pain scale  - Administer analgesics based on type and severity of pain and evaluate response  - Implement non-pharmacological measures as appropriate and evaluate response  - Consider cultural and social influences on pain and pain management  - Notify physician/advanced practitioner if interventions unsuccessful or patient reports new pain  Outcome: Progressing     Problem: INFECTION - ADULT  Goal: Absence or prevention of progression during hospitalization  Description: INTERVENTIONS:  - Assess and monitor for signs and symptoms of infection  - Monitor lab/diagnostic results  - Monitor all insertion sites, i e  indwelling lines, tubes, and drains  - Monitor endotracheal if appropriate and nasal secretions for changes in amount and color  - [place] appropriate cooling/warming therapies per order  - Administer medications as ordered  - Instruct and encourage patient and family to use good hand hygiene technique  - Identify and instruct in appropriate isolation precautions for identified infection/condition  Outcome: Progressing     Problem: SAFETY ADULT  Goal: Patient will remain free of falls  Description: INTERVENTIONS:  - Educate patient/family on patient safety including physical limitations  - Instruct patient to call for assistance with activity   - Consult OT/PT to assist with strengthening/mobility   - Keep Call bell within reach  - Keep bed low and locked with side rails adjusted as appropriate  - Keep care items and personal belongings within reach  - Initiate and maintain comfort rounds  - Make Fall Risk Sign visible to staff  - Offer Toileting every 2 Hours, in advance of need  - Obtain necessary fall risk management equipment:  - Apply yellow socks and bracelet for high fall risk patients  - Consider moving patient to room near nurses station  Outcome: Progressing  Goal: Maintain or return to baseline ADL function  Description: INTERVENTIONS:  -  Assess patient's ability to carry out ADLs; assess patient's baseline for ADL function and identify physical deficits which impact ability to perform ADLs (bathing, care of mouth/teeth, toileting, grooming, dressing, etc )  - Assess/evaluate cause of self-care deficits   - Assess range of motion  - Assess patient's mobility; develop plan if impaired  - Assess patient's need for assistive devices and provide as appropriate  - Encourage maximum independence but intervene and supervise when necessary  - Involve family in performance of ADLs  - Assess for home care needs following discharge   - Consider OT consult to assist with ADL evaluation and planning for discharge  - Provide patient education as appropriate  Outcome: Progressing  Goal: Maintains/Returns to pre admission functional level  Description: INTERVENTIONS:  - Perform BMAT or MOVE assessment daily    - Set and communicate daily mobility goal to care team and patient/family/caregiver  - Collaborate with rehabilitation services on mobility goals if consulted  - Perform Range of Motion 2 times a day  - Reposition patient every 2 hours    - Dangle patient 2 times a day  - Stand patient 2 times a day  - Ambulate patient 2 times a day  - Out of bed to chair 2 times a day   - Out of bed for meals 2 times a day  - Out of bed for toileting  - Record patient progress and toleration of activity level   Outcome: Progressing     Problem: DISCHARGE PLANNING  Goal: Discharge to home or other facility with appropriate resources  Description: INTERVENTIONS:  - Identify barriers to discharge w/patient and caregiver  - Arrange for needed discharge resources and transportation as appropriate  - Identify discharge learning needs (meds, wound care, etc )  - Arrange for interpretive services to assist at discharge as needed  - Refer to Case Management Department for coordinating discharge planning if the patient needs post-hospital services based on physician/advanced practitioner order or complex needs related to functional status, cognitive ability, or social support system  Outcome: Progressing     Problem: Knowledge Deficit  Goal: Patient/family/caregiver demonstrates understanding of disease process, treatment plan, medications, and discharge instructions  Description: Complete learning assessment and assess knowledge base  Interventions:  - Provide teaching at level of understanding  - Provide teaching via preferred learning methods  Outcome: Progressing     Problem: MOBILITY - ADULT  Goal: Maintain or return to baseline ADL function  Description: INTERVENTIONS:  -  Assess patient's ability to carry out ADLs; assess patient's baseline for ADL function and identify physical deficits which impact ability to perform ADLs (bathing, care of mouth/teeth, toileting, grooming, dressing, etc )  - Assess/evaluate cause of self-care deficits   - Assess range of motion  - Assess patient's mobility; develop plan if impaired  - Assess patient's need for assistive devices and provide as appropriate  - Encourage maximum independence but intervene and supervise when necessary  - Involve family in performance of ADLs  - Assess for home care needs following discharge   - Consider OT consult to assist with ADL evaluation and planning for discharge  - Provide patient education as appropriate  Outcome: Progressing  Goal: Maintains/Returns to pre admission functional level  Description: INTERVENTIONS:  - Perform BMAT or MOVE assessment daily    - Set and communicate daily mobility goal to care team and patient/family/caregiver  - Collaborate with rehabilitation services on mobility goals if consulted  - Perform Range of Motion 2 times a day  - Reposition patient every 2 hours    - Dangle patient 2 times a day  - Stand patient 2 times a day  - Ambulate patient 2 times a day  - Out of bed to chair 2 times a day   - Out of bed for meals 2 times a day  - Out of bed for toileting  - Record patient progress and toleration of activity level   Outcome: Progressing     Problem: Potential for Falls  Goal: Patient will remain free of falls  Description: INTERVENTIONS:  - Educate patient/family on patient safety including physical limitations  - Instruct patient to call for assistance with activity   - Consult OT/PT to assist with strengthening/mobility   - Keep Call bell within reach  - Keep bed low and locked with side rails adjusted as appropriate  - Keep care items and personal belongings within reach  - Initiate and maintain comfort rounds  - Make Fall Risk Sign visible to staff  - Offer Toileting every 2 Hours, in advance of need  - Obtain necessary fall risk management equipment:   - Apply yellow socks and bracelet for high fall risk patients  - Consider moving patient to room near nurses station  Outcome: Progressing     Problem: Prexisting or High Potential for Compromised Skin Integrity  Goal: Skin integrity is maintained or improved  Description: INTERVENTIONS:  - Identify patients at risk for skin breakdown  - Assess and monitor skin integrity  - Assess and monitor nutrition and hydration status  - Monitor labs   - Assess for incontinence   - Turn and reposition patient  - Assist with mobility/ambulation  - Relieve pressure over bony prominences  - Avoid friction and shearing  - Provide appropriate hygiene as needed including keeping skin clean and dry  - Evaluate need for skin moisturizer/barrier cream  - Collaborate with interdisciplinary team   - Patient/family teaching  - Consider wound care consult   Outcome: Progressing

## 2022-01-18 NOTE — ASSESSMENT & PLAN NOTE
· Patient with psoriasis flare, was on Enbrel in the past however was lost to follow-up  · Discussed with dermatology- appreciate recommendation for topical creams  · Patient stated he had scabies before  · No clinical evidence of scabies for now    As patient is clinically improving with topical creams and nystatin powder continue to monitor  · Patient will need outpatient Dermatology and Rheumatology follow-up

## 2022-01-19 PROCEDURE — 97116 GAIT TRAINING THERAPY: CPT

## 2022-01-19 PROCEDURE — 99232 SBSQ HOSP IP/OBS MODERATE 35: CPT | Performed by: INTERNAL MEDICINE

## 2022-01-19 RX ADMIN — TRIAMCINOLONE ACETONIDE: 1 CREAM TOPICAL at 12:09

## 2022-01-19 RX ADMIN — ACETAMINOPHEN 650 MG: 325 TABLET ORAL at 23:34

## 2022-01-19 RX ADMIN — ENOXAPARIN SODIUM 40 MG: 100 INJECTION SUBCUTANEOUS at 12:09

## 2022-01-19 RX ADMIN — ACETAMINOPHEN 650 MG: 325 TABLET ORAL at 12:20

## 2022-01-19 RX ADMIN — TRIAMCINOLONE ACETONIDE: 1 CREAM TOPICAL at 18:52

## 2022-01-19 RX ADMIN — NYSTATIN: 100000 POWDER TOPICAL at 18:52

## 2022-01-19 RX ADMIN — NYSTATIN: 100000 POWDER TOPICAL at 12:09

## 2022-01-19 RX ADMIN — AMLODIPINE BESYLATE 10 MG: 10 TABLET ORAL at 12:09

## 2022-01-19 NOTE — PLAN OF CARE
Continue current plan of care  Medications that are requested pantoprazole    Medications selected in [facility]    Patient pharmacy [facility]    [facility]    Patient notified that refill may take 48-72 hoursYes    Callback Number:   Telephone Information:   Mobile [phone]        Best Availability: anytime    Can A Detailed Message Be Left?Yes     Additional Info: none    Informed patient to check with their pharmacy for the status of the refill and they will only be contacted if there is an issue with the refillYes

## 2022-01-19 NOTE — ASSESSMENT & PLAN NOTE
· The patient could eventually go home with his sister if he can improve to be able to ambulate around the house and go up stairs  · PT/OT recommending post acute rehab  · Case management following to aide in discharge planning  · Medically stable for discharge

## 2022-01-19 NOTE — PLAN OF CARE
Problem: OCCUPATIONAL THERAPY ADULT  Goal: Performs self-care activities at highest level of function for planned discharge setting  See evaluation for individualized goals  Description: Treatment Interventions: ADL retraining,Functional transfer training,UE strengthening/ROM,Endurance training,Cognitive reorientation,Patient/family training,Equipment evaluation/education,Compensatory technique education,Fine motor coordination activities,Continued evaluation,Energy conservation,Activityengagement          See flowsheet documentation for full assessment, interventions and recommendations  Outcome: Progressing  Note: Limitation: Decreased ADL status,Decreased UE ROM,Decreased UE strength,Decreased Safe judgement during ADL,Decreased cognition,Decreased endurance,Decreased self-care trans,Decreased high-level ADLs (SEVERE PAIN )  Prognosis: Fair,Good  Assessment: Pt seen for OT txt  Pt does demonstrate improvement with ambulation and bathroom mobility, Pt continues to require assist for LB and higher level IADLs  Pt below his functional baseline, would benefit from continued OT services while in the hospital to address deficits with ADLs, endurance, and mobility        OT Discharge Recommendation: Post acute rehabilitation services

## 2022-01-19 NOTE — ASSESSMENT & PLAN NOTE
· Patient was to be discharged to the [facility] rescue mission however they would not take him back  · PT recommending post acute rehab placement

## 2022-01-19 NOTE — PLAN OF CARE
Problem: PAIN - ADULT  Goal: Verbalizes/displays adequate comfort level or baseline comfort level  Description: Interventions:  - Encourage patient to monitor pain and request assistance  - Assess pain using appropriate pain scale  - Administer analgesics based on type and severity of pain and evaluate response  - Implement non-pharmacological measures as appropriate and evaluate response  - Consider cultural and social influences on pain and pain management  - Notify physician/advanced practitioner if interventions unsuccessful or patient reports new pain  Outcome: Progressing     Problem: INFECTION - ADULT  Goal: Absence or prevention of progression during hospitalization  Description: INTERVENTIONS:  - Assess and monitor for signs and symptoms of infection  - Monitor lab/diagnostic results  - Monitor all insertion sites, i e  indwelling lines, tubes, and drains  - Monitor endotracheal if appropriate and nasal secretions for changes in amount and color  - [place] appropriate cooling/warming therapies per order  - Administer medications as ordered  - Instruct and encourage patient and family to use good hand hygiene technique  - Identify and instruct in appropriate isolation precautions for identified infection/condition  Outcome: Progressing     Problem: SAFETY ADULT  Goal: Patient will remain free of falls  Description: INTERVENTIONS:  - Educate patient/family on patient safety including physical limitations  - Instruct patient to call for assistance with activity   - Consult OT/PT to assist with strengthening/mobility   - Keep Call bell within reach  - Keep bed low and locked with side rails adjusted as appropriate  - Keep care items and personal belongings within reach  - Initiate and maintain comfort rounds  - Make Fall Risk Sign visible to staff  - Offer Toileting every 2 Hours, in advance of need  - Apply yellow socks and bracelet for high fall risk patients  - Consider moving patient to room near nurses station  Outcome: Progressing  Goal: Maintain or return to baseline ADL function  Description: INTERVENTIONS:  -  Assess patient's ability to carry out ADLs; assess patient's baseline for ADL function and identify physical deficits which impact ability to perform ADLs (bathing, care of mouth/teeth, toileting, grooming, dressing, etc )  - Assess/evaluate cause of self-care deficits   - Assess range of motion  - Assess patient's mobility; develop plan if impaired  - Assess patient's need for assistive devices and provide as appropriate  - Encourage maximum independence but intervene and supervise when necessary  - Involve family in performance of ADLs  - Assess for home care needs following discharge   - Consider OT consult to assist with ADL evaluation and planning for discharge  - Provide patient education as appropriate  Outcome: Progressing  Goal: Maintains/Returns to pre admission functional level  Description: INTERVENTIONS:  - Perform BMAT or MOVE assessment daily    - Set and communicate daily mobility goal to care team and patient/family/caregiver  - Collaborate with rehabilitation services on mobility goals if consulted  - Perform Range of Motion 3 times a day  - Reposition patient every 3 hours    - Dangle patient 3 times a day  - Stand patient 3 times a day  - Ambulate patient 3 times a day  - Out of bed to chair 3 times a day   - Out of bed for meals 3 times a day  - Out of bed for toileting  - Record patient progress and toleration of activity level   Outcome: Progressing     Problem: DISCHARGE PLANNING  Goal: Discharge to home or other facility with appropriate resources  Description: INTERVENTIONS:  - Identify barriers to discharge w/patient and caregiver  - Arrange for needed discharge resources and transportation as appropriate  - Identify discharge learning needs (meds, wound care, etc )  - Arrange for interpretive services to assist at discharge as needed  - Refer to Case Management Department for coordinating discharge planning if the patient needs post-hospital services based on physician/advanced practitioner order or complex needs related to functional status, cognitive ability, or social support system  Outcome: Progressing     Problem: Knowledge Deficit  Goal: Patient/family/caregiver demonstrates understanding of disease process, treatment plan, medications, and discharge instructions  Description: Complete learning assessment and assess knowledge base  Interventions:  - Provide teaching at level of understanding  - Provide teaching via preferred learning methods  Outcome: Progressing     Problem: MOBILITY - ADULT  Goal: Maintain or return to baseline ADL function  Description: INTERVENTIONS:  -  Assess patient's ability to carry out ADLs; assess patient's baseline for ADL function and identify physical deficits which impact ability to perform ADLs (bathing, care of mouth/teeth, toileting, grooming, dressing, etc )  - Assess/evaluate cause of self-care deficits   - Assess range of motion  - Assess patient's mobility; develop plan if impaired  - Assess patient's need for assistive devices and provide as appropriate  - Encourage maximum independence but intervene and supervise when necessary  - Involve family in performance of ADLs  - Assess for home care needs following discharge   - Consider OT consult to assist with ADL evaluation and planning for discharge  - Provide patient education as appropriate  Outcome: Progressing  Goal: Maintains/Returns to pre admission functional level  Description: INTERVENTIONS:  - Perform BMAT or MOVE assessment daily    - Set and communicate daily mobility goal to care team and patient/family/caregiver  - Collaborate with rehabilitation services on mobility goals if consulted  - Perform Range of Motion 3 times a day  - Reposition patient every 3 hours    - Dangle patient 3 times a day  - Stand patient 3 times a day  - Ambulate patient 3 times a day  - Out of bed to chair 3 times a day   - Out of bed for meals 3 times a day  - Out of bed for toileting  - Record patient progress and toleration of activity level   Outcome: Progressing     Problem: Potential for Falls  Goal: Patient will remain free of falls  Description: INTERVENTIONS:  - Educate patient/family on patient safety including physical limitations  - Instruct patient to call for assistance with activity   - Consult OT/PT to assist with strengthening/mobility   - Keep Call bell within reach  - Keep bed low and locked with side rails adjusted as appropriate  - Keep care items and personal belongings within reach  - Initiate and maintain comfort rounds  - Make Fall Risk Sign visible to staff  - Offer Toileting every 2 Hours, in advance of need  - Apply yellow socks and bracelet for high fall risk patients  - Consider moving patient to room near nurses station  Outcome: Progressing     Problem: Prexisting or High Potential for Compromised Skin Integrity  Goal: Skin integrity is maintained or improved  Description: INTERVENTIONS:  - Identify patients at risk for skin breakdown  - Assess and monitor skin integrity  - Assess and monitor nutrition and hydration status  - Monitor labs   - Assess for incontinence   - Turn and reposition patient  - Assist with mobility/ambulation  - Relieve pressure over bony prominences  - Avoid friction and shearing  - Provide appropriate hygiene as needed including keeping skin clean and dry  - Evaluate need for skin moisturizer/barrier cream  - Collaborate with interdisciplinary team   - Patient/family teaching  - Consider wound care consult   Outcome: Progressing

## 2022-01-19 NOTE — PROGRESS NOTES
[facility]  Progress Note - Caitlyn Mejia 1965, 60 y o  male MRN: [id number]  Unit/Bed#: 7T [facility] 711-01 Encounter: [id number]  Primary Care Provider: Nabil Jett MD   Date and time admitted to hospital: 12/29/2021  5:38 PM    * Ambulatory dysfunction  Assessment & Plan  · The patient could eventually go home with his sister if he can improve to be able to ambulate around the house and go up stairs  · PT/OT recommending post acute rehab  · Case management following to aide in discharge planning  · Medically stable for discharge    [facility]  · Patient was to be discharged to [facility] [employer] however they would not take him back  · PT recommending post acute rehab placement    Intertrigo  Assessment & Plan  · Patient has intertrigo of the groin area  · Continue nystatin powder b i d  Right arm cellulitis  Assessment & Plan  · Resolved  · Patient with right forearm lesion with redness and streaking noted in ED  · Completed Keflex course      Psoriasis, unspecified  Assessment & Plan  · Patient with psoriasis flare, was on Enbrel in the past however was lost to follow-up  · Discussed with dermatology- appreciate recommendations for topical creams  · Patient will need outpatient Dermatology and Rheumatology follow-up      Essential hypertension  Assessment & Plan  · Continue amlodipine 10 mg daily      VTE Pharmacologic Prophylaxis: VTE Score: 1 Low Risk (Score 0-2) - Encourage Ambulation  Lovenox    Patient Centered Rounds: I performed bedside rounds with nursing staff today  Discussions with Specialists or Other Care Team Provider: CM, PT, and Nursing    Education and Discussions with Family / Patient: Patient declined call to   Time Spent for Care: 30 minutes  More than 50% of total time spent on counseling and coordination of care as described above      Current Length of Stay: 20 day(s)  Current Patient Status: Inpatient Certification Statement: The patient will continue to require additional inpatient hospital stay due to Awaiting placement  Discharge Plan: To be determined based on bed availability and accepting facility    Code Status: Level 1 - Full Code    Subjective:   Patient resting comfortably in bed without any acute complaints  He reports no significant changes and has groin discomfort  No overnight events reported by nursing  Objective:     Vitals:   Temp (24hrs), Av 5 °F (36 4 °C), Min:97 °F (36 1 °C), Max:98 °F (36 7 °C)    Temp:  [97 °F (36 1 °C)-98 °F (36 7 °C)] 97 °F (36 1 °C)  HR:  [60-66] 62  Resp:  [18] 18  BP: (141-164)/(72-88) 157/79  SpO2:  [97 %-98 %] 98 %  Body mass index is 23 82 kg/m²  Input and Output Summary (last 24 hours): Intake/Output Summary (Last 24 hours) at 2022 1110  Last data filed at 2022 1034  Gross per 24 hour   Intake 1560 ml   Output 2300 ml   Net -740 ml       Physical Exam:   Physical Exam  Vitals and nursing note reviewed  Constitutional:       General: He is not in acute distress  Appearance: Normal appearance  He is normal weight  HENT:      Head: Normocephalic and atraumatic  Mouth/Throat:      Mouth: Mucous membranes are moist       Pharynx: Oropharynx is clear  Eyes:      Extraocular Movements: Extraocular movements intact  Conjunctiva/sclera: Conjunctivae normal    Cardiovascular:      Rate and Rhythm: Normal rate and regular rhythm  Pulses: Normal pulses  Heart sounds: Normal heart sounds  Pulmonary:      Effort: Pulmonary effort is normal  No respiratory distress  Breath sounds: Normal breath sounds  No wheezing  Abdominal:      General: Bowel sounds are normal  There is no distension  Palpations: Abdomen is soft  Tenderness: There is no abdominal tenderness  Musculoskeletal:         General: Normal range of motion  Cervical back: Normal range of motion and neck supple     Skin:     General: Skin is warm and dry  Comments: Psoriatic lesions on bilateral upper and lower extremity, groin erythema   Neurological:      General: No focal deficit present  Mental Status: He is alert and oriented to person, place, and time  Mental status is at baseline  Psychiatric:         Mood and Affect: Mood normal          Behavior: Behavior normal          Judgment: Judgment normal           Labs:  Results from last 7 days   Lab Units 01/18/22  1019   WBC Thousand/uL 7 00   HEMOGLOBIN g/dL 12 6*   HEMATOCRIT % 38 3*   PLATELETS Thousands/uL 354   NEUTROS PCT % 64   LYMPHS PCT % 22*   MONOS PCT % 10   EOS PCT % 3     Results from last 7 days   Lab Units 01/18/22  1019   SODIUM mmol/L 140   POTASSIUM mmol/L 3 9   CHLORIDE mmol/L 103   CO2 mmol/L 29   BUN mg/dL 18   CREATININE mg/dL 0 73   ANION GAP mmol/L 8   CALCIUM mg/dL 8 3*   GLUCOSE RANDOM mg/dL 101*                       Lines/Drains:  Invasive Devices  Report    None               Recent Cultures (last 7 days):         Last 24 Hours Medication List:   Current Facility-Administered Medications   Medication Dose Route Frequency Provider Last Rate    acetaminophen  650 mg Oral Q4H PRN Jessica Elaine PA-C      amLODIPine  10 mg Oral Daily Niranjan Kaur, DO      diphenhydrAMINE  25 mg Oral Q6H PRN Niranjan Welshe, DO      enoxaparin  40 mg Subcutaneous Q24H [address] [facility] F[facility]       nicotine  1 patch Transdermal Daily [place], [place]      nystatin   Topical BID Hai Beckford MD      ondansetron  4 mg Oral Q6H PRN Niranjan Kaur, DO      polyethylene glycol  17 g Oral Daily PRN Jones Pacheco PA-C      triamcinolone   Topical BID Hai Beckford MD      white petrolatum-mineral oil   Topical TID PRN Maria M Wang DO          Today, Patient Was Seen By: Leeanna Malone DO    **Please Note: This note may have been constructed using a voice recognition system  **

## 2022-01-19 NOTE — ASSESSMENT & PLAN NOTE
· Patient with psoriasis flare, was on Enbrel in the past however was lost to follow-up  · Discussed with dermatology- appreciate recommendations for topical creams  · Patient will need outpatient Dermatology and Rheumatology follow-up

## 2022-01-19 NOTE — OCCUPATIONAL THERAPY NOTE
Occupational Therapy Treatment Note     Patient Name: Dominick Landaverde  Today's Date: 1/19/2022  Problem List  Principal Problem:    Ambulatory dysfunction  Active Problems:    Right arm cellulitis    Homeless    Psoriasis, unspecified    Essential hypertension    Intertrigo          01/18/22 1506   OT Last Visit   OT Visit Date 01/19/22   Note Type   Note Type Treatment   Restrictions/Precautions   Other Precautions Fall Risk;Pain   Pain Assessment   Pain Assessment Tool 0-10   Pain Score No Pain   ADL   LB Dressing Assistance 4  Minimal Assistance   LB Dressing Deficit Thread RLE into underwear; Thread LLE into underwear;Pull up over hips   Toileting Assistance    (CGA)   Toileting Comments backside hygiene in standing    Functional Standing Tolerance   Time ~ 3 minutes    Transfers   Sit to Stand 5  Supervision   Additional items Increased time required   Stand to Sit 5  Supervision   Additional items Increased time required   Functional Mobility   Functional Mobility 5  Supervision   Additional Comments short household distances    Cognition   Arousal/Participation Alert; Cooperative   Attention Within functional limits   Orientation Level Oriented X4   Memory Within functional limits   Following Commands Follows all commands and directions without difficulty   Activity Tolerance   Activity Tolerance Patient tolerated treatment well   Assessment   Assessment  53' Pt seen for OT txt  Pt does demonstrate improvement with ambulation and bathroom mobility, Pt continues to require assist for LB and higher level IADLs  Pt below his functional baseline, would benefit from continued OT services while in the hospital to address deficits with ADLs, endurance, and mobility  Plan   Treatment Interventions ADL retraining;Functional transfer training;UE strengthening/ROM; Endurance training;Continued evaluation; Energy conservation; Activityengagement   Goal Expiration Date 01/24/22   OT Frequency 2-3x/wk   Recommendation   OT Discharge Recommendation Post acute rehabilitation services

## 2022-01-20 PROCEDURE — 99231 SBSQ HOSP IP/OBS SF/LOW 25: CPT | Performed by: INTERNAL MEDICINE

## 2022-01-20 RX ADMIN — ENOXAPARIN SODIUM 40 MG: 100 INJECTION SUBCUTANEOUS at 09:20

## 2022-01-20 RX ADMIN — NYSTATIN: 100000 POWDER TOPICAL at 09:20

## 2022-01-20 RX ADMIN — TRIAMCINOLONE ACETONIDE: 1 CREAM TOPICAL at 18:05

## 2022-01-20 RX ADMIN — TRIAMCINOLONE ACETONIDE: 1 CREAM TOPICAL at 09:22

## 2022-01-20 RX ADMIN — AMLODIPINE BESYLATE 10 MG: 10 TABLET ORAL at 09:20

## 2022-01-20 RX ADMIN — NYSTATIN: 100000 POWDER TOPICAL at 18:04

## 2022-01-20 NOTE — PLAN OF CARE
Problem: PAIN - ADULT  Goal: Verbalizes/displays adequate comfort level or baseline comfort level  Description: Interventions:  - Encourage patient to monitor pain and request assistance  - Assess pain using appropriate pain scale  - Administer analgesics based on type and severity of pain and evaluate response  - Implement non-pharmacological measures as appropriate and evaluate response  - Consider cultural and social influences on pain and pain management  - Notify physician/advanced practitioner if interventions unsuccessful or patient reports new pain  Outcome: Progressing     Problem: INFECTION - ADULT  Goal: Absence or prevention of progression during hospitalization  Description: INTERVENTIONS:  - Assess and monitor for signs and symptoms of infection  - Monitor lab/diagnostic results  - Monitor all insertion sites, i e  indwelling lines, tubes, and drains  - Monitor endotracheal if appropriate and nasal secretions for changes in amount and color  - [place] appropriate cooling/warming therapies per order  - Administer medications as ordered  - Instruct and encourage patient and family to use good hand hygiene technique  - Identify and instruct in appropriate isolation precautions for identified infection/condition  Outcome: Progressing     Problem: SAFETY ADULT  Goal: Patient will remain free of falls  Description: INTERVENTIONS:  - Educate patient/family on patient safety including physical limitations  - Instruct patient to call for assistance with activity   - Consult OT/PT to assist with strengthening/mobility   - Keep Call bell within reach  - Keep bed low and locked with side rails adjusted as appropriate  - Keep care items and personal belongings within reach  - Initiate and maintain comfort rounds  - Make Fall Risk Sign visible to staff  - Offer Toileting  - Obtain necessary fall risk management equipment:   - Apply yellow socks and bracelet for high fall risk patients  - Consider moving patient to room near nurses station  Outcome: Progressing  Goal: Maintain or return to baseline ADL function  Description: INTERVENTIONS:  -  Assess patient's ability to carry out ADLs; assess patient's baseline for ADL function and identify physical deficits which impact ability to perform ADLs (bathing, care of mouth/teeth, toileting, grooming, dressing, etc )  - Assess/evaluate cause of self-care deficits   - Assess range of motion  - Assess patient's mobility; develop plan if impaired  - Assess patient's need for assistive devices and provide as appropriate  - Encourage maximum independence but intervene and supervise when necessary  - Involve family in performance of ADLs  - Assess for home care needs following discharge   - Consider OT consult to assist with ADL evaluation and planning for discharge  - Provide patient education as appropriate  Outcome: Progressing  Goal: Maintains/Returns to pre admission functional level  Description: INTERVENTIONS:  - Perform BMAT or MOVE assessment daily    - Set and communicate daily mobility goal to care team and patient/family/caregiver  - Collaborate with rehabilitation services on mobility goals if consulted  - Perform Range of Motion 2 times a day  - Reposition patient every 2 hours    - Dangle patient 2 times a day  - Stand patient 2 times a day  - Ambulate patient 2 times a day  - Out of bed to chair 2 times a day   - Out of bed for meals 2 times a day  - Out of bed for toileting  - Record patient progress and toleration of activity level   Outcome: Progressing     Problem: DISCHARGE PLANNING  Goal: Discharge to home or other facility with appropriate resources  Description: INTERVENTIONS:  - Identify barriers to discharge w/patient and caregiver  - Arrange for needed discharge resources and transportation as appropriate  - Identify discharge learning needs (meds, wound care, etc )  - Arrange for interpretive services to assist at discharge as needed  - Refer to Case Management Department for coordinating discharge planning if the patient needs post-hospital services based on physician/advanced practitioner order or complex needs related to functional status, cognitive ability, or social support system  Outcome: Progressing     Problem: Knowledge Deficit  Goal: Patient/family/caregiver demonstrates understanding of disease process, treatment plan, medications, and discharge instructions  Description: Complete learning assessment and assess knowledge base  Interventions:  - Provide teaching at level of understanding  - Provide teaching via preferred learning methods  Outcome: Progressing     Problem: MOBILITY - ADULT  Goal: Maintain or return to baseline ADL function  Description: INTERVENTIONS:  -  Assess patient's ability to carry out ADLs; assess patient's baseline for ADL function and identify physical deficits which impact ability to perform ADLs (bathing, care of mouth/teeth, toileting, grooming, dressing, etc )  - Assess/evaluate cause of self-care deficits   - Assess range of motion  - Assess patient's mobility; develop plan if impaired  - Assess patient's need for assistive devices and provide as appropriate  - Encourage maximum independence but intervene and supervise when necessary  - Involve family in performance of ADLs  - Assess for home care needs following discharge   - Consider OT consult to assist with ADL evaluation and planning for discharge  - Provide patient education as appropriate  Outcome: Progressing  Goal: Maintains/Returns to pre admission functional level  Description: INTERVENTIONS:  - Perform BMAT or MOVE assessment daily    - Set and communicate daily mobility goal to care team and patient/family/caregiver  - Collaborate with rehabilitation services on mobility goals if consulted  - Perform Range of Motion 2 times a day  - Reposition patient every 2 hours    - Dangle patient 2 times a day  - Stand patient 2 times a day  - Ambulate patient 2 times a day  - Out of bed to chair 2 times a day   - Out of bed for meals 2 times a day  - Out of bed for toileting  - Record patient progress and toleration of activity level   Outcome: Progressing     Problem: Potential for Falls  Goal: Patient will remain free of falls  Description: INTERVENTIONS:  - Educate patient/family on patient safety including physical limitations  - Instruct patient to call for assistance with activity   - Consult OT/PT to assist with strengthening/mobility   - Keep Call bell within reach  - Keep bed low and locked with side rails adjusted as appropriate  - Keep care items and personal belongings within reach  - Initiate and maintain comfort rounds  - Make Fall Risk Sign visible to staff  - Offer Toileting   - Obtain necessary fall risk management equipment:   - Apply yellow socks and bracelet for high fall risk patients  - Consider moving patient to room near nurses station  Outcome: Progressing     Problem: Prexisting or High Potential for Compromised Skin Integrity  Goal: Skin integrity is maintained or improved  Description: INTERVENTIONS:  - Identify patients at risk for skin breakdown  - Assess and monitor skin integrity  - Assess and monitor nutrition and hydration status  - Monitor labs   - Assess for incontinence   - Turn and reposition patient  - Assist with mobility/ambulation  - Relieve pressure over bony prominences  - Avoid friction and shearing  - Provide appropriate hygiene as needed including keeping skin clean and dry  - Evaluate need for skin moisturizer/barrier cream  - Collaborate with interdisciplinary team   - Patient/family teaching  - Consider wound care consult   Outcome: Progressing

## 2022-01-20 NOTE — PROGRESS NOTES
[facility]  Progress Note - Carolyn Guzman 1965, 60 y o  male MRN: [id number]  Unit/Bed#: 7T [facility] 711-01 Encounter: [id number]  Primary Care Provider: Rowdy Lantigua MD   Date and time admitted to hospital: 12/29/2021  5:38 PM    * Ambulatory dysfunction  Assessment & Plan  · The patient could eventually go home with his sister if he can improve to be able to ambulate around the house and go up stairs  · PT/OT recommending post acute rehab  · Case management following to aide in discharge planning  · Medically stable for discharge    [facility]  · Patient was to be discharged to the [employer] [facility] however they would not take him back  · PT recommending post acute rehab placement    Intertrigo  Assessment & Plan  · Patient has intertrigo of the groin area  · Continue nystatin powder b i d  Right arm cellulitis  Assessment & Plan  · Resolved  · Patient with right forearm lesion with redness and streaking noted in ED  · Completed Keflex course      Psoriasis, unspecified  Assessment & Plan  · Patient with psoriasis flare, was on Enbrel in the past however was lost to follow-up  · Discussed with dermatology- appreciate recommendations for topical creams  · Patient will need outpatient Dermatology and Rheumatology follow-up      Essential hypertension  Assessment & Plan  · Continue amlodipine 10 mg daily      VTE Pharmacologic Prophylaxis: VTE Score: 1 Low Risk (Score 0-2) - Encourage Ambulation  Lovenox    Patient Centered Rounds: I performed bedside rounds with nursing staff today  Discussions with Specialists or Other Care Team Provider:  PT, case management, and nursing    Education and Discussions with Family / Patient: Patient declined call to   Time Spent for Care: 30 minutes  More than 50% of total time spent on counseling and coordination of care as described above      Current Length of Stay: 21 day(s)  Current Patient Status: Inpatient   Certification Statement: The patient will continue to require additional inpatient hospital stay due to Awaiting placement  Discharge Plan: To be determined upon accepting facility and bed availability    Code Status: Level 1 - Full Code    Subjective:   Patient resting comfortably in bed  Denies any acute complaints and no overnight events were reported by nursing staff  Discussion had during IDTM rounds this morning regarding the patient's physical abilities  He appears to be self limiting his progress with physical therapy as he would like to remain in the hospital until the end of the month  Objective:     Vitals:   Temp (24hrs), Av 6 °F (36 4 °C), Min:97 4 °F (36 3 °C), Max:97 8 °F (36 6 °C)    Temp:  [97 4 °F (36 3 °C)-97 8 °F (36 6 °C)] 97 6 °F (36 4 °C)  HR:  [62-71] 62  Resp:  [17-18] 17  BP: (143-167)/(74-93) 143/74  SpO2:  [94 %-98 %] 94 %  Body mass index is 23 82 kg/m²  Input and Output Summary (last 24 hours): Intake/Output Summary (Last 24 hours) at 2022 1419  Last data filed at 2022 1130  Gross per 24 hour   Intake 1440 ml   Output 3480 ml   Net -2040 ml       Physical Exam:   Physical Exam  Vitals and nursing note reviewed  Constitutional:       General: He is not in acute distress  Appearance: Normal appearance  HENT:      Head: Normocephalic and atraumatic  Mouth/Throat:      Mouth: Mucous membranes are moist       Pharynx: Oropharynx is clear  Eyes:      Extraocular Movements: Extraocular movements intact  Conjunctiva/sclera: Conjunctivae normal    Cardiovascular:      Rate and Rhythm: Normal rate and regular rhythm  Pulses: Normal pulses  Heart sounds: Normal heart sounds  Pulmonary:      Effort: Pulmonary effort is normal  No respiratory distress  Breath sounds: Normal breath sounds  No wheezing  Abdominal:      General: Bowel sounds are normal  There is no distension  Palpations: Abdomen is soft  Tenderness: There is no abdominal tenderness  Musculoskeletal:         General: Normal range of motion  Cervical back: Normal range of motion and neck supple  Skin:     General: Skin is warm and dry  Neurological:      General: No focal deficit present  Mental Status: He is alert and oriented to person, place, and time  Mental status is at baseline  Psychiatric:         Mood and Affect: Mood normal          Behavior: Behavior normal          Judgment: Judgment normal         Labs:  Results from last 7 days   Lab Units 01/18/22  1019   WBC Thousand/uL 7 00   HEMOGLOBIN g/dL 12 6*   HEMATOCRIT % 38 3*   PLATELETS Thousands/uL 354   NEUTROS PCT % 64   LYMPHS PCT % 22*   MONOS PCT % 10   EOS PCT % 3     Results from last 7 days   Lab Units 01/18/22  1019   SODIUM mmol/L 140   POTASSIUM mmol/L 3 9   CHLORIDE mmol/L 103   CO2 mmol/L 29   BUN mg/dL 18   CREATININE mg/dL 0 73   ANION GAP mmol/L 8   CALCIUM mg/dL 8 3*   GLUCOSE RANDOM mg/dL 101*                       Lines/Drains:  Invasive Devices  Report    None               Imaging:  No new imaging    Recent Cultures (last 7 days):         Last 24 Hours Medication List:   Current Facility-Administered Medications   Medication Dose Route Frequency Provider Last Rate    acetaminophen  650 mg Oral Q4H PRN Daphne Vincent PA-C      amLODIPine  10 mg Oral Daily Luna Medrano, DO      diphenhydrAMINE  25 mg Oral Q6H PRN Luna Medrano, DO      enoxaparin  40 mg Subcutaneous Q24H [address] [facility],       nicotine  1 patch Transdermal Daily [place], [place]      nystatin   Topical BID Ed MD Tara      ondansetron  4 mg Oral Q6H PRN Luna Medrano DO      polyethylene glycol  17 g Oral Daily PRN Luis E Blancas PA-C      triamcinolone   Topical BID Ed MD Tara      white petrolatum-mineral oil   Topical TID PRN Kolton Au DO          Today, Patient Was Seen By: Temo Polanco DO    **Please Note: This note may have been constructed using a voice recognition system  **

## 2022-01-20 NOTE — ASSESSMENT & PLAN NOTE
· Patient was to be discharged to the [facility] rescue mission however they would not take him back  · PT recommending post acute rehab placement

## 2022-01-20 NOTE — PHYSICAL THERAPY NOTE
Physical TherapyTreatment Note    Patient's Name: Roberto Haywood    Admitting Diagnosis  Cellulitis [L03 90]  Homeless [Z59 00]  Wound check, abscess [Z51 89]    Problem List  Patient Active Problem List   Diagnosis    Right arm cellulitis    Homeless    Psoriasis, unspecified    Ambulatory dysfunction    Essential hypertension    Intertrigo       Past Medical History  Past Medical History:   Diagnosis Date    Arthritis     Hypertension     Sciatica        Past Surgical History  Past Surgical History:   Procedure Laterality Date    BACK SURGERY         Recent Imaging  No orders to display       Recent Vital Signs  Vitals:    01/18/22 1507 01/18/22 2303 01/19/22 0715 01/19/22 1523   BP: 164/88 141/72 157/79 167/93   BP Location: Left arm Left arm Left arm Left arm   Pulse: 60 66 62 71   Resp: 18 18 18 18   Temp: 98 °F (36 7 °C) 97 5 °F (36 4 °C) (!) 97 °F (36 1 °C) 97 8 °F (36 6 °C)   TempSrc: Temporal Temporal Temporal Temporal   SpO2: 97% 98% 98% 98%   Weight:       Height:           PT Treatment Time: 25 minutes       01/19/22 1250   PT Last Visit   PT Visit Date 01/19/22   Note Type   Note Type Treatment   Pain Assessment   Pain Assessment Tool 0-10   Pain Score No Pain   Restrictions/Precautions   Other Precautions Fall Risk   General   Chart Reviewed Yes   Response to Previous Treatment Patient with no complaints from previous session  Family/Caregiver Present No   Bed Mobility   Rolling R 6  Modified independent   Rolling L 6  Modified independent   Supine to Sit 6  Modified independent   Sit to Supine 6  Modified independent   Transfers   Sit to Stand 5  Supervision   Additional items Increased time required   Stand to Sit 5  Supervision   Additional items Increased time required   Additional Comments with RW   Ambulation/Elevation   Gait pattern Step through pattern;Decreased toe off;Decreased heel strike; Foward flexed;Decreased foot clearance; Improper Weight shift   Gait Assistance 5  Supervision Additional items Assist x 1;Verbal cues   Assistive Device Rolling walker   Distance 20ftx3, 50ft with seated rest between each trial   Balance   Static Sitting Fair +   Dynamic Sitting Fair +   Static Standing Fair   Dynamic Standing Fair -   Ambulatory Fair -   Endurance Deficit   Endurance Deficit Yes   Endurance Deficit Description fatigue   Activity Tolerance   Activity Tolerance Patient limited by fatigue   Medical Staff Made Aware spoke to CM   Nurse Made Aware spoke to RN   Assessment   Prognosis Fair   Problem List Decreased strength;Decreased endurance; Impaired balance;Decreased mobility; Impaired sensation;Pain;Decreased coordination;Decreased cognition; Impaired judgement;Decreased safety awareness   Assessment Pt demonstrating improvement today and better participation with therapy  He was able to tolerate longer distance ambulation and increased number of attempts  Balance in standing is impaired due to altered gait pattern and some LE ataxia  Pt reports taking a shower today and being able to stand for parts of it  Barriers to Discharge Inaccessible home environment;Decreased caregiver support   Goals   Patient Goals to be able to walk without RW   STG Expiration Date 01/24/22   Short Term Goal #1 see eval note'   PT Treatment Day 3   Plan   Treatment/Interventions ADL retraining;Functional transfer training;LE strengthening/ROM; Elevations; Therapeutic exercise; Endurance training;Patient/family training;Equipment eval/education; Bed mobility;Gait training;Spoke to nursing;Spoke to case management;OT   Progress Progressing toward goals   PT Frequency 3-5x/wk   Recommendation   PT Discharge Recommendation Post acute rehabilitation services   Equipment Recommended [address] [place] Recommended Wheeled walker   AM-PAC Basic Mobility Inpatient   Turning in Bed Without Bedrails 4   Lying on Back to Sitting on Edge of Flat Bed 4   Moving Bed to Chair 3   Standing Up From Chair 3   Walk in Room 3 Climb 3-5 Stairs 3   Basic Mobility Inpatient Raw Score 20   Basic Mobility Standardized Score 43 99   Highest Level Of Mobility   JH-HLM Goal 6: Walk 10 steps or more   JH-HLM Highest Level of Mobility 6: Walk 10 steps or more   JH-HLM Goal Achieved Yes   Education   Education Provided Mobility training;Assistive device   Patient Demonstrates verbal understanding;Explanation/teachback used   End of Consult   Patient Position at End of Consult Supine; All needs within reach       [facility] PT, DPT

## 2022-01-20 NOTE — PLAN OF CARE
Problem: PHYSICAL THERAPY ADULT  Goal: Performs mobility at highest level of function for planned discharge setting  See evaluation for individualized goals  Description: Treatment/Interventions: ADL retraining,Functional transfer training,LE strengthening/ROM,Elevations,Therapeutic exercise,Endurance training,Patient/family training,Equipment eval/education,Bed mobility,Gait training,Spoke to nursing,Spoke to case management,OT  Equipment Recommended: Tiffany Macias       See flowsheet documentation for full assessment, interventions and recommendations  Outcome: Progressing  Note: Prognosis: Fair  Problem List: Decreased strength,Decreased endurance,Impaired balance,Decreased mobility,Impaired sensation,Pain,Decreased coordination,Decreased cognition,Impaired judgement,Decreased safety awareness  Assessment: Pt demonstrating improvement today and better participation with therapy  He was able to tolerate longer distance ambulation and increased number of attempts  Balance in standing is impaired due to altered gait pattern and some LE ataxia  Pt reports taking a shower today and being able to stand for parts of it  Barriers to Discharge: Inaccessible home environment,Decreased caregiver support        PT Discharge Recommendation: Post acute rehabilitation services          See flowsheet documentation for full assessment

## 2022-01-20 NOTE — CASE MANAGEMENT
Case Management Discharge Planning Note    Patient name [facility]  Location 7T U 711/7T U [id number] MRN [id number]  : 1965 Date 2022       Current Admission Date: 2021  Current Admission Diagnosis:Ambulatory dysfunction   Patient Active Problem List    Diagnosis Date Noted   Mago Le 2022    Essential hypertension 2022    Psoriasis, unspecified 2022    Ambulatory dysfunction 2022    Right arm cellulitis 2021    Homeless 2021      LOS (days): 21  Geometric Mean LOS (GMLOS) (days): 3 20  Days to GMLOS:-17 7     OBJECTIVE:  Risk of Unplanned Readmission Score: 14         Current admission status: Inpatient   Preferred Pharmacy:   [facility], [address]  Phone: [phone] Fax: [phone] [facility] [facility],[address], [address]  [address]  Phone: [phone] Fax: [phone]    Primary Care Provider: Sebastian Simons MD    Primary Insurance: [facility]  Secondary Insurance:     DISCHARGE DETAILS:    CM met with Pt to discuss dcp  Pt reports he will not go anywhere until he is "better", his "infection" is gone and he has a place to go  Pt wants rehab-but does not want to leave the hospital yet  Plan was for Pt to discharge to his sisters house-however-following several minutes of telephone communicaton with Pts sister Chasity-It is clear that Pts sister does NOT want Pt to discharge to her home  Pt reports he does not want to live with his sister Mundo Mendoza unless he is able to walk and care for himself  Mundo Mendoza reports that because of her brothers Hx of drug use-Hx of friends who are drug users-and limited mobility and independence-Chasity will not be able to accept Pt into her home at this time   Mundokarlos Mendoza states, perhaps after several months of rehab, If Pt can demonstrate an acceptable level of independent  Sherri Romeo reports she also cares for a quadropelgic in her home, and cannot havee Pt interrupt the comfortable dynamic she has established in her home  CM placed tc to Pts APS  to follow up with APS plan of care  (Latrice [phone])-BRODERICK cs is in vacation-CM was redirected to cw's supervisor,  Lucinda Justin [phone]  CM discussed Pts case w/ [facility]-APS final assessment of Pt: "No Need For Protective Services" as Pt is now out of his former environment and safe in the hospital    CM discussed different dispos for Pt-STR referrals have been declined so far  Pt does not want LTC-Pt wants his "own place"  As per [facility]-Pt is not appropriate for group home, as there is not an underlying intellectual disability present prior to age 23  [facility] w/ home PT/OT was discussed, this appears to be a good fit for Pts condition and needs  Lucinda Justin states she will email a list to [address] [place] [address] that there are several PCHs that will accept social security as full payment toward monthly cost of living  CM will discuss option for [facility] w/ Pt  CM will identify PCHs that accept SS payments as full monthly cost    CM will discuss responses w/ Pt  Other considerations; Is Pt competent? Realistic? Is there an underlying MH Dx?   CM will continue to follow

## 2022-01-20 NOTE — PHYSICAL THERAPY NOTE
Physical TherapyTreatment Note    Patient's Name: Izabella Masters    Admitting Diagnosis  Cellulitis [L03 90]  Homeless [Z59 00]  Wound check, abscess [Z51 89]    Problem List  Patient Active Problem List   Diagnosis    Right arm cellulitis    Homeless    Psoriasis, unspecified    Ambulatory dysfunction    Essential hypertension    Intertrigo       Past Medical History  Past Medical History:   Diagnosis Date    Arthritis     Hypertension     Sciatica        Past Surgical History  Past Surgical History:   Procedure Laterality Date    BACK SURGERY         Recent Imaging  No orders to display       Recent Vital Signs  Vitals:    01/18/22 1507 01/18/22 2303 01/19/22 0715 01/19/22 1523   BP: 164/88 141/72 157/79 167/93   BP Location: Left arm Left arm Left arm Left arm   Pulse: 60 66 62 71   Resp: 18 18 18 18   Temp: 98 °F (36 7 °C) 97 5 °F (36 4 °C) (!) 97 °F (36 1 °C) 97 8 °F (36 6 °C)   TempSrc: Temporal Temporal Temporal Temporal   SpO2: 97% 98% 98% 98%   Weight:       Height:           PT Treatment Time: 25 minutes       01/17/22 1520   PT Last Visit   PT Visit Date 01/17/22   Note Type   Note Type Treatment   Pain Assessment   Pain Assessment Tool 0-10   Pain Score No Pain   Restrictions/Precautions   Other Precautions Fall Risk;Pain   General   Chart Reviewed Yes   Response to Previous Treatment Patient with no complaints from previous session  Family/Caregiver Present No   Cognition   Overall Cognitive Status WFL   Bed Mobility   Rolling R 6  Modified independent   Rolling L 6  Modified independent   Supine to Sit 6  Modified independent   Sit to Supine 6  Modified independent   Transfers   Sit to Stand 5  Supervision   Additional items Increased time required   Stand to Sit 5  Supervision   Additional items Increased time required   Additional Comments with RW   Ambulation/Elevation   Gait pattern Step through pattern;Decreased toe off;Decreased heel strike; Foward flexed;Decreased foot clearance; Wide HARJIT;Improper Weight shift   Gait Assistance 5  Supervision   Additional items Verbal cues   Assistive Device Rolling walker   Distance 10ftx3 with seated rest breaks between   Balance   Static Sitting Fair +   Dynamic Sitting Fair +   Static Standing Fair   Dynamic Standing Fair -   Ambulatory Fair -   Endurance Deficit   Endurance Deficit Yes   Endurance Deficit Description pain, self limiting   Activity Tolerance   Activity Tolerance Patient limited by fatigue;Patient limited by pain   Medical Staff Made Aware spoke to CM   Nurse Made Aware spoke to RN   Assessment   Prognosis Fair   Problem List Decreased strength;Decreased endurance; Impaired balance;Decreased mobility; Impaired sensation;Pain;Decreased coordination;Decreased cognition; Impaired judgement;Decreased safety awareness   Assessment Continues to make progress, continues to be somewhat self limiting only willing to try a few ambulation trials    Barriers to Discharge Inaccessible home environment;Decreased caregiver support   Goals   Patient Goals to keep improving   STG Expiration Date 01/24/22   Short Term Goal #1 see eval note   Plan   Treatment/Interventions ADL retraining;Functional transfer training;LE strengthening/ROM; Elevations; Therapeutic exercise; Endurance training;Patient/family training;Equipment eval/education; Bed mobility;Gait training;Spoke to nursing;Spoke to case management;OT   Progress Progressing toward goals   PT Frequency 2-3x/wk   Recommendation   PT Discharge Recommendation Post acute rehabilitation services   Equipment Recommended [facility] Recommended Wheeled walker   AM-PAC Basic Mobility Inpatient   Turning in Bed Without Bedrails 4   Lying on Back to Sitting on Edge of Flat Bed 4   Moving Bed to Chair 3   Standing Up From Chair 3   Walk in Room 3   Climb 3-5 Stairs 2   Basic Mobility Inpatient Raw Score 19   Basic Mobility Standardized Score 42 48   Highest Level Of Mobility   [facility] Goal 6: Walk 10 steps or more   JH-HLM Highest Level of Mobility 6: Walk 10 steps or more   JH-HLM Goal Achieved Yes   Education   Education Provided Mobility training;Assistive device   Patient Explanation/teachback used;Demonstrates verbal understanding;Reinforcement needed   End of Consult   Patient Position at End of Consult Supine; All needs within reach       [facility] PT, DPT

## 2022-01-21 LAB
ANION GAP SERPL CALCULATED.3IONS-SCNC: 4 MMOL/L (ref 5–14)
BUN SERPL-MCNC: 16 MG/DL (ref 5–25)
CALCIUM SERPL-MCNC: 8.5 MG/DL (ref 8.4–10.2)
CHLORIDE SERPL-SCNC: 102 MMOL/L (ref 97–108)
CO2 SERPL-SCNC: 31 MMOL/L (ref 22–30)
CREAT SERPL-MCNC: 0.64 MG/DL (ref 0.7–1.5)
ERYTHROCYTE [DISTWIDTH] IN BLOOD BY AUTOMATED COUNT: 16.6 %
GFR SERPL CREATININE-BSD FRML MDRD: 107 ML/MIN/1.73SQ M
GLUCOSE SERPL-MCNC: 93 MG/DL (ref 70–99)
HCT VFR BLD AUTO: 37.5 % (ref 41–53)
HGB BLD-MCNC: 12.7 G/DL (ref 13.5–17.5)
MCH RBC QN AUTO: 29.1 PG (ref 26–34)
MCHC RBC AUTO-ENTMCNC: 33.8 G/DL (ref 31–36)
MCV RBC AUTO: 86 FL (ref 80–100)
PLATELET # BLD AUTO: 323 THOUSANDS/UL (ref 150–450)
PMV BLD AUTO: 7.2 FL (ref 8.9–12.7)
POTASSIUM SERPL-SCNC: 3.7 MMOL/L (ref 3.6–5)
RBC # BLD AUTO: 4.35 MILLION/UL (ref 4.5–5.9)
SODIUM SERPL-SCNC: 137 MMOL/L (ref 137–147)
WBC # BLD AUTO: 7.9 THOUSAND/UL (ref 4.5–11)

## 2022-01-21 PROCEDURE — 97116 GAIT TRAINING THERAPY: CPT

## 2022-01-21 PROCEDURE — 99232 SBSQ HOSP IP/OBS MODERATE 35: CPT | Performed by: INTERNAL MEDICINE

## 2022-01-21 PROCEDURE — 80048 BASIC METABOLIC PNL TOTAL CA: CPT | Performed by: INTERNAL MEDICINE

## 2022-01-21 PROCEDURE — 85027 COMPLETE CBC AUTOMATED: CPT | Performed by: INTERNAL MEDICINE

## 2022-01-21 RX ORDER — PREDNISONE 1 MG/1
5 TABLET ORAL DAILY
Status: DISCONTINUED | OUTPATIENT
Start: 2022-01-21 | End: 2022-01-23

## 2022-01-21 RX ADMIN — AMLODIPINE BESYLATE 10 MG: 10 TABLET ORAL at 08:29

## 2022-01-21 RX ADMIN — ENOXAPARIN SODIUM 40 MG: 100 INJECTION SUBCUTANEOUS at 08:29

## 2022-01-21 RX ADMIN — ACETAMINOPHEN 650 MG: 325 TABLET ORAL at 22:46

## 2022-01-21 RX ADMIN — NYSTATIN: 100000 POWDER TOPICAL at 08:29

## 2022-01-21 RX ADMIN — TRIAMCINOLONE ACETONIDE: 1 CREAM TOPICAL at 08:32

## 2022-01-21 RX ADMIN — PREDNISONE 5 MG: 5 TABLET ORAL at 16:49

## 2022-01-21 RX ADMIN — TRIAMCINOLONE ACETONIDE: 1 CREAM TOPICAL at 17:08

## 2022-01-21 RX ADMIN — NYSTATIN: 100000 POWDER TOPICAL at 17:08

## 2022-01-21 NOTE — PLAN OF CARE
Problem: PAIN - ADULT  Goal: Verbalizes/displays adequate comfort level or baseline comfort level  Description: Interventions:  - Encourage patient to monitor pain and request assistance  - Assess pain using appropriate pain scale  - Administer analgesics based on type and severity of pain and evaluate response  - Implement non-pharmacological measures as appropriate and evaluate response  - Consider cultural and social influences on pain and pain management  - Notify physician/advanced practitioner if interventions unsuccessful or patient reports new pain  Outcome: Progressing     Problem: INFECTION - ADULT  Goal: Absence or prevention of progression during hospitalization  Description: INTERVENTIONS:  - Assess and monitor for signs and symptoms of infection  - Monitor lab/diagnostic results  - Monitor all insertion sites, i e  indwelling lines, tubes, and drains  - Monitor endotracheal if appropriate and nasal secretions for changes in amount and color  - [place] appropriate cooling/warming therapies per order  - Administer medications as ordered  - Instruct and encourage patient and family to use good hand hygiene technique  - Identify and instruct in appropriate isolation precautions for identified infection/condition  Outcome: Progressing     Problem: SAFETY ADULT  Goal: Patient will remain free of falls  Description: INTERVENTIONS:  - Educate patient/family on patient safety including physical limitations  - Instruct patient to call for assistance with activity   - Consult OT/PT to assist with strengthening/mobility   - Keep Call bell within reach  - Keep bed low and locked with side rails adjusted as appropriate  - Keep care items and personal belongings within reach  - Initiate and maintain comfort rounds  - Make Fall Risk Sign visible to staff  - Apply yellow socks and bracelet for high fall risk patients  - Consider moving patient to room near nurses station  Outcome: Progressing  Goal: Maintain or return to baseline ADL function  Description: INTERVENTIONS:  -  Assess patient's ability to carry out ADLs; assess patient's baseline for ADL function and identify physical deficits which impact ability to perform ADLs (bathing, care of mouth/teeth, toileting, grooming, dressing, etc )  - Assess/evaluate cause of self-care deficits   - Assess range of motion  - Assess patient's mobility; develop plan if impaired  - Assess patient's need for assistive devices and provide as appropriate  - Encourage maximum independence but intervene and supervise when necessary  - Involve family in performance of ADLs  - Assess for home care needs following discharge   - Consider OT consult to assist with ADL evaluation and planning for discharge  - Provide patient education as appropriate  Outcome: Progressing  Goal: Maintains/Returns to pre admission functional level  Description: INTERVENTIONS:  - Perform BMAT or MOVE assessment daily    - Set and communicate daily mobility goal to care team and patient/family/caregiver     - Collaborate with rehabilitation services on mobility goals if consulted  - Out of bed for toileting  - Record patient progress and toleration of activity level   Outcome: Progressing     Problem: DISCHARGE PLANNING  Goal: Discharge to home or other facility with appropriate resources  Description: INTERVENTIONS:  - Identify barriers to discharge w/patient and caregiver  - Arrange for needed discharge resources and transportation as appropriate  - Identify discharge learning needs (meds, wound care, etc )  - Arrange for interpretive services to assist at discharge as needed  - Refer to Case Management Department for coordinating discharge planning if the patient needs post-hospital services based on physician/advanced practitioner order or complex needs related to functional status, cognitive ability, or social support system  Outcome: Progressing     Problem: Knowledge Deficit  Goal: Patient/family/caregiver demonstrates understanding of disease process, treatment plan, medications, and discharge instructions  Description: Complete learning assessment and assess knowledge base  Interventions:  - Provide teaching at level of understanding  - Provide teaching via preferred learning methods  Outcome: Progressing     Problem: MOBILITY - ADULT  Goal: Maintain or return to baseline ADL function  Description: INTERVENTIONS:  -  Assess patient's ability to carry out ADLs; assess patient's baseline for ADL function and identify physical deficits which impact ability to perform ADLs (bathing, care of mouth/teeth, toileting, grooming, dressing, etc )  - Assess/evaluate cause of self-care deficits   - Assess range of motion  - Assess patient's mobility; develop plan if impaired  - Assess patient's need for assistive devices and provide as appropriate  - Encourage maximum independence but intervene and supervise when necessary  - Involve family in performance of ADLs  - Assess for home care needs following discharge   - Consider OT consult to assist with ADL evaluation and planning for discharge  - Provide patient education as appropriate  Outcome: Progressing  Goal: Maintains/Returns to pre admission functional level  Description: INTERVENTIONS:  - Perform BMAT or MOVE assessment daily    - Set and communicate daily mobility goal to care team and patient/family/caregiver     - Collaborate with rehabilitation services on mobility goals if consulted  - Out of bed for toileting  - Record patient progress and toleration of activity level   Outcome: Progressing     Problem: Potential for Falls  Goal: Patient will remain free of falls  Description: INTERVENTIONS:  - Educate patient/family on patient safety including physical limitations  - Instruct patient to call for assistance with activity   - Consult OT/PT to assist with strengthening/mobility   - Keep Call bell within reach  - Keep bed low and locked with side rails adjusted as appropriate  - Keep care items and personal belongings within reach  - Initiate and maintain comfort rounds  - Make Fall Risk Sign visible to staff  - Apply yellow socks and bracelet for high fall risk patients  - Consider moving patient to room near nurses station  Outcome: Progressing     Problem: Prexisting or High Potential for Compromised Skin Integrity  Goal: Skin integrity is maintained or improved  Description: INTERVENTIONS:  - Identify patients at risk for skin breakdown  - Assess and monitor skin integrity  - Assess and monitor nutrition and hydration status  - Monitor labs   - Assess for incontinence   - Turn and reposition patient  - Assist with mobility/ambulation  - Relieve pressure over bony prominences  - Avoid friction and shearing  - Provide appropriate hygiene as needed including keeping skin clean and dry  - Evaluate need for skin moisturizer/barrier cream  - Collaborate with interdisciplinary team   - Patient/family teaching  - Consider wound care consult   Outcome: Progressing

## 2022-01-21 NOTE — ASSESSMENT & PLAN NOTE
· [facility] was to be [facility] however [facility] would not take him back  · PT recommending post acute rehab placement

## 2022-01-21 NOTE — PROGRESS NOTES
[facility]  Progress Note - Denny  1965, 60 y o  male MRN: [id number]  Unit/Bed#: 7T [facility] 711-01 Encounter: [id number]  Primary Care Provider: Bettie Howard MD   Date and time admitted to hospital: 12/29/2021  5:38 PM    * Ambulatory dysfunction  Assessment & Plan  · The patient could eventually go home with his sister if he can improve to be able to ambulate around the house and go up stairs  · PT/OT recommending post acute rehab  · Case management following to aide in discharge planning  · Medically stable for discharge    [address]  · Patient was to be discharged to the [employer] [facility] however they would not take him back  · PT recommending post acute rehab placement    Intertrigo  Assessment & Plan  · Patient has intertrigo of the groin area  · Continue nystatin powder b i d  Right arm cellulitis  Assessment & Plan  · Resolved  · Patient with right forearm lesion with redness and streaking noted in ED  · Completed Keflex course      Psoriasis, unspecified  Assessment & Plan  · Patient with psoriasis flare, was on Enbrel in the past however was lost to follow-up  · Discussed with dermatology- appreciate recommendations for topical creams  · Patient will need outpatient Dermatology and Rheumatology follow-up      Essential hypertension  Assessment & Plan  · Continue amlodipine 10 mg daily      VTE Pharmacologic Prophylaxis: VTE Score: 1 Low Risk (Score 0-2) - Encourage Ambulation  Lovenox    Patient Centered Rounds: I performed bedside rounds with nursing staff today  Discussions with Specialists or Other Care Team Provider:  Physical therapy, case management, and nursing    Education and Discussions with Family / Patient: Patient declined call to   Time Spent for Care: 30 minutes  More than 50% of total time spent on counseling and coordination of care as described above      Current Length of Stay: 22 day(s)  Current Patient Status: Inpatient   Certification Statement: The patient will continue to require additional inpatient hospital stay due to Awaiting placement  Discharge Plan: To be determined based on accepting facility and bed availability    Code Status: Level 1 - Full Code    Subjective:   Patient is resting comfortably in bed without any acute complaints  He continues to progress with physical therapy  No overnight events reported by nursing  Objective:     Vitals:   Temp (24hrs), Av 7 °F (36 5 °C), Min:97 2 °F (36 2 °C), Max:98 3 °F (36 8 °C)    Temp:  [97 2 °F (36 2 °C)-98 3 °F (36 8 °C)] 97 2 °F (36 2 °C)  HR:  [63-66] 63  Resp:  [18] 18  BP: (164-170)/(88-89) 164/88  SpO2:  [97 %-99 %] 97 %  Body mass index is 23 82 kg/m²  Input and Output Summary (last 24 hours): Intake/Output Summary (Last 24 hours) at 2022 1147  Last data filed at 2022 0101  Gross per 24 hour   Intake 480 ml   Output 1750 ml   Net -1270 ml       Physical Exam:   Physical Exam  Vitals and nursing note reviewed  Constitutional:       Appearance: Normal appearance  He is normal weight  HENT:      Head: Normocephalic and atraumatic  Mouth/Throat:      Mouth: Mucous membranes are moist       Pharynx: Oropharynx is clear  Eyes:      Extraocular Movements: Extraocular movements intact  Conjunctiva/sclera: Conjunctivae normal    Cardiovascular:      Rate and Rhythm: Normal rate and regular rhythm  Pulses: Normal pulses  Heart sounds: Normal heart sounds  Pulmonary:      Effort: Pulmonary effort is normal  No respiratory distress  Breath sounds: Normal breath sounds  No wheezing  Abdominal:      General: Bowel sounds are normal  There is no distension  Palpations: Abdomen is soft  Tenderness: There is no abdominal tenderness  Musculoskeletal:         General: Normal range of motion  Cervical back: Normal range of motion and neck supple     Skin:     General: Skin is warm and dry    Neurological:      General: No focal deficit present  Mental Status: He is alert and oriented to person, place, and time  Psychiatric:         Mood and Affect: Mood normal          Behavior: Behavior normal          Judgment: Judgment normal           Labs:  Results from last 7 days   Lab Units 01/21/22  0433 01/18/22  1019 01/18/22  1019   WBC Thousand/uL 7 90   < > 7 00   HEMOGLOBIN g/dL 12 7*   < > 12 6*   HEMATOCRIT % 37 5*   < > 38 3*   PLATELETS Thousands/uL 323   < > 354   NEUTROS PCT %  --   --  64   LYMPHS PCT %  --   --  22*   MONOS PCT %  --   --  10   EOS PCT %  --   --  3    < > = values in this interval not displayed  Results from last 7 days   Lab Units 01/21/22  0433   SODIUM mmol/L 137   POTASSIUM mmol/L 3 7   CHLORIDE mmol/L 102   CO2 mmol/L 31*   BUN mg/dL 16   CREATININE mg/dL 0 64*   ANION GAP mmol/L 4*   CALCIUM mg/dL 8 5   GLUCOSE RANDOM mg/dL 93                       Lines/Drains:  Invasive Devices  Report    None               Imaging:  No new imaging at this time    Recent Cultures (last 7 days):         Last 24 Hours Medication List:   Current Facility-Administered Medications   Medication Dose Route Frequency Provider Last Rate    acetaminophen  650 mg Oral Q4H PRN Tejinderálvaro Manzanares PA-C      amLODIPine  10 mg Oral Daily Yuliana Huddle, DO      diphenhydrAMINE  25 mg Oral Q6H PRN Yuliana Huddle, DO      enoxaparin  40 mg Subcutaneous Q24H [address] [facility],       nicotine  1 patch Transdermal Daily Bette Man Benson, [place]      nystatin   Topical BID Samara Rose MD      ondansetron  4 mg Oral Q6H PRN Yuliana Huddle, DO      polyethylene glycol  17 g Oral Daily PRN Sreekanth Gutierrez PA-C      triamcinolone   Topical BID Samara Rose MD      white petrolatum-mineral oil   Topical TID PRN Vicente Rodrigues DO          Today, Patient Was Seen By: [facility],     **Please Note: This note may have been constructed using a voice recognition system  **

## 2022-01-22 PROCEDURE — 99232 SBSQ HOSP IP/OBS MODERATE 35: CPT | Performed by: INTERNAL MEDICINE

## 2022-01-22 RX ORDER — GABAPENTIN 300 MG/1
300 CAPSULE ORAL 3 TIMES DAILY
Status: DISCONTINUED | OUTPATIENT
Start: 2022-01-22 | End: 2022-01-23

## 2022-01-22 RX ADMIN — GABAPENTIN 300 MG: 300 CAPSULE ORAL at 21:18

## 2022-01-22 RX ADMIN — DIPHENHYDRAMINE HCL 25 MG: 25 TABLET ORAL at 08:05

## 2022-01-22 RX ADMIN — TRIAMCINOLONE ACETONIDE: 1 CREAM TOPICAL at 17:00

## 2022-01-22 RX ADMIN — NYSTATIN: 100000 POWDER TOPICAL at 08:11

## 2022-01-22 RX ADMIN — DIPHENHYDRAMINE HCL 25 MG: 25 TABLET ORAL at 15:32

## 2022-01-22 RX ADMIN — PREDNISONE 5 MG: 5 TABLET ORAL at 08:04

## 2022-01-22 RX ADMIN — NYSTATIN: 100000 POWDER TOPICAL at 17:01

## 2022-01-22 RX ADMIN — ACETAMINOPHEN 650 MG: 325 TABLET ORAL at 08:04

## 2022-01-22 RX ADMIN — ENOXAPARIN SODIUM 40 MG: 100 INJECTION SUBCUTANEOUS at 08:04

## 2022-01-22 RX ADMIN — TRIAMCINOLONE ACETONIDE: 1 CREAM TOPICAL at 08:10

## 2022-01-22 RX ADMIN — ACETAMINOPHEN 650 MG: 325 TABLET ORAL at 15:32

## 2022-01-22 RX ADMIN — GABAPENTIN 300 MG: 300 CAPSULE ORAL at 15:32

## 2022-01-22 RX ADMIN — AMLODIPINE BESYLATE 10 MG: 10 TABLET ORAL at 08:08

## 2022-01-22 RX ADMIN — GABAPENTIN 300 MG: 300 CAPSULE ORAL at 09:54

## 2022-01-22 NOTE — NURSING NOTE
PT was walking with PT when his leg give out and PT was assisted to the floor  Vital was stable no injury noted on assessment  MD was notify  Will continue to monitor call bell within reach

## 2022-01-22 NOTE — PLAN OF CARE
Problem: PAIN - ADULT  Goal: Verbalizes/displays adequate comfort level or baseline comfort level  Description: Interventions:  - Encourage patient to monitor pain and request assistance  - Assess pain using appropriate pain scale  - Administer analgesics based on type and severity of pain and evaluate response  - Implement non-pharmacological measures as appropriate and evaluate response  - Consider cultural and social influences on pain and pain management  - Notify physician/advanced practitioner if interventions unsuccessful or patient reports new pain  Outcome: Progressing     Problem: INFECTION - ADULT  Goal: Absence or prevention of progression during hospitalization  Description: INTERVENTIONS:  - Assess and monitor for signs and symptoms of infection  - Monitor lab/diagnostic results  - Monitor all insertion sites, i e  indwelling lines, tubes, and drains  - Monitor endotracheal if appropriate and nasal secretions for changes in amount and color  - Lolo appropriate cooling/warming therapies per order  - Administer medications as ordered  - Instruct and encourage patient and family to use good hand hygiene technique  - Identify and instruct in appropriate isolation precautions for identified infection/condition  Outcome: Progressing     Problem: SAFETY ADULT  Goal: Patient will remain free of falls  Description: INTERVENTIONS:  - Educate patient/family on patient safety including physical limitations  - Instruct patient to call for assistance with activity   - Consult OT/PT to assist with strengthening/mobility   - Keep Call bell within reach  - Keep bed low and locked with side rails adjusted as appropriate  - Keep care items and personal belongings within reach  - Initiate and maintain comfort rounds  - Make Fall Risk Sign visible to staff    - Apply yellow socks and bracelet for high fall risk patients  - Consider moving patient to room near nurses station  Outcome: Progressing  Goal: Maintain or return to baseline ADL function  Description: INTERVENTIONS:  -  Assess patient's ability to carry out ADLs; assess patient's baseline for ADL function and identify physical deficits which impact ability to perform ADLs (bathing, care of mouth/teeth, toileting, grooming, dressing, etc )  - Assess/evaluate cause of self-care deficits   - Assess range of motion  - Assess patient's mobility; develop plan if impaired  - Assess patient's need for assistive devices and provide as appropriate  - Encourage maximum independence but intervene and supervise when necessary  - Involve family in performance of ADLs  - Assess for home care needs following discharge   - Consider OT consult to assist with ADL evaluation and planning for discharge  - Provide patient education as appropriate  Outcome: Progressing  Goal: Maintains/Returns to pre admission functional level  Description: INTERVENTIONS:  - Perform BMAT or MOVE assessment daily    - Set and communicate daily mobility goal to care team and patient/family/caregiver     - Collaborate with rehabilitation services on mobility goals if consulted    - Out of bed for toileting  - Record patient progress and toleration of activity level   Outcome: Progressing     Problem: DISCHARGE PLANNING  Goal: Discharge to home or other facility with appropriate resources  Description: INTERVENTIONS:  - Identify barriers to discharge w/patient and caregiver  - Arrange for needed discharge resources and transportation as appropriate  - Identify discharge learning needs (meds, wound care, etc )  - Arrange for interpretive services to assist at discharge as needed  - Refer to Case Management Department for coordinating discharge planning if the patient needs post-hospital services based on physician/advanced practitioner order or complex needs related to functional status, cognitive ability, or social support system  Outcome: Progressing     Problem: Knowledge Deficit  Goal: Patient/family/caregiver demonstrates understanding of disease process, treatment plan, medications, and discharge instructions  Description: Complete learning assessment and assess knowledge base  Interventions:  - Provide teaching at level of understanding  - Provide teaching via preferred learning methods  Outcome: Progressing     Problem: MOBILITY - ADULT  Goal: Maintain or return to baseline ADL function  Description: INTERVENTIONS:  -  Assess patient's ability to carry out ADLs; assess patient's baseline for ADL function and identify physical deficits which impact ability to perform ADLs (bathing, care of mouth/teeth, toileting, grooming, dressing, etc )  - Assess/evaluate cause of self-care deficits   - Assess range of motion  - Assess patient's mobility; develop plan if impaired  - Assess patient's need for assistive devices and provide as appropriate  - Encourage maximum independence but intervene and supervise when necessary  - Involve family in performance of ADLs  - Assess for home care needs following discharge   - Consider OT consult to assist with ADL evaluation and planning for discharge  - Provide patient education as appropriate  Outcome: Progressing  Goal: Maintains/Returns to pre admission functional level  Description: INTERVENTIONS:  - Perform BMAT or MOVE assessment daily    - Set and communicate daily mobility goal to care team and patient/family/caregiver     - Collaborate with rehabilitation services on mobility goals if consulted    - Out of bed for toileting  - Record patient progress and toleration of activity level   Outcome: Progressing     Problem: Potential for Falls  Goal: Patient will remain free of falls  Description: INTERVENTIONS:  - Educate patient/family on patient safety including physical limitations  - Instruct patient to call for assistance with activity   - Consult OT/PT to assist with strengthening/mobility   - Keep Call bell within reach  - Keep bed low and locked with side rails adjusted as appropriate  - Keep care items and personal belongings within reach  - Initiate and maintain comfort rounds  - Make Fall Risk Sign visible to staff    - Apply yellow socks and bracelet for high fall risk patients  - Consider moving patient to room near nurses station  Outcome: Progressing     Problem: Prexisting or High Potential for Compromised Skin Integrity  Goal: Skin integrity is maintained or improved  Description: INTERVENTIONS:  - Identify patients at risk for skin breakdown  - Assess and monitor skin integrity  - Assess and monitor nutrition and hydration status  - Monitor labs   - Assess for incontinence   - Turn and reposition patient  - Assist with mobility/ambulation  - Relieve pressure over bony prominences  - Avoid friction and shearing  - Provide appropriate hygiene as needed including keeping skin clean and dry  - Evaluate need for skin moisturizer/barrier cream  - Collaborate with interdisciplinary team   - Patient/family teaching  - Consider wound care consult   Outcome: Progressing

## 2022-01-22 NOTE — ASSESSMENT & PLAN NOTE
· Patient was to be discharged to the [facility] rescue mission however they would not take him back  · PT recommending post acute rehab placement

## 2022-01-22 NOTE — ASSESSMENT & PLAN NOTE
· Patient believes that his ambulatory dysfunction as directly related to 'being wrapped up in a fungus'  · His participation with PT has been limited due to the above and therefore his progress seems more self impeded at this point  · Patient believes gabapentin and steroids will help with his discomfort and fungus and therefore help improve his progress with ambulation  · Prednisone 5 milligrams daily initiated and gabapentin 300 milligrams t i d   Added  · PT/OT recommending post acute rehab  · Case management following to aide in discharge planning  · Medically stable for discharge

## 2022-01-22 NOTE — PROGRESS NOTES
[facility]  Progress Note Janna Cardoso 1965, 60 y o  male MRN: [id number]  Unit/Bed#: 7T [facility] 711-01 Encounter: [id number]  Primary Care Provider: Bettie Howard MD   Date and time admitted to hospital: 12/29/2021  5:38 PM    * Ambulatory dysfunction  Assessment & Plan  · Patient believes that his ambulatory dysfunction as directly related to 'being wrapped up in a fungus'  · His participation with PT has been limited due to the above and therefore his progress seems more self impeded at this point  · Patient believes gabapentin and steroids will help with his discomfort and fungus and therefore help improve his progress with ambulation  · Prednisone 5 milligrams daily initiated and gabapentin 300 milligrams t i d  Added  · PT/OT recommending post acute rehab  · Case management following to aide in discharge planning  · Medically stable for discharge    Homeless  Assessment & Plan  · Patient was to be discharged to the [facility] rescue mission however they would not take him back  · PT recommending post acute rehab placement    Intertrigo  Assessment & Plan  · Patient has intertrigo of the groin area  · Continue nystatin powder b i d   · Prednisone as above    Right arm cellulitis  Assessment & Plan  · Resolved  · Patient with right forearm lesion with redness and streaking noted in ED  · Completed Keflex course      Psoriasis, unspecified  Assessment & Plan  · Patient with psoriasis flare, was on Enbrel in the past however was lost to follow-up  · Discussed with dermatology- appreciate recommendations for topical creams  · Patient will need outpatient Dermatology and Rheumatology follow-up      Essential hypertension  Assessment & Plan  · Continue amlodipine 10 mg daily      VTE Pharmacologic Prophylaxis: VTE Score: 1 Low Risk (Score 0-2) - Encourage Ambulation  Lovenox    Patient Centered Rounds: I performed bedside rounds with nursing staff today    Discussions with Specialists or Other Care Team Provider: CM, Nursing    Education and Discussions with Family / Patient: Patient declined call to   Time Spent for Care: 45 minutes  More than 50% of total time spent on counseling and coordination of care as described above  Current Length of Stay: 23 day(s)  Current Patient Status: Inpatient   Certification Statement: The patient will continue to require additional inpatient hospital stay due to Awaiting placement  Discharge Plan: To be determined based on accepting facility in bed availability    Code Status: Level 1 - Full Code    Subjective:   Extensive conversation AGAIN had with the patient regarding his 'fungus' that is limiting his ambulation  He emphatically believes that steroids will help resolve this issue and his ambulation will improve very quickly  I explained to him that I did not see any fungus and that the lesions on his body see more consistent with his history of psoriasis that he has rubbed and scratched  He stated that he has been on thousands of dollars worth of steroids and knows his body best      Objective:     Vitals:   Temp (24hrs), Av 2 °F (36 2 °C), Min:96 8 °F (36 °C), Max:97 7 °F (36 5 °C)    Temp:  [96 8 °F (36 °C)-97 7 °F (36 5 °C)] 97 7 °F (36 5 °C)  HR:  [68-84] 71  Resp:  [18-20] 18  BP: (160-172)/(83-94) 163/86  SpO2:  [93 %-99 %] 93 %  Body mass index is 23 82 kg/m²  Input and Output Summary (last 24 hours): Intake/Output Summary (Last 24 hours) at 2022 1211  Last data filed at 2022 0754  Gross per 24 hour   Intake 1320 ml   Output 1000 ml   Net 320 ml       Physical Exam:   Physical Exam  Vitals and nursing note reviewed  Constitutional:       General: He is not in acute distress  Appearance: Normal appearance  He is normal weight  HENT:      Head: Normocephalic and atraumatic  Mouth/Throat:      Mouth: Mucous membranes are moist       Pharynx: Oropharynx is clear     Eyes: Extraocular Movements: Extraocular movements intact  Conjunctiva/sclera: Conjunctivae normal    Cardiovascular:      Rate and Rhythm: Normal rate and regular rhythm  Pulses: Normal pulses  Heart sounds: Normal heart sounds  No murmur heard  Pulmonary:      Effort: Pulmonary effort is normal  No respiratory distress  Breath sounds: Normal breath sounds  No wheezing  Abdominal:      General: Bowel sounds are normal  There is no distension  Palpations: Abdomen is soft  Tenderness: There is no abdominal tenderness  Musculoskeletal:         General: Normal range of motion  Cervical back: Normal range of motion and neck supple  Skin:     General: Skin is warm  Comments: Several psoriatic lesions without evidence of irritation likely from scratching; no overt infection either bacterial or fungal identified   Neurological:      General: No focal deficit present  Mental Status: He is alert and oriented to person, place, and time  Mental status is at baseline  Psychiatric:         Mood and Affect: Mood normal          Behavior: Behavior normal          Labs:  Results from last 7 days   Lab Units 01/21/22  0433 01/18/22  1019 01/18/22  1019   WBC Thousand/uL 7 90   < > 7 00   HEMOGLOBIN g/dL 12 7*   < > 12 6*   HEMATOCRIT % 37 5*   < > 38 3*   PLATELETS Thousands/uL 323   < > 354   NEUTROS PCT %  --   --  64   LYMPHS PCT %  --   --  22*   MONOS PCT %  --   --  10   EOS PCT %  --   --  3    < > = values in this interval not displayed  Results from last 7 days   Lab Units 01/21/22  0433   SODIUM mmol/L 137   POTASSIUM mmol/L 3 7   CHLORIDE mmol/L 102   CO2 mmol/L 31*   BUN mg/dL 16   CREATININE mg/dL 0 64*   ANION GAP mmol/L 4*   CALCIUM mg/dL 8 5   GLUCOSE RANDOM mg/dL 93                       Lines/Drains:  Invasive Devices  Report    None                 Imaging:  No new imaging at this time      Recent Cultures (last 7 days):         Last 24 Hours Medication List: Current Facility-Administered Medications   Medication Dose Route Frequency Provider Last Rate    acetaminophen  650 mg Oral Q4H PRN Iwona Olivas PA-C      amLODIPine  10 mg Oral Daily Frederick Regulus, DO      diphenhydrAMINE  25 mg Oral Q6H PRN Frederick Regulus, DO      enoxaparin  40 mg Subcutaneous Q24H [facility] Malone, DO      gabapentin  300 mg Oral TID [place] L Malone, DO      nicotine  1 patch Transdermal Daily [place]      nystatin   Topical BID Landon Nielsen MD      ondansetron  4 mg Oral Q6H PRN Frederick Regulus, DO      polyethylene glycol  17 g Oral Daily PRN Juana Mejía PA-C      predniSONE  5 mg Oral Daily [place] L Malone, DO      triamcinolone   Topical BID Landon Nielsen MD      white petrolatum-mineral oil   Topical TID PRN Raisa Hernandez DO          Today, Patient Was Seen By: Deepali Rodriguez  Malone, DO    **Please Note: This note may have been constructed using a voice recognition system  **

## 2022-01-22 NOTE — ASSESSMENT & PLAN NOTE
· Patient has intertrigo of the groin area  · Continue nystatin powder b i d   · Prednisone as above

## 2022-01-23 PROBLEM — H61.23 BILATERAL IMPACTED CERUMEN: Status: ACTIVE | Noted: 2022-01-23

## 2022-01-23 PROBLEM — L03.113 RIGHT ARM CELLULITIS: Status: RESOLVED | Noted: 2021-12-30 | Resolved: 2022-01-23

## 2022-01-23 PROCEDURE — 99232 SBSQ HOSP IP/OBS MODERATE 35: CPT | Performed by: INTERNAL MEDICINE

## 2022-01-23 RX ORDER — GABAPENTIN 400 MG/1
400 CAPSULE ORAL 3 TIMES DAILY
Status: DISCONTINUED | OUTPATIENT
Start: 2022-01-23 | End: 2022-03-11 | Stop reason: HOSPADM

## 2022-01-23 RX ADMIN — ACETAMINOPHEN 650 MG: 325 TABLET ORAL at 14:07

## 2022-01-23 RX ADMIN — PREDNISONE 5 MG: 5 TABLET ORAL at 08:20

## 2022-01-23 RX ADMIN — NYSTATIN: 100000 POWDER TOPICAL at 17:10

## 2022-01-23 RX ADMIN — ACETAMINOPHEN 650 MG: 325 TABLET ORAL at 08:20

## 2022-01-23 RX ADMIN — ENOXAPARIN SODIUM 40 MG: 100 INJECTION SUBCUTANEOUS at 08:20

## 2022-01-23 RX ADMIN — TRIAMCINOLONE ACETONIDE: 1 CREAM TOPICAL at 08:23

## 2022-01-23 RX ADMIN — GABAPENTIN 400 MG: 400 CAPSULE ORAL at 15:58

## 2022-01-23 RX ADMIN — GABAPENTIN 300 MG: 300 CAPSULE ORAL at 08:20

## 2022-01-23 RX ADMIN — DIPHENHYDRAMINE HCL 25 MG: 25 TABLET ORAL at 14:07

## 2022-01-23 RX ADMIN — DIPHENHYDRAMINE HCL 25 MG: 25 TABLET ORAL at 21:09

## 2022-01-23 RX ADMIN — NYSTATIN: 100000 POWDER TOPICAL at 08:20

## 2022-01-23 RX ADMIN — TRIAMCINOLONE ACETONIDE: 1 CREAM TOPICAL at 17:10

## 2022-01-23 RX ADMIN — ACETAMINOPHEN 650 MG: 325 TABLET ORAL at 21:09

## 2022-01-23 RX ADMIN — AMLODIPINE BESYLATE 10 MG: 10 TABLET ORAL at 08:20

## 2022-01-23 RX ADMIN — DIPHENHYDRAMINE HCL 25 MG: 25 TABLET ORAL at 08:20

## 2022-01-23 RX ADMIN — GABAPENTIN 400 MG: 400 CAPSULE ORAL at 21:09

## 2022-01-23 NOTE — PLAN OF CARE
Problem: PAIN - ADULT  Goal: Verbalizes/displays adequate comfort level or baseline comfort level  Description: Interventions:  - Encourage patient to monitor pain and request assistance  - Assess pain using appropriate pain scale  - Administer analgesics based on type and severity of pain and evaluate response  - Implement non-pharmacological measures as appropriate and evaluate response  - Consider cultural and social influences on pain and pain management  - Notify physician/advanced practitioner if interventions unsuccessful or patient reports new pain  Outcome: Progressing     Problem: INFECTION - ADULT  Goal: Absence or prevention of progression during hospitalization  Description: INTERVENTIONS:  - Assess and monitor for signs and symptoms of infection  - Monitor lab/diagnostic results  - Monitor all insertion sites, i e  indwelling lines, tubes, and drains  - Monitor endotracheal if appropriate and nasal secretions for changes in amount and color  - [place] appropriate cooling/warming therapies per order  - Administer medications as ordered  - Instruct and encourage patient and family to use good hand hygiene technique  - Identify and instruct in appropriate isolation precautions for identified infection/condition  Outcome: Progressing     Problem: SAFETY ADULT  Goal: Patient will remain free of falls  Description: INTERVENTIONS:  - Educate patient/family on patient safety including physical limitations  - Instruct patient to call for assistance with activity   - Consult OT/PT to assist with strengthening/mobility   - Keep Call bell within reach  - Keep bed low and locked with side rails adjusted as appropriate  - Keep care items and personal belongings within reach  - Initiate and maintain comfort rounds  - Make Fall Risk Sign visible to staff  - Apply yellow socks and bracelet for high fall risk patients  - Consider moving patient to room near nurses station  Outcome: Progressing  Goal: Maintain or return to baseline ADL function  Description: INTERVENTIONS:  -  Assess patient's ability to carry out ADLs; assess patient's baseline for ADL function and identify physical deficits which impact ability to perform ADLs (bathing, care of mouth/teeth, toileting, grooming, dressing, etc )  - Assess/evaluate cause of self-care deficits   - Assess range of motion  - Assess patient's mobility; develop plan if impaired  - Assess patient's need for assistive devices and provide as appropriate  - Encourage maximum independence but intervene and supervise when necessary  - Involve family in performance of ADLs  - Assess for home care needs following discharge   - Consider OT consult to assist with ADL evaluation and planning for discharge  - Provide patient education as appropriate  Outcome: Progressing  Goal: Maintains/Returns to pre admission functional level  Description: INTERVENTIONS:  - Perform BMAT or MOVE assessment daily    - Set and communicate daily mobility goal to care team and patient/family/caregiver     - Collaborate with rehabilitation services on mobility goals if consulted  - Out of bed for toileting  - Record patient progress and toleration of activity level   Outcome: Progressing     Problem: DISCHARGE PLANNING  Goal: Discharge to home or other facility with appropriate resources  Description: INTERVENTIONS:  - Identify barriers to discharge w/patient and caregiver  - Arrange for needed discharge resources and transportation as appropriate  - Identify discharge learning needs (meds, wound care, etc )  - Arrange for interpretive services to assist at discharge as needed  - Refer to Case Management Department for coordinating discharge planning if the patient needs post-hospital services based on physician/advanced practitioner order or complex needs related to functional status, cognitive ability, or social support system  Outcome: Progressing     Problem: Knowledge Deficit  Goal: Patient/family/caregiver demonstrates understanding of disease process, treatment plan, medications, and discharge instructions  Description: Complete learning assessment and assess knowledge base  Interventions:  - Provide teaching at level of understanding  - Provide teaching via preferred learning methods  Outcome: Progressing     Problem: MOBILITY - ADULT  Goal: Maintain or return to baseline ADL function  Description: INTERVENTIONS:  -  Assess patient's ability to carry out ADLs; assess patient's baseline for ADL function and identify physical deficits which impact ability to perform ADLs (bathing, care of mouth/teeth, toileting, grooming, dressing, etc )  - Assess/evaluate cause of self-care deficits   - Assess range of motion  - Assess patient's mobility; develop plan if impaired  - Assess patient's need for assistive devices and provide as appropriate  - Encourage maximum independence but intervene and supervise when necessary  - Involve family in performance of ADLs  - Assess for home care needs following discharge   - Consider OT consult to assist with ADL evaluation and planning for discharge  - Provide patient education as appropriate  Outcome: Progressing  Goal: Maintains/Returns to pre admission functional level  Description: INTERVENTIONS:  - Perform BMAT or MOVE assessment daily    - Set and communicate daily mobility goal to care team and patient/family/caregiver     - Collaborate with rehabilitation services on mobility goals if consulted  - Out of bed for toileting  - Record patient progress and toleration of activity level   Outcome: Progressing     Problem: Potential for Falls  Goal: Patient will remain free of falls  Description: INTERVENTIONS:  - Educate patient/family on patient safety including physical limitations  - Instruct patient to call for assistance with activity   - Consult OT/PT to assist with strengthening/mobility   - Keep Call bell within reach  - Keep bed low and locked with side rails adjusted as appropriate  - Keep care items and personal belongings within reach  - Initiate and maintain comfort rounds  - Make Fall Risk Sign visible to staff  - Apply yellow socks and bracelet for high fall risk patients  - Consider moving patient to room near nurses station  Outcome: Progressing     Problem: Prexisting or High Potential for Compromised Skin Integrity  Goal: Skin integrity is maintained or improved  Description: INTERVENTIONS:  - Identify patients at risk for skin breakdown  - Assess and monitor skin integrity  - Assess and monitor nutrition and hydration status  - Monitor labs   - Assess for incontinence   - Turn and reposition patient  - Assist with mobility/ambulation  - Relieve pressure over bony prominences  - Avoid friction and shearing  - Provide appropriate hygiene as needed including keeping skin clean and dry  - Evaluate need for skin moisturizer/barrier cream  - Collaborate with interdisciplinary team   - Patient/family teaching  - Consider wound care consult   Outcome: Progressing

## 2022-01-23 NOTE — PROGRESS NOTES
[facility]  Progress Note Moises Anderson 1965, 60 y o  male MRN: [id number]  Unit/Bed#: 7T [facility] 711-01 Encounter: [id number]  Primary Care Provider: Reyna Blancas MD   Date and time admitted to hospital: 12/29/2021  5:38 PM    * Ambulatory dysfunction  Assessment & Plan  · Patient believes that his ambulatory dysfunction as directly related to 'being wrapped up in a fungus'  · His participation with PT has been limited due to the above and therefore his progress seems more self impeded at this point  · Patient believes gabapentin and steroids will help with his discomfort and fungus and therefore help improve his progress with ambulation  · Continue gabapentin 400 milligrams t i d  · PT/OT recommending post acute rehab  · Case management following to aide in discharge planning  · Medically stable for discharge    [facility]  · Patient was to be discharged to the [facility] rescue mission however they would not take him back  · PT recommending post acute rehab placement    Bilateral impacted cerumen  Assessment & Plan  · Complained of decreased hearing and that his ears feel full  · There was a significant amount of cerumen in each ear  · Nursing order placed to remove cerumen    Psoriasis, unspecified  Assessment & Plan  · Patient with psoriasis, was on Enbrel in the past however was lost to follow-up  · Discussed with dermatology- appreciate recommendations for topical creams  · Patient will need outpatient Dermatology and Rheumatology follow-up      Essential hypertension  Assessment & Plan  · Continue amlodipine 10 mg daily      VTE Pharmacologic Prophylaxis: VTE Score: 1 Lovenox    Patient Centered Rounds: I performed bedside rounds with nursing staff today  Education and Discussions with Family / Patient: Patient declined call to   Time Spent for Care: 45 minutes   More than 50% of total time spent on counseling and coordination of care as described above  Current Length of Stay: 24 day(s)  Current Patient Status: Inpatient   Certification Statement: The patient will continue to require additional inpatient hospital stay due to Awaiting placement  Discharge Plan: To be determined based on accepting facility in bed availability    Code Status: Level 1 - Full Code    Subjective:   Patient is sitting up in bed with the acute complaint of feeling as if his ears are full and that he has decreased hearing  He notes improvement in his skin lesions  No overnight events reported  Objective:     Vitals:   Temp (24hrs), Av 9 °F (36 6 °C), Min:97 3 °F (36 3 °C), Max:98 7 °F (37 1 °C)    Temp:  [97 3 °F (36 3 °C)-98 7 °F (37 1 °C)] 98 7 °F (37 1 °C)  HR:  [68-72] 72  Resp:  [18] 18  BP: (134-156)/(73-87) 156/87  SpO2:  [98 %] 98 %  Body mass index is 23 82 kg/m²  Input and Output Summary (last 24 hours): Intake/Output Summary (Last 24 hours) at 2022 1121  Last data filed at 2022 0400  Gross per 24 hour   Intake 2520 ml   Output 1800 ml   Net 720 ml       Physical Exam:   Physical Exam  Vitals and nursing note reviewed  Constitutional:       General: He is not in acute distress  Appearance: Normal appearance  HENT:      Head: Normocephalic and atraumatic  Right Ear: There is impacted cerumen  Left Ear: There is impacted cerumen  Mouth/Throat:      Mouth: Mucous membranes are moist       Pharynx: Oropharynx is clear  Eyes:      Extraocular Movements: Extraocular movements intact  Conjunctiva/sclera: Conjunctivae normal    Cardiovascular:      Rate and Rhythm: Normal rate and regular rhythm  Pulses: Normal pulses  Heart sounds: Normal heart sounds  Pulmonary:      Effort: Pulmonary effort is normal  No respiratory distress  Breath sounds: Normal breath sounds  No wheezing  Abdominal:      General: Bowel sounds are normal  There is no distension  Palpations: Abdomen is soft  Tenderness: There is no abdominal tenderness  Musculoskeletal:      Cervical back: Normal range of motion and neck supple  Skin:     Comments: Multiple psoriatic lesions over his upper and lower extremities  There is some erythema from him scratching   Neurological:      General: No focal deficit present  Mental Status: He is alert and oriented to person, place, and time  Mental status is at baseline  Psychiatric:         Mood and Affect: Mood normal          Behavior: Behavior normal          Judgment: Judgment normal           Labs:  Results from last 7 days   Lab Units 01/21/22  0433 01/18/22  1019 01/18/22  1019   WBC Thousand/uL 7 90   < > 7 00   HEMOGLOBIN g/dL 12 7*   < > 12 6*   HEMATOCRIT % 37 5*   < > 38 3*   PLATELETS Thousands/uL 323   < > 354   NEUTROS PCT %  --   --  64   LYMPHS PCT %  --   --  22*   MONOS PCT %  --   --  10   EOS PCT %  --   --  3    < > = values in this interval not displayed       Results from last 7 days   Lab Units 01/21/22  0433   SODIUM mmol/L 137   POTASSIUM mmol/L 3 7   CHLORIDE mmol/L 102   CO2 mmol/L 31*   BUN mg/dL 16   CREATININE mg/dL 0 64*   ANION GAP mmol/L 4*   CALCIUM mg/dL 8 5   GLUCOSE RANDOM mg/dL 93                       Lines/Drains:  Invasive Devices  Report    None                 Imaging:  No new imaging    Recent Cultures (last 7 days):         Last 24 Hours Medication List:   Current Facility-Administered Medications   Medication Dose Route Frequency Provider Last Rate    acetaminophen  650 mg Oral Q4H PRN Froilan Solis PA-C      amLODIPine  10 mg Oral Daily Helen Palma,       diphenhydrAMINE  25 mg Oral Q6H PRN Helen Palma DO      enoxaparin  40 mg Subcutaneous Q24H [address] [facility], DO      gabapentin  400 mg Oral TID [place] L Malone, DO      nicotine  1 patch Transdermal Daily [place], [place]      nystatin   Topical BID Sanjiv Harris MD      ondansetron  4 mg Oral Q6H PRN DO Beverly Guerrero polyethylene glycol  17 g Oral Daily PRN Torey Zamorano PA-C      triamcinolone   Topical BID Rei Luna MD      white petrolatum-mineral oil   Topical TID PRN Martyn Lesch, DO          Today, Patient Was Seen By: Adan Malone, DO    **Please Note: This note may have been constructed using a voice recognition system  **

## 2022-01-23 NOTE — ASSESSMENT & PLAN NOTE
· Patient with psoriasis, was on Enbrel in the past however was lost to follow-up  · Discussed with dermatology- appreciate recommendations for topical creams  · Patient will need outpatient Dermatology and Rheumatology follow-up

## 2022-01-23 NOTE — ASSESSMENT & PLAN NOTE
· Patient believes that his ambulatory dysfunction as directly related to 'being wrapped up in a fungus'  · His participation with PT has been limited due to the above and therefore his progress seems more self impeded at this point  · Patient believes gabapentin and steroids will help with his discomfort and fungus and therefore help improve his progress with ambulation  · Continue gabapentin 400 milligrams t i d  · PT/OT recommending post acute rehab  · Case management following to aide in discharge planning  · Medically stable for discharge

## 2022-01-23 NOTE — ASSESSMENT & PLAN NOTE
· Patient was to be discharged to the [facility] rescue mission however they would not take him back  · PT recommending post acute rehab placement

## 2022-01-23 NOTE — ASSESSMENT & PLAN NOTE
· Complained of decreased hearing and that his ears feel full  · There was a significant amount of cerumen in each ear  · Nursing order placed to remove cerumen

## 2022-01-24 PROCEDURE — 99232 SBSQ HOSP IP/OBS MODERATE 35: CPT | Performed by: STUDENT IN AN ORGANIZED HEALTH CARE EDUCATION/TRAINING PROGRAM

## 2022-01-24 RX ADMIN — ACETAMINOPHEN 650 MG: 325 TABLET ORAL at 21:23

## 2022-01-24 RX ADMIN — NYSTATIN: 100000 POWDER TOPICAL at 09:51

## 2022-01-24 RX ADMIN — TRIAMCINOLONE ACETONIDE: 1 CREAM TOPICAL at 17:27

## 2022-01-24 RX ADMIN — TRIAMCINOLONE ACETONIDE: 1 CREAM TOPICAL at 09:55

## 2022-01-24 RX ADMIN — GABAPENTIN 400 MG: 400 CAPSULE ORAL at 09:47

## 2022-01-24 RX ADMIN — ENOXAPARIN SODIUM 40 MG: 100 INJECTION SUBCUTANEOUS at 09:47

## 2022-01-24 RX ADMIN — NYSTATIN: 100000 POWDER TOPICAL at 17:27

## 2022-01-24 RX ADMIN — CARBAMIDE PEROXIDE 6.5% 10 DROP: 6.5 LIQUID AURICULAR (OTIC) at 17:28

## 2022-01-24 RX ADMIN — GABAPENTIN 400 MG: 400 CAPSULE ORAL at 21:23

## 2022-01-24 RX ADMIN — CARBAMIDE PEROXIDE 6.5% 10 DROP: 6.5 LIQUID AURICULAR (OTIC) at 09:47

## 2022-01-24 RX ADMIN — GABAPENTIN 400 MG: 400 CAPSULE ORAL at 17:27

## 2022-01-24 RX ADMIN — AMLODIPINE BESYLATE 10 MG: 10 TABLET ORAL at 09:47

## 2022-01-24 NOTE — ASSESSMENT & PLAN NOTE
· Patient was to be discharged to the [facility] rescue mission however they would not take him back  · PT recommending post acute rehab placement

## 2022-01-24 NOTE — PLAN OF CARE
Problem: PAIN - ADULT  Goal: Verbalizes/displays adequate comfort level or baseline comfort level  Description: Interventions:  - Encourage patient to monitor pain and request assistance  - Assess pain using appropriate pain scale  - Administer analgesics based on type and severity of pain and evaluate response  - Implement non-pharmacological measures as appropriate and evaluate response  - Consider cultural and social influences on pain and pain management  - Notify physician/advanced practitioner if interventions unsuccessful or patient reports new pain  Outcome: Progressing     Problem: INFECTION - ADULT  Goal: Absence or prevention of progression during hospitalization  Description: INTERVENTIONS:  - Assess and monitor for signs and symptoms of infection  - Monitor lab/diagnostic results  - Monitor all insertion sites, i e  indwelling lines, tubes, and drains  - Monitor endotracheal if appropriate and nasal secretions for changes in amount and color  - [place] appropriate cooling/warming therapies per order  - Administer medications as ordered  - Instruct and encourage patient and family to use good hand hygiene technique  - Identify and instruct in appropriate isolation precautions for identified infection/condition  Outcome: Progressing     Problem: SAFETY ADULT  Goal: Patient will remain free of falls  Description: INTERVENTIONS:  - Educate patient/family on patient safety including physical limitations  - Instruct patient to call for assistance with activity   - Consult OT/PT to assist with strengthening/mobility   - Keep Call bell within reach  - Keep bed low and locked with side rails adjusted as appropriate  - Keep care items and personal belongings within reach  - Initiate and maintain comfort rounds  - Make Fall Risk Sign visible to staff  - Apply yellow socks and bracelet for high fall risk patients  - Consider moving patient to room near nurses station  Outcome: Progressing  Goal: Maintain or return to baseline ADL function  Description: INTERVENTIONS:  -  Assess patient's ability to carry out ADLs; assess patient's baseline for ADL function and identify physical deficits which impact ability to perform ADLs (bathing, care of mouth/teeth, toileting, grooming, dressing, etc )  - Assess/evaluate cause of self-care deficits   - Assess range of motion  - Assess patient's mobility; develop plan if impaired  - Assess patient's need for assistive devices and provide as appropriate  - Encourage maximum independence but intervene and supervise when necessary  - Involve family in performance of ADLs  - Assess for home care needs following discharge   - Consider OT consult to assist with ADL evaluation and planning for discharge  - Provide patient education as appropriate  Outcome: Progressing  Goal: Maintains/Returns to pre admission functional level  Description: INTERVENTIONS:  - Perform BMAT or MOVE assessment daily    - Set and communicate daily mobility goal to care team and patient/family/caregiver     - Collaborate with rehabilitation services on mobility goals if consulted  - Out of bed for toileting  - Record patient progress and toleration of activity level   Outcome: Progressing     Problem: DISCHARGE PLANNING  Goal: Discharge to home or other facility with appropriate resources  Description: INTERVENTIONS:  - Identify barriers to discharge w/patient and caregiver  - Arrange for needed discharge resources and transportation as appropriate  - Identify discharge learning needs (meds, wound care, etc )  - Arrange for interpretive services to assist at discharge as needed  - Refer to Case Management Department for coordinating discharge planning if the patient needs post-hospital services based on physician/advanced practitioner order or complex needs related to functional status, cognitive ability, or social support system  Outcome: Progressing     Problem: Knowledge Deficit  Goal: Patient/family/caregiver demonstrates understanding of disease process, treatment plan, medications, and discharge instructions  Description: Complete learning assessment and assess knowledge base  Interventions:  - Provide teaching at level of understanding  - Provide teaching via preferred learning methods  Outcome: Progressing     Problem: MOBILITY - ADULT  Goal: Maintain or return to baseline ADL function  Description: INTERVENTIONS:  -  Assess patient's ability to carry out ADLs; assess patient's baseline for ADL function and identify physical deficits which impact ability to perform ADLs (bathing, care of mouth/teeth, toileting, grooming, dressing, etc )  - Assess/evaluate cause of self-care deficits   - Assess range of motion  - Assess patient's mobility; develop plan if impaired  - Assess patient's need for assistive devices and provide as appropriate  - Encourage maximum independence but intervene and supervise when necessary  - Involve family in performance of ADLs  - Assess for home care needs following discharge   - Consider OT consult to assist with ADL evaluation and planning for discharge  - Provide patient education as appropriate  Outcome: Progressing  Goal: Maintains/Returns to pre admission functional level  Description: INTERVENTIONS:  - Perform BMAT or MOVE assessment daily    - Set and communicate daily mobility goal to care team and patient/family/caregiver     - Collaborate with rehabilitation services on mobility goals if consulted  - Out of bed for toileting  - Record patient progress and toleration of activity level   Outcome: Progressing     Problem: Potential for Falls  Goal: Patient will remain free of falls  Description: INTERVENTIONS:  - Educate patient/family on patient safety including physical limitations  - Instruct patient to call for assistance with activity   - Consult OT/PT to assist with strengthening/mobility   - Keep Call bell within reach  - Keep bed low and locked with side rails adjusted as appropriate  - Keep care items and personal belongings within reach  - Initiate and maintain comfort rounds  - Make Fall Risk Sign visible to staff  - Apply yellow socks and bracelet for high fall risk patients  - Consider moving patient to room near nurses station  Outcome: Progressing     Problem: Prexisting or High Potential for Compromised Skin Integrity  Goal: Skin integrity is maintained or improved  Description: INTERVENTIONS:  - Identify patients at risk for skin breakdown  - Assess and monitor skin integrity  - Assess and monitor nutrition and hydration status  - Monitor labs   - Assess for incontinence   - Turn and reposition patient  - Assist with mobility/ambulation  - Relieve pressure over bony prominences  - Avoid friction and shearing  - Provide appropriate hygiene as needed including keeping skin clean and dry  - Evaluate need for skin moisturizer/barrier cream  - Collaborate with interdisciplinary team   - Patient/family teaching  - Consider wound care consult   Outcome: Progressing

## 2022-01-24 NOTE — PHYSICAL THERAPY NOTE
Physical TherapyTreatment Note    Patient's Name: Izabella Masters    Admitting Diagnosis  Cellulitis [L03 90]  Homeless [Z59 00]  Wound check, abscess [Z51 89]    Problem List  Patient Active Problem List   Diagnosis    Homeless    Psoriasis, unspecified    Ambulatory dysfunction    Essential hypertension    Bilateral impacted cerumen       Past Medical History  Past Medical History:   Diagnosis Date    Arthritis     Hypertension     Sciatica        Past Surgical History  Past Surgical History:   Procedure Laterality Date    BACK SURGERY         Recent Imaging  No orders to display       Recent Vital Signs  Vitals:    01/22/22 2300 01/23/22 0820 01/23/22 1500 01/23/22 2227   BP: 134/73 156/87 151/71 153/73   BP Location: Left arm Left arm Left arm Left arm   Pulse: 68 72 74 77   Resp: 18 18 18 18   Temp: (!) 97 3 °F (36 3 °C) 98 7 °F (37 1 °C) 98 3 °F (36 8 °C) 97 9 °F (36 6 °C)   TempSrc: Temporal Temporal Temporal Temporal   SpO2: 98% 98% 98% 99%   Weight:       Height:           PT Treatment Time: 25 minutes       01/21/22 1545   PT Last Visit   PT Visit Date 01/21/22   Note Type   Note Type Treatment   Pain Assessment   Pain Assessment Tool 0-10   Pain Score No Pain   Restrictions/Precautions   Weight Bearing Precautions Per Order No   Other Precautions Fall Risk   General   Chart Reviewed Yes   Response to Previous Treatment Patient with no complaints from previous session  Family/Caregiver Present No   Cognition   Arousal/Participation Alert; Cooperative   Attention Within functional limits   Orientation Level Oriented X4   Memory Within functional limits   Following Commands Follows all commands and directions without difficulty   Bed Mobility   Rolling R 6  Modified independent   Rolling L 6  Modified independent   Supine to Sit 6  Modified independent   Sit to Supine 6  Modified independent   Transfers   Sit to Stand 5  Supervision   Additional items Increased time required   Stand to Sit 5 Supervision   Additional items Increased time required   Additional Comments with RW   Ambulation/Elevation   Gait pattern Decreased toe off;Decreased heel strike; Step through pattern;Decreased foot clearance; Wide HARJIT; Improper Weight shift   Gait Assistance   (CGA)   Additional items Assist x 1;Verbal cues; Tactile cues   Assistive Device Rolling walker   Distance 40ft, 100ft, 30ft   Balance   Static Sitting Fair +   Dynamic Sitting Fair +   Static Standing Fair   Dynamic Standing Fair -   Ambulatory Fair -   Endurance Deficit   Endurance Deficit Yes   Endurance Deficit Description fatigue   Activity Tolerance   Activity Tolerance Patient limited by fatigue   Medical Staff Made Aware spoke to CM   Nurse Made Aware spoke to RN   Assessment   Prognosis Fair   Problem List Decreased strength;Decreased endurance; Impaired balance;Decreased mobility; Impaired sensation;Pain;Decreased coordination;Decreased cognition; Impaired judgement;Decreased safety awareness   Assessment Pt continues to demonstrate improved activity tolerance and strength/endurance with functional mobility  He is able to ambulate up to 100ft today without seated rest  Pt continues to demonstrate forward flexed posture with altered gait pattern  During last gait trial pt LE became abruptly weak and he was not able to remain standing to return to wheel chair and was lowered to sitting position on floor before being assisted back to w/c, pt reporting no pain at end of treatment, does report fatigue  Barriers to Discharge Inaccessible home environment;Decreased caregiver support   Goals   Patient Goals to walk without walker   STG Expiration Date 02/03/22   Short Term Goal #1 see eval note   PT Treatment Day 4   Plan   Treatment/Interventions ADL retraining;Functional transfer training;LE strengthening/ROM; Elevations; Endurance training; Therapeutic exercise;Patient/family training;Equipment eval/education; Bed mobility;Gait training;Spoke to case management;Spoke to nursing;OT   Progress Progressing toward goals   PT Frequency 3-5x/wk   Recommendation   PT Discharge Recommendation Post acute rehabilitation services   Equipment Recommended [facility] Recommended Wheeled walker   AM-PAC Basic Mobility Inpatient   Turning in Bed Without Bedrails 4   Lying on Back to Sitting on Edge of Flat Bed 4   Moving Bed to Chair 3   Standing Up From Chair 3   Walk in Room 3   Climb 3-5 Stairs 2   Basic Mobility Inpatient Raw Score 19   Basic Mobility Standardized Score 42 48   Highest Level Of Mobility   JH-HLM Goal 6: Walk 10 steps or more   JH-HLM Highest Level of Mobility 7: Walk 25 feet or more   JH-HLM Goal Achieved Yes   Education   Education Provided Mobility training;Assistive device   Patient Explanation/teachback used;Demonstrates verbal understanding;Reinforcement needed   End of Consult   Patient Position at End of Consult Supine; All needs within reach       Eros Herrera PT, DPT

## 2022-01-24 NOTE — PLAN OF CARE
Problem: PAIN - ADULT  Goal: Verbalizes/displays adequate comfort level or baseline comfort level  Description: Interventions:  - Encourage patient to monitor pain and request assistance  - Assess pain using appropriate pain scale  - Administer analgesics based on type and severity of pain and evaluate response  - Implement non-pharmacological measures as appropriate and evaluate response  - Consider cultural and social influences on pain and pain management  - Notify physician/advanced practitioner if interventions unsuccessful or patient reports new pain  Outcome: Progressing     Problem: INFECTION - ADULT  Goal: Absence or prevention of progression during hospitalization  Description: INTERVENTIONS:  - Assess and monitor for signs and symptoms of infection  - Monitor lab/diagnostic results  - Monitor all insertion sites, i e  indwelling lines, tubes, and drains  - Monitor endotracheal if appropriate and nasal secretions for changes in amount and color  - [place] appropriate cooling/warming therapies per order  - Administer medications as ordered  - Instruct and encourage patient and family to use good hand hygiene technique  - Identify and instruct in appropriate isolation precautions for identified infection/condition  Outcome: Progressing     Problem: Knowledge Deficit  Goal: Patient/family/caregiver demonstrates understanding of disease process, treatment plan, medications, and discharge instructions  Description: Complete learning assessment and assess knowledge base    Interventions:  - Provide teaching at level of understanding  - Provide teaching via preferred learning methods  Outcome: Progressing

## 2022-01-24 NOTE — PLAN OF CARE
Problem: PHYSICAL THERAPY ADULT  Goal: Performs mobility at highest level of function for planned discharge setting  See evaluation for individualized goals  Description: Treatment/Interventions: ADL retraining,Functional transfer training,LE strengthening/ROM,Elevations,Endurance training,Therapeutic exercise,Patient/family training,Equipment eval/education,Bed mobility,Gait training,Spoke to case management,Spoke to nursing,OT  Equipment Recommended: Frank       See flowsheet documentation for full assessment, interventions and recommendations  Outcome: Progressing  Note: Prognosis: Fair  Problem List: Decreased strength,Decreased endurance,Impaired balance,Decreased mobility,Impaired sensation,Pain,Decreased coordination,Decreased cognition,Impaired judgement,Decreased safety awareness  Assessment: Pt continues to demonstrate improved activity tolerance and strength/endurance with functional mobility  He is able to ambulate up to 100ft today without seated rest  Pt continues to demonstrate forward flexed posture with altered gait pattern  During last gait trial pt LE became abruptly weak and he was not able to remain standing to return to wheel chair and was lowered to sitting position on floor before being assisted back to w/c, pt reporting no pain at end of treatment, does report fatigue  Barriers to Discharge: Inaccessible home environment,Decreased caregiver support        PT Discharge Recommendation: Post acute rehabilitation services          See flowsheet documentation for full assessment

## 2022-01-24 NOTE — PROGRESS NOTES
[facility]  Progress Note Felipa Lea 1965, 60 y o  male MRN: [id number]  Unit/Bed#: 7T [facility] 711-01 Encounter: [id number]  Primary Care Provider: Suzy Colon MD   Date and time admitted to hospital: 12/29/2021  5:38 PM    * Ambulatory dysfunction  Assessment & Plan  · Patient believes that his ambulatory dysfunction as directly related to 'being wrapped up in a fungus'  · His participation with PT has been limited due to the above and therefore his progress seems more self impeded at this point  · Patient believes gabapentin and steroids will help with his discomfort and fungus and therefore help improve his progress with ambulation  · Continue gabapentin 400 milligrams t i d  · PT/OT recommending post acute rehab  · Case management following to aide in discharge planning  · Medically stable for discharge    Bilateral impacted cerumen  Assessment & Plan  · Complained of decreased hearing and that his ears feel full  · There was a significant amount of cerumen in each ear  · Nursing order placed to remove cerumen  · Debrox drops through 1/27    Essential hypertension  Assessment & Plan  · Continue amlodipine 10 mg daily    Psoriasis, unspecified  Assessment & Plan  · Patient with psoriasis, was on Enbrel in the past however was lost to follow-up  · Discussed with dermatology- appreciate recommendations for topical creams  · Patient will need outpatient Dermatology and Rheumatology follow-up      Homeless  Assessment & Plan  · Patient was to be discharged to the [facility] rescue mission however they would not take him back  · PT recommending post acute rehab placement          VTE Pharmacologic Prophylaxis: VTE Score: 1  Lovenox    Patient Centered Rounds: I performed bedside rounds with nursing staff today  Discussions with Specialists or Other Care Team Provider: none    Education and Discussions with Family / Patient: Patient declined call to        Time Spent for Care: 30 minutes  More than 50% of total time spent on counseling and coordination of care as described above  Current Length of Stay: 25 day(s)  Current Patient Status: Inpatient   Certification Statement: The patient will continue to require additional inpatient hospital stay due to Awaiting placement rehab capable facility  Discharge Plan: Pending bed availability    Code Status: Level 1 - Full Code    Subjective:   Patient seen examined bedside  Patient resting comfortably in bed no apparent distress  No acute events overnight  Objective:     Vitals:   Temp (24hrs), Av 6 °F (36 4 °C), Min:96 7 °F (35 9 °C), Max:98 3 °F (36 8 °C)    Temp:  [96 7 °F (35 9 °C)-98 3 °F (36 8 °C)] 96 7 °F (35 9 °C)  HR:  [63-77] 63  Resp:  [18-20] 20  BP: (151-157)/(71-80) 157/80  SpO2:  [98 %-99 %] 98 %  Body mass index is 23 82 kg/m²  Input and Output Summary (last 24 hours): Intake/Output Summary (Last 24 hours) at 2022 1105  Last data filed at 2022 0900  Gross per 24 hour   Intake 860 ml   Output 1650 ml   Net -790 ml       Physical Exam:   Physical Exam  Constitutional:       General: He is not in acute distress  Appearance: He is not ill-appearing  HENT:      Right Ear: There is impacted cerumen  Left Ear: There is impacted cerumen  Cardiovascular:      Rate and Rhythm: Normal rate and regular rhythm  Pulses: Normal pulses  Heart sounds: Normal heart sounds  Pulmonary:      Effort: Pulmonary effort is normal       Breath sounds: Normal breath sounds  Abdominal:      General: Abdomen is flat  Bowel sounds are normal       Palpations: Abdomen is soft  Musculoskeletal:         General: Normal range of motion  Cervical back: Normal range of motion  Skin:     Comments: Multiple psoriatic lesions over his upper and lower extremities  There is some erythema from him scratching    Neurological:      General: No focal deficit present        Mental Status: He is alert and oriented to person, place, and time  Mental status is at baseline  Psychiatric:         Mood and Affect: Mood normal          Thought Content: Thought content normal          Judgment: Judgment normal           Additional Data:     Labs:  Results from last 7 days   Lab Units 01/21/22  0433 01/18/22  1019 01/18/22  1019   WBC Thousand/uL 7 90   < > 7 00   HEMOGLOBIN g/dL 12 7*   < > 12 6*   HEMATOCRIT % 37 5*   < > 38 3*   PLATELETS Thousands/uL 323   < > 354   NEUTROS PCT %  --   --  64   LYMPHS PCT %  --   --  22*   MONOS PCT %  --   --  10   EOS PCT %  --   --  3    < > = values in this interval not displayed  Results from last 7 days   Lab Units 01/21/22  0433   SODIUM mmol/L 137   POTASSIUM mmol/L 3 7   CHLORIDE mmol/L 102   CO2 mmol/L 31*   BUN mg/dL 16   CREATININE mg/dL 0 64*   ANION GAP mmol/L 4*   CALCIUM mg/dL 8 5   GLUCOSE RANDOM mg/dL 93                       Lines/Drains:  Invasive Devices  Report    None                       Imaging: No pertinent imaging reviewed      Recent Cultures (last 7 days):         Last 24 Hours Medication List:   Current Facility-Administered Medications   Medication Dose Route Frequency Provider Last Rate    acetaminophen  650 mg Oral Q4H PRN Buck Goodrich PA-C      amLODIPine  10 mg Oral Daily Haddad Bowels, DO      carbamide peroxide  10 drop Both Ears BID Haddad Bowels, DO      diphenhydrAMINE  25 mg Oral Q6H PRN Haddad Bowels, DO      enoxaparin  40 mg Subcutaneous Q24H [facility], DO      gabapentin  400 mg Oral TID [place] L Malone, DO      nicotine  1 patch Transdermal Daily [place], [place]      nystatin   Topical BID Mayito Ballesteros MD      ondansetron  4 mg Oral Q6H PRN Haddad Bowels, DO      polyethylene glycol  17 g Oral Daily PRN Mary Patel PA-C      triamcinolone   Topical BID Mayito Ballesteros MD      white petrolatum-mineral oil   Topical TID PRN Dontrell Nguyen DO          Today, Patient Was Seen By: Rudolph Velasco DO    **Please Note: This note may have been constructed using a voice recognition system  **

## 2022-01-24 NOTE — ASSESSMENT & PLAN NOTE
· Complained of decreased hearing and that his ears feel full  · There was a significant amount of cerumen in each ear  · Nursing order placed to remove cerumen  · Debrox drops through 1/27

## 2022-01-25 PROCEDURE — 99232 SBSQ HOSP IP/OBS MODERATE 35: CPT | Performed by: STUDENT IN AN ORGANIZED HEALTH CARE EDUCATION/TRAINING PROGRAM

## 2022-01-25 RX ADMIN — CARBAMIDE PEROXIDE 6.5% 10 DROP: 6.5 LIQUID AURICULAR (OTIC) at 08:08

## 2022-01-25 RX ADMIN — DIPHENHYDRAMINE HCL 25 MG: 25 TABLET ORAL at 08:08

## 2022-01-25 RX ADMIN — ENOXAPARIN SODIUM 40 MG: 100 INJECTION SUBCUTANEOUS at 08:08

## 2022-01-25 RX ADMIN — GABAPENTIN 400 MG: 400 CAPSULE ORAL at 16:51

## 2022-01-25 RX ADMIN — TRIAMCINOLONE ACETONIDE: 1 CREAM TOPICAL at 17:58

## 2022-01-25 RX ADMIN — ACETAMINOPHEN 650 MG: 325 TABLET ORAL at 08:07

## 2022-01-25 RX ADMIN — GABAPENTIN 400 MG: 400 CAPSULE ORAL at 08:08

## 2022-01-25 RX ADMIN — TRIAMCINOLONE ACETONIDE: 1 CREAM TOPICAL at 08:08

## 2022-01-25 RX ADMIN — ACETAMINOPHEN 650 MG: 325 TABLET ORAL at 21:34

## 2022-01-25 RX ADMIN — AMLODIPINE BESYLATE 10 MG: 10 TABLET ORAL at 08:08

## 2022-01-25 RX ADMIN — GABAPENTIN 400 MG: 400 CAPSULE ORAL at 21:14

## 2022-01-25 RX ADMIN — NYSTATIN: 100000 POWDER TOPICAL at 08:10

## 2022-01-25 RX ADMIN — NYSTATIN: 100000 POWDER TOPICAL at 17:58

## 2022-01-25 NOTE — PLAN OF CARE
Problem: PAIN - ADULT  Goal: Verbalizes/displays adequate comfort level or baseline comfort level  Description: Interventions:  - Encourage patient to monitor pain and request assistance  - Assess pain using appropriate pain scale  - Administer analgesics based on type and severity of pain and evaluate response  - Implement non-pharmacological measures as appropriate and evaluate response  - Consider cultural and social influences on pain and pain management  - Notify physician/advanced practitioner if interventions unsuccessful or patient reports new pain  Outcome: Progressing     Problem: INFECTION - ADULT  Goal: Absence or prevention of progression during hospitalization  Description: INTERVENTIONS:  - Assess and monitor for signs and symptoms of infection  - Monitor lab/diagnostic results  - Monitor all insertion sites, i e  indwelling lines, tubes, and drains  - Monitor endotracheal if appropriate and nasal secretions for changes in amount and color  - [place] appropriate cooling/warming therapies per order  - Administer medications as ordered  - Instruct and encourage patient and family to use good hand hygiene technique  - Identify and instruct in appropriate isolation precautions for identified infection/condition  Outcome: Progressing     Problem: MOBILITY - ADULT  Goal: Maintain or return to baseline ADL function  Description: INTERVENTIONS:  -  Assess patient's ability to carry out ADLs; assess patient's baseline for ADL function and identify physical deficits which impact ability to perform ADLs (bathing, care of mouth/teeth, toileting, grooming, dressing, etc )  - Assess/evaluate cause of self-care deficits   - Assess range of motion  - Assess patient's mobility; develop plan if impaired  - Assess patient's need for assistive devices and provide as appropriate  - Encourage maximum independence but intervene and supervise when necessary  - Involve family in performance of ADLs  - Assess for home care needs following discharge   - Consider OT consult to assist with ADL evaluation and planning for discharge  - Provide patient education as appropriate  Outcome: Progressing     Problem: Prexisting or High Potential for Compromised Skin Integrity  Goal: Skin integrity is maintained or improved  Description: INTERVENTIONS:  - Identify patients at risk for skin breakdown  - Assess and monitor skin integrity  - Assess and monitor nutrition and hydration status  - Monitor labs   - Assess for incontinence   - Turn and reposition patient  - Assist with mobility/ambulation  - Relieve pressure over bony prominences  - Avoid friction and shearing  - Provide appropriate hygiene as needed including keeping skin clean and dry  - Evaluate need for skin moisturizer/barrier cream  - Collaborate with interdisciplinary team   - Patient/family teaching  - Consider wound care consult   Outcome: Progressing

## 2022-01-25 NOTE — ASSESSMENT & PLAN NOTE
· Patient was to be discharged to the [facility] rescue mission however they would not take him back  · PT recommending post acute rehab placement

## 2022-01-25 NOTE — CASE MANAGEMENT
Case Management Discharge Planning Note    Patient name Jocelyn Correa  Location 7T /7T -66 MRN [id number]  : 1965 Date 2022       Current Admission Date: 2021  Current Admission Diagnosis:Ambulatory dysfunction   Patient Active Problem List    Diagnosis Date Noted    Bilateral impacted cerumen 2022    Essential hypertension 2022    Psoriasis, unspecified 2022    Ambulatory dysfunction 2022    Homeless 2021      LOS (days): 26  Geometric Mean LOS (GMLOS) (days): 3 20  Days to GMLOS:-22 7     OBJECTIVE:  Risk of Unplanned Readmission Score: 12         Current admission status: Inpatient   Preferred Pharmacy:   [address] [facility]  [address] [facility]  [address]  Phone: [phone] Fax: [phone] [address] [facility],[address], [address]  [address]  Phone: [phone] Fax: [phone]    Primary Care Provider: Jose Storey MD    Primary Insurance: [facility]  Secondary Insurance:     DISCHARGE DETAILS: CM was notified through careport that SNF facility :  [facility] [phone] is reviewing pt's chart and will get back to CM soon with decision     CM department will continue to follow through pt's D/C

## 2022-01-25 NOTE — PROGRESS NOTES
[facility]  Progress Note Elysia Pérez 1965, 60 y o  male MRN: [id number]  Unit/Bed#: 7T [facility] 711-01 Encounter: [id number]  Primary Care Provider: Sebastian Simons MD   Date and time admitted to hospital: 12/29/2021  5:38 PM    * Ambulatory dysfunction  Assessment & Plan  · Patient believes that his ambulatory dysfunction as directly related to 'being wrapped up in a fungus'  · His participation with PT has been limited due to the above and therefore his progress seems more self impeded at this point  · Patient believes gabapentin and steroids will help with his discomfort and fungus and therefore help improve his progress with ambulation  · Continue gabapentin 400 milligrams t i d  · PT/OT recommending post acute rehab  · Case management following to aide in discharge planning  · Medically stable for discharge    Bilateral impacted cerumen  Assessment & Plan  · Complained of decreased hearing and that his ears feel full  · There was a significant amount of cerumen in each ear  · Nursing order placed to remove cerumen  · Debrox drops through 1/27    Essential hypertension  Assessment & Plan  · Continue amlodipine 10 mg daily    Psoriasis, unspecified  Assessment & Plan  · Patient with psoriasis, was on Enbrel in the past however was lost to follow-up  · Discussed with dermatology- appreciate recommendations for topical creams  · Patient will need outpatient Dermatology and Rheumatology follow-up      Homeless  Assessment & Plan  · Patient was to be discharged to the [facility] rescue [facility] however they would not take him back  · PT recommending post acute rehab placement        VTE Pharmacologic Prophylaxis:  Lovenox    Patient Centered Rounds: I performed bedside rounds with nursing staff today  Discussions with Specialists or Other Care Team Provider:  None    Education and Discussions with Family / Patient: Patient declined call to        Time Spent for Care: 30 minutes  More than 50% of total time spent on counseling and coordination of care as described above  Current Length of Stay: 26 day(s)  Current Patient Status: Inpatient   Certification Statement: The patient will continue to require additional inpatient hospital stay due to Pending placement into acute rehab  Discharge Plan:  Discharge pending bed availability    Code Status: Level 1 - Full Code    Subjective:   Patient seen examined at bedside  Patient resting in bed  Patient requesting Ativan so he "can feel happy"  I explained that this is not what Ativan as for however he continues to ask for Ativan  Denies suicidal or homicidal thoughts    Objective:     Vitals:   Temp (24hrs), Av 8 °F (36 6 °C), Min:97 4 °F (36 3 °C), Max:98 4 °F (36 9 °C)    Temp:  [97 4 °F (36 3 °C)-98 4 °F (36 9 °C)] 97 4 °F (36 3 °C)  HR:  [63-88] 63  Resp:  [18-20] 18  BP: (146-168)/(76-94) 154/79  SpO2:  [98 %-99 %] 98 %  Body mass index is 23 82 kg/m²  Input and Output Summary (last 24 hours): Intake/Output Summary (Last 24 hours) at 2022 0842  Last data filed at 2022 0814  Gross per 24 hour   Intake 1200 ml   Output 2900 ml   Net -1700 ml       Physical Exam:   Physical Exam  HENT:      Right Ear: There is impacted cerumen  Left Ear: There is impacted cerumen  Cardiovascular:      Rate and Rhythm: Normal rate and regular rhythm  Pulses: Normal pulses  Heart sounds: Normal heart sounds  Pulmonary:      Effort: Pulmonary effort is normal       Breath sounds: Normal breath sounds  Abdominal:      General: Abdomen is flat  Bowel sounds are normal       Palpations: Abdomen is soft  Musculoskeletal:         General: Normal range of motion  Cervical back: Normal range of motion  Skin:     General: Skin is warm  Comments: Multiple psoriatic lesions over his upper and lower extremities    There is some erythema from him scratching     Neurological:      General: No focal deficit present  Mental Status: He is alert and oriented to person, place, and time  Mental status is at baseline  Psychiatric:         Mood and Affect: Mood normal          Behavior: Behavior normal          Thought Content: Thought content normal          Judgment: Judgment normal           Additional Data:     Labs:  Results from last 7 days   Lab Units 01/21/22  0433 01/18/22  1019 01/18/22  1019   WBC Thousand/uL 7 90   < > 7 00   HEMOGLOBIN g/dL 12 7*   < > 12 6*   HEMATOCRIT % 37 5*   < > 38 3*   PLATELETS Thousands/uL 323   < > 354   NEUTROS PCT %  --   --  64   LYMPHS PCT %  --   --  22*   MONOS PCT %  --   --  10   EOS PCT %  --   --  3    < > = values in this interval not displayed  Results from last 7 days   Lab Units 01/21/22  0433   SODIUM mmol/L 137   POTASSIUM mmol/L 3 7   CHLORIDE mmol/L 102   CO2 mmol/L 31*   BUN mg/dL 16   CREATININE mg/dL 0 64*   ANION GAP mmol/L 4*   CALCIUM mg/dL 8 5   GLUCOSE RANDOM mg/dL 93                       Lines/Drains:  Invasive Devices  Report    None                       Imaging: No pertinent imaging reviewed      Recent Cultures (last 7 days):         Last 24 Hours Medication List:   Current Facility-Administered Medications   Medication Dose Route Frequency Provider Last Rate    acetaminophen  650 mg Oral Q4H PRN Milad Frey PA-C      amLODIPine  10 mg Oral Daily Lisa Henrry, DO      carbamide peroxide  10 drop Both Ears BID Lisa Henrry, DO      diphenhydrAMINE  25 mg Oral Q6H PRN Lisa Henrry, DO      enoxaparin  40 mg Subcutaneous Q24H [facility], DO      gabapentin  400 mg Oral TID [place] L Malone, DO      nicotine  1 patch Transdermal Daily [place], [place]      nystatin   Topical BID iMchelle Mccullough MD      ondansetron  4 mg Oral Q6H PRN Lisa Henrry, DO      polyethylene glycol  17 g Oral Daily PRN Connie Mejia PA-C      triamcinolone   Topical BID MD Cesar Dash petrolatum-mineral oil Topical TID SHILPA Parham DO          Today, Patient Was Seen By: Mariana Prather DO    **Please Note: This note may have been constructed using a voice recognition system  **

## 2022-01-25 NOTE — PLAN OF CARE
Problem: PAIN - ADULT  Goal: Verbalizes/displays adequate comfort level or baseline comfort level  Description: Interventions:  - Encourage patient to monitor pain and request assistance  - Assess pain using appropriate pain scale  - Administer analgesics based on type and severity of pain and evaluate response  - Implement non-pharmacological measures as appropriate and evaluate response  - Consider cultural and social influences on pain and pain management  - Notify physician/advanced practitioner if interventions unsuccessful or patient reports new pain  Outcome: Progressing     Problem: INFECTION - ADULT  Goal: Absence or prevention of progression during hospitalization  Description: INTERVENTIONS:  - Assess and monitor for signs and symptoms of infection  - Monitor lab/diagnostic results  - Monitor all insertion sites, i e  indwelling lines, tubes, and drains  - Monitor endotracheal if appropriate and nasal secretions for changes in amount and color  - [place] appropriate cooling/warming therapies per order  - Administer medications as ordered  - Instruct and encourage patient and family to use good hand hygiene technique  - Identify and instruct in appropriate isolation precautions for identified infection/condition  Outcome: Progressing     Problem: SAFETY ADULT  Goal: Patient will remain free of falls  Description: INTERVENTIONS:  - Educate patient/family on patient safety including physical limitations  - Instruct patient to call for assistance with activity   - Consult OT/PT to assist with strengthening/mobility   - Keep Call bell within reach  - Keep bed low and locked with side rails adjusted as appropriate  - Keep care items and personal belongings within reach  - Initiate and maintain comfort rounds  - Make Fall Risk Sign visible to staff  - Apply yellow socks and bracelet for high fall risk patients  - Consider moving patient to room near nurses station  Outcome: Progressing  Goal: Maintain or return to baseline ADL function  Description: INTERVENTIONS:  -  Assess patient's ability to carry out ADLs; assess patient's baseline for ADL function and identify physical deficits which impact ability to perform ADLs (bathing, care of mouth/teeth, toileting, grooming, dressing, etc )  - Assess/evaluate cause of self-care deficits   - Assess range of motion  - Assess patient's mobility; develop plan if impaired  - Assess patient's need for assistive devices and provide as appropriate  - Encourage maximum independence but intervene and supervise when necessary  - Involve family in performance of ADLs  - Assess for home care needs following discharge   - Consider OT consult to assist with ADL evaluation and planning for discharge  - Provide patient education as appropriate  Outcome: Progressing  Goal: Maintains/Returns to pre admission functional level  Description: INTERVENTIONS:  - Perform BMAT or MOVE assessment daily    - Set and communicate daily mobility goal to care team and patient/family/caregiver     - Collaborate with rehabilitation services on mobility goals if consulted  - Out of bed for toileting  - Record patient progress and toleration of activity level   Outcome: Progressing     Problem: DISCHARGE PLANNING  Goal: Discharge to home or other facility with appropriate resources  Description: INTERVENTIONS:  - Identify barriers to discharge w/patient and caregiver  - Arrange for needed discharge resources and transportation as appropriate  - Identify discharge learning needs (meds, wound care, etc )  - Arrange for interpretive services to assist at discharge as needed  - Refer to Case Management Department for coordinating discharge planning if the patient needs post-hospital services based on physician/advanced practitioner order or complex needs related to functional status, cognitive ability, or social support system  Outcome: Progressing     Problem: Knowledge Deficit  Goal: Patient/family/caregiver demonstrates understanding of disease process, treatment plan, medications, and discharge instructions  Description: Complete learning assessment and assess knowledge base  Interventions:  - Provide teaching at level of understanding  - Provide teaching via preferred learning methods  Outcome: Progressing     Problem: MOBILITY - ADULT  Goal: Maintain or return to baseline ADL function  Description: INTERVENTIONS:  -  Assess patient's ability to carry out ADLs; assess patient's baseline for ADL function and identify physical deficits which impact ability to perform ADLs (bathing, care of mouth/teeth, toileting, grooming, dressing, etc )  - Assess/evaluate cause of self-care deficits   - Assess range of motion  - Assess patient's mobility; develop plan if impaired  - Assess patient's need for assistive devices and provide as appropriate  - Encourage maximum independence but intervene and supervise when necessary  - Involve family in performance of ADLs  - Assess for home care needs following discharge   - Consider OT consult to assist with ADL evaluation and planning for discharge  - Provide patient education as appropriate  Outcome: Progressing  Goal: Maintains/Returns to pre admission functional level  Description: INTERVENTIONS:  - Perform BMAT or MOVE assessment daily    - Set and communicate daily mobility goal to care team and patient/family/caregiver     - Collaborate with rehabilitation services on mobility goals if consulted  - Out of bed for toileting  - Record patient progress and toleration of activity level   Outcome: Progressing     Problem: Potential for Falls  Goal: Patient will remain free of falls  Description: INTERVENTIONS:  - Educate patient/family on patient safety including physical limitations  - Instruct patient to call for assistance with activity   - Consult OT/PT to assist with strengthening/mobility   - Keep Call bell within reach  - Keep bed low and locked with side rails adjusted as appropriate  - Keep care items and personal belongings within reach  - Initiate and maintain comfort rounds  - Make Fall Risk Sign visible to staff  - Apply yellow socks and bracelet for high fall risk patients  - Consider moving patient to room near nurses station  Outcome: Progressing     Problem: Prexisting or High Potential for Compromised Skin Integrity  Goal: Skin integrity is maintained or improved  Description: INTERVENTIONS:  - Identify patients at risk for skin breakdown  - Assess and monitor skin integrity  - Assess and monitor nutrition and hydration status  - Monitor labs   - Assess for incontinence   - Turn and reposition patient  - Assist with mobility/ambulation  - Relieve pressure over bony prominences  - Avoid friction and shearing  - Provide appropriate hygiene as needed including keeping skin clean and dry  - Evaluate need for skin moisturizer/barrier cream  - Collaborate with interdisciplinary team   - Patient/family teaching  - Consider wound care consult   Outcome: Progressing

## 2022-01-26 PROCEDURE — 97110 THERAPEUTIC EXERCISES: CPT

## 2022-01-26 PROCEDURE — 97116 GAIT TRAINING THERAPY: CPT

## 2022-01-26 PROCEDURE — 99232 SBSQ HOSP IP/OBS MODERATE 35: CPT | Performed by: STUDENT IN AN ORGANIZED HEALTH CARE EDUCATION/TRAINING PROGRAM

## 2022-01-26 RX ORDER — KETOROLAC TROMETHAMINE 10 MG/1
10 TABLET, FILM COATED ORAL EVERY 6 HOURS PRN
Status: DISPENSED | OUTPATIENT
Start: 2022-01-26 | End: 2022-01-28

## 2022-01-26 RX ADMIN — ACETAMINOPHEN 650 MG: 325 TABLET ORAL at 12:29

## 2022-01-26 RX ADMIN — TRIAMCINOLONE ACETONIDE: 1 CREAM TOPICAL at 17:11

## 2022-01-26 RX ADMIN — TRIAMCINOLONE ACETONIDE: 1 CREAM TOPICAL at 08:45

## 2022-01-26 RX ADMIN — ACETAMINOPHEN 650 MG: 325 TABLET ORAL at 22:02

## 2022-01-26 RX ADMIN — GABAPENTIN 400 MG: 400 CAPSULE ORAL at 22:02

## 2022-01-26 RX ADMIN — AMLODIPINE BESYLATE 10 MG: 10 TABLET ORAL at 08:45

## 2022-01-26 RX ADMIN — ACETAMINOPHEN 650 MG: 325 TABLET ORAL at 07:28

## 2022-01-26 RX ADMIN — NYSTATIN: 100000 POWDER TOPICAL at 08:46

## 2022-01-26 RX ADMIN — KETOROLAC TROMETHAMINE 10 MG: 10 TABLET, FILM COATED ORAL at 15:12

## 2022-01-26 RX ADMIN — GABAPENTIN 400 MG: 400 CAPSULE ORAL at 15:12

## 2022-01-26 RX ADMIN — NYSTATIN: 100000 POWDER TOPICAL at 17:12

## 2022-01-26 RX ADMIN — GABAPENTIN 400 MG: 400 CAPSULE ORAL at 08:45

## 2022-01-26 NOTE — PLAN OF CARE
Problem: PAIN - ADULT  Goal: Verbalizes/displays adequate comfort level or baseline comfort level  Description: Interventions:  - Encourage patient to monitor pain and request assistance  - Assess pain using appropriate pain scale  - Administer analgesics based on type and severity of pain and evaluate response  - Implement non-pharmacological measures as appropriate and evaluate response  - Consider cultural and social influences on pain and pain management  - Notify physician/advanced practitioner if interventions unsuccessful or patient reports new pain  Outcome: Progressing     Problem: INFECTION - ADULT  Goal: Absence or prevention of progression during hospitalization  Description: INTERVENTIONS:  - Assess and monitor for signs and symptoms of infection  - Monitor lab/diagnostic results  - Monitor all insertion sites, i e  indwelling lines, tubes, and drains  - Monitor endotracheal if appropriate and nasal secretions for changes in amount and color  - [place] appropriate cooling/warming therapies per order  - Administer medications as ordered  - Instruct and encourage patient and family to use good hand hygiene technique  - Identify and instruct in appropriate isolation precautions for identified infection/condition  Outcome: Progressing     Problem: MOBILITY - ADULT  Goal: Maintain or return to baseline ADL function  Description: INTERVENTIONS:  -  Assess patient's ability to carry out ADLs; assess patient's baseline for ADL function and identify physical deficits which impact ability to perform ADLs (bathing, care of mouth/teeth, toileting, grooming, dressing, etc )  - Assess/evaluate cause of self-care deficits   - Assess range of motion  - Assess patient's mobility; develop plan if impaired  - Assess patient's need for assistive devices and provide as appropriate  - Encourage maximum independence but intervene and supervise when necessary  - Involve family in performance of ADLs  - Assess for home care needs following discharge   - Consider OT consult to assist with ADL evaluation and planning for discharge  - Provide patient education as appropriate  Outcome: Progressing     Problem: Prexisting or High Potential for Compromised Skin Integrity  Goal: Skin integrity is maintained or improved  Description: INTERVENTIONS:  - Identify patients at risk for skin breakdown  - Assess and monitor skin integrity  - Assess and monitor nutrition and hydration status  - Monitor labs   - Assess for incontinence   - Turn and reposition patient  - Assist with mobility/ambulation  - Relieve pressure over bony prominences  - Avoid friction and shearing  - Provide appropriate hygiene as needed including keeping skin clean and dry  - Evaluate need for skin moisturizer/barrier cream  - Collaborate with interdisciplinary team   - Patient/family teaching  - Consider wound care consult   Outcome: Progressing

## 2022-01-26 NOTE — PROGRESS NOTES
51 [facility]  Progress Note Mara Baig 1965, 60 y o  male MRN: [id number]  Unit/Bed#: 7T [facility] 711-01 Encounter: [id number]  Primary Care Provider: Trey Medina MD   Date and time admitted to hospital: 12/29/2021  5:38 PM    * Ambulatory dysfunction  Assessment & Plan  · Patient believes that his ambulatory dysfunction as directly related to 'being wrapped up in a fungus'  · His participation with PT has been limited due to the above and therefore his progress seems more self impeded at this point  · Patient believes gabapentin and steroids will help with his discomfort and fungus and therefore help improve his progress with ambulation  · Continue gabapentin 400 milligrams t i d  · PT/OT recommending post acute rehab  · Case management following to aide in discharge planning  · Medically stable for discharge    Bilateral impacted cerumen  Assessment & Plan  · Complained of decreased hearing and that his ears feel full  · There was a significant amount of cerumen in each ear  · Nursing order placed to remove cerumen  · Debrox drops through 1/27    Essential hypertension  Assessment & Plan  · Continue amlodipine 10 mg daily    Psoriasis, unspecified  Assessment & Plan  · Patient with psoriasis, was on Enbrel in the past however was lost to follow-up  · Discussed with dermatology- appreciate recommendations for topical creams  · Patient will need outpatient Dermatology and Rheumatology follow-up      Homeless  Assessment & Plan  · Patient was to be discharged to the [facility] however they would not take him back  · PT recommending post acute rehab placement          VTE Pharmacologic Prophylaxis: VTE Score: 1 Lovenox    Patient Centered Rounds: I performed bedside rounds with nursing staff today    Discussions with Specialists or Other Care Team Provider: none    Education and Discussions with Family / Patient:  Patient, all questions answered    Time Spent for Care: 30 minutes  More than 50% of total time spent on counseling and coordination of care as described above  Current Length of Stay: 27 day(s)  Current Patient Status: Inpatient   Certification Statement: The patient will continue to require additional inpatient hospital stay due to Pending placement into acute care rehab  Discharge Plan: Pending bed availability    Code Status: Level 1 - Full Code    Subjective:   Patient seen examined bedside  Patient resting in bed no apparent distress  No acute events overnight  Objective:     Vitals:   Temp (24hrs), Av 8 °F (36 °C), Min:96 5 °F (35 8 °C), Max:97 °F (36 1 °C)    Temp:  [96 5 °F (35 8 °C)-97 °F (36 1 °C)] 97 °F (36 1 °C)  HR:  [58-69] 58  Resp:  [18] 18  BP: (150-161)/(79-81) 156/81  SpO2:  [93 %-99 %] 98 %  Body mass index is 23 82 kg/m²  Input and Output Summary (last 24 hours): Intake/Output Summary (Last 24 hours) at 2022 1442  Last data filed at 2022 1300  Gross per 24 hour   Intake 1100 ml   Output 1800 ml   Net -700 ml       Physical Exam:   Physical Exam  Cardiovascular:      Rate and Rhythm: Normal rate and regular rhythm  Pulses: Normal pulses  Heart sounds: Normal heart sounds  Pulmonary:      Effort: Pulmonary effort is normal       Breath sounds: Normal breath sounds  Abdominal:      General: Abdomen is flat  Bowel sounds are normal       Palpations: Abdomen is soft  Musculoskeletal:         General: Normal range of motion  Cervical back: Normal range of motion  Skin:     Comments: Multiple psoriatic lesions over his upper and lower extremities  There is some erythema from him scratching     Neurological:      General: No focal deficit present  Mental Status: He is alert and oriented to person, place, and time  Mental status is at baseline     Psychiatric:         Mood and Affect: Mood normal           Additional Data:     Labs:  Results from last 7 days   Lab Units 22  0433   WBC Thousand/uL 7 90   HEMOGLOBIN g/dL 12 7*   HEMATOCRIT % 37 5*   PLATELETS Thousands/uL 323     Results from last 7 days   Lab Units 01/21/22  0433   SODIUM mmol/L 137   POTASSIUM mmol/L 3 7   CHLORIDE mmol/L 102   CO2 mmol/L 31*   BUN mg/dL 16   CREATININE mg/dL 0 64*   ANION GAP mmol/L 4*   CALCIUM mg/dL 8 5   GLUCOSE RANDOM mg/dL 93                       Lines/Drains:  Invasive Devices  Report    None                       Imaging: No pertinent imaging reviewed  Recent Cultures (last 7 days):         Last 24 Hours Medication List:   Current Facility-Administered Medications   Medication Dose Route Frequency Provider Last Rate    acetaminophen  650 mg Oral Q4H PRN Tomas Cadena PA-C      amLODIPine  10 mg Oral Daily Florentin Hdz DO      carbamide peroxide  10 drop Both Ears BID Florentin Hdz DO      diphenhydrAMINE  25 mg Oral Q6H PRN Florentin Hdz DO      enoxaparin  40 mg Subcutaneous Q24H [address] [facility], DO      gabapentin  400 mg Oral TID [place] L Malone, DO      ketorolac  10 mg Oral Q6H PRN Florentin Hdz DO      nicotine  1 patch Transdermal Daily Daniel Briscoe, [place]      nystatin   Topical BID Altagracia Razo MD      ondansetron  4 mg Oral Q6H PRN Florentin Hdz DO      polyethylene glycol  17 g Oral Daily PRN Cristo Sadler PA-C      triamcinolone   Topical BID Altagracia Razo MD      white petrolatum-mineral oil   Topical TID PRN She Gay DO          Today, Patient Was Seen By: Florentin Hdz DO    **Please Note: This note may have been constructed using a voice recognition system  **

## 2022-01-26 NOTE — PLAN OF CARE
Problem: PAIN - ADULT  Goal: Verbalizes/displays adequate comfort level or baseline comfort level  Description: Interventions:  - Encourage patient to monitor pain and request assistance  - Assess pain using appropriate pain scale  - Administer analgesics based on type and severity of pain and evaluate response  - Implement non-pharmacological measures as appropriate and evaluate response  - Consider cultural and social influences on pain and pain management  - Notify physician/advanced practitioner if interventions unsuccessful or patient reports new pain  Outcome: Progressing     Problem: INFECTION - ADULT  Goal: Absence or prevention of progression during hospitalization  Description: INTERVENTIONS:  - Assess and monitor for signs and symptoms of infection  - Monitor lab/diagnostic results  - Monitor all insertion sites, i e  indwelling lines, tubes, and drains  - Monitor endotracheal if appropriate and nasal secretions for changes in amount and color  - [place] appropriate cooling/warming therapies per order  - Administer medications as ordered  - Instruct and encourage patient and family to use good hand hygiene technique  - Identify and instruct in appropriate isolation precautions for identified infection/condition  Outcome: Progressing     Problem: SAFETY ADULT  Goal: Patient will remain free of falls  Description: INTERVENTIONS:  - Educate patient/family on patient safety including physical limitations  - Instruct patient to call for assistance with activity   - Consult OT/PT to assist with strengthening/mobility   - Keep Call bell within reach  - Keep bed low and locked with side rails adjusted as appropriate  - Keep care items and personal belongings within reach  - Initiate and maintain comfort rounds  - Make Fall Risk Sign visible to staff  - Offer Toileting   - Obtain necessary fall risk management equipment:   - Apply yellow socks and bracelet for high fall risk patients  - Consider moving patient to room near nurses station  Outcome: Progressing  Goal: Maintain or return to baseline ADL function  Description: INTERVENTIONS:  -  Assess patient's ability to carry out ADLs; assess patient's baseline for ADL function and identify physical deficits which impact ability to perform ADLs (bathing, care of mouth/teeth, toileting, grooming, dressing, etc )  - Assess/evaluate cause of self-care deficits   - Assess range of motion  - Assess patient's mobility; develop plan if impaired  - Assess patient's need for assistive devices and provide as appropriate  - Encourage maximum independence but intervene and supervise when necessary  - Involve family in performance of ADLs  - Assess for home care needs following discharge   - Consider OT consult to assist with ADL evaluation and planning for discharge  - Provide patient education as appropriate  Outcome: Progressing  Goal: Maintains/Returns to pre admission functional level  Description: INTERVENTIONS:  - Perform BMAT or MOVE assessment daily    - Set and communicate daily mobility goal to care team and patient/family/caregiver     - Collaborate with rehabilitation services on mobility goals if consulted  - Perform Range of Motion *  - Ambulate patient  - Out of bed for toileting  - Record patient progress and toleration of activity level   Outcome: Progressing     Problem: DISCHARGE PLANNING  Goal: Discharge to home or other facility with appropriate resources  Description: INTERVENTIONS:  - Identify barriers to discharge w/patient and caregiver  - Arrange for needed discharge resources and transportation as appropriate  - Identify discharge learning needs (meds, wound care, etc )  - Arrange for interpretive services to assist at discharge as needed  - Refer to Case Management Department for coordinating discharge planning if the patient needs post-hospital services based on physician/advanced practitioner order or complex needs related to functional status, cognitive ability, or social support system  Outcome: Progressing     Problem: Knowledge Deficit  Goal: Patient/family/caregiver demonstrates understanding of disease process, treatment plan, medications, and discharge instructions  Description: Complete learning assessment and assess knowledge base  Interventions:  - Provide teaching at level of understanding  - Provide teaching via preferred learning methods  Outcome: Progressing     Problem: MOBILITY - ADULT  Goal: Maintain or return to baseline ADL function  Description: INTERVENTIONS:  -  Assess patient's ability to carry out ADLs; assess patient's baseline for ADL function and identify physical deficits which impact ability to perform ADLs (bathing, care of mouth/teeth, toileting, grooming, dressing, etc )  - Assess/evaluate cause of self-care deficits   - Assess range of motion  - Assess patient's mobility; develop plan if impaired  - Assess patient's need for assistive devices and provide as appropriate  - Encourage maximum independence but intervene and supervise when necessary  - Involve family in performance of ADLs  - Assess for home care needs following discharge   - Consider OT consult to assist with ADL evaluation and planning for discharge  - Provide patient education as appropriate  Outcome: Progressing  Goal: Maintains/Returns to pre admission functional level  Description: INTERVENTIONS:  - Perform BMAT or MOVE assessment daily    - Set and communicate daily mobility goal to care team and patient/family/caregiver     - Collaborate with rehabilitation services on mobility goals if consulted  - Perform Range of Motion  - Ambulate patient   - Out of bed for toileting  - Record patient progress and toleration of activity level   Outcome: Progressing     Problem: Potential for Falls  Goal: Patient will remain free of falls  Description: INTERVENTIONS:  - Educate patient/family on patient safety including physical limitations  - Instruct patient to call for assistance with activity   - Consult OT/PT to assist with strengthening/mobility   - Keep Call bell within reach  - Keep bed low and locked with side rails adjusted as appropriate  - Keep care items and personal belongings within reach  - Initiate and maintain comfort rounds  - Make Fall Risk Sign visible to staff  - Offer Toileting  - Obtain necessary fall risk management equipment:   - Apply yellow socks and bracelet for high fall risk patients  - Consider moving patient to room near nurses station  Outcome: Progressing     Problem: Prexisting or High Potential for Compromised Skin Integrity  Goal: Skin integrity is maintained or improved  Description: INTERVENTIONS:  - Identify patients at risk for skin breakdown  - Assess and monitor skin integrity  - Assess and monitor nutrition and hydration status  - Monitor labs   - Assess for incontinence   - Turn and reposition patient  - Assist with mobility/ambulation  - Relieve pressure over bony prominences  - Avoid friction and shearing  - Provide appropriate hygiene as needed including keeping skin clean and dry  - Evaluate need for skin moisturizer/barrier cream  - Collaborate with interdisciplinary team   - Patient/family teaching  - Consider wound care consult   Outcome: Progressing

## 2022-01-26 NOTE — ASSESSMENT & PLAN NOTE
· Patient was to be discharged to the [facility] rescue mission however they would not take him back  · PT recommending post acute rehab placement

## 2022-01-27 PROBLEM — R46.2 STRANGE AND INEXPLICABLE BEHAVIOR: Status: ACTIVE | Noted: 2022-01-27

## 2022-01-27 PROBLEM — B35.1 ONYCHOMYCOSIS: Status: ACTIVE | Noted: 2022-01-27

## 2022-01-27 PROCEDURE — 99223 1ST HOSP IP/OBS HIGH 75: CPT

## 2022-01-27 PROCEDURE — 99232 SBSQ HOSP IP/OBS MODERATE 35: CPT | Performed by: STUDENT IN AN ORGANIZED HEALTH CARE EDUCATION/TRAINING PROGRAM

## 2022-01-27 PROCEDURE — 97116 GAIT TRAINING THERAPY: CPT

## 2022-01-27 PROCEDURE — 0HBRXZZ EXCISION OF TOE NAIL, EXTERNAL APPROACH: ICD-10-PCS | Performed by: PODIATRIST

## 2022-01-27 RX ORDER — HYDROXYZINE HYDROCHLORIDE 25 MG/1
25 TABLET, FILM COATED ORAL EVERY 6 HOURS PRN
Status: DISCONTINUED | OUTPATIENT
Start: 2022-01-27 | End: 2022-03-11 | Stop reason: HOSPADM

## 2022-01-27 RX ORDER — ARIPIPRAZOLE 5 MG/1
5 TABLET ORAL DAILY
Status: DISCONTINUED | OUTPATIENT
Start: 2022-01-27 | End: 2022-02-17

## 2022-01-27 RX ORDER — TERBINAFINE HYDROCHLORIDE 250 MG/1
250 TABLET ORAL DAILY
Status: DISCONTINUED | OUTPATIENT
Start: 2022-01-27 | End: 2022-03-11 | Stop reason: HOSPADM

## 2022-01-27 RX ADMIN — NYSTATIN: 100000 POWDER TOPICAL at 08:53

## 2022-01-27 RX ADMIN — KETOROLAC TROMETHAMINE 10 MG: 10 TABLET, FILM COATED ORAL at 20:48

## 2022-01-27 RX ADMIN — GABAPENTIN 400 MG: 400 CAPSULE ORAL at 08:52

## 2022-01-27 RX ADMIN — AMLODIPINE BESYLATE 10 MG: 10 TABLET ORAL at 08:52

## 2022-01-27 RX ADMIN — TERBINAFINE HYDROCHLORIDE 250 MG: 250 TABLET ORAL at 12:44

## 2022-01-27 RX ADMIN — GABAPENTIN 400 MG: 400 CAPSULE ORAL at 20:42

## 2022-01-27 RX ADMIN — KETOROLAC TROMETHAMINE 10 MG: 10 TABLET, FILM COATED ORAL at 07:43

## 2022-01-27 RX ADMIN — ACETAMINOPHEN 650 MG: 325 TABLET ORAL at 10:08

## 2022-01-27 RX ADMIN — TRIAMCINOLONE ACETONIDE: 1 CREAM TOPICAL at 17:11

## 2022-01-27 RX ADMIN — HYDROXYZINE HYDROCHLORIDE 25 MG: 25 TABLET ORAL at 15:38

## 2022-01-27 RX ADMIN — TRIAMCINOLONE ACETONIDE: 1 CREAM TOPICAL at 08:53

## 2022-01-27 RX ADMIN — ARIPIPRAZOLE 5 MG: 5 TABLET ORAL at 15:03

## 2022-01-27 RX ADMIN — GABAPENTIN 400 MG: 400 CAPSULE ORAL at 15:38

## 2022-01-27 RX ADMIN — ENOXAPARIN SODIUM 40 MG: 100 INJECTION SUBCUTANEOUS at 17:12

## 2022-01-27 RX ADMIN — KETOROLAC TROMETHAMINE 10 MG: 10 TABLET, FILM COATED ORAL at 13:41

## 2022-01-27 RX ADMIN — NYSTATIN: 100000 POWDER TOPICAL at 17:11

## 2022-01-27 NOTE — PLAN OF CARE
Problem: PAIN - ADULT  Goal: Verbalizes/displays adequate comfort level or baseline comfort level  Description: Interventions:  - Encourage patient to monitor pain and request assistance  - Assess pain using appropriate pain scale  - Administer analgesics based on type and severity of pain and evaluate response  - Implement non-pharmacological measures as appropriate and evaluate response  - Consider cultural and social influences on pain and pain management  - Notify physician/advanced practitioner if interventions unsuccessful or patient reports new pain  Outcome: Progressing     Problem: INFECTION - ADULT  Goal: Absence or prevention of progression during hospitalization  Description: INTERVENTIONS:  - Assess and monitor for signs and symptoms of infection  - Monitor lab/diagnostic results  - Monitor all insertion sites, i e  indwelling lines, tubes, and drains  - Monitor endotracheal if appropriate and nasal secretions for changes in amount and color  - [place] appropriate cooling/warming therapies per order  - Administer medications as ordered  - Instruct and encourage patient and family to use good hand hygiene technique  - Identify and instruct in appropriate isolation precautions for identified infection/condition  Outcome: Progressing     Problem: SAFETY ADULT  Goal: Patient will remain free of falls  Description: INTERVENTIONS:  - Educate patient/family on patient safety including physical limitations  - Instruct patient to call for assistance with activity   - Consult OT/PT to assist with strengthening/mobility   - Keep Call bell within reach  - Keep bed low and locked with side rails adjusted as appropriate  - Keep care items and personal belongings within reach  - Initiate and maintain comfort rounds  - Make Fall Risk Sign visible to staff  - Apply yellow socks and bracelet for high fall risk patients  - Consider moving patient to room near nurses station  Outcome: Progressing  Goal: Maintain or return to baseline ADL function  Description: INTERVENTIONS:  -  Assess patient's ability to carry out ADLs; assess patient's baseline for ADL function and identify physical deficits which impact ability to perform ADLs (bathing, care of mouth/teeth, toileting, grooming, dressing, etc )  - Assess/evaluate cause of self-care deficits   - Assess range of motion  - Assess patient's mobility; develop plan if impaired  - Assess patient's need for assistive devices and provide as appropriate  - Encourage maximum independence but intervene and supervise when necessary  - Involve family in performance of ADLs  - Assess for home care needs following discharge   - Consider OT consult to assist with ADL evaluation and planning for discharge  - Provide patient education as appropriate  Outcome: Progressing  Goal: Maintains/Returns to pre admission functional level  Description: INTERVENTIONS:  - Perform BMAT or MOVE assessment daily    - Set and communicate daily mobility goal to care team and patient/family/caregiver     - Collaborate with rehabilitation services on mobility goals if consulted  - Out of bed for toileting  - Record patient progress and toleration of activity level   Outcome: Progressing     Problem: DISCHARGE PLANNING  Goal: Discharge to home or other facility with appropriate resources  Description: INTERVENTIONS:  - Identify barriers to discharge w/patient and caregiver  - Arrange for needed discharge resources and transportation as appropriate  - Identify discharge learning needs (meds, wound care, etc )  - Arrange for interpretive services to assist at discharge as needed  - Refer to Case Management Department for coordinating discharge planning if the patient needs post-hospital services based on physician/advanced practitioner order or complex needs related to functional status, cognitive ability, or social support system  Outcome: Progressing     Problem: Knowledge Deficit  Goal: Patient/family/caregiver demonstrates understanding of disease process, treatment plan, medications, and discharge instructions  Description: Complete learning assessment and assess knowledge base  Interventions:  - Provide teaching at level of understanding  - Provide teaching via preferred learning methods  Outcome: Progressing     Problem: MOBILITY - ADULT  Goal: Maintain or return to baseline ADL function  Description: INTERVENTIONS:  -  Assess patient's ability to carry out ADLs; assess patient's baseline for ADL function and identify physical deficits which impact ability to perform ADLs (bathing, care of mouth/teeth, toileting, grooming, dressing, etc )  - Assess/evaluate cause of self-care deficits   - Assess range of motion  - Assess patient's mobility; develop plan if impaired  - Assess patient's need for assistive devices and provide as appropriate  - Encourage maximum independence but intervene and supervise when necessary  - Involve family in performance of ADLs  - Assess for home care needs following discharge   - Consider OT consult to assist with ADL evaluation and planning for discharge  - Provide patient education as appropriate  Outcome: Progressing  Goal: Maintains/Returns to pre admission functional level  Description: INTERVENTIONS:  - Perform BMAT or MOVE assessment daily    - Set and communicate daily mobility goal to care team and patient/family/caregiver     - Collaborate with rehabilitation services on mobility goals if consulted  - Out of bed for toileting  - Record patient progress and toleration of activity level   Outcome: Progressing     Problem: Potential for Falls  Goal: Patient will remain free of falls  Description: INTERVENTIONS:  - Educate patient/family on patient safety including physical limitations  - Instruct patient to call for assistance with activity   - Consult OT/PT to assist with strengthening/mobility   - Keep Call bell within reach  - Keep bed low and locked with side rails adjusted as appropriate  - Keep care items and personal belongings within reach  - Initiate and maintain comfort rounds  - Make Fall Risk Sign visible to staff  - Apply yellow socks and bracelet for high fall risk patients  - Consider moving patient to room near nurses station  Outcome: Progressing     Problem: Prexisting or High Potential for Compromised Skin Integrity  Goal: Skin integrity is maintained or improved  Description: INTERVENTIONS:  - Identify patients at risk for skin breakdown  - Assess and monitor skin integrity  - Assess and monitor nutrition and hydration status  - Monitor labs   - Assess for incontinence   - Turn and reposition patient  - Assist with mobility/ambulation  - Relieve pressure over bony prominences  - Avoid friction and shearing  - Provide appropriate hygiene as needed including keeping skin clean and dry  - Evaluate need for skin moisturizer/barrier cream  - Collaborate with interdisciplinary team   - Patient/family teaching  - Consider wound care consult   Outcome: Progressing

## 2022-01-27 NOTE — CONSULTS
Consultation - Behavioral Health     Identification Data: Jatinder Castro 60 y o  male MRN: [id number]  Unit/Bed#: 7T [facility] 711-01 Encounter: [id number]    01/27/22  4:43 PM    Inpatient consult to Psychiatry  Consult performed by: DOV Mustafa  Consult ordered by: Levi Sandhu DO        Physician Requesting Consult: Levi Sandhu DO  Principal Problem:Ambulatory dysfunction    Reason for Consult:  "you tell me"    History of Present Illness     Jatinder Castro is a 60 y o  male with a history of dementia who was admitted to the medical service on 12/29/2021 due to right arm cellulitis  Psychiatric consultation was requested due to Patient excessively focusing on fungal infections stating it is making him unable to walk  Per ED Note: Patient with PMH chronic rashes, chronic ambulatory dysfunction presents with concern for infection all over his skin  Symptoms have been constant for 3 months now  He had cellulitis in his legs in the past and thinks that is what is going on now  It has moved to his abdomen and right forearm  He has difficulty ambulating that he states has been for 3 months now due to generalized weakness in the legs  Denies fever or chills  Denies pain  Did not take any medicine for these symptoms  Patient presents in his own wheelchair  On initial psychiatric evaluation Katelyn Gudino was seen while sitting in his wheelchair dressed in regular attire  He was for the most part cooperative with assessment however his behavior is somewhat odd and bizarre  He is frequently asking for either Adderall, Xanax, or Ativan  When this writer attempts to redirect the patient into completing the assessment questions he laughs and states "I am just joking with you, but seriously I need [address]  He is fairly redirectable and pleasant  His affect is somewhat labile however spends majority of the interview smiling    He was able to participate in a fairly meaningful conversation and follow along with structured interview  He continues to believe that he has a fungus all over his body however it is unclear if this is a hallucination or more of a delusion  Corinne Dawson denies auditory hallucinations and when asked if he is seeing shadows or other things that other people can not see Stanley's smiles and says what? Like the fungus?"  Corinne Dawson also reports feeling depressed and we discussed what types of medications worked well for him in the past   He states he was on lithium, Prozac, Zoloft however none of these medications helped him  He states the only medications that help him were Adderall and Xanax  Associated symptoms include sleep disturbances and poor concentration  He also appears easily distracted in conversation  Per chart review and in discussion with the patient, he has never been admitted into a psychiatric hospital   Also per chart review, patient appears to have presented to multiple emergency department due to dementia related behavioral disturbances however they have not resulted in an admission  He is currently homeless and was living at the [facility] however he cannot return there  At this time, Corinne Dawson denies suicidal ideation and homicidal ideation  He states he has not had any previous suicide attempts  Per staff, initially Corinne Dawson was irritable and yelling at staff when he was first admitted (almost 30 days ago)  However, he is now cooperative and pleasant and does not engage in any threatening behavior  After much encouragement and this writer continuing to refuse to prescribe controlled medication, Corinne Dawson was agreeable to starting Aripiprazole (Abilify) 5 mg PO Daily  Will also add Hydroxyzine HCl (Atarax) 25 mg PO Q6hrs PRN for anxiety, Corinne Dawson repeatedly was asking for Xanax however was explained multiple times that this writer will not prescribe controlled medication to the patient due to his drug use history      Psychiatric Review Of Systems:    sleep changes: yes  appetite changes: no  weight changes: no  energy/anergy: no  interest/pleasure/anhedonia: no  somatic symptoms: no  anxiety/panic: yes  beth: no  guilty/hopeless: no  self injurious behavior/risky behavior: no  Suicidal ideation: no  Homicidal ideation: no  Auditory hallucinations: no  Visual hallucinations: Unclear  Other hallucinations: no  Delusional thinking: Unclear    Historical Information     Past Psychiatric History:     Past Inpatient Psychiatric Treatment:   No history of past inpatient psychiatric admissions  Past Outpatient Psychiatric Treatment:    Per chart review cannot find history of past psychiatric treatment    However patient states that they have tried Lithium, Prozac, and Zoloft in the past  Past Suicide Attempts: History of multiple overdoses however patient denies that these were suicide attempts  Past Violent Behavior: no  Past Psychiatric Medication Trials: Prozac, Zoloft and Lithium     Substance Abuse History:    Social History     Tobacco History     Smoking Status  Current Every Day Smoker    Smokeless Tobacco Use  Unknown          Alcohol History     Alcohol Use Status  Yes          Drug Use     Drug Use Status  Yes Types  Cocaine, Fentanyl, Heroin, Methamphetamines          Sexual Activity     Sexually Active  Not Asked          Activities of Daily Living    Not Asked                 I have assessed this patient for substance use within the past 12 months    Alcohol use: On the weekends  Recreational drug use:   Cocaine:  denies current use, However tox screen completed on admission showed positive  Heroin:  denies current use  Marijuana:  Yes and when asked how often he states states wouldn't you like to know  Other drugs: denies use     Family Psychiatric History:     Psychiatric Illness:  unknown  Substance Abuse:  unknown  Suicide Attempts:  unknown    Social History:    Education: unable to obtain  Marital History: Single  Children: "I've maybe got a couple floating around out there"  Living Arrangement: The patient is presently homeless  Occupational History: On disability  Legal History: unable to obtain      Past Medical History:   Diagnosis Date    Arthritis     Hypertension     Sciatica      Past Surgical History:   Procedure Laterality Date    BACK SURGERY           Medical Review Of Systems:    A comprehensive review of systems was negative  Allergies:    No Known Allergies    Medications: All current active medications have been reviewed    Current medications:   Current Facility-Administered Medications   Medication Dose Route Frequency    acetaminophen (TYLENOL) tablet 650 mg  650 mg Oral Q4H PRN    amLODIPine (NORVASC) tablet 10 mg  10 mg Oral Daily    ARIPiprazole (ABILIFY) tablet 5 mg  5 mg Oral Daily    diphenhydrAMINE (BENADRYL) tablet 25 mg  25 mg Oral Q6H PRN    enoxaparin (LOVENOX) subcutaneous injection 40 mg  40 mg Subcutaneous Q24H RADHA    gabapentin (NEURONTIN) capsule 400 mg  400 mg Oral TID    hydrOXYzine HCL (ATARAX) tablet 25 mg  25 mg Oral Q6H PRN    ketorolac (TORADOL) tablet 10 mg  10 mg Oral Q6H PRN    nicotine (NICODERM CQ) 21 mg/24 hr TD 24 hr patch 1 patch  1 patch Transdermal Daily    nystatin (MYCOSTATIN) powder   Topical BID    ondansetron (ZOFRAN-ODT) dispersible tablet 4 mg  4 mg Oral Q6H PRN    polyethylene glycol (MIRALAX) packet 17 g  17 g Oral Daily PRN    terbinafine (LamISIL) tablet 250 mg  250 mg Oral Daily    triamcinolone (KENALOG) 0 1 % cream   Topical BID    white petrolatum-mineral oil (EUCERIN,HYDROCERIN) cream   Topical TID PRN       Objective     Vital signs in last 24 hours:    Temp:  [97 1 °F (36 2 °C)-97 6 °F (36 4 °C)] 97 1 °F (36 2 °C)  HR:  [74-86] 86  Resp:  [17-20] 20  BP: (150-180)/() 169/89    Intake/Output Summary (Last 24 hours) at 1/27/2022 1643  Last data filed at 1/27/2022 1341  Gross per 24 hour   Intake 840 ml   Output 1800 ml   Net -960 ml       Mental Status Evaluation:    Appearance:  age appropriate, casually dressed, Clean shaven   Behavior:  Somewhat bizarre, but overall cooperative and calm with interview   Speech:  hypertalkative   Mood:  Somewhat labile   Affect:  Overbright at times   Language: unable to assess   Thought Process:  disorganized   Associations: intact associations   Thought Content:  Delusions noted that he has fungus all over his skin, no overt paranoia noted on exam   Perceptual Disturbances: no auditory hallucinations, no visual hallucinations, denies when asked, does not appear responding to internal stimuli   Risk Potential: Suicidal ideation - None at present, contracts for safety on the unit, would talk to staff if not feeling safe on the unit  Homicidal ideation - None at present  Potential for aggression - Not at present   Sensorium:  oriented to person, place and time/date   Memory:  recent and remote memory grossly intact   Consciousness:  alert and awake    Attention/Concentration: attention span and concentration appear shorter than expected for age   Intellect: unable to assess   Fund of Knowledge: awareness of current events: yes   Insight:  limited   Judgment: limited   Muscle Strength Muscle Tone: Not assessed  Not assessed   Gait/Station: in wheelchair   Motor Activity: no abnormal movements     Laboratory Results:   I have personally reviewed all pertinent laboratory/tests results    Most Recent Labs:   Lab Results   Component Value Date    WBC 7 90 01/21/2022    RBC 4 35 (L) 01/21/2022    HGB 12 7 (L) 01/21/2022    HCT 37 5 (L) 01/21/2022     01/21/2022    RDW 16 6 (H) 01/21/2022    NEUTROABS 4 50 01/18/2022    SODIUM 137 01/21/2022    K 3 7 01/21/2022     01/21/2022    CO2 31 (H) 01/21/2022    BUN 16 01/21/2022    CREATININE 0 64 (L) 01/21/2022    GLUC 93 01/21/2022    CALCIUM 8 5 01/21/2022    AST 29 12/29/2021    ALT 16 12/29/2021    ALKPHOS 121 12/29/2021    TP 7 5 12/29/2021    ALB 3 8 12/29/2021    TBILI 1 04 12/29/2021    GZF3NYBPIZDS 1 990 01/08/2022       Imaging Studies: No results found  Code Status: Level 1 - Full Code  Advance Directive and Living Will:       Power of :      Assessment/Plan     Principal Problem:    Ambulatory dysfunction  Active Problems:    Homeless    Psoriasis, unspecified    Essential hypertension    Bilateral impacted cerumen    Strange and inexplicable behavior    Onychomycosis      Assessment:    Jose Carlos Villareal is a 60 y o  male with a history of dementia who was admitted to the medical service on 12/29/2021 due to right arm cellulitis  Psychiatric consultation was requested due to patient excessively focusing on fungal infections stating it is making him unable to walk  Patient was fairly cooperative through interaction with writer today  For the most part he was able to answer psychiatric assessment questions and follow along with structured interview  Genesis Jose is rather odd and bizarre  He also endorses feeling depressed, denies anxiety at this time  He continues to believe that he is a fungus all over his body however it is unclear if these are visual hallucinations or delusions  Genesis Mahinpolina states that he does not have a history of inpatient psychiatric admission and this appears to be supported by chart review  He has taken psychiatric medication in the past, so he may have been in treatment at some point  He is currently homeless and has been on the medical unit for 29 days  After much encouragement, he was agreeable to start a noncontrolled medication to help his mood and symptoms  Will start Aripiprazole (Abilify) 5mg PO Daily for mood stability - can increase as needed for mood stability and Start Hydroxyzine (Atarax) 25mg PO PRN Q6HRS for anxiety - this can also be increased as needed    If patient is discharged to a structured setting, would recommend psychiatric follow-up by a psychiatrist   If patient is discharged to the community (homeless shelter, apartment) please initiate outpatient psychiatric referrals  Treatment Plan:   1  All current active medications have been reviewed  2  Encourage group therapy, milieu therapy and occupational therapy  3  At this time, patient does not meet criteria for inpatient psychiatric admission  4  Patient is able to contract for safety at this time and is not in imminent danger to herself  5  Start Aripiprazole (Abilify) 5mg PO Daily for mood stability - Can increase as needed for mood stability  6  Start Hydroxyzine (Atarax) 25mg PO PRN Q6HRS for anxiety  7  Psychiatry will sign off on this patient at this time  Please to not hesitate to re-consult to reach out through tiger text for any questions or concerns  Current Facility-Administered Medications   Medication Dose Route Frequency Provider Last Rate    acetaminophen  650 mg Oral Q4H PRN Salinas Smith PA-C      amLODIPine  10 mg Oral Daily Barnet Bio, DO      ARIPiprazole  5 mg Oral Daily DOV Hayward      diphenhydrAMINE  25 mg Oral Q6H PRN Barnet Bio, DO      enoxaparin  40 mg Subcutaneous Q24H [address] [facility], DO      gabapentin  400 mg Oral TID [place] L Malone, DO      hydrOXYzine HCL  25 mg Oral Q6H PRN DOV Hayward      ketorolac  10 mg Oral Q6H PRN Barnet Bio, DO      nicotine  1 patch Transdermal Daily [place], [place]      nystatin   Topical BID Melchor Montes MD      ondansetron  4 mg Oral Q6H PRN Barnet Bio, DO      polyethylene glycol  17 g Oral Daily PRN Emily August PA-C      terbinafine  250 mg Oral Daily Barnet Bio, DO      triamcinolone   Topical BID Melchor Montes MD      white petrolatum-mineral oil   Topical TID PRN Sara Gonzalez DO       Risks / Benefits of Treatment:    Risks, benefits, and possible side effects of medications explained to patient and patient verbalizes understanding and agreement for treatment  Counseling / Coordination of Care:     Total floor / unit time spent today 50 minutes  Greater than 50% of total time was spent with the patient and / or family counseling and / or coordination of care  A description of the counseling / coordination of care:   Patient's presentation on admission and proposed treatment plan discussed with treatment team   Diagnosis, medication changes and treatment plan reviewed with patient  Importance of medication and treatment compliance reviewed with patient      DOV Marroquin 01/27/22

## 2022-01-27 NOTE — CONSULTS
Consult Note- Podiatry   Evon Diallo 60 y o  male MRN: [id number]  Unit/Bed#: 7T [facility] 711-01 Encounter: [id number]    Assessment/Plan     Assessment:  1  Onychomycosis x 10  2  PVD  3  Pain toes b/l     Plan:  - -pt eval and managed    - Number and complexity of problems addressed:  1 undiagnosed new problem with uncertain prognosis   as shown    - Amount/complexity of data reviewed and analyzed:     Category 1: prior patient notes were analyzed today before evaluating and managing patient  All PMH were discussed with pt today  - Risk of complications: moderate risk of morbidity from additional testing or treatment involved with this patient, which includes but not limited to:    - discussed anatomy, condition, treatment plan and options  They were instructed on proper foot care  The patient was seen today for greater than total of  45-59 minutes     This is total time spent today involving both face-to-face time and non face-to-face time  This time spent includes  reviewing their past medical history  , performing a medically appropriate examination and evaluation of the patient, counseling and educating the patient,  documenting all findings in EMR, and independently interpreting results and communicating results with  patient     and discussing their condition and treatment options, risks, and potential complications  I have discussed the findings of this examination with the patient  The discussion included a complete verbal explanation of the examination results, diagnosis and planned treatment(s)  A schedule for future care needs was explained  The patient has verbalized the understanding of these instructions at this time  If any questions should arise after returning home I have encouraged the patient to feel free to call the office     - d/w pt that discomfort is secondary to toe nail thickening  - Hallucal mycotic nails x10 debrided decreasing thickness by 1 mm     - All questions and concerns addressed  - Podiatry signing off, thank you for the consult  History of Present Illness     HPI:  Jocelyn Correa is a 60 y o  male who presents with painful, elongated toenails  They state that they see no Podiatrist outpatient  They have difficulty applying their socks and shoes due to the elongation of the nails  The pressure within their shoe gear is painful and they have been unable to cut their nails adequately  Patient states pain is 1/10 in shoe gear  Pain with pressure  Requires at risk foot care  Inpatient consult to Podiatry  Consult performed by: Mary Johnson DPM  Consult ordered by: Dinah Walls DO        Review of Systems   Constitutional: Negative  HENT: Negative  Eyes: Negative  Respiratory: Negative  Cardiovascular: Negative  Gastrointestinal: Negative  Musculoskeletal: Negative   Skin: elongated thickened toenails   Neurological: Negative  Historical Information   Past Medical History:   Diagnosis Date    Arthritis     Hypertension     Sciatica      Past Surgical History:   Procedure Laterality Date    BACK SURGERY       Social History   Social History     Substance and Sexual Activity   Alcohol Use Yes     Social History     Substance and Sexual Activity   Drug Use Yes    Types: Heroin, Methamphetamines, Cocaine, Fentanyl     Social History     Tobacco Use   Smoking Status Current Every Day Smoker   Smokeless Tobacco Not on file     Family History: History reviewed  No pertinent family history      Meds/Allergies   Medications Prior to Admission   Medication    [] cephalexin (KEFLEX) 500 mg capsule    potassium chloride (K-DUR,KLOR-CON) 20 mEq tablet    [] sulfamethoxazole-trimethoprim (BACTRIM DS) 800-160 mg per tablet     No Known Allergies    Objective   First Vitals:   Blood Pressure: (!) 198/105 (21)  Pulse: 75 (21)  Temperature: 98 5 °F (36 9 °C) (21)  Temp Source: Oral (21)  Respirations: 16 (12/29/21 1954)  Height: 5' 10" (177 8 cm) (12/30/21 0939)  Weight - Scale: 77 3 kg (170 lb 6 4 oz) (12/29/21 1742)  SpO2: 97 % (12/29/21 1742)    Current Vitals:   Blood Pressure: 163/92 (01/27/22 0721)  Pulse: 74 (01/27/22 0715)  Temperature: 97 6 °F (36 4 °C) (01/27/22 0715)  Temp Source: Temporal (01/27/22 0715)  Respirations: 17 (01/27/22 0715)  Height: 5' 10" (177 8 cm) (12/30/21 0939)  Weight - Scale: 75 3 kg (166 lb 0 1 oz) (12/30/21 0939)  SpO2: 98 % (01/27/22 0715)    /92 (BP Location: Left arm)   Pulse 74   Temp 97 6 °F (36 4 °C) (Temporal)   Resp 17   Ht 5' 10" (1 778 m)   Wt 75 3 kg (166 lb 0 1 oz)   SpO2 98%   BMI 23 82 kg/m²     General Appearance:    Alert, cooperative, no distress   Head:    Normocephalic, without obvious abnormality, atraumatic   Eyes:    PERRL, conjunctiva/corneas clear, EOM's intact            Nose:   Moist mucous membranes, no drainage or sinus tenderness   Throat:   No tenderness, no exudates   Neck:   Supple, symmetrical, trachea midline, no JVD   Back:     Symmetric, no CVA tenderness   Lungs:     Clear to auscultation bilaterally, respirations unlabored   Chest wall:    No tenderness or deformity   Heart:    Regular rate and rhythm, S1 and S2 normal, no murmur, rub   or gallop   Abdomen:     Soft, non-tender, bowel sounds active all four quadrants,     no masses, no organomegaly     Extremities:   MMT is 4/5 to all compartments of the LE, +1/4 edema B/L, Digital ROM is intact,    Pulses:   R DP is +1/4, R PT is +1/4, L DP is +1/4, L PT is +1/4, CFT< 3sec to all digits  Thin/shiny skin noted to the B/L LE, pigmentary changes to B/L LE  Absent digital hair growth b/l  Nail thickening b/l  Skin:   Nails are yellow discolored, thickened, elongated, with notable subungual debris and > 2 mm thickness noted to toenails 1-5 B/L  No open Lesions  Neurologic:   CNII-XII intact  Normal strength, sensation and reflexes       Throughout   Gross sensation is intact  Protective sensation is diminished       Lab Results:   No results displayed because visit has over 200 results  Imaging: I have personally reviewed pertinent films in PACS  EKG, Pathology, and Other Studies: I have personally reviewed pertinent reports        Code Status: Level 1 - Full Code  Advance Directive and Living Will:      Power of :    POLST:

## 2022-01-27 NOTE — ASSESSMENT & PLAN NOTE
· Patient was to be discharged to the [facility] rescue mission however they would not take him back  · PT recommending post acute rehab placement

## 2022-01-27 NOTE — ASSESSMENT & PLAN NOTE
Patient with hyper fixation on fungal infection arms (which was treated), states he can feel it moving throughout his body  Sometimes with strange tangential thoughts    -psych consult

## 2022-01-27 NOTE — CASE MANAGEMENT
Case Management Discharge Planning Note    Patient name Ava Brown  Location 7T U 711/7T [facility] 895-41 MRN [id number]  : 1965 Date 2022       Current Admission Date: 2021  Current Admission Diagnosis:Ambulatory dysfunction   Patient Active Problem List    Diagnosis Date Noted    Strange and inexplicable behavior     Onychomycosis 2022    Bilateral impacted cerumen 2022    Essential hypertension 2022    Psoriasis, unspecified 2022    Ambulatory dysfunction 2022    Homeless 2021      LOS (days): 28  Geometric Mean LOS (GMLOS) (days): 3 20  Days to GMLOS:-24 6     OBJECTIVE:  Risk of Unplanned Readmission Score: 9         Current admission status: Inpatient   Preferred Pharmacy:   [address], [address] [facility]  [address]  [address]  Phone: [phone] Fax: [phone]    [facility] [facility],[address], [address]  [place] [address]  Phone: [phone] Fax: [phone]    Primary Care Provider: Akila Garcia MD    Primary Insurance: [facility]  Secondary Insurance:     DISCHARGE DETAILS: CM spoke with  APS  to follow up with discharge planning for the pt  APS: Soo ph: [phone]  Carrie Khan gave  list of personal care home over the phone  for the pt as followed:    [facility]: Ph: [phone]  [facility]: [facility] [address]: [phone]  [facility] manor: [address]: Evan Soria 1: [address]: [address] [place] [address] ph: [phone]  Nathaniel Daniels ph: Jaquan 12: Ph:  [address] [facility] Ph: [phone]     CM will be reaching out to above [facility] to see if they have bed available    CM department will continue to follow through pt's D/C

## 2022-01-27 NOTE — PROGRESS NOTES
[facility]  Progress Note [place] 1965, 60 y o  male MRN: [id number]  Unit/Bed#: 7T [facility] 711-01 Encounter: [id number]  Primary Care Provider: Valentín Blankenship MD   Date and time admitted to hospital: 12/29/2021  5:38 PM    * Ambulatory dysfunction  Assessment & Plan  · Patient believes that his ambulatory dysfunction as directly related to 'being wrapped up in a fungus'  · His participation with PT has been limited due to the above and therefore his progress seems more self impeded at this point  · Patient believes gabapentin and steroids will help with his discomfort and fungus and therefore help improve his progress with ambulation  · Continue gabapentin 400 milligrams t i d  · PT/OT recommending post acute rehab  · Case management following to aide in discharge planning  · Medically stable for discharge    Onychomycosis  Assessment & Plan  Present on almost all toes and fingers  -podiatry consult  -terbinafine 250 mg p o   Daily for 12 weeks    Strange and inexplicable behavior  Assessment & Plan  Patient with hyper fixation on fungal infection arms (which was treated), states he can feel it moving throughout his body  Sometimes with strange tangential thoughts    -psych consult     Bilateral impacted cerumen  Assessment & Plan  · Complained of decreased hearing and that his ears feel full  · There was a significant amount of cerumen in each ear  · Nursing order placed to remove cerumen  · Debrox drops through 1/27    Essential hypertension  Assessment & Plan  · Continue amlodipine 10 mg daily    Psoriasis, unspecified  Assessment & Plan  · Patient with psoriasis, was on Enbrel in the past however was lost to follow-up  · Discussed with dermatology- appreciate recommendations for topical creams  · Patient will need outpatient Dermatology and Rheumatology follow-up      Homeless  Assessment & Plan  · Patient was to be discharged to the [facility] rescue mission however they would not take him back  · PT recommending post acute rehab placement        VTE Pharmacologic Prophylaxis: VTE Score: 1 Lovenox    Patient Centered Rounds: I performed bedside rounds with nursing staff today  Discussions with Specialists or Other Care Team Provider:  None    Education and Discussions with Family / Patient:  Patient, all questions answered    Time Spent for Care: 30 minutes  More than 50% of total time spent on counseling and coordination of care as described above  Current Length of Stay: 28 day(s)  Current Patient Status: Inpatient   Certification Statement: The patient will continue to require additional inpatient hospital stay due to Pending placement into skilled nursing facility  Discharge Plan:  Pending bed availability    Code Status: Level 1 - Full Code    Subjective:   Patient seen examined bedside  Patient resting in bed  Patient continues to complain of fungal infection spreading throughout his body, states that is making him unable to walk properly    Objective:     Vitals:   Temp (24hrs), Av 7 °F (36 5 °C), Min:97 5 °F (36 4 °C), Max:98 1 °F (36 7 °C)    Temp:  [97 5 °F (36 4 °C)-98 1 °F (36 7 °C)] 97 6 °F (36 4 °C)  HR:  [74-77] 74  Resp:  [17-18] 17  BP: (150-180)/() 163/92  SpO2:  [97 %-98 %] 98 %  Body mass index is 23 82 kg/m²  Input and Output Summary (last 24 hours): Intake/Output Summary (Last 24 hours) at 2022 1107  Last data filed at 2022 0519  Gross per 24 hour   Intake 740 ml   Output 2200 ml   Net -1460 ml       Physical Exam:   Physical Exam  Cardiovascular:      Rate and Rhythm: Normal rate and regular rhythm  Pulses: Normal pulses  Heart sounds: Normal heart sounds  Pulmonary:      Effort: Pulmonary effort is normal       Breath sounds: Normal breath sounds  Abdominal:      General: Abdomen is flat  Bowel sounds are normal       Palpations: Abdomen is soft     Musculoskeletal:      Cervical back: Normal range of motion  Skin:     General: Skin is warm and dry  Comments: Onychomycosis of finger and toe nails  Psoriatic rash throughout body   Neurological:      General: No focal deficit present  Mental Status: He is alert and oriented to person, place, and time  Mental status is at baseline  Psychiatric:         Mood and Affect: Mood normal           Additional Data:     Labs:  Results from last 7 days   Lab Units 01/21/22  0433   WBC Thousand/uL 7 90   HEMOGLOBIN g/dL 12 7*   HEMATOCRIT % 37 5*   PLATELETS Thousands/uL 323     Results from last 7 days   Lab Units 01/21/22  0433   SODIUM mmol/L 137   POTASSIUM mmol/L 3 7   CHLORIDE mmol/L 102   CO2 mmol/L 31*   BUN mg/dL 16   CREATININE mg/dL 0 64*   ANION GAP mmol/L 4*   CALCIUM mg/dL 8 5   GLUCOSE RANDOM mg/dL 93                       Lines/Drains:  Invasive Devices  Report    None                       Imaging: No pertinent imaging reviewed      Recent Cultures (last 7 days):         Last 24 Hours Medication List:   Current Facility-Administered Medications   Medication Dose Route Frequency Provider Last Rate    acetaminophen  650 mg Oral Q4H PRN Rita Sánchez PA-C      amLODIPine  10 mg Oral Daily Khang Beck DO      diphenhydrAMINE  25 mg Oral Q6H PRN Khang Beck,       enoxaparin  40 mg Subcutaneous Q24H [address] [facility], DO      gabapentin  400 mg Oral TID [place] L Malone, DO      ketorolac  10 mg Oral Q6H PRN Khang Beck, DO      nicotine  1 patch Transdermal Daily Desma [place], [place]      NON FORMULARY  250 mg Oral Daily Khang Beck, DO      nystatin   Topical BID Sonny Carmen MD      ondansetron  4 mg Oral Q6H PRN Khang Beck,       polyethylene glycol  17 g Oral Daily PRN Saintclair Levee, PA-C      triamcinolone   Topical BID Sonny Carmen MD      white petrolatum-mineral oil   Topical TID PRN Kev Jiménez DO          Today, Patient Was Seen By: Khang Beck DO    **Please Note: This note may have been constructed using a voice recognition system  **

## 2022-01-27 NOTE — ASSESSMENT & PLAN NOTE
Present on almost all toes and fingers  -podiatry consult  -terbinafine 250 mg p o   Daily for 12 weeks

## 2022-01-27 NOTE — PLAN OF CARE
Problem: PAIN - ADULT  Goal: Verbalizes/displays adequate comfort level or baseline comfort level  Description: Interventions:  - Encourage patient to monitor pain and request assistance  - Assess pain using appropriate pain scale  - Administer analgesics based on type and severity of pain and evaluate response  - Implement non-pharmacological measures as appropriate and evaluate response  - Consider cultural and social influences on pain and pain management  - Notify physician/advanced practitioner if interventions unsuccessful or patient reports new pain  Outcome: Progressing     Problem: INFECTION - ADULT  Goal: Absence or prevention of progression during hospitalization  Description: INTERVENTIONS:  - Assess and monitor for signs and symptoms of infection  - Monitor lab/diagnostic results  - Monitor all insertion sites, i e  indwelling lines, tubes, and drains  - Monitor endotracheal if appropriate and nasal secretions for changes in amount and color  - [place] appropriate cooling/warming therapies per order  - Administer medications as ordered  - Instruct and encourage patient and family to use good hand hygiene technique  - Identify and instruct in appropriate isolation precautions for identified infection/condition  Outcome: Progressing     Problem: MOBILITY - ADULT  Goal: Maintain or return to baseline ADL function  Description: INTERVENTIONS:  -  Assess patient's ability to carry out ADLs; assess patient's baseline for ADL function and identify physical deficits which impact ability to perform ADLs (bathing, care of mouth/teeth, toileting, grooming, dressing, etc )  - Assess/evaluate cause of self-care deficits   - Assess range of motion  - Assess patient's mobility; develop plan if impaired  - Assess patient's need for assistive devices and provide as appropriate  - Encourage maximum independence but intervene and supervise when necessary  - Involve family in performance of ADLs  - Assess for home care needs following discharge   - Consider OT consult to assist with ADL evaluation and planning for discharge  - Provide patient education as appropriate  Outcome: Progressing

## 2022-01-27 NOTE — PHYSICAL THERAPY NOTE
Physical TherapyTreatment Note    Patient's Name: Sylvia Bray    Admitting Diagnosis  Cellulitis [L03 90]  Homeless [Z59 00]  Wound check, abscess [Z51 89]    Problem List  Patient Active Problem List   Diagnosis    Homeless    Psoriasis, unspecified    Ambulatory dysfunction    Essential hypertension    Bilateral impacted cerumen       Past Medical History  Past Medical History:   Diagnosis Date    Arthritis     Hypertension     Sciatica        Past Surgical History  Past Surgical History:   Procedure Laterality Date    BACK SURGERY         Recent Imaging  No orders to display       Recent Vital Signs  Vitals:    01/26/22 1459 01/26/22 2300 01/27/22 0715 01/27/22 0721   BP: 159/91 150/80 (!) 180/108 163/92   BP Location: Left arm Left arm Left arm Left arm   Pulse: 77 75 74    Resp: 18 18 17    Temp: 98 1 °F (36 7 °C) 97 5 °F (36 4 °C) 97 6 °F (36 4 °C)    TempSrc: Temporal Temporal Temporal    SpO2: 97% 97% 98%    Weight:       Height:           PT Treatment Time: 45 minutes       01/26/22 1405   PT Last Visit   PT Visit Date 01/26/22   Note Type   Note Type Treatment   Pain Assessment   Pain Assessment Tool 0-10   Pain Score 8   Pain Location/Orientation Orientation: Left; Location: Groin   Restrictions/Precautions   Weight Bearing Precautions Per Order No   Other Precautions Fall Risk;Pain;Cognitive   General   Chart Reviewed Yes   Response to Previous Treatment Patient with no complaints from previous session  Family/Caregiver Present No   Cognition   Overall Cognitive Status Impaired   Arousal/Participation Alert; Cooperative   Attention Attends with cues to redirect   Orientation Level Oriented X4   Memory Within functional limits   Following Commands Follows one step commands without difficulty   Comments pt seemingly with delusions that something is "wrapping him up" making him unable to walk or move, pt points to arms and says "see its this black stuff its all wrapped around me" nothing visable at areas pt is referencing    Bed Mobility   Rolling R 6  Modified independent   Additional items Increased time required   Rolling L 6  Modified independent   Additional items Increased time required   Supine to Sit 6  Modified independent   Additional items Increased time required   Sit to Supine 6  Modified independent   Additional items Increased time required   Transfers   Sit to Stand 4  Minimal assistance   Additional items Assist x 1; Armrests; Increased time required;Verbal cues   Stand to Sit 4  Minimal assistance   Additional items Assist x 1; Armrests; Increased time required;Verbal cues   Additional Comments with RW   Ambulation/Elevation   Gait pattern Step through pattern;Decreased toe off;Decreased heel strike; Ataxia; Foward flexed;Decreased foot clearance;Narrow HARJIT; Improper Weight shift;R Knee Katerine;L Knee Katerine   Gait Assistance 4  Minimal assist   Additional items Assist x 1;Verbal cues; Tactile cues   Assistive Device Rolling walker   Distance 30ft x4, 50ft   Balance   Static Sitting Fair +   Dynamic Sitting Fair +   Static Standing Fair -   Dynamic Standing Poor +   Ambulatory Poor +   Endurance Deficit   Endurance Deficit Yes   Endurance Deficit Description pain and self limited   Activity Tolerance   Activity Tolerance Patient limited by pain   Medical Staff Made Aware spoke to CM   Nurse Made Aware spoke to RN   Exercises   Hip Flexion Sitting;25 reps;AROM; Bilateral   Hip Abduction Sitting;25 reps;AROM; Bilateral   Hip Adduction Sitting;25 reps;AROM; Bilateral   Knee AROM Long Arc Quad Sitting;25 reps;AROM; Bilateral   Assessment   Prognosis Fair   Problem List Decreased strength;Decreased endurance; Impaired balance;Decreased mobility; Impaired sensation;Pain;Decreased coordination;Decreased cognition; Impaired judgement;Decreased safety awareness   Assessment Pt continues to ambulate with altered gait pattern with mild LE ataxia  He reports pain in the groin   Reports that legs do not fatigue with ambulation and are not painful however reports feels that he is limited due to "this stuff that is wrapped around me"  Pt performance varies greatly depending on his perception of how much "stuff is wrapped around him"  Unclear if pt is experiencing visual or tactile hallucinations at this time  While his is likely deconditioned especially in LEs due to hospital stay and extended time using w/c there is also likely aspect of self limiting unable to determine if purposeful or due to delusions  Barriers to Discharge Inaccessible home environment;Decreased caregiver support   Goals   Patient Goals "to walk out of here"   STG Expiration Date 02/03/22   Short Term Goal #1 see eval note   PT Treatment Day 5   Plan   Treatment/Interventions ADL retraining;Functional transfer training;LE strengthening/ROM; Elevations; Therapeutic exercise; Endurance training;Patient/family training;Equipment eval/education; Bed mobility;Gait training;Spoke to nursing;Spoke to case management;OT   Progress Progressing toward goals   PT Frequency 3-5x/wk   Recommendation   PT Discharge Recommendation Post acute rehabilitation services   Equipment Recommended [facility] Recommended Wheeled walker   [address] Mobility Inpatient   Turning in Bed Without Bedrails 4   Lying on Back to Sitting on Edge of Flat Bed 4   Moving Bed to Chair 3   Standing Up From Chair 3   Walk in Room 2   Climb 3-5 Stairs 2   Basic Mobility Inpatient Raw Score 18   Basic Mobility Standardized Score 41 05   Highest Level Of Mobility   JH-HLM Goal 6: Walk 10 steps or more   JH-HLM Highest Level of Mobility 7: Walk 25 feet or more   JH-HLM Goal Achieved Yes   Education   Education Provided Mobility training;Home exercise program;Assistive device   Patient Explanation/teachback used;Demonstrates verbal understanding;Reinforcement needed   End of Consult   Patient Position at End of Consult All needs within reach; Supine       Donavon Hill PT, DPT

## 2022-01-27 NOTE — PLAN OF CARE
Problem: PHYSICAL THERAPY ADULT  Goal: Performs mobility at highest level of function for planned discharge setting  See evaluation for individualized goals  Description: Treatment/Interventions: ADL retraining,Functional transfer training,LE strengthening/ROM,Elevations,Therapeutic exercise,Endurance training,Patient/family training,Equipment eval/education,Bed mobility,Gait training,Spoke to nursing,Spoke to case management,OT  Equipment Recommended: Victoriano Ugalde       See flowsheet documentation for full assessment, interventions and recommendations  Outcome: Progressing  Note: Prognosis: Fair  Problem List: Decreased strength,Decreased endurance,Impaired balance,Decreased mobility,Impaired sensation,Pain,Decreased coordination,Decreased cognition,Impaired judgement,Decreased safety awareness  Assessment: Pt continues to ambulate with altered gait pattern with mild LE ataxia  He reports pain in the groin  Reports that legs do not fatigue with ambulation and are not painful however reports feels that he is limited due to "this stuff that is wrapped around me"  Pt performance varies greatly depending on his perception of how much "stuff is wrapped around him"  Unclear if pt is experiencing visual or tactile hallucinations at this time  While his is likely deconditioned especially in LEs due to hospital stay and extended time using w/c there is also likely aspect of self limiting unable to determine if purposeful or due to delusions  Barriers to Discharge: Inaccessible home environment,Decreased caregiver support        PT Discharge Recommendation: Post acute rehabilitation services          See flowsheet documentation for full assessment

## 2022-01-28 ENCOUNTER — APPOINTMENT (INPATIENT)
Dept: CT IMAGING | Facility: HOSPITAL | Age: 57
DRG: 603 | End: 2022-01-28
Payer: COMMERCIAL

## 2022-01-28 PROBLEM — H61.23 BILATERAL IMPACTED CERUMEN: Status: RESOLVED | Noted: 2022-01-23 | Resolved: 2022-01-28

## 2022-01-28 LAB
ALBUMIN SERPL BCP-MCNC: 3.5 G/DL (ref 3–5.2)
ALP SERPL-CCNC: 88 U/L (ref 43–122)
ALT SERPL W P-5'-P-CCNC: 16 U/L
ANION GAP SERPL CALCULATED.3IONS-SCNC: 2 MMOL/L (ref 5–14)
AST SERPL W P-5'-P-CCNC: 23 U/L (ref 17–59)
BILIRUB SERPL-MCNC: 0.42 MG/DL
BUN SERPL-MCNC: 19 MG/DL (ref 5–25)
CALCIUM SERPL-MCNC: 8.5 MG/DL (ref 8.4–10.2)
CHLORIDE SERPL-SCNC: 105 MMOL/L (ref 97–108)
CO2 SERPL-SCNC: 34 MMOL/L (ref 22–30)
CREAT SERPL-MCNC: 0.73 MG/DL (ref 0.7–1.5)
ERYTHROCYTE [DISTWIDTH] IN BLOOD BY AUTOMATED COUNT: 16.8 %
GFR SERPL CREATININE-BSD FRML MDRD: 102 ML/MIN/1.73SQ M
GLUCOSE SERPL-MCNC: 86 MG/DL (ref 70–99)
HCT VFR BLD AUTO: 35.9 % (ref 41–53)
HGB BLD-MCNC: 12 G/DL (ref 13.5–17.5)
MCH RBC QN AUTO: 28.8 PG (ref 26–34)
MCHC RBC AUTO-ENTMCNC: 33.5 G/DL (ref 31–36)
MCV RBC AUTO: 86 FL (ref 80–100)
PLATELET # BLD AUTO: 318 THOUSANDS/UL (ref 150–450)
PMV BLD AUTO: 7.1 FL (ref 8.9–12.7)
POTASSIUM SERPL-SCNC: 4.2 MMOL/L (ref 3.6–5)
PROT SERPL-MCNC: 6.8 G/DL (ref 5.9–8.4)
RBC # BLD AUTO: 4.18 MILLION/UL (ref 4.5–5.9)
SODIUM SERPL-SCNC: 141 MMOL/L (ref 137–147)
WBC # BLD AUTO: 7 THOUSAND/UL (ref 4.5–11)

## 2022-01-28 PROCEDURE — 99232 SBSQ HOSP IP/OBS MODERATE 35: CPT | Performed by: STUDENT IN AN ORGANIZED HEALTH CARE EDUCATION/TRAINING PROGRAM

## 2022-01-28 PROCEDURE — 74177 CT ABD & PELVIS W/CONTRAST: CPT

## 2022-01-28 PROCEDURE — G1004 CDSM NDSC: HCPCS

## 2022-01-28 PROCEDURE — 85027 COMPLETE CBC AUTOMATED: CPT | Performed by: STUDENT IN AN ORGANIZED HEALTH CARE EDUCATION/TRAINING PROGRAM

## 2022-01-28 PROCEDURE — 80053 COMPREHEN METABOLIC PANEL: CPT | Performed by: STUDENT IN AN ORGANIZED HEALTH CARE EDUCATION/TRAINING PROGRAM

## 2022-01-28 RX ORDER — OXYCODONE HYDROCHLORIDE 5 MG/1
2.5 TABLET ORAL EVERY 6 HOURS PRN
Status: DISCONTINUED | OUTPATIENT
Start: 2022-01-28 | End: 2022-01-29

## 2022-01-28 RX ORDER — POLYETHYLENE GLYCOL 3350 17 G/17G
17 POWDER, FOR SOLUTION ORAL DAILY
Status: DISCONTINUED | OUTPATIENT
Start: 2022-01-29 | End: 2022-03-11 | Stop reason: HOSPADM

## 2022-01-28 RX ORDER — TRAMADOL HYDROCHLORIDE 50 MG/1
50 TABLET ORAL ONCE
Status: COMPLETED | OUTPATIENT
Start: 2022-01-28 | End: 2022-01-28

## 2022-01-28 RX ORDER — AMOXICILLIN 250 MG
1 CAPSULE ORAL
Status: DISCONTINUED | OUTPATIENT
Start: 2022-01-28 | End: 2022-01-29

## 2022-01-28 RX ADMIN — SENNOSIDES AND DOCUSATE SODIUM 1 TABLET: 50; 8.6 TABLET ORAL at 21:05

## 2022-01-28 RX ADMIN — OXYCODONE HYDROCHLORIDE 2.5 MG: 5 TABLET ORAL at 16:35

## 2022-01-28 RX ADMIN — NYSTATIN 1 APPLICATION: 100000 POWDER TOPICAL at 17:03

## 2022-01-28 RX ADMIN — TERBINAFINE HYDROCHLORIDE 250 MG: 250 TABLET ORAL at 08:55

## 2022-01-28 RX ADMIN — GABAPENTIN 400 MG: 400 CAPSULE ORAL at 16:32

## 2022-01-28 RX ADMIN — ARIPIPRAZOLE 5 MG: 5 TABLET ORAL at 08:46

## 2022-01-28 RX ADMIN — IOHEXOL 100 ML: 350 INJECTION, SOLUTION INTRAVENOUS at 13:53

## 2022-01-28 RX ADMIN — NYSTATIN 1 APPLICATION: 100000 POWDER TOPICAL at 08:46

## 2022-01-28 RX ADMIN — TRIAMCINOLONE ACETONIDE 1 APPLICATION: 1 CREAM TOPICAL at 17:03

## 2022-01-28 RX ADMIN — GABAPENTIN 400 MG: 400 CAPSULE ORAL at 08:46

## 2022-01-28 RX ADMIN — GABAPENTIN 400 MG: 400 CAPSULE ORAL at 21:05

## 2022-01-28 RX ADMIN — TRAMADOL HYDROCHLORIDE 50 MG: 50 TABLET, FILM COATED ORAL at 11:38

## 2022-01-28 RX ADMIN — AMLODIPINE BESYLATE 10 MG: 10 TABLET ORAL at 08:46

## 2022-01-28 RX ADMIN — ENOXAPARIN SODIUM 40 MG: 100 INJECTION SUBCUTANEOUS at 08:46

## 2022-01-28 RX ADMIN — TRIAMCINOLONE ACETONIDE 1 APPLICATION: 1 CREAM TOPICAL at 08:47

## 2022-01-28 RX ADMIN — OXYCODONE HYDROCHLORIDE 2.5 MG: 5 TABLET ORAL at 23:21

## 2022-01-28 NOTE — CASE MANAGEMENT
Case Management Discharge Planning Note    Patient name Trenton Leon  Location 7T /7T -97 MRN [id number]  : 1965 Date 2022       Current Admission Date: 2021  Current Admission Diagnosis:Ambulatory dysfunction   Patient Active Problem List    Diagnosis Date Noted    Strange and inexplicable behavior     Onychomycosis 2022    Essential hypertension 2022    Psoriasis, unspecified 2022    Ambulatory dysfunction 2022    Homeless 2021      LOS (days): 29  Geometric Mean LOS (GMLOS) (days): 3 20  Days to GMLOS:-25 8     OBJECTIVE:  Risk of Unplanned Readmission Score: 18         Current admission status: Inpatient   Preferred Pharmacy:   [facility], [address]  Phone: [phone] Fax: [phone]    [facility],[address], [address] [place] [address]  Phone: [phone] Fax: [phone]    Primary Care Provider: Yolanda Cazares MD    Primary Insurance: [facility] REP  Secondary Insurance:     DISCHARGE DETAILS: CM sent referral to [facility]  CM sent H&P, Med list, PT/OT notes,  Pending bed availability  [address] [place] [address] stated that they will call CM if they have a bed available    CM department will continue to follow through pt's D/C

## 2022-01-28 NOTE — ASSESSMENT & PLAN NOTE
Present on almost all toes and fingers  Podiatry performed nail debridement on 01/27  -terbinafine 250 mg p o   Daily for 12 weeks

## 2022-01-28 NOTE — ASSESSMENT & PLAN NOTE
· Patient was to be discharged to the [facility] rescue mission however they would not take him back  · PT recommending post acute rehab placement

## 2022-01-28 NOTE — PHYSICAL THERAPY NOTE
Physical TherapyTreatment Note    Patient's Name: Manju Ley    Admitting Diagnosis  Cellulitis [L03 90]  Homeless [Z59 00]  Wound check, abscess [Z51 89]    Problem List  Patient Active Problem List   Diagnosis    Homeless    Psoriasis, unspecified    Ambulatory dysfunction    Essential hypertension    Strange and inexplicable behavior    Onychomycosis       Past Medical History  Past Medical History:   Diagnosis Date    Arthritis     Hypertension     Sciatica        Past Surgical History  Past Surgical History:   Procedure Laterality Date    BACK SURGERY         Recent Imaging  CT abdomen pelvis w contrast    (Results Pending)       Recent Vital Signs  Vitals:    01/27/22 0721 01/27/22 1513 01/27/22 2257 01/28/22 0700   BP: 163/92 169/89 133/68 149/85   BP Location: Left arm Left arm Left arm Left arm   Pulse:  86 80 61   Resp:  20 18 20   Temp:  (!) 97 1 °F (36 2 °C) (!) 97 °F (36 1 °C) (!) 96 2 °F (35 7 °C)   TempSrc:  Temporal Temporal Temporal   SpO2:  98% 98% 96%   Weight:       Height:           PT Treatment Time: 45 minutes       01/27/22 1545   PT Last Visit   PT Visit Date 01/27/22   Note Type   Note Type Treatment   Pain Assessment   Pain Assessment Tool 0-10   Pain Score 6   Pain Location/Orientation Location: Generalized   Restrictions/Precautions   Weight Bearing Precautions Per Order No   Other Precautions Fall Risk;Pain;Cognitive   General   Chart Reviewed Yes   Response to Previous Treatment Patient with no complaints from previous session  Family/Caregiver Present No   Cognition   Overall Cognitive Status Impaired   Arousal/Participation Alert; Cooperative   Attention Attends with cues to redirect   Orientation Level Oriented X4   Memory Within functional limits   Following Commands Follows one step commands without difficulty   Bed Mobility   Rolling R 6  Modified independent   Rolling L 6  Modified independent   Supine to Sit 6  Modified independent   Sit to Supine 6  Modified independent   Transfers   Sit to Stand 4  Minimal assistance   Additional items Assist x 1; Armrests; Increased time required;Verbal cues   Stand to Sit 4  Minimal assistance   Additional items Assist x 1; Armrests; Increased time required;Verbal cues   Additional Comments with RW   Ambulation/Elevation   Gait pattern Step through pattern;Decreased toe off;Decreased heel strike; Ataxia; Foward flexed;Decreased foot clearance;Narrow HARJIT; Improper Weight shift   Gait Assistance 4  Minimal assist   Additional items Assist x 1;Verbal cues; Tactile cues   Assistive Device Rolling walker   Distance 120ft, 50ft x2, 100ft   Balance   Static Sitting Fair +   Dynamic Sitting Fair +   Static Standing Fair -   Dynamic Standing Poor +   Ambulatory Poor +   Endurance Deficit   Endurance Deficit Yes   Endurance Deficit Description fatigue   Activity Tolerance   Activity Tolerance Patient limited by fatigue   Medical Staff Made Aware spoke to CM   Nurse Made Aware spoke to RN   Assessment   Prognosis Fair   Problem List Decreased strength;Decreased endurance; Impaired balance;Decreased mobility; Impaired sensation;Pain;Decreased coordination;Decreased cognition; Impaired judgement;Decreased safety awareness   Assessment Pt is improving ambulation tolerance  Gait pattern degrades with increased distance with pt scissoring more as he fatigues  He is able to recover with seated rest however decreased tolerance for ambulation with more attempts  Barriers to Discharge Inaccessible home environment;Decreased caregiver support   Goals   Patient Goals to get to rehab   STG Expiration Date 02/03/22   Short Term Goal #1 see eval note   PT Treatment Day 6   Plan   Treatment/Interventions ADL retraining;Functional transfer training;LE strengthening/ROM; Therapeutic exercise; Endurance training;Elevations; Patient/family training;Bed mobility; Equipment eval/education;Gait training;Spoke to nursing;Spoke to case management;OT   Progress Progressing toward goals   PT Frequency 3-5x/wk   Recommendation   PT Discharge Recommendation Post acute rehabilitation services   Equipment Recommended [facility] Recommended Wheeled walker   AM-PAC Basic Mobility Inpatient   Turning in Bed Without Bedrails 4   Lying on Back to Sitting on Edge of Flat Bed 4   Moving Bed to Chair 3   Standing Up From Chair 2   Walk in Room 2   Climb 3-5 Stairs 2   Basic Mobility Inpatient Raw Score 17   Basic Mobility Standardized Score 39 67   Highest Level Of Mobility   JH-HLM Goal 5: Stand one or more mins   JH-HLM Highest Level of Mobility 8: Walk 250 feet ot more   JH-HLM Goal Achieved Yes   Education   Education Provided Mobility training;Home exercise program;Assistive device   Patient Explanation/teachback used;Demonstrates verbal understanding   End of Consult   Patient Position at End of Consult All needs within reach; Supine       Corey Conroy PT, DPT

## 2022-01-28 NOTE — ASSESSMENT & PLAN NOTE
Patient with hyper fixation on fungal infection arms (which was treated), states he can feel it moving throughout his body  Sometimes with strange tangential thoughts  Psychiatry consulted, not appropriate for inpatient psychiatry unit at this time    -continue Abilify 5 mg p o  daily  -continue Atarax 25 mg p o  Q 6 hours p r n   For anxiety

## 2022-01-28 NOTE — PLAN OF CARE
Problem: PHYSICAL THERAPY ADULT  Goal: Performs mobility at highest level of function for planned discharge setting  See evaluation for individualized goals  Description: Treatment/Interventions: ADL retraining,Functional transfer training,LE strengthening/ROM,Therapeutic exercise,Endurance training,Elevations,Patient/family training,Bed mobility,Equipment eval/education,Gait training,Spoke to nursing,Spoke to case management,OT  Equipment Recommended: [employer] Cranker       See flowsheet documentation for full assessment, interventions and recommendations  Outcome: Progressing  Note: Prognosis: Fair  Problem List: Decreased strength,Decreased endurance,Impaired balance,Decreased mobility,Impaired sensation,Pain,Decreased coordination,Decreased cognition,Impaired judgement,Decreased safety awareness  Assessment: Pt is improving ambulation tolerance  Gait pattern degrades with increased distance with pt scissoring more as he fatigues  He is able to recover with seated rest however decreased tolerance for ambulation with more attempts  Barriers to Discharge: Inaccessible home environment,Decreased caregiver support        PT Discharge Recommendation: Post acute rehabilitation services          See flowsheet documentation for full assessment

## 2022-01-28 NOTE — PLAN OF CARE
Problem: PAIN - ADULT  Goal: Verbalizes/displays adequate comfort level or baseline comfort level  Description: Interventions:  - Encourage patient to monitor pain and request assistance  - Assess pain using appropriate pain scale  - Administer analgesics based on type and severity of pain and evaluate response  - Implement non-pharmacological measures as appropriate and evaluate response  - Consider cultural and social influences on pain and pain management  - Notify physician/advanced practitioner if interventions unsuccessful or patient reports new pain  Outcome: Progressing     Problem: INFECTION - ADULT  Goal: Absence or prevention of progression during hospitalization  Description: INTERVENTIONS:  - Assess and monitor for signs and symptoms of infection  - Monitor lab/diagnostic results  - Monitor all insertion sites, i e  indwelling lines, tubes, and drains  - Monitor endotracheal if appropriate and nasal secretions for changes in amount and color  - [place] appropriate cooling/warming therapies per order  - Administer medications as ordered  - Instruct and encourage patient and family to use good hand hygiene technique  - Identify and instruct in appropriate isolation precautions for identified infection/condition  Outcome: Progressing     Problem: SAFETY ADULT  Goal: Patient will remain free of falls  Description: INTERVENTIONS:  - Educate patient/family on patient safety including physical limitations  - Instruct patient to call for assistance with activity   - Consult OT/PT to assist with strengthening/mobility   - Keep Call bell within reach  - Keep bed low and locked with side rails adjusted as appropriate  - Keep care items and personal belongings within reach  - Initiate and maintain comfort rounds  - Make Fall Risk Sign visible to staff  - Offer Toileting   - Obtain necessary fall risk management equipment:   - Apply yellow socks and bracelet for high fall risk patients  - Consider moving patient to room near nurses station  Outcome: Progressing  Goal: Maintain or return to baseline ADL function  Description: INTERVENTIONS:  -  Assess patient's ability to carry out ADLs; assess patient's baseline for ADL function and identify physical deficits which impact ability to perform ADLs (bathing, care of mouth/teeth, toileting, grooming, dressing, etc )  - Assess/evaluate cause of self-care deficits   - Assess range of motion  - Assess patient's mobility; develop plan if impaired  - Assess patient's need for assistive devices and provide as appropriate  - Encourage maximum independence but intervene and supervise when necessary  - Involve family in performance of ADLs  - Assess for home care needs following discharge   - Consider OT consult to assist with ADL evaluation and planning for discharge  - Provide patient education as appropriate  Outcome: Progressing  Goal: Maintains/Returns to pre admission functional level  Description: INTERVENTIONS:  - Perform BMAT or MOVE assessment daily    - Set and communicate daily mobility goal to care team and patient/family/caregiver  - Collaborate with rehabilitation services on mobility goals if consulted  - Perform Range of Motion 2 times a day  - Reposition patient every 2 hours    - Dangle patient 2 times a day  - Stand patient 2 times a day  - Ambulate patient 2 times a day  - Out of bed to chair 2 times a day   - Out of bed for meals 2 times a day  - Out of bed for toileting  - Record patient progress and toleration of activity level   Outcome: Progressing     Problem: DISCHARGE PLANNING  Goal: Discharge to home or other facility with appropriate resources  Description: INTERVENTIONS:  - Identify barriers to discharge w/patient and caregiver  - Arrange for needed discharge resources and transportation as appropriate  - Identify discharge learning needs (meds, wound care, etc )  - Arrange for interpretive services to assist at discharge as needed  - Refer to Case Management Department for coordinating discharge planning if the patient needs post-hospital services based on physician/advanced practitioner order or complex needs related to functional status, cognitive ability, or social support system  Outcome: Progressing     Problem: Knowledge Deficit  Goal: Patient/family/caregiver demonstrates understanding of disease process, treatment plan, medications, and discharge instructions  Description: Complete learning assessment and assess knowledge base  Interventions:  - Provide teaching at level of understanding  - Provide teaching via preferred learning methods  Outcome: Progressing     Problem: MOBILITY - ADULT  Goal: Maintain or return to baseline ADL function  Description: INTERVENTIONS:  -  Assess patient's ability to carry out ADLs; assess patient's baseline for ADL function and identify physical deficits which impact ability to perform ADLs (bathing, care of mouth/teeth, toileting, grooming, dressing, etc )  - Assess/evaluate cause of self-care deficits   - Assess range of motion  - Assess patient's mobility; develop plan if impaired  - Assess patient's need for assistive devices and provide as appropriate  - Encourage maximum independence but intervene and supervise when necessary  - Involve family in performance of ADLs  - Assess for home care needs following discharge   - Consider OT consult to assist with ADL evaluation and planning for discharge  - Provide patient education as appropriate  Outcome: Progressing  Goal: Maintains/Returns to pre admission functional level  Description: INTERVENTIONS:  - Perform BMAT or MOVE assessment daily    - Set and communicate daily mobility goal to care team and patient/family/caregiver  - Collaborate with rehabilitation services on mobility goals if consulted  - Perform Range of Motion 2 times a day  - Reposition patient every 2 hours    - Dangle patient 2 times a day  - Stand patient 2 times a day  - Ambulate patient 2 times a day  - Out of bed to chair 2 times a day   - Out of bed for meals 2 times a day  - Out of bed for toileting  - Record patient progress and toleration of activity level   Outcome: Progressing     Problem: Potential for Falls  Goal: Patient will remain free of falls  Description: INTERVENTIONS:  - Educate patient/family on patient safety including physical limitations  - Instruct patient to call for assistance with activity   - Consult OT/PT to assist with strengthening/mobility   - Keep Call bell within reach  - Keep bed low and locked with side rails adjusted as appropriate  - Keep care items and personal belongings within reach  - Initiate and maintain comfort rounds  - Make Fall Risk Sign visible to staff  - Offer Toileting every 2 Hours, in advance of need  - Obtain necessary fall risk management equipment:   - Apply yellow socks and bracelet for high fall risk patients  - Consider moving patient to room near nurses station  Outcome: Progressing     Problem: Prexisting or High Potential for Compromised Skin Integrity  Goal: Skin integrity is maintained or improved  Description: INTERVENTIONS:  - Identify patients at risk for skin breakdown  - Assess and monitor skin integrity  - Assess and monitor nutrition and hydration status  - Monitor labs   - Assess for incontinence   - Turn and reposition patient  - Assist with mobility/ambulation  - Relieve pressure over bony prominences  - Avoid friction and shearing  - Provide appropriate hygiene as needed including keeping skin clean and dry  - Evaluate need for skin moisturizer/barrier cream  - Collaborate with interdisciplinary team   - Patient/family teaching  - Consider wound care consult   Outcome: Progressing

## 2022-01-28 NOTE — PROGRESS NOTES
[facility]  Progress Note Cesar Yu 1965, 60 y o  male MRN: [id number]  Unit/Bed#: 7T [facility] 711-01 Encounter: [id number]  Primary Care Provider: Codi Santamaria MD   Date and time admitted to hospital: 12/29/2021  5:38 PM    * Ambulatory dysfunction  Assessment & Plan  · Patient believes that his ambulatory dysfunction as directly related to 'being wrapped up in a fungus'  · His participation with PT has been limited due to the above and therefore his progress seems more self impeded at this point  · Patient believes gabapentin and steroids will help with his discomfort and fungus and therefore help improve his progress with ambulation  · Continue gabapentin 400 milligrams t i d  · PT/OT recommending post acute rehab  · Case management following to aide in discharge planning  · Medically stable for discharge    Onychomycosis  Assessment & Plan  Present on almost all toes and fingers  Podiatry performed nail debridement on 01/27  -terbinafine 250 mg p o  Daily for 12 weeks    Strange and inexplicable behavior  Assessment & Plan  Patient with hyper fixation on fungal infection arms (which was treated), states he can feel it moving throughout his body  Sometimes with strange tangential thoughts  Psychiatry consulted, not appropriate for inpatient psychiatry unit at this time    -continue Abilify 5 mg p o  daily  -continue Atarax 25 mg p o  Q 6 hours p r n   For anxiety    Essential hypertension  Assessment & Plan  · Continue amlodipine 10 mg daily    Psoriasis, unspecified  Assessment & Plan  · Patient with psoriasis, was on Enbrel in the past however was lost to follow-up  · Discussed with dermatology- appreciate recommendations for topical creams  · Patient will need outpatient Dermatology and Rheumatology follow-up      [address]  · Patient was to be discharged to the [facility] rescue mission however they would not take him back  · PT recommending post acute rehab placement    Bilateral impacted cerumen-resolved as of 2022  Assessment & Plan  · Complained of decreased hearing and that his ears feel full  · There was a significant amount of cerumen in each ear  · Nursing order placed to remove cerumen  · Debrox drops through           VTE Pharmacologic Prophylaxis: VTE Score: 1 Lovenox    Patient Centered Rounds: I performed bedside rounds with nursing staff today  Discussions with Specialists or Other Care Team Provider:  Psychiatry, podiatry    Education and Discussions with Family / Patient:  Patient, all questions answered    Time Spent for Care: 30 minutes  More than 50% of total time spent on counseling and coordination of care as described above  Current Length of Stay: 29 day(s)  Current Patient Status: Inpatient   Certification Statement: The patient will continue to require additional inpatient hospital stay due to Pending placement to skilled nursing facility  Discharge Plan:  Pending bed availability    Code Status: Level 1 - Full Code    Subjective:   Patient seen examined at the bedside  Patient with no complaints at this time  No acute events overnight    Objective:     Vitals:   Temp (24hrs), Av 8 °F (36 °C), Min:96 2 °F (35 7 °C), Max:97 1 °F (36 2 °C)    Temp:  [96 2 °F (35 7 °C)-97 1 °F (36 2 °C)] 96 2 °F (35 7 °C)  HR:  [61-86] 61  Resp:  [18-20] 20  BP: (133-169)/(68-89) 149/85  SpO2:  [96 %-98 %] 96 %  Body mass index is 23 82 kg/m²  Input and Output Summary (last 24 hours): Intake/Output Summary (Last 24 hours) at 2022 0844  Last data filed at 2022 1700  Gross per 24 hour   Intake 720 ml   Output 200 ml   Net 520 ml       Physical Exam:   Physical Exam  Constitutional:       General: He is not in acute distress  Appearance: He is not ill-appearing  Cardiovascular:      Rate and Rhythm: Normal rate and regular rhythm  Pulses: Normal pulses  Heart sounds: Normal heart sounds     Pulmonary: Effort: Pulmonary effort is normal       Breath sounds: Normal breath sounds  Abdominal:      General: Abdomen is flat  Bowel sounds are normal       Palpations: Abdomen is soft  Musculoskeletal:         General: Normal range of motion  Cervical back: Normal range of motion  Skin:     Comments: Onychomycosis of finger and toe nails  Psoriatic rash throughout body    Neurological:      General: No focal deficit present  Mental Status: He is alert and oriented to person, place, and time  Mental status is at baseline  Psychiatric:         Mood and Affect: Mood normal           Additional Data:     Labs:                            Lines/Drains:  Invasive Devices  Report    None                       Imaging: No pertinent imaging reviewed      Recent Cultures (last 7 days):         Last 24 Hours Medication List:   Current Facility-Administered Medications   Medication Dose Route Frequency Provider Last Rate    acetaminophen  650 mg Oral Q4H PRN Aroldo Gallardo PA-C      amLODIPine  10 mg Oral Daily Trudy Lopez DO      ARIPiprazole  5 mg Oral Daily Sung Ronde, CRNP      diphenhydrAMINE  25 mg Oral Q6H PRN Trudy Lopez DO      enoxaparin  40 mg Subcutaneous Q24H [address] [facility], DO      gabapentin  400 mg Oral TID [place] L Malone, DO      hydrOXYzine HCL  25 mg Oral Q6H PRN Froilan Nelson, BARONNP      ketorolac  10 mg Oral Q6H PRN Trudy Lopez,       nicotine  1 patch Transdermal Daily [place], [place]      nystatin   Topical BID Curt Cutler MD      ondansetron  4 mg Oral Q6H PRN Trudy Lopez,       polyethylene glycol  17 g Oral Daily PRN Adrian Enamorado PA-C      terbinafine  250 mg Oral Daily Trudy Lopez DO      triamcinolone   Topical BID Curt Cutler MD      white petrolatum-mineral oil   Topical TID PRN Skyla Boucher DO          Today, Patient Was Seen By: Trudy Lopez DO    **Please Note: This note may have been constructed using a voice recognition system  **

## 2022-01-29 PROBLEM — K59.00 CONSTIPATION: Status: ACTIVE | Noted: 2022-01-29

## 2022-01-29 PROCEDURE — 99232 SBSQ HOSP IP/OBS MODERATE 35: CPT | Performed by: STUDENT IN AN ORGANIZED HEALTH CARE EDUCATION/TRAINING PROGRAM

## 2022-01-29 RX ORDER — LACTULOSE 20 G/30ML
30 SOLUTION ORAL ONCE
Status: COMPLETED | OUTPATIENT
Start: 2022-01-29 | End: 2022-01-29

## 2022-01-29 RX ORDER — AMOXICILLIN 250 MG
1 CAPSULE ORAL 2 TIMES DAILY
Status: DISCONTINUED | OUTPATIENT
Start: 2022-01-29 | End: 2022-03-11 | Stop reason: HOSPADM

## 2022-01-29 RX ORDER — KETOROLAC TROMETHAMINE 10 MG/1
10 TABLET, FILM COATED ORAL EVERY 6 HOURS PRN
Status: DISCONTINUED | OUTPATIENT
Start: 2022-01-29 | End: 2022-01-31

## 2022-01-29 RX ADMIN — TERBINAFINE HYDROCHLORIDE 250 MG: 250 TABLET ORAL at 08:44

## 2022-01-29 RX ADMIN — ARIPIPRAZOLE 5 MG: 5 TABLET ORAL at 08:44

## 2022-01-29 RX ADMIN — KETOROLAC TROMETHAMINE 10 MG: 10 TABLET, FILM COATED ORAL at 16:57

## 2022-01-29 RX ADMIN — ACETAMINOPHEN 650 MG: 325 TABLET ORAL at 20:43

## 2022-01-29 RX ADMIN — GABAPENTIN 400 MG: 400 CAPSULE ORAL at 08:44

## 2022-01-29 RX ADMIN — GABAPENTIN 400 MG: 400 CAPSULE ORAL at 16:53

## 2022-01-29 RX ADMIN — POLYETHYLENE GLYCOL 3350 17 G: 17 POWDER, FOR SOLUTION ORAL at 08:44

## 2022-01-29 RX ADMIN — NYSTATIN 1 APPLICATION: 100000 POWDER TOPICAL at 17:00

## 2022-01-29 RX ADMIN — OXYCODONE HYDROCHLORIDE 2.5 MG: 5 TABLET ORAL at 08:51

## 2022-01-29 RX ADMIN — ENOXAPARIN SODIUM 40 MG: 100 INJECTION SUBCUTANEOUS at 08:44

## 2022-01-29 RX ADMIN — TRIAMCINOLONE ACETONIDE 1 APPLICATION: 1 CREAM TOPICAL at 17:00

## 2022-01-29 RX ADMIN — ACETAMINOPHEN 650 MG: 325 TABLET ORAL at 11:21

## 2022-01-29 RX ADMIN — SENNOSIDES AND DOCUSATE SODIUM 1 TABLET: 50; 8.6 TABLET ORAL at 17:01

## 2022-01-29 RX ADMIN — NYSTATIN 1 APPLICATION: 100000 POWDER TOPICAL at 08:47

## 2022-01-29 RX ADMIN — AMLODIPINE BESYLATE 10 MG: 10 TABLET ORAL at 08:44

## 2022-01-29 RX ADMIN — GABAPENTIN 400 MG: 400 CAPSULE ORAL at 20:43

## 2022-01-29 RX ADMIN — TRIAMCINOLONE ACETONIDE 1 APPLICATION: 1 CREAM TOPICAL at 08:44

## 2022-01-29 RX ADMIN — SENNOSIDES AND DOCUSATE SODIUM 1 TABLET: 50; 8.6 TABLET ORAL at 08:44

## 2022-01-29 RX ADMIN — LACTULOSE 30 G: 10 SOLUTION ORAL at 08:45

## 2022-01-29 NOTE — ASSESSMENT & PLAN NOTE
Patient complaining of vague abdominal pain  CT imaging obtained on 01/28 revealed large stool burden  -continue MiraLax, Senokot S, lactulose  -if no bowel movement by 1/30, will proceed with enema, patient in agreement with plan

## 2022-01-29 NOTE — PLAN OF CARE
Problem: PAIN - ADULT  Goal: Verbalizes/displays adequate comfort level or baseline comfort level  Description: Interventions:  - Encourage patient to monitor pain and request assistance  - Assess pain using appropriate pain scale  - Administer analgesics based on type and severity of pain and evaluate response  - Implement non-pharmacological measures as appropriate and evaluate response  - Consider cultural and social influences on pain and pain management  - Notify physician/advanced practitioner if interventions unsuccessful or patient reports new pain  Outcome: Progressing     Problem: SAFETY ADULT  Goal: Patient will remain free of falls  Description: INTERVENTIONS:  - Educate patient/family on patient safety including physical limitations  - Instruct patient to call for assistance with activity   - Consult OT/PT to assist with strengthening/mobility   - Keep Call bell within reach  - Keep bed low and locked with side rails adjusted as appropriate  - Keep care items and personal belongings within reach  - Initiate and maintain comfort rounds  - Make Fall Risk Sign visible to staff  - Apply yellow socks and bracelet for high fall risk patients  - Consider moving patient to room near nurses station  Outcome: Progressing     Problem: DISCHARGE PLANNING  Goal: Discharge to home or other facility with appropriate resources  Description: INTERVENTIONS:  - Identify barriers to discharge w/patient and caregiver  - Arrange for needed discharge resources and transportation as appropriate  - Identify discharge learning needs (meds, wound care, etc )  - Arrange for interpretive services to assist at discharge as needed  - Refer to Case Management Department for coordinating discharge planning if the patient needs post-hospital services based on physician/advanced practitioner order or complex needs related to functional status, cognitive ability, or social support system  Outcome: Progressing     Problem: Knowledge Deficit  Goal: Patient/family/caregiver demonstrates understanding of disease process, treatment plan, medications, and discharge instructions  Description: Complete learning assessment and assess knowledge base    Interventions:  - Provide teaching at level of understanding  - Provide teaching via preferred learning methods  Outcome: Progressing

## 2022-01-29 NOTE — ASSESSMENT & PLAN NOTE
· Patient was to be discharged to the [facility] rescue mission however they would not take him back  · PT recommending post acute rehab placement

## 2022-01-29 NOTE — PROGRESS NOTES
[facility]  Progress Note Chris Millan 1965, 60 y o  male MRN: [id number]  Unit/Bed#: 7T [facility] 711-01 Encounter: [id number]  Primary Care Provider: Rolo Robins MD   Date and time admitted to hospital: 12/29/2021  5:38 PM    * Ambulatory dysfunction  Assessment & Plan  · Patient believes that his ambulatory dysfunction as directly related to 'being wrapped up in a fungus'  · His participation with PT has been limited due to the above and therefore his progress seems more self impeded at this point  · Patient believes gabapentin and steroids will help with his discomfort and fungus and therefore help improve his progress with ambulation  · Continue gabapentin 400 milligrams t i d  · PT/OT recommending post acute rehab  · Case management following to aide in discharge planning  · Medically stable for discharge    Onychomycosis  Assessment & Plan  Present on almost all toes and fingers  Podiatry performed nail debridement on 01/27  -terbinafine 250 mg p o  Daily for 12 weeks (ordered through 4/21)    Constipation  Assessment & Plan  Patient complaining of vague abdominal pain  CT imaging obtained on 01/28 revealed large stool burden  -continue MiraLax, Senokot S, lactulose  -if no bowel movement by 1/30, will proceed with enema, patient in agreement with plan    Strange and inexplicable behavior  Assessment & Plan  Patient with hyper fixation on fungal infection arms (which was treated), states he can feel it moving throughout his body  Sometimes with strange tangential thoughts  Psychiatry consulted, not appropriate for inpatient psychiatry unit at this time    -continue Abilify 5 mg p o  daily  -continue Atarax 25 mg p o  Q 6 hours p r n   For anxiety    Essential hypertension  Assessment & Plan  · Continue amlodipine 10 mg daily    Psoriasis, unspecified  Assessment & Plan  · Patient with psoriasis, was on Enbrel in the past however was lost to follow-up  · Discussed with dermatology- appreciate recommendations for topical creams  · Patient will need outpatient Dermatology and Rheumatology follow-up      Homeless  Assessment & Plan  · Patient was to be discharged to the [facility] rescue mission however they would not take him back  · PT recommending post acute rehab placement        VTE Pharmacologic Prophylaxis: VTE Score: 1 Lovenox    Patient Centered Rounds: I performed bedside rounds with nursing staff today  Discussions with Specialists or Other Care Team Provider: None    Education and Discussions with Family / Patient: Patient declined call to   Time Spent for Care: 45 minutes  More than 50% of total time spent on counseling and coordination of care as described above  Current Length of Stay: 30 day(s)  Current Patient Status: Inpatient   Certification Statement: The patient will continue to require additional inpatient hospital stay due to Pending placement to acute care rehab  Discharge Plan: Pending facility acceptance and bed available    Code Status: Level 1 - Full Code    Subjective:   Patient seen examined bedside  Patient of pain, found to have large stool burden on CT imaging  At this time requesting medications  If no bowel movement by tomorrow, will proceed with enema, patient agreement with plan    Objective:     Vitals:   Temp (24hrs), Av 5 °F (36 4 °C), Min:96 8 °F (36 °C), Max:97 8 °F (36 6 °C)    Temp:  [96 8 °F (36 °C)-97 8 °F (36 6 °C)] 96 8 °F (36 °C)  HR:  [61-64] 61  Resp:  [16-18] 18  BP: (153-168)/(78-83) 168/79  SpO2:  [98 %-99 %] 99 %  Body mass index is 23 82 kg/m²  Input and Output Summary (last 24 hours): Intake/Output Summary (Last 24 hours) at 2022 1247  Last data filed at 2022 0900  Gross per 24 hour   Intake 240 ml   Output 700 ml   Net -460 ml       Physical Exam:   Physical Exam  HENT:      Head: Normocephalic  Cardiovascular:      Rate and Rhythm: Normal rate and regular rhythm        Pulses: Normal pulses  Heart sounds: Normal heart sounds  Pulmonary:      Effort: Pulmonary effort is normal       Breath sounds: Normal breath sounds  Abdominal:      General: Abdomen is flat  Bowel sounds are normal       Palpations: Abdomen is soft  Musculoskeletal:         General: Normal range of motion  Cervical back: Normal range of motion  Skin:     Comments: Psoriatic rash throughout body   Neurological:      General: No focal deficit present  Mental Status: He is alert and oriented to person, place, and time  Mental status is at baseline  Psychiatric:         Mood and Affect: Mood normal           Additional Data:     Labs:  Results from last 7 days   Lab Units 01/28/22  1047   WBC Thousand/uL 7 00   HEMOGLOBIN g/dL 12 0*   HEMATOCRIT % 35 9*   PLATELETS Thousands/uL 318     Results from last 7 days   Lab Units 01/28/22  1047   SODIUM mmol/L 141   POTASSIUM mmol/L 4 2   CHLORIDE mmol/L 105   CO2 mmol/L 34*   BUN mg/dL 19   CREATININE mg/dL 0 73   ANION GAP mmol/L 2*   CALCIUM mg/dL 8 5   ALBUMIN g/dL 3 5   TOTAL BILIRUBIN mg/dL 0 42   ALK PHOS U/L 88   ALT U/L 16   AST U/L 23   GLUCOSE RANDOM mg/dL 86                       Lines/Drains:  Invasive Devices  Report    Peripheral Intravenous Line            Peripheral IV 01/28/22 Right Antecubital <1 day                      Imaging: No pertinent imaging reviewed      Recent Cultures (last 7 days):         Last 24 Hours Medication List:   Current Facility-Administered Medications   Medication Dose Route Frequency Provider Last Rate    acetaminophen  650 mg Oral Q4H PRN Marjorie Hankins PA-C      amLODIPine  10 mg Oral Daily May Peat, DO      ARIPiprazole  5 mg Oral Daily DOV Patricia      diphenhydrAMINE  25 mg Oral Q6H PRN May Peat, DO      enoxaparin  40 mg Subcutaneous Q24H [facility], DO      gabapentin  400 mg Oral TID [place] L Malone, DO      hydrOXYzine HCL  25 mg Oral Q6H PRN DOV Patricia  ketorolac  10 mg Oral Q6H PRN Joseph Bio, DO      nicotine  1 patch Transdermal Daily [place], [place]      nystatin   Topical BID Melchor Montes MD      ondansetron  4 mg Oral Q6H PRN Joseph Bio, DO      polyethylene glycol  17 g Oral Daily Joseph Leroy, DO      senna-docusate sodium  1 tablet Oral BID Joseph Leroy, DO      terbinafine  250 mg Oral Daily Joseph Bio, DO      triamcinolone   Topical BID Melchor Montes MD      white petrolatum-mineral oil   Topical TID PRN Sara Gonzalez DO          Today, Patient Was Seen By: Joseph Leroy DO    **Please Note: This note may have been constructed using a voice recognition system  **

## 2022-01-29 NOTE — ASSESSMENT & PLAN NOTE
Present on almost all toes and fingers  Podiatry performed nail debridement on 01/27  -terbinafine 250 mg p o   Daily for 12 weeks (ordered through 4/21)

## 2022-01-30 PROCEDURE — 99232 SBSQ HOSP IP/OBS MODERATE 35: CPT | Performed by: STUDENT IN AN ORGANIZED HEALTH CARE EDUCATION/TRAINING PROGRAM

## 2022-01-30 RX ADMIN — TRIAMCINOLONE ACETONIDE 1 APPLICATION: 1 CREAM TOPICAL at 17:01

## 2022-01-30 RX ADMIN — TERBINAFINE HYDROCHLORIDE 250 MG: 250 TABLET ORAL at 08:11

## 2022-01-30 RX ADMIN — NYSTATIN 1 APPLICATION: 100000 POWDER TOPICAL at 17:01

## 2022-01-30 RX ADMIN — SENNOSIDES AND DOCUSATE SODIUM 1 TABLET: 50; 8.6 TABLET ORAL at 17:02

## 2022-01-30 RX ADMIN — TRIAMCINOLONE ACETONIDE 1 APPLICATION: 1 CREAM TOPICAL at 08:10

## 2022-01-30 RX ADMIN — AMLODIPINE BESYLATE 10 MG: 10 TABLET ORAL at 08:11

## 2022-01-30 RX ADMIN — GABAPENTIN 400 MG: 400 CAPSULE ORAL at 08:11

## 2022-01-30 RX ADMIN — KETOROLAC TROMETHAMINE 10 MG: 10 TABLET, FILM COATED ORAL at 16:31

## 2022-01-30 RX ADMIN — SENNOSIDES AND DOCUSATE SODIUM 1 TABLET: 50; 8.6 TABLET ORAL at 08:11

## 2022-01-30 RX ADMIN — GABAPENTIN 400 MG: 400 CAPSULE ORAL at 16:28

## 2022-01-30 RX ADMIN — POLYETHYLENE GLYCOL 3350 17 G: 17 POWDER, FOR SOLUTION ORAL at 08:10

## 2022-01-30 RX ADMIN — KETOROLAC TROMETHAMINE 10 MG: 10 TABLET, FILM COATED ORAL at 23:21

## 2022-01-30 RX ADMIN — ARIPIPRAZOLE 5 MG: 5 TABLET ORAL at 08:11

## 2022-01-30 RX ADMIN — KETOROLAC TROMETHAMINE 10 MG: 10 TABLET, FILM COATED ORAL at 08:11

## 2022-01-30 RX ADMIN — ENOXAPARIN SODIUM 40 MG: 100 INJECTION SUBCUTANEOUS at 08:10

## 2022-01-30 RX ADMIN — GABAPENTIN 400 MG: 400 CAPSULE ORAL at 20:53

## 2022-01-30 RX ADMIN — NYSTATIN 1 APPLICATION: 100000 POWDER TOPICAL at 08:10

## 2022-01-30 NOTE — PROGRESS NOTES
[facility]  Progress Note Heena Hall 1965, 60 y o  male MRN: [id number]  Unit/Bed#: 7T [facility] 711-01 Encounter: [id number]  Primary Care Provider: Cathy Correa MD   Date and time admitted to hospital: 12/29/2021  5:38 PM    * Ambulatory dysfunction  Assessment & Plan  · Patient believes that his ambulatory dysfunction as directly related to 'being wrapped up in a fungus'  · His participation with PT has been limited due to the above and therefore his progress seems more self impeded at this point  · Patient believes gabapentin and steroids will help with his discomfort and fungus and therefore help improve his progress with ambulation  · Continue gabapentin 400 milligrams t i d  · PT/OT recommending post acute rehab  · Case management following to aide in discharge planning  · Medically stable for discharge    Onychomycosis  Assessment & Plan  Present on almost all toes and fingers  Podiatry performed nail debridement on 01/27  -terbinafine 250 mg p o   Daily for 12 weeks (ordered through 4/21)    Constipation  Assessment & Plan  Patient complaining of vague abdominal pain  CT imaging obtained on 01/28 revealed large stool burden  -patient with bowel movement on 01/29 reports improvement in pain  -continue MiraLax, Senokot S, lactulose      Essential hypertension  Assessment & Plan  · Continue amlodipine 10 mg daily    Psoriasis, unspecified  Assessment & Plan  · Patient with psoriasis, was on Enbrel in the past however was lost to follow-up  · Discussed with dermatology- appreciate recommendations for topical creams  · Patient will need outpatient Dermatology and Rheumatology follow-up      [facility]  · Patient was to be discharged to the [facility] rescue mission however they would not take him back  · PT recommending post acute rehab placement          VTE Pharmacologic Prophylaxis: VTE Score: 1 Lovenox    Patient Centered Rounds: I performed bedside rounds with nursing staff today  Discussions with Specialists or Other Care Team Provider:  None    Education and Discussions with Family / Patient:  Discussed with patient    Time Spent for Care: 30 minutes  More than 50% of total time spent on counseling and coordination of care as described above  Current Length of Stay: 31 day(s)  Current Patient Status: Inpatient   Certification Statement: The patient will continue to require additional inpatient hospital stay due to Pending placement to skilled nursing facility, medically stable for discharge  Discharge Plan:  Pending accepting facility    Code Status: Level 1 - Full Code    Subjective:   Patient reports large bowel movement last night, endorses improvement of abdominal pain    Objective:     Vitals:   Temp (24hrs), Av 7 °F (36 5 °C), Min:97 6 °F (36 4 °C), Max:97 8 °F (36 6 °C)    Temp:  [97 6 °F (36 4 °C)-97 8 °F (36 6 °C)] 97 6 °F (36 4 °C)  HR:  [64-80] 64  Resp:  [18] 18  BP: (140-147)/(75-94) 147/75  SpO2:  [97 %] 97 %  Body mass index is 23 82 kg/m²  Input and Output Summary (last 24 hours): Intake/Output Summary (Last 24 hours) at 2022 0826  Last data filed at 2022 2315  Gross per 24 hour   Intake 1920 ml   Output 1900 ml   Net 20 ml       Physical Exam:   Physical Exam  Cardiovascular:      Rate and Rhythm: Normal rate and regular rhythm  Pulses: Normal pulses  Heart sounds: Normal heart sounds  Pulmonary:      Effort: Pulmonary effort is normal       Breath sounds: Normal breath sounds  Abdominal:      General: Abdomen is flat  Bowel sounds are normal       Palpations: Abdomen is soft  Tenderness: There is abdominal tenderness  Musculoskeletal:         General: Normal range of motion  Cervical back: Normal range of motion  Skin:     General: Skin is warm and dry  Comments: Onychomycosis of all finger and toenails  Psoriatic rash on body   Neurological:      General: No focal deficit present  Mental Status: He is alert and oriented to person, place, and time  Mental status is at baseline  Psychiatric:         Mood and Affect: Mood normal          Behavior: Behavior normal          Thought Content: Thought content normal          Judgment: Judgment normal           Additional Data:     Labs:  Results from last 7 days   Lab Units 01/28/22  1047   WBC Thousand/uL 7 00   HEMOGLOBIN g/dL 12 0*   HEMATOCRIT % 35 9*   PLATELETS Thousands/uL 318     Results from last 7 days   Lab Units 01/28/22  1047   SODIUM mmol/L 141   POTASSIUM mmol/L 4 2   CHLORIDE mmol/L 105   CO2 mmol/L 34*   BUN mg/dL 19   CREATININE mg/dL 0 73   ANION GAP mmol/L 2*   CALCIUM mg/dL 8 5   ALBUMIN g/dL 3 5   TOTAL BILIRUBIN mg/dL 0 42   ALK PHOS U/L 88   ALT U/L 16   AST U/L 23   GLUCOSE RANDOM mg/dL 86                       Lines/Drains:  Invasive Devices  Report    None                       Imaging: No pertinent imaging reviewed      Recent Cultures (last 7 days):         Last 24 Hours Medication List:   Current Facility-Administered Medications   Medication Dose Route Frequency Provider Last Rate    acetaminophen  650 mg Oral Q4H PRN Robbie Flaherty PA-C      amLODIPine  10 mg Oral Daily Td Kelle, DO      ARIPiprazole  5 mg Oral Daily Roseau Renea, CRNP      diphenhydrAMINE  25 mg Oral Q6H PRN Td Kelle, DO      enoxaparin  40 mg Subcutaneous Q24H [facility], DO      gabapentin  400 mg Oral TID [place] L Malone, DO      hydrOXYzine HCL  25 mg Oral Q6H PRN Roseau Renea, CRNP      ketorolac  10 mg Oral Q6H PRN Td Kelle, DO      nicotine  1 patch Transdermal Daily [place], [place]      nystatin   Topical BID Maria Elena Lipscomb MD      ondansetron  4 mg Oral Q6H PRN Td Kelle, DO      polyethylene glycol  17 g Oral Daily Td Kelle, DO      senna-docusate sodium  1 tablet Oral BID Td Kelle, DO      terbinafine  250 mg Oral Daily Td Nina, DO      triamcinolone Topical BID Rei Luna MD      white petrolatum-mineral oil   Topical TID PRN Martyn Lesch, DO          Today, Patient Was Seen By: Jessica Booker DO    **Please Note: This note may have been constructed using a voice recognition system  **

## 2022-01-30 NOTE — ASSESSMENT & PLAN NOTE
· Patient was to be discharged to the [facility] rescue mission however they would not take him back  · PT recommending post acute rehab placement

## 2022-01-30 NOTE — PLAN OF CARE
Problem: PAIN - ADULT  Goal: Verbalizes/displays adequate comfort level or baseline comfort level  Description: Interventions:  - Encourage patient to monitor pain and request assistance  - Assess pain using appropriate pain scale  - Administer analgesics based on type and severity of pain and evaluate response  - Implement non-pharmacological measures as appropriate and evaluate response  - Consider cultural and social influences on pain and pain management  - Notify physician/advanced practitioner if interventions unsuccessful or patient reports new pain  Outcome: Progressing     Problem: SAFETY ADULT  Goal: Patient will remain free of falls  Description: INTERVENTIONS:  - Educate patient/family on patient safety including physical limitations  - Instruct patient to call for assistance with activity   - Consult OT/PT to assist with strengthening/mobility   - Keep Call bell within reach  - Keep bed low and locked with side rails adjusted as appropriate  - Keep care items and personal belongings within reach  - Initiate and maintain comfort rounds  - Make Fall Risk Sign visible to staff  - Apply yellow socks and bracelet for high fall risk patients  - Consider moving patient to room near nurses station  Outcome: Progressing     Problem: DISCHARGE PLANNING  Goal: Discharge to home or other facility with appropriate resources  Description: INTERVENTIONS:  - Identify barriers to discharge w/patient and caregiver  - Arrange for needed discharge resources and transportation as appropriate  - Identify discharge learning needs (meds, wound care, etc )  - Arrange for interpretive services to assist at discharge as needed  - Refer to Case Management Department for coordinating discharge planning if the patient needs post-hospital services based on physician/advanced practitioner order or complex needs related to functional status, cognitive ability, or social support system  Outcome: Progressing     Problem: Knowledge Deficit  Goal: Patient/family/caregiver demonstrates understanding of disease process, treatment plan, medications, and discharge instructions  Description: Complete learning assessment and assess knowledge base    Interventions:  - Provide teaching at level of understanding  - Provide teaching via preferred learning methods  Outcome: Progressing     Problem: Prexisting or High Potential for Compromised Skin Integrity  Goal: Skin integrity is maintained or improved  Description: INTERVENTIONS:  - Identify patients at risk for skin breakdown  - Assess and monitor skin integrity  - Assess and monitor nutrition and hydration status  - Monitor labs   - Assess for incontinence   - Turn and reposition patient  - Assist with mobility/ambulation  - Relieve pressure over bony prominences  - Avoid friction and shearing  - Provide appropriate hygiene as needed including keeping skin clean and dry  - Evaluate need for skin moisturizer/barrier cream  - Collaborate with interdisciplinary team   - Patient/family teaching  - Consider wound care consult   Outcome: Progressing

## 2022-01-31 PROCEDURE — 99232 SBSQ HOSP IP/OBS MODERATE 35: CPT | Performed by: INTERNAL MEDICINE

## 2022-01-31 PROCEDURE — 97110 THERAPEUTIC EXERCISES: CPT

## 2022-01-31 PROCEDURE — 97530 THERAPEUTIC ACTIVITIES: CPT

## 2022-01-31 PROCEDURE — 97116 GAIT TRAINING THERAPY: CPT

## 2022-01-31 RX ORDER — TRAMADOL HYDROCHLORIDE 50 MG/1
50 TABLET ORAL EVERY 6 HOURS PRN
Status: DISCONTINUED | OUTPATIENT
Start: 2022-01-31 | End: 2022-02-08

## 2022-01-31 RX ADMIN — KETOROLAC TROMETHAMINE 10 MG: 10 TABLET, FILM COATED ORAL at 08:35

## 2022-01-31 RX ADMIN — GABAPENTIN 400 MG: 400 CAPSULE ORAL at 20:41

## 2022-01-31 RX ADMIN — ENOXAPARIN SODIUM 40 MG: 100 INJECTION SUBCUTANEOUS at 08:34

## 2022-01-31 RX ADMIN — POLYETHYLENE GLYCOL 3350 17 G: 17 POWDER, FOR SOLUTION ORAL at 08:34

## 2022-01-31 RX ADMIN — NYSTATIN: 100000 POWDER TOPICAL at 08:36

## 2022-01-31 RX ADMIN — GABAPENTIN 400 MG: 400 CAPSULE ORAL at 17:04

## 2022-01-31 RX ADMIN — TRIAMCINOLONE ACETONIDE: 1 CREAM TOPICAL at 08:43

## 2022-01-31 RX ADMIN — SENNOSIDES AND DOCUSATE SODIUM 1 TABLET: 50; 8.6 TABLET ORAL at 17:05

## 2022-01-31 RX ADMIN — NYSTATIN: 100000 POWDER TOPICAL at 17:05

## 2022-01-31 RX ADMIN — TERBINAFINE HYDROCHLORIDE 250 MG: 250 TABLET ORAL at 08:36

## 2022-01-31 RX ADMIN — ARIPIPRAZOLE 5 MG: 5 TABLET ORAL at 08:34

## 2022-01-31 RX ADMIN — TRAMADOL HYDROCHLORIDE 50 MG: 50 TABLET, FILM COATED ORAL at 12:26

## 2022-01-31 RX ADMIN — AMLODIPINE BESYLATE 10 MG: 10 TABLET ORAL at 08:34

## 2022-01-31 RX ADMIN — TRAMADOL HYDROCHLORIDE 50 MG: 50 TABLET, FILM COATED ORAL at 20:40

## 2022-01-31 RX ADMIN — GABAPENTIN 400 MG: 400 CAPSULE ORAL at 08:34

## 2022-01-31 RX ADMIN — SENNOSIDES AND DOCUSATE SODIUM 1 TABLET: 50; 8.6 TABLET ORAL at 08:34

## 2022-01-31 RX ADMIN — TRIAMCINOLONE ACETONIDE: 1 CREAM TOPICAL at 17:05

## 2022-01-31 NOTE — PROGRESS NOTES
[facility]  Progress Note Aren Quintana 1965, 60 y o  male MRN: [id number]  Unit/Bed#: 7T [facility] 711-01 Encounter: [id number]  Primary Care Provider: Yolanda Cazares MD   Date and time admitted to hospital: 12/29/2021  5:38 PM    * Ambulatory dysfunction  Assessment & Plan  · Patient believes that his ambulatory dysfunction as directly related to 'being wrapped up in a fungus'  · His participation with PT has been limited due to the above and therefore his progress seems more self impeded at this point  · Patient believes gabapentin and steroids will help with his discomfort and fungus and therefore help improve his progress with ambulation  · Continue gabapentin 400 milligrams t i d  · PT/OT recommending post acute rehab  · Case management following to aide in discharge planning  · Medically stable for discharge    [facility]  · [facility] however they would not take him back  · PT recommending post acute rehab placement    Psoriasis, unspecified  Assessment & Plan  · Patient with psoriasis, was on Enbrel in the past however was lost to follow-up  · Discussed with dermatology- appreciate recommendations for topical creams  · Patient will need outpatient Dermatology and Rheumatology follow-up      Essential hypertension  Assessment & Plan  · Continue amlodipine 10 mg daily    Strange and inexplicable behavior  Assessment & Plan  Patient with hyper fixation on fungal infection arms (which was treated), states he can feel it moving throughout his body  Sometimes with strange tangential thoughts  Psychiatry consulted, not appropriate for inpatient psychiatry unit at this time    -continue Abilify 5 mg p o  daily  -continue Atarax 25 mg p o  Q 6 hours p r n  For anxiety    Onychomycosis  Assessment & Plan  Present on almost all toes and fingers  Podiatry performed nail debridement on 01/27  -terbinafine 250 mg p o  Daily for 12 weeks (ordered through )    Constipation  Assessment & Plan  Patient complaining of vague abdominal pain  CT imaging obtained on  revealed large stool burden  -patient with bowel movement on  reports improvement in pain  -continue MiraLax, Senokot S, lactulose        VTE Pharmacologic Prophylaxis:   Pharmacologic: Enoxaparin (Lovenox)  Mechanical VTE Prophylaxis in Place: Yes    Patient Centered Rounds: I have performed bedside rounds with nursing staff today  Discussions with Specialists or Other Care Team Provider:  Case Management, Nursing, PT/OT    Education and Discussions with Family / Patient:  Spoke with patient    Time Spent for Care: 45 minutes  More than 50% of total time spent on counseling and coordination of care as described above  Current Length of Stay: 32 day(s)    Current Patient Status: Inpatient   Certification Statement: The patient will continue to require additional inpatient hospital stay due to Stable for discharge to short-term rehab    Discharge Plan:  Post acute rehabilitation services    Code Status: Level 1 - Full Code      Subjective:   Patient seen and examined at bedside  No acute events overnight  Endorses leg pain  Objective:     Vitals:   Temp (24hrs), Av 4 °F (36 3 °C), Min:97 3 °F (36 3 °C), Max:97 6 °F (36 4 °C)    Temp:  [97 3 °F (36 3 °C)-97 6 °F (36 4 °C)] 97 4 °F (36 3 °C)  HR:  [66-78] 66  Resp:  [17-18] 17  BP: (153-169)/(70-88) 153/80  SpO2:  [96 %-97 %] 97 %  Body mass index is 23 82 kg/m²  Input and Output Summary (last 24 hours): Intake/Output Summary (Last 24 hours) at 2022 0908  Last data filed at 2022 0740  Gross per 24 hour   Intake 517 ml   Output 1800 ml   Net -1283 ml       Physical Exam:     Physical Exam  Vitals and nursing note reviewed  Constitutional:       Appearance: He is well-developed  HENT:      Head: Normocephalic and atraumatic     Eyes:      Conjunctiva/sclera: Conjunctivae normal  Cardiovascular:      Rate and Rhythm: Normal rate and regular rhythm  Heart sounds: No murmur heard  Pulmonary:      Effort: Pulmonary effort is normal  No respiratory distress  Breath sounds: Normal breath sounds  Abdominal:      Palpations: Abdomen is soft  Tenderness: There is no abdominal tenderness  Musculoskeletal:      Cervical back: Neck supple  Skin:     General: Skin is warm and dry  Neurological:      Mental Status: He is alert  Additional Data:     Labs:  I have reviewed pertinent results     Results from last 7 days   Lab Units 01/28/22  1047   WBC Thousand/uL 7 00   HEMOGLOBIN g/dL 12 0*   HEMATOCRIT % 35 9*   PLATELETS Thousands/uL 318     Results from last 7 days   Lab Units 01/28/22  1047   SODIUM mmol/L 141   POTASSIUM mmol/L 4 2   CHLORIDE mmol/L 105   CO2 mmol/L 34*   BUN mg/dL 19   CREATININE mg/dL 0 73   ANION GAP mmol/L 2*   CALCIUM mg/dL 8 5   ALBUMIN g/dL 3 5   TOTAL BILIRUBIN mg/dL 0 42   ALK PHOS U/L 88   ALT U/L 16   AST U/L 23   GLUCOSE RANDOM mg/dL 86                         Imaging: I have reviewed pertinent imaging       Recent Cultures (last 7 days):           Last 24 Hours Medication List:   Current Facility-Administered Medications   Medication Dose Route Frequency Provider Last Rate    acetaminophen  650 mg Oral Q4H PRN Emily Austin PA-C      amLODIPine  10 mg Oral Daily Jorge Bhat, DO      ARIPiprazole  5 mg Oral Daily DOV Noel      diphenhydrAMINE  25 mg Oral Q6H PRN Jorge Bhat, DO      enoxaparin  40 mg Subcutaneous Q24H [address] [facility], DO      gabapentin  400 mg Oral TID [place] L Malone, DO      hydrOXYzine HCL  25 mg Oral Q6H PRN DOV Noel      ketorolac  10 mg Oral Q6H PRN Jorge Bhat, DO      nicotine  1 patch Transdermal Daily [place] PA-C      nystatin   Topical BID Kathryn Johnson MD      ondansetron  4 mg Oral Q6H PRN Jorge Bhat, DO      polyethylene glycol 17 g Oral Daily Peggy Vaca DO      senna-docusate sodium  1 tablet Oral BID Peggy Vaca DO      terbinafine  250 mg Oral Daily Peggy Vaca DO      triamcinolone   Topical BID Lisa Sheffield MD      white petrolatum-mineral oil   Topical TID PRN Lavon Harris DO          Today, Patient Was Seen By: Lavon Harris DO    ** Please Note: Dictation voice to text software may have been used in the creation of this document   **

## 2022-01-31 NOTE — PLAN OF CARE
Problem: PAIN - ADULT  Goal: Verbalizes/displays adequate comfort level or baseline comfort level  Description: Interventions:  - Encourage patient to monitor pain and request assistance  - Assess pain using appropriate pain scale  - Administer analgesics based on type and severity of pain and evaluate response  - Implement non-pharmacological measures as appropriate and evaluate response  - Consider cultural and social influences on pain and pain management  - Notify physician/advanced practitioner if interventions unsuccessful or patient reports new pain  Outcome: Progressing     Problem: INFECTION - ADULT  Goal: Absence or prevention of progression during hospitalization  Description: INTERVENTIONS:  - Assess and monitor for signs and symptoms of infection  - Monitor lab/diagnostic results  - Monitor all insertion sites, i e  indwelling lines, tubes, and drains  - Monitor endotracheal if appropriate and nasal secretions for changes in amount and color  - [place] appropriate cooling/warming therapies per order  - Administer medications as ordered  - Instruct and encourage patient and family to use good hand hygiene technique  - Identify and instruct in appropriate isolation precautions for identified infection/condition  Outcome: Progressing     Problem: SAFETY ADULT  Goal: Patient will remain free of falls  Description: INTERVENTIONS:  - Educate patient/family on patient safety including physical limitations  - Instruct patient to call for assistance with activity   - Consult OT/PT to assist with strengthening/mobility   - Keep Call bell within reach  - Keep bed low and locked with side rails adjusted as appropriate  - Keep care items and personal belongings within reach  - Initiate and maintain comfort rounds  - Make Fall Risk Sign visible to staff    - Apply yellow socks and bracelet for high fall risk patients  - Consider moving patient to room near nurses station  Outcome: Progressing  Goal: Maintain or return to baseline ADL function  Description: INTERVENTIONS:  -  Assess patient's ability to carry out ADLs; assess patient's baseline for ADL function and identify physical deficits which impact ability to perform ADLs (bathing, care of mouth/teeth, toileting, grooming, dressing, etc )  - Assess/evaluate cause of self-care deficits   - Assess range of motion  - Assess patient's mobility; develop plan if impaired  - Assess patient's need for assistive devices and provide as appropriate  - Encourage maximum independence but intervene and supervise when necessary  - Involve family in performance of ADLs  - Assess for home care needs following discharge   - Consider OT consult to assist with ADL evaluation and planning for discharge  - Provide patient education as appropriate  Outcome: Progressing  Goal: Maintains/Returns to pre admission functional level  Description: INTERVENTIONS:  - Perform BMAT or MOVE assessment daily    - Set and communicate daily mobility goal to care team and patient/family/caregiver     - Collaborate with rehabilitation services on mobility goals if consulted    - Out of bed to chair 2 times a day     - Out of bed for toileting  - Record patient progress and toleration of activity level   Outcome: Progressing     Problem: DISCHARGE PLANNING  Goal: Discharge to home or other facility with appropriate resources  Description: INTERVENTIONS:  - Identify barriers to discharge w/patient and caregiver  - Arrange for needed discharge resources and transportation as appropriate  - Identify discharge learning needs (meds, wound care, etc )  - Arrange for interpretive services to assist at discharge as needed  - Refer to Case Management Department for coordinating discharge planning if the patient needs post-hospital services based on physician/advanced practitioner order or complex needs related to functional status, cognitive ability, or social support system  Outcome: Progressing     Problem: Knowledge Deficit  Goal: Patient/family/caregiver demonstrates understanding of disease process, treatment plan, medications, and discharge instructions  Description: Complete learning assessment and assess knowledge base  Interventions:  - Provide teaching at level of understanding  - Provide teaching via preferred learning methods  Outcome: Progressing     Problem: MOBILITY - ADULT  Goal: Maintain or return to baseline ADL function  Description: INTERVENTIONS:  -  Assess patient's ability to carry out ADLs; assess patient's baseline for ADL function and identify physical deficits which impact ability to perform ADLs (bathing, care of mouth/teeth, toileting, grooming, dressing, etc )  - Assess/evaluate cause of self-care deficits   - Assess range of motion  - Assess patient's mobility; develop plan if impaired  - Assess patient's need for assistive devices and provide as appropriate  - Encourage maximum independence but intervene and supervise when necessary  - Involve family in performance of ADLs  - Assess for home care needs following discharge   - Consider OT consult to assist with ADL evaluation and planning for discharge  - Provide patient education as appropriate  Outcome: Progressing  Goal: Maintains/Returns to pre admission functional level  Description: INTERVENTIONS:  - Perform BMAT or MOVE assessment daily    - Set and communicate daily mobility goal to care team and patient/family/caregiver     - Collaborate with rehabilitation services on mobility goals if consulted    - Out of bed to chair 2 times a day     - Out of bed for toileting  - Record patient progress and toleration of activity level   Outcome: Progressing     Problem: Potential for Falls  Goal: Patient will remain free of falls  Description: INTERVENTIONS:  - Educate patient/family on patient safety including physical limitations  - Instruct patient to call for assistance with activity   - Consult OT/PT to assist with strengthening/mobility - Keep Call bell within reach  - Keep bed low and locked with side rails adjusted as appropriate  - Keep care items and personal belongings within reach  - Initiate and maintain comfort rounds  - Make Fall Risk Sign visible to staff      - Apply yellow socks and bracelet for high fall risk patients  - Consider moving patient to room near nurses station  Outcome: Progressing     Problem: Prexisting or High Potential for Compromised Skin Integrity  Goal: Skin integrity is maintained or improved  Description: INTERVENTIONS:  - Identify patients at risk for skin breakdown  - Assess and monitor skin integrity  - Assess and monitor nutrition and hydration status  - Monitor labs   - Assess for incontinence   - Turn and reposition patient  - Assist with mobility/ambulation  - Relieve pressure over bony prominences  - Avoid friction and shearing  - Provide appropriate hygiene as needed including keeping skin clean and dry  - Evaluate need for skin moisturizer/barrier cream  - Collaborate with interdisciplinary team   - Patient/family teaching  - Consider wound care consult   Outcome: Progressing

## 2022-01-31 NOTE — ASSESSMENT & PLAN NOTE
Patient complaining of vague abdominal pain  CT imaging obtained on 01/28 revealed large stool burden  -patient with bowel movement on 01/29 reports improvement in pain  -continue MiraLax, Senokot S, lactulose

## 2022-01-31 NOTE — ASSESSMENT & PLAN NOTE
· Patient was to be discharged to the [facility] rescue mission however they would not take him back  · PT recommending post acute rehab placement

## 2022-02-01 LAB
ANION GAP SERPL CALCULATED.3IONS-SCNC: 4 MMOL/L (ref 5–14)
BASOPHILS # BLD AUTO: 0.1 THOUSANDS/ΜL (ref 0–0.1)
BASOPHILS NFR BLD AUTO: 1 % (ref 0–1)
BUN SERPL-MCNC: 13 MG/DL (ref 5–25)
CALCIUM SERPL-MCNC: 8.4 MG/DL (ref 8.4–10.2)
CHLORIDE SERPL-SCNC: 103 MMOL/L (ref 97–108)
CO2 SERPL-SCNC: 32 MMOL/L (ref 22–30)
CREAT SERPL-MCNC: 0.67 MG/DL (ref 0.7–1.5)
EOSINOPHIL # BLD AUTO: 0.2 THOUSAND/ΜL (ref 0–0.4)
EOSINOPHIL NFR BLD AUTO: 3 % (ref 0–6)
ERYTHROCYTE [DISTWIDTH] IN BLOOD BY AUTOMATED COUNT: 17.2 %
GFR SERPL CREATININE-BSD FRML MDRD: 105 ML/MIN/1.73SQ M
GLUCOSE SERPL-MCNC: 94 MG/DL (ref 70–99)
HCT VFR BLD AUTO: 35.9 % (ref 41–53)
HGB BLD-MCNC: 11.8 G/DL (ref 13.5–17.5)
LYMPHOCYTES # BLD AUTO: 1.7 THOUSANDS/ΜL (ref 0.5–4)
LYMPHOCYTES NFR BLD AUTO: 23 % (ref 25–45)
MAGNESIUM SERPL-MCNC: 1.9 MG/DL (ref 1.6–2.3)
MCH RBC QN AUTO: 28.2 PG (ref 26–34)
MCHC RBC AUTO-ENTMCNC: 32.8 G/DL (ref 31–36)
MCV RBC AUTO: 86 FL (ref 80–100)
MONOCYTES # BLD AUTO: 0.8 THOUSAND/ΜL (ref 0.2–0.9)
MONOCYTES NFR BLD AUTO: 11 % (ref 1–10)
NEUTROPHILS # BLD AUTO: 4.5 THOUSANDS/ΜL (ref 1.8–7.8)
NEUTS SEG NFR BLD AUTO: 62 % (ref 45–65)
PHOSPHATE SERPL-MCNC: 4.1 MG/DL (ref 2.5–4.8)
PLATELET # BLD AUTO: 286 THOUSANDS/UL (ref 150–450)
PMV BLD AUTO: 7.1 FL (ref 8.9–12.7)
POTASSIUM SERPL-SCNC: 3.8 MMOL/L (ref 3.6–5)
RBC # BLD AUTO: 4.17 MILLION/UL (ref 4.5–5.9)
SODIUM SERPL-SCNC: 139 MMOL/L (ref 137–147)
WBC # BLD AUTO: 7.4 THOUSAND/UL (ref 4.5–11)

## 2022-02-01 PROCEDURE — 85025 COMPLETE CBC W/AUTO DIFF WBC: CPT | Performed by: INTERNAL MEDICINE

## 2022-02-01 PROCEDURE — 83735 ASSAY OF MAGNESIUM: CPT | Performed by: INTERNAL MEDICINE

## 2022-02-01 PROCEDURE — 99232 SBSQ HOSP IP/OBS MODERATE 35: CPT | Performed by: INTERNAL MEDICINE

## 2022-02-01 PROCEDURE — 97530 THERAPEUTIC ACTIVITIES: CPT

## 2022-02-01 PROCEDURE — 80048 BASIC METABOLIC PNL TOTAL CA: CPT | Performed by: INTERNAL MEDICINE

## 2022-02-01 PROCEDURE — 84100 ASSAY OF PHOSPHORUS: CPT | Performed by: INTERNAL MEDICINE

## 2022-02-01 RX ADMIN — TRAMADOL HYDROCHLORIDE 50 MG: 50 TABLET, FILM COATED ORAL at 22:48

## 2022-02-01 RX ADMIN — GABAPENTIN 400 MG: 400 CAPSULE ORAL at 08:18

## 2022-02-01 RX ADMIN — NYSTATIN: 100000 POWDER TOPICAL at 17:07

## 2022-02-01 RX ADMIN — GABAPENTIN 400 MG: 400 CAPSULE ORAL at 17:03

## 2022-02-01 RX ADMIN — GABAPENTIN 400 MG: 400 CAPSULE ORAL at 22:48

## 2022-02-01 RX ADMIN — TERBINAFINE HYDROCHLORIDE 250 MG: 250 TABLET ORAL at 08:18

## 2022-02-01 RX ADMIN — SENNOSIDES AND DOCUSATE SODIUM 1 TABLET: 50; 8.6 TABLET ORAL at 17:03

## 2022-02-01 RX ADMIN — SENNOSIDES AND DOCUSATE SODIUM 1 TABLET: 50; 8.6 TABLET ORAL at 08:18

## 2022-02-01 RX ADMIN — TRAMADOL HYDROCHLORIDE 50 MG: 50 TABLET, FILM COATED ORAL at 07:56

## 2022-02-01 RX ADMIN — ARIPIPRAZOLE 5 MG: 5 TABLET ORAL at 08:18

## 2022-02-01 RX ADMIN — NYSTATIN: 100000 POWDER TOPICAL at 08:22

## 2022-02-01 RX ADMIN — ENOXAPARIN SODIUM 40 MG: 100 INJECTION SUBCUTANEOUS at 08:19

## 2022-02-01 RX ADMIN — TRIAMCINOLONE ACETONIDE: 1 CREAM TOPICAL at 08:22

## 2022-02-01 RX ADMIN — TRIAMCINOLONE ACETONIDE: 1 CREAM TOPICAL at 17:08

## 2022-02-01 RX ADMIN — POLYETHYLENE GLYCOL 3350 17 G: 17 POWDER, FOR SOLUTION ORAL at 08:22

## 2022-02-01 RX ADMIN — AMLODIPINE BESYLATE 10 MG: 10 TABLET ORAL at 08:18

## 2022-02-01 RX ADMIN — TRAMADOL HYDROCHLORIDE 50 MG: 50 TABLET, FILM COATED ORAL at 17:03

## 2022-02-01 NOTE — PLAN OF CARE
Problem: PHYSICAL THERAPY ADULT  Goal: Performs mobility at highest level of function for planned discharge setting  See evaluation for individualized goals  Description: Treatment/Interventions: ADL retraining,LE strengthening/ROM,Functional transfer training,Elevations,Therapeutic exercise,Endurance training,Patient/family training,Equipment eval/education,Bed mobility,Gait training,Spoke to nursing,Spoke to case management,OT  Equipment Recommended: [facility]       See flowsheet documentation for full assessment, interventions and recommendations  Outcome: Progressing  Note: Prognosis: Fair  Problem List: Decreased strength,Decreased endurance,Impaired balance,Decreased mobility,Impaired sensation,Pain,Decreased coordination,Decreased cognition,Impaired judgement,Decreased safety awareness  Assessment: Pt is more limited by LBP today, he is putting forth good effort during therapy but performance varies greatly based on symptom presentation at current time  Sometimes able to ambulate household distance with little assistance and other times requiring significant assistance to stand and transfer  LE buckling intermittently likely due to LBP 2* spinal stenosis  Pt with difficult time describing symptoms due to poor health literacy and insight  Continue to recommend inpt rehab services at D/C from acute care  Barriers to Discharge: Inaccessible home environment,Decreased caregiver support        PT Discharge Recommendation: Post acute rehabilitation services          See flowsheet documentation for full assessment

## 2022-02-01 NOTE — PHYSICAL THERAPY NOTE
Physical TherapyTreatment Note    Patient's Name: Carolyn Guzman    Admitting Diagnosis  Cellulitis [L03 90]  Homeless [Z59 00]  Wound check, abscess [Z51 89]    Problem List  Patient Active Problem List   Diagnosis    Homeless    Psoriasis, unspecified    Ambulatory dysfunction    Essential hypertension    Strange and inexplicable behavior    Onychomycosis    Constipation       Past Medical History  Past Medical History:   Diagnosis Date    Arthritis     Hypertension     Sciatica        Past Surgical History  Past Surgical History:   Procedure Laterality Date    BACK SURGERY         Recent Imaging  CT abdomen pelvis w contrast   Final Result by Ayla Menendez MD (01/28 1435)      Stool filled colon with possible impaction  Diverticulosis with no diverticulitis  Workstation performed: [id number]             Recent Vital Signs  Vitals:    01/31/22 0722 01/31/22 1516 01/31/22 2311 02/01/22 0713   BP: 153/80 157/93 152/80 161/89   BP Location: Left arm Left arm Left arm Left arm   Pulse: 66 66 71 69   Resp: 17 19 20 20   Temp: (!) 97 4 °F (36 3 °C) (!) 97 4 °F (36 3 °C) 97 5 °F (36 4 °C) 97 8 °F (36 6 °C)   TempSrc: Temporal Temporal Temporal Temporal   SpO2: 97% 98% 98% 98%   Weight:       Height:           PT Treatment Time: 55 minutes       01/31/22 1400   PT Last Visit   PT Visit Date 01/31/22   Note Type   Note Type Treatment   Pain Assessment   Pain Assessment Tool 0-10   Pain Score 7   Pain Location/Orientation Orientation: Lower; Location: Back;Orientation: Right;Location: Groin   Restrictions/Precautions   Weight Bearing Precautions Per Order No   Other Precautions Fall Risk;Pain; Chair Alarm   General   Chart Reviewed Yes   Response to Previous Treatment Patient with no complaints from previous session  Family/Caregiver Present No   Cognition   Overall Cognitive Status Impaired   Arousal/Participation Alert; Cooperative   Attention Attends with cues to redirect   Orientation Level Oriented X4   Memory Within functional limits   Following Commands Follows one step commands without difficulty   Bed Mobility   Rolling R 6  Modified independent   Additional items Increased time required   Rolling L 6  Modified independent   Additional items Increased time required   Supine to Sit 6  Modified independent   Additional items Increased time required   Sit to Supine 6  Modified independent   Additional items Increased time required   Transfers   Sit to Stand 4  Minimal assistance   Additional items Assist x 1; Armrests; Increased time required;Verbal cues   Stand to Sit 4  Minimal assistance   Additional items Assist x 1; Armrests; Increased time required;Verbal cues   Additional Comments with RW   Ambulation/Elevation   Gait pattern Step through pattern;Decreased toe off;Decreased heel strike; Ataxia; Decreased foot clearance;Narrow HARJIT;Scissoring; Improper Weight shift   Gait Assistance 4  Minimal assist   Additional items Assist x 1;Verbal cues; Tactile cues   Assistive Device Rolling walker   Distance 15ftx3, 50ft   Stair Management Assistance 3  Moderate assist   Additional items Assist x 1;Verbal cues; Tactile cues; Increased time required   Stair Management Technique Step to pattern; Foreward; With walker   Number of Stairs 2   Ambulation/Elevation Additional Comments able to step up and down from single step; 3 trials completed up and down once with seated break for first two, able to complete 2 in a row last trial   Balance   Static Sitting Fair +   Dynamic Sitting Fair +   Static Standing Fair -   Dynamic Standing Poor +   Ambulatory Poor +   Endurance Deficit   Endurance Deficit Yes   Endurance Deficit Description fatigue   Activity Tolerance   Activity Tolerance Patient limited by fatigue   Medical Staff Made Aware spoke to CM   Nurse Made Aware spoke to RN   Exercises   UE Exercise Sitting;10 reps;AROM; Bilateral  (Shoulder flex, abd; elbow flex)   Neuro re-ed use of theraputty for hand strengthening exercises and fine motor   Assessment   Prognosis Fair   Problem List Decreased strength;Decreased endurance; Impaired balance;Decreased mobility; Impaired sensation;Pain;Decreased coordination;Decreased cognition; Impaired judgement;Decreased safety awareness   Assessment Pt is more limited by LBP today, he is putting forth good effort during therapy but performance varies greatly based on symptom presentation at current time  Sometimes able to ambulate household distance with little assistance and other times requiring significant assistance to stand and transfer  LE buckling intermittently likely due to LBP 2* spinal stenosis  Pt with difficult time describing symptoms due to poor health literacy and insight  Continue to recommend inpt rehab services at D/C from acute care  Barriers to Discharge Inaccessible home environment;Decreased caregiver support   Goals   Patient Goals to get to rehab   STG Expiration Date 02/03/22   Short Term Goal #1 see eval note   PT Treatment Day 7   Plan   Treatment/Interventions ADL retraining;LE strengthening/ROM; Functional transfer training;Elevations; Therapeutic exercise; Endurance training;Patient/family training;Equipment eval/education; Bed mobility;Gait training;Spoke to nursing;Spoke to case management;OT   Progress Progressing toward goals   PT Frequency 3-5x/wk   Recommendation   PT Discharge Recommendation Post acute rehabilitation services   Equipment Recommended Jannie Hooks; Wheelchair   AM-PAC Basic Mobility Inpatient   Turning in Bed Without Bedrails 4   Lying on Back to Sitting on Edge of Flat Bed 4   Moving Bed to Chair 3   Standing Up From Chair 2   Walk in Room 2   Climb 3-5 Stairs 1   Basic Mobility Inpatient Raw Score 16   Basic Mobility Standardized Score 38 32   Highest Level Of Mobility   JH-HLM Goal 5: Stand one or more mins   JH-HLM Highest Level of Mobility 6: Walk 10 steps or more   JH-HLM Goal Achieved Yes   Education   Education Provided Mobility training;Home exercise program;Assistive device   Patient Explanation/teachback used;Demonstrates verbal understanding;Reinforcement needed   End of Consult   Patient Position at End of Consult All needs within reach; Seated edge of bed       Eros Herrera PT, DPT

## 2022-02-01 NOTE — PLAN OF CARE
Problem: PAIN - ADULT  Goal: Verbalizes/displays adequate comfort level or baseline comfort level  Description: Interventions:  - Encourage patient to monitor pain and request assistance  - Assess pain using appropriate pain scale  - Administer analgesics based on type and severity of pain and evaluate response  - Implement non-pharmacological measures as appropriate and evaluate response  - Consider cultural and social influences on pain and pain management  - Notify physician/advanced practitioner if interventions unsuccessful or patient reports new pain  Outcome: Progressing     Problem: INFECTION - ADULT  Goal: Absence or prevention of progression during hospitalization  Description: INTERVENTIONS:  - Assess and monitor for signs and symptoms of infection  - Monitor lab/diagnostic results  - Monitor all insertion sites, i e  indwelling lines, tubes, and drains  - Monitor endotracheal if appropriate and nasal secretions for changes in amount and color  - [place] appropriate cooling/warming therapies per order  - Administer medications as ordered  - Instruct and encourage patient and family to use good hand hygiene technique  - Identify and instruct in appropriate isolation precautions for identified infection/condition  Outcome: Progressing     Problem: SAFETY ADULT  Goal: Patient will remain free of falls  Description: INTERVENTIONS:  - Educate patient/family on patient safety including physical limitations  - Instruct patient to call for assistance with activity   - Consult OT/PT to assist with strengthening/mobility   - Keep Call bell within reach  - Keep bed low and locked with side rails adjusted as appropriate  - Keep care items and personal belongings within reach  - Initiate and maintain comfort rounds  - Make Fall Risk Sign visible to staff  - Apply yellow socks and bracelet for high fall risk patients  - Consider moving patient to room near nurses station  Outcome: Progressing  Goal: Maintain or return to baseline ADL function  Description: INTERVENTIONS:  -  Assess patient's ability to carry out ADLs; assess patient's baseline for ADL function and identify physical deficits which impact ability to perform ADLs (bathing, care of mouth/teeth, toileting, grooming, dressing, etc )  - Assess/evaluate cause of self-care deficits   - Assess range of motion  - Assess patient's mobility; develop plan if impaired  - Assess patient's need for assistive devices and provide as appropriate  - Encourage maximum independence but intervene and supervise when necessary  - Involve family in performance of ADLs  - Assess for home care needs following discharge   - Consider OT consult to assist with ADL evaluation and planning for discharge  - Provide patient education as appropriate  Outcome: Progressing  Goal: Maintains/Returns to pre admission functional level  Description: INTERVENTIONS:  - Perform BMAT or MOVE assessment daily    - Set and communicate daily mobility goal to care team and patient/family/caregiver     - Collaborate with rehabilitation services on mobility goals if consulted  - Out of bed for toileting  - Record patient progress and toleration of activity level   Outcome: Progressing     Problem: DISCHARGE PLANNING  Goal: Discharge to home or other facility with appropriate resources  Description: INTERVENTIONS:  - Identify barriers to discharge w/patient and caregiver  - Arrange for needed discharge resources and transportation as appropriate  - Identify discharge learning needs (meds, wound care, etc )  - Arrange for interpretive services to assist at discharge as needed  - Refer to Case Management Department for coordinating discharge planning if the patient needs post-hospital services based on physician/advanced practitioner order or complex needs related to functional status, cognitive ability, or social support system  Outcome: Progressing     Problem: Knowledge Deficit  Goal: Patient/family/caregiver demonstrates understanding of disease process, treatment plan, medications, and discharge instructions  Description: Complete learning assessment and assess knowledge base  Interventions:  - Provide teaching at level of understanding  - Provide teaching via preferred learning methods  Outcome: Progressing     Problem: MOBILITY - ADULT  Goal: Maintain or return to baseline ADL function  Description: INTERVENTIONS:  -  Assess patient's ability to carry out ADLs; assess patient's baseline for ADL function and identify physical deficits which impact ability to perform ADLs (bathing, care of mouth/teeth, toileting, grooming, dressing, etc )  - Assess/evaluate cause of self-care deficits   - Assess range of motion  - Assess patient's mobility; develop plan if impaired  - Assess patient's need for assistive devices and provide as appropriate  - Encourage maximum independence but intervene and supervise when necessary  - Involve family in performance of ADLs  - Assess for home care needs following discharge   - Consider OT consult to assist with ADL evaluation and planning for discharge  - Provide patient education as appropriate  Outcome: Progressing  Goal: Maintains/Returns to pre admission functional level  Description: INTERVENTIONS:  - Perform BMAT or MOVE assessment daily    - Set and communicate daily mobility goal to care team and patient/family/caregiver     - Collaborate with rehabilitation services on mobility goals if consulted  - Out of bed for toileting  - Record patient progress and toleration of activity level   Outcome: Progressing     Problem: Potential for Falls  Goal: Patient will remain free of falls  Description: INTERVENTIONS:  - Educate patient/family on patient safety including physical limitations  - Instruct patient to call for assistance with activity   - Consult OT/PT to assist with strengthening/mobility   - Keep Call bell within reach  - Keep bed low and locked with side rails adjusted as appropriate  - Keep care items and personal belongings within reach  - Initiate and maintain comfort rounds  - Make Fall Risk Sign visible to staff  - Apply yellow socks and bracelet for high fall risk patients  - Consider moving patient to room near nurses station  Outcome: Progressing     Problem: Prexisting or High Potential for Compromised Skin Integrity  Goal: Skin integrity is maintained or improved  Description: INTERVENTIONS:  - Identify patients at risk for skin breakdown  - Assess and monitor skin integrity  - Assess and monitor nutrition and hydration status  - Monitor labs   - Assess for incontinence   - Turn and reposition patient  - Assist with mobility/ambulation  - Relieve pressure over bony prominences  - Avoid friction and shearing  - Provide appropriate hygiene as needed including keeping skin clean and dry  - Evaluate need for skin moisturizer/barrier cream  - Collaborate with interdisciplinary team   - Patient/family teaching  - Consider wound care consult   Outcome: Progressing

## 2022-02-01 NOTE — ASSESSMENT & PLAN NOTE
· CT imaging obtained on 01/28 revealed large stool burden  · Patient with bowel movement on 01/29 reports improvement in pain  · Continue MiraLax, Senokot S, lactulose

## 2022-02-01 NOTE — ASSESSMENT & PLAN NOTE
· Patient was to be discharged to the [facility] rescue mission however they would not take him back  · PT recommending post acute rehab placement

## 2022-02-01 NOTE — CASE MANAGEMENT
Case Management Discharge Planning Note    Patient name Rachel Copeland  Location 7T /7T -30 MRN [id number]  : 1965 Date 2022       Current Admission Date: 2021  Current Admission Diagnosis:Ambulatory dysfunction   Patient Active Problem List    Diagnosis Date Noted    Constipation 2022    Strange and inexplicable behavior     Onychomycosis 2022    Essential hypertension 2022    Psoriasis, unspecified 2022    Ambulatory dysfunction 2022    Homeless 2021      LOS (days): 33  Geometric Mean LOS (GMLOS) (days): 3 20  Days to GMLOS:-29 6     OBJECTIVE:  Risk of Unplanned Readmission Score: 21         Current admission status: Inpatient   Preferred Pharmacy:   [facility] [address], [address] [facility],[address]  [address]  Phone: [phone] Fax: [phone]    [facility],[address], [address]  [address]  Phone: [phone] Fax: [phone]    Primary Care Provider: Hortencia Rosales MD    Primary Insurance: [address] [facility] RE[facility]  Secondary Insurance:     DISCHARGE DETAILS: CM was notified at morning rounds that pt is medically cleared to be discharged today  CM was notified that pt has a [facility]  Ph: [phone]  CM called the facility and left a request for a return call     CM department will continue to follow through pt's D/C

## 2022-02-01 NOTE — ASSESSMENT & PLAN NOTE
· Patient with hyper fixation on fungal infection arms (which was treated)  · Sometimes with strange tangential thoughts  · Psychiatry consulted, not appropriate for inpatient psychiatry unit at this time    -continue Abilify 5 mg p o  daily  -continue Atarax 25 mg p o  Q 6 hours p r n   For anxiety

## 2022-02-01 NOTE — ASSESSMENT & PLAN NOTE
· Present on almost all toes and fingers  · Podiatry performed nail debridement on 01/27  · Terbinafine 250 mg PO daily for 12 weeks (ordered through 4/21)

## 2022-02-01 NOTE — PROGRESS NOTES
[facility]  Progress Note Humberto Whitney 1965, 60 y o  male MRN: [id number]  Unit/Bed#: 7T [facility] 711-01 Encounter: [id number]  Primary Care Provider: Kevin Rascon MD   Date and time admitted to hospital: 12/29/2021  5:38 PM    * Ambulatory dysfunction  Assessment & Plan  · Patient believes that his ambulatory dysfunction as directly related to 'being wrapped up in a fungus'  · His participation with PT has been limited due to the above and therefore his progress seems more self impeded at this point  · Patient believes gabapentin and steroids will help with his discomfort and fungus and therefore help improve his progress with ambulation  · Continue gabapentin 400 milligrams t i d  · PT/OT recommending post acute rehab  · Case management following to aide in discharge planning  · Medically stable for discharge    [address]  · [facility] however [facility] would not [facility] back  · PT recommending post acute rehab placement    Psoriasis, unspecified  Assessment & Plan  · Patient with psoriasis, was on Enbrel in the past however was lost to follow-up  · Discussed with dermatology- appreciate recommendations for topical creams  · Patient will need outpatient Dermatology and Rheumatology follow-up      Essential hypertension  Assessment & Plan  · Continue amlodipine 10 mg daily    Strange and inexplicable behavior  Assessment & Plan  Patient with hyper fixation on fungal infection arms (which was treated), states he can feel it moving throughout his body  Sometimes with strange tangential thoughts  Psychiatry consulted, not appropriate for inpatient psychiatry unit at this time    -continue Abilify 5 mg p o  daily  -continue Atarax 25 mg p o  Q 6 hours p r n   For anxiety    Onychomycosis  Assessment & Plan  Present on almost all toes and fingers  Podiatry performed nail debridement on 01/27  Terbinafine 250 mg PO daily for 12 weeks (ordered through )    Constipation  Assessment & Plan  Patient complaining of vague abdominal pain  CT imaging obtained on  revealed large stool burden  Patient with bowel movement on  reports improvement in pain  Continue MiraLax, Senokot S, lactulose      VTE Pharmacologic Prophylaxis:   Pharmacologic: Enoxaparin (Lovenox)  Mechanical VTE Prophylaxis in Place: Yes    Patient Centered Rounds: I have performed bedside rounds with nursing staff today  Discussions with Specialists or Other Care Team Provider:  Case Management, Nursing, PT/OT    Education and Discussions with Family / Patient:  Spoke with patient    Time Spent for Care: 45 minutes  More than 50% of total time spent on counseling and coordination of care as described above  Current Length of Stay: 33 day(s)    Current Patient Status: Inpatient   Certification Statement: The patient will continue to require additional inpatient hospital stay due to Stable for discharge to short-term rehab    Discharge Plan:  Post acute rehabilitation services    Code Status: Level 1 - Full Code      Subjective:   Patient seen and examined at bedside  No acute events overnight  Objective:     Vitals:   Temp (24hrs), Av 6 °F (36 4 °C), Min:97 4 °F (36 3 °C), Max:97 8 °F (36 6 °C)    Temp:  [97 4 °F (36 3 °C)-97 8 °F (36 6 °C)] 97 8 °F (36 6 °C)  HR:  [66-71] 69  Resp:  [19-20] 20  BP: (152-161)/(80-93) 161/89  SpO2:  [98 %] 98 %  Body mass index is 23 82 kg/m²  Input and Output Summary (last 24 hours): Intake/Output Summary (Last 24 hours) at 2022 0801  Last data filed at 2022 2301  Gross per 24 hour   Intake 960 ml   Output 1200 ml   Net -240 ml       Physical Exam:     Physical Exam  Vitals and nursing note reviewed  Constitutional:       Appearance: He is well-developed  HENT:      Head: Normocephalic and atraumatic     Eyes:      Conjunctiva/sclera: Conjunctivae normal    Cardiovascular:      Rate and Rhythm: Normal rate and regular rhythm  Heart sounds: No murmur heard  Pulmonary:      Effort: Pulmonary effort is normal  No respiratory distress  Breath sounds: Normal breath sounds  Abdominal:      Palpations: Abdomen is soft  Tenderness: There is no abdominal tenderness  Musculoskeletal:      Cervical back: Neck supple  Skin:     General: Skin is warm and dry  Neurological:      Mental Status: He is alert  Additional Data:     Labs: I have reviewed pertinent results     Results from last 7 days   Lab Units 02/01/22  0445   WBC Thousand/uL 7 40   HEMOGLOBIN g/dL 11 8*   HEMATOCRIT % 35 9*   PLATELETS Thousands/uL 286   NEUTROS PCT % 62   LYMPHS PCT % 23*   MONOS PCT % 11*   EOS PCT % 3     Results from last 7 days   Lab Units 02/01/22  0445 01/28/22  1047 01/28/22  1047   SODIUM mmol/L 139   < > 141   POTASSIUM mmol/L 3 8   < > 4 2   CHLORIDE mmol/L 103   < > 105   CO2 mmol/L 32*   < > 34*   BUN mg/dL 13   < > 19   CREATININE mg/dL 0 67*   < > 0 73   ANION GAP mmol/L 4*   < > 2*   CALCIUM mg/dL 8 4   < > 8 5   ALBUMIN g/dL  --   --  3 5   TOTAL BILIRUBIN mg/dL  --   --  0 42   ALK PHOS U/L  --   --  88   ALT U/L  --   --  16   AST U/L  --   --  23   GLUCOSE RANDOM mg/dL 94   < > 86    < > = values in this interval not displayed                           Imaging: I have reviewed pertinent imaging       Recent Cultures (last 7 days):           Last 24 Hours Medication List:   Current Facility-Administered Medications   Medication Dose Route Frequency Provider Last Rate    acetaminophen  650 mg Oral Q4H PRN Yue Acevedo PA-C      amLODIPine  10 mg Oral Daily Michael Graff,       ARIPiprazole  5 mg Oral Daily DOV Last      diphenhydrAMINE  25 mg Oral Q6H PRN Michael Graff,       enoxaparin  40 mg Subcutaneous Q24H [address] [facility], DO      gabapentin  400 mg Oral TID [place] L Malone, DO      hydrOXYzine HCL  25 mg Oral Q6H PRN DOV Last  nicotine  1 patch Transdermal Daily Zakiya Choe PA-C      nystatin   Topical BID Thierno Abrams MD      ondansetron  4 mg Oral Q6H PRN Danney Poplin, DO      polyethylene glycol  17 g Oral Daily Danney Poplin, DO      senna-docusate sodium  1 tablet Oral BID Danney Poplin, DO      terbinafine  250 mg Oral Daily Danney Poplin, DO      traMADol  50 mg Oral Q6H PRN Rachel Akbar DO      triamcinolone   Topical BID Thierno Abrams MD      white petrolatum-mineral oil   Topical TID PRN Rachel Akbar DO          Today, Patient Was Seen By: Rachel Akbar DO    ** Please Note: Dictation voice to text software may have been used in the creation of this document   **

## 2022-02-01 NOTE — ASSESSMENT & PLAN NOTE
· Patient believes that his ambulatory dysfunction as directly related to 'being wrapped up in a fungus'  · His participation with PT has been limited due to the above and therefore his progress seems more self impeded at this point  · Patient believes gabapentin and steroids will help with his discomfort and fungus and therefore help improve his progress with ambulation  · Continue gabapentin 400mg t i d  · PT/OT recommending post acute rehab  · Case management following to aide in discharge planning  · Medically stable for discharge

## 2022-02-01 NOTE — PLAN OF CARE
Problem: PAIN - ADULT  Goal: Verbalizes/displays adequate comfort level or baseline comfort level  Description: Interventions:  - Encourage patient to monitor pain and request assistance  - Assess pain using appropriate pain scale  - Administer analgesics based on type and severity of pain and evaluate response  - Implement non-pharmacological measures as appropriate and evaluate response  - Consider cultural and social influences on pain and pain management  - Notify physician/advanced practitioner if interventions unsuccessful or patient reports new pain  Outcome: Progressing     Problem: INFECTION - ADULT  Goal: Absence or prevention of progression during hospitalization  Description: INTERVENTIONS:  - Assess and monitor for signs and symptoms of infection  - Monitor lab/diagnostic results  - Monitor all insertion sites, i e  indwelling lines, tubes, and drains  - Monitor endotracheal if appropriate and nasal secretions for changes in amount and color  - [place] appropriate cooling/warming therapies per order  - Administer medications as ordered  - Instruct and encourage patient and family to use good hand hygiene technique  - Identify and instruct in appropriate isolation precautions for identified infection/condition  Outcome: Progressing     Problem: SAFETY ADULT  Goal: Patient will remain free of falls  Description: INTERVENTIONS:  - Educate patient/family on patient safety including physical limitations  - Instruct patient to call for assistance with activity   - Consult OT/PT to assist with strengthening/mobility   - Keep Call bell within reach  - Keep bed low and locked with side rails adjusted as appropriate  - Keep care items and personal belongings within reach  - Initiate and maintain comfort rounds  - Make Fall Risk Sign visible to staff    - Apply yellow socks and bracelet for high fall risk patients  - Consider moving patient to room near nurses station  Outcome: Progressing  Goal: Maintain or return to baseline ADL function  Description: INTERVENTIONS:  -  Assess patient's ability to carry out ADLs; assess patient's baseline for ADL function and identify physical deficits which impact ability to perform ADLs (bathing, care of mouth/teeth, toileting, grooming, dressing, etc )  - Assess/evaluate cause of self-care deficits   - Assess range of motion  - Assess patient's mobility; develop plan if impaired  - Assess patient's need for assistive devices and provide as appropriate  - Encourage maximum independence but intervene and supervise when necessary  - Involve family in performance of ADLs  - Assess for home care needs following discharge   - Consider OT consult to assist with ADL evaluation and planning for discharge  - Provide patient education as appropriate  Outcome: Progressing  Goal: Maintains/Returns to pre admission functional level  Description: INTERVENTIONS:  - Perform BMAT or MOVE assessment daily    - Set and communicate daily mobility goal to care team and patient/family/caregiver     - Collaborate with rehabilitation services on mobility goals if consulted    - Ambulate patient 2 times a day  - Out of bed to chair 4 times a day     - Out of bed for toileting  - Record patient progress and toleration of activity level   Outcome: Progressing     Problem: DISCHARGE PLANNING  Goal: Discharge to home or other facility with appropriate resources  Description: INTERVENTIONS:  - Identify barriers to discharge w/patient and caregiver  - Arrange for needed discharge resources and transportation as appropriate  - Identify discharge learning needs (meds, wound care, etc )  - Arrange for interpretive services to assist at discharge as needed  - Refer to Case Management Department for coordinating discharge planning if the patient needs post-hospital services based on physician/advanced practitioner order or complex needs related to functional status, cognitive ability, or social support system  Outcome: Progressing     Problem: Knowledge Deficit  Goal: Patient/family/caregiver demonstrates understanding of disease process, treatment plan, medications, and discharge instructions  Description: Complete learning assessment and assess knowledge base  Interventions:  - Provide teaching at level of understanding  - Provide teaching via preferred learning methods  Outcome: Progressing     Problem: MOBILITY - ADULT  Goal: Maintain or return to baseline ADL function  Description: INTERVENTIONS:  -  Assess patient's ability to carry out ADLs; assess patient's baseline for ADL function and identify physical deficits which impact ability to perform ADLs (bathing, care of mouth/teeth, toileting, grooming, dressing, etc )  - Assess/evaluate cause of self-care deficits   - Assess range of motion  - Assess patient's mobility; develop plan if impaired  - Assess patient's need for assistive devices and provide as appropriate  - Encourage maximum independence but intervene and supervise when necessary  - Involve family in performance of ADLs  - Assess for home care needs following discharge   - Consider OT consult to assist with ADL evaluation and planning for discharge  - Provide patient education as appropriate  Outcome: Progressing  Goal: Maintains/Returns to pre admission functional level  Description: INTERVENTIONS:  - Perform BMAT or MOVE assessment daily    - Set and communicate daily mobility goal to care team and patient/family/caregiver  - Collaborate with rehabilitation services on mobility goals if consulted        - Ambulate patient 2 times a day  - Out of bed to chair 4 times a day     - Out of bed for toileting  - Record patient progress and toleration of activity level   Outcome: Progressing     Problem: Potential for Falls  Goal: Patient will remain free of falls  Description: INTERVENTIONS:  - Educate patient/family on patient safety including physical limitations  - Instruct patient to call for assistance with activity   - Consult OT/PT to assist with strengthening/mobility   - Keep Call bell within reach  - Keep bed low and locked with side rails adjusted as appropriate  - Keep care items and personal belongings within reach  - Initiate and maintain comfort rounds  - Make Fall Risk Sign visible to staff    - Apply yellow socks and bracelet for high fall risk patients  - Consider moving patient to room near nurses station  Outcome: Progressing     Problem: Prexisting or High Potential for Compromised Skin Integrity  Goal: Skin integrity is maintained or improved  Description: INTERVENTIONS:  - Identify patients at risk for skin breakdown  - Assess and monitor skin integrity  - Assess and monitor nutrition and hydration status  - Monitor labs   - Assess for incontinence   - Turn and reposition patient  - Assist with mobility/ambulation  - Relieve pressure over bony prominences  - Avoid friction and shearing  - Provide appropriate hygiene as needed including keeping skin clean and dry  - Evaluate need for skin moisturizer/barrier cream  - Collaborate with interdisciplinary team   - Patient/family teaching  - Consider wound care consult   Outcome: Progressing

## 2022-02-02 LAB
ANION GAP SERPL CALCULATED.3IONS-SCNC: 7 MMOL/L (ref 5–14)
BASOPHILS # BLD AUTO: 0.1 THOUSANDS/ΜL (ref 0–0.1)
BASOPHILS NFR BLD AUTO: 1 % (ref 0–1)
BUN SERPL-MCNC: 16 MG/DL (ref 5–25)
CALCIUM SERPL-MCNC: 8.3 MG/DL (ref 8.4–10.2)
CHLORIDE SERPL-SCNC: 101 MMOL/L (ref 97–108)
CO2 SERPL-SCNC: 30 MMOL/L (ref 22–30)
CREAT SERPL-MCNC: 0.7 MG/DL (ref 0.7–1.5)
EOSINOPHIL # BLD AUTO: 0.2 THOUSAND/ΜL (ref 0–0.4)
EOSINOPHIL NFR BLD AUTO: 3 % (ref 0–6)
ERYTHROCYTE [DISTWIDTH] IN BLOOD BY AUTOMATED COUNT: 17.3 %
GFR SERPL CREATININE-BSD FRML MDRD: 104 ML/MIN/1.73SQ M
GLUCOSE SERPL-MCNC: 94 MG/DL (ref 70–99)
HCT VFR BLD AUTO: 39 % (ref 41–53)
HGB BLD-MCNC: 12.6 G/DL (ref 13.5–17.5)
LYMPHOCYTES # BLD AUTO: 1.7 THOUSANDS/ΜL (ref 0.5–4)
LYMPHOCYTES NFR BLD AUTO: 25 % (ref 25–45)
MAGNESIUM SERPL-MCNC: 1.9 MG/DL (ref 1.6–2.3)
MCH RBC QN AUTO: 27.9 PG (ref 26–34)
MCHC RBC AUTO-ENTMCNC: 32.4 G/DL (ref 31–36)
MCV RBC AUTO: 86 FL (ref 80–100)
MONOCYTES # BLD AUTO: 0.6 THOUSAND/ΜL (ref 0.2–0.9)
MONOCYTES NFR BLD AUTO: 10 % (ref 1–10)
NEUTROPHILS # BLD AUTO: 4.1 THOUSANDS/ΜL (ref 1.8–7.8)
NEUTS SEG NFR BLD AUTO: 61 % (ref 45–65)
PHOSPHATE SERPL-MCNC: 4.7 MG/DL (ref 2.5–4.8)
PLATELET # BLD AUTO: 313 THOUSANDS/UL (ref 150–450)
PMV BLD AUTO: 7.3 FL (ref 8.9–12.7)
POTASSIUM SERPL-SCNC: 4 MMOL/L (ref 3.6–5)
RBC # BLD AUTO: 4.52 MILLION/UL (ref 4.5–5.9)
SODIUM SERPL-SCNC: 138 MMOL/L (ref 137–147)
WBC # BLD AUTO: 6.8 THOUSAND/UL (ref 4.5–11)

## 2022-02-02 PROCEDURE — 84100 ASSAY OF PHOSPHORUS: CPT | Performed by: INTERNAL MEDICINE

## 2022-02-02 PROCEDURE — 80048 BASIC METABOLIC PNL TOTAL CA: CPT | Performed by: INTERNAL MEDICINE

## 2022-02-02 PROCEDURE — 83735 ASSAY OF MAGNESIUM: CPT | Performed by: INTERNAL MEDICINE

## 2022-02-02 PROCEDURE — 85025 COMPLETE CBC W/AUTO DIFF WBC: CPT | Performed by: INTERNAL MEDICINE

## 2022-02-02 PROCEDURE — 99232 SBSQ HOSP IP/OBS MODERATE 35: CPT | Performed by: INTERNAL MEDICINE

## 2022-02-02 RX ADMIN — TRAMADOL HYDROCHLORIDE 50 MG: 50 TABLET, FILM COATED ORAL at 17:22

## 2022-02-02 RX ADMIN — AMLODIPINE BESYLATE 10 MG: 10 TABLET ORAL at 08:12

## 2022-02-02 RX ADMIN — CARBAMIDE PEROXIDE 6.5% 5 DROP: 6.5 LIQUID AURICULAR (OTIC) at 17:19

## 2022-02-02 RX ADMIN — SENNOSIDES AND DOCUSATE SODIUM 1 TABLET: 50; 8.6 TABLET ORAL at 08:12

## 2022-02-02 RX ADMIN — POLYETHYLENE GLYCOL 3350 17 G: 17 POWDER, FOR SOLUTION ORAL at 08:12

## 2022-02-02 RX ADMIN — TRIAMCINOLONE ACETONIDE: 1 CREAM TOPICAL at 17:19

## 2022-02-02 RX ADMIN — ENOXAPARIN SODIUM 40 MG: 100 INJECTION SUBCUTANEOUS at 08:12

## 2022-02-02 RX ADMIN — GABAPENTIN 400 MG: 400 CAPSULE ORAL at 08:12

## 2022-02-02 RX ADMIN — TRAMADOL HYDROCHLORIDE 50 MG: 50 TABLET, FILM COATED ORAL at 04:57

## 2022-02-02 RX ADMIN — GABAPENTIN 400 MG: 400 CAPSULE ORAL at 16:04

## 2022-02-02 RX ADMIN — TRIAMCINOLONE ACETONIDE: 1 CREAM TOPICAL at 08:15

## 2022-02-02 RX ADMIN — SENNOSIDES AND DOCUSATE SODIUM 1 TABLET: 50; 8.6 TABLET ORAL at 17:19

## 2022-02-02 RX ADMIN — ARIPIPRAZOLE 5 MG: 5 TABLET ORAL at 08:12

## 2022-02-02 RX ADMIN — GABAPENTIN 400 MG: 400 CAPSULE ORAL at 21:23

## 2022-02-02 RX ADMIN — NYSTATIN: 100000 POWDER TOPICAL at 17:19

## 2022-02-02 RX ADMIN — Medication: at 17:19

## 2022-02-02 RX ADMIN — CARBAMIDE PEROXIDE 6.5% 5 DROP: 6.5 LIQUID AURICULAR (OTIC) at 08:12

## 2022-02-02 RX ADMIN — NYSTATIN: 100000 POWDER TOPICAL at 08:12

## 2022-02-02 RX ADMIN — TRAMADOL HYDROCHLORIDE 50 MG: 50 TABLET, FILM COATED ORAL at 23:14

## 2022-02-02 RX ADMIN — TRAMADOL HYDROCHLORIDE 50 MG: 50 TABLET, FILM COATED ORAL at 11:14

## 2022-02-02 RX ADMIN — TERBINAFINE HYDROCHLORIDE 250 MG: 250 TABLET ORAL at 08:12

## 2022-02-02 NOTE — CASE MANAGEMENT
Case Management Discharge Planning Note    Patient name Lalo Tovar  Location 7T /7T -01 MRN [id number]  : 1965 Date 2022       Current Admission Date: 2021  Current Admission Diagnosis:Ambulatory dysfunction   Patient Active Problem List    Diagnosis Date Noted    Constipation 2022    Strange and inexplicable behavior     Onychomycosis 2022    Essential hypertension 2022    Psoriasis, unspecified 2022    Ambulatory dysfunction 2022    Homeless 2021      LOS (days): 34  Geometric Mean LOS (GMLOS) (days): 3 20  Days to GMLOS:-30 7     OBJECTIVE:  Risk of Unplanned Readmission Score: 24         Current admission status: Inpatient   Preferred Pharmacy:   [facility] [address], [address] [facility],[address]  [address]  Phone: [phone] Fax: [phone] [address] [facility],[address], [address]  [address]  Phone: [phone] Fax: [phone]    Primary Care Provider: Codi Santamaria MD    [facility]  Secondary Insurance:     DISCHARGE DETAILS: CM was notified at morning rounds that pt is medically cleared to be discharged today  CM was notified that pt has a history of rehab at [facility]  Ph: [phone]  [facility] stated that they cannot accept the pt because pt was deemed 302 at there facility and they will not be able to accept the pt again  CM contacted multiple personal care home: 1  [facility]; 2 [facility]  [address] [address] faxed pt's clinicals; waiting for accepting facility  CM department will continue to follow through pt's D/C

## 2022-02-02 NOTE — PROGRESS NOTES
[facility]  Progress Note Mara Baig 1965, 60 y o  male MRN: [id number]  Unit/Bed#: 7T [facility] 711-01 Encounter: [id number]  Primary Care Provider: Trey Medina MD   Date and time admitted to hospital: 12/29/2021  5:38 PM    * Ambulatory dysfunction  Assessment & Plan  · Patient believes that his ambulatory dysfunction as directly related to 'being wrapped up in a fungus'  · His participation with PT has been limited due to the above and therefore his progress seems more self impeded at this point  · Patient believes gabapentin and steroids will help with his discomfort and fungus and therefore help improve his progress with ambulation  · Continue gabapentin 400 milligrams t i d  · PT/OT recommending post acute rehab  · Case management following to aide in discharge planning  · Medically stable for discharge    [facility]  · [facility] however they would not take him back  · PT recommending post acute rehab placement    Psoriasis, unspecified  Assessment & Plan  · Patient with psoriasis, was on Enbrel in the past however was lost to follow-up  · Discussed with dermatology- appreciate recommendations for topical creams  · Patient will need outpatient Dermatology and Rheumatology follow-up      Essential hypertension  Assessment & Plan  · Continue amlodipine 10 mg daily    Strange and inexplicable behavior  Assessment & Plan  Patient with hyper fixation on fungal infection arms (which was treated), states he can feel it moving throughout his body  Sometimes with strange tangential thoughts  Psychiatry consulted, not appropriate for inpatient psychiatry unit at this time    -continue Abilify 5 mg p o  daily  -continue Atarax 25 mg p o  Q 6 hours p r n   For anxiety    Onychomycosis  Assessment & Plan  Present on almost all toes and fingers  Podiatry performed nail debridement on 01/27  Terbinafine 250 mg PO daily for 12 weeks (ordered through )    Constipation  Assessment & Plan  Patient complaining of vague abdominal pain  CT imaging obtained on  revealed large stool burden  Patient with bowel movement on  reports improvement in pain  Continue MiraLax, Senokot S, lactulose      VTE Pharmacologic Prophylaxis:   Pharmacologic: Enoxaparin (Lovenox)  Mechanical VTE Prophylaxis in Place: Yes    Patient Centered Rounds: I have performed bedside rounds with nursing staff today  Discussions with Specialists or Other Care Team Provider:  Case Management, Nursing, PT/OT    Education and Discussions with Family / Patient:  Spoke with patient    Time Spent for Care: 45 minutes  More than 50% of total time spent on counseling and coordination of care as described above  Current Length of Stay: 34 day(s)    Current Patient Status: Inpatient   Certification Statement: The patient will continue to require additional inpatient hospital stay due to Stable for discharge to short-term rehab    Discharge Plan:  Post acute rehabilitation services    Code Status: Level 1 - Full Code      Subjective:   Patient seen and examined at bedside  No acute events overnight  Objective:     Vitals:   Temp (24hrs), Av 7 °F (36 5 °C), Min:97 6 °F (36 4 °C), Max:97 8 °F (36 6 °C)    Temp:  [97 6 °F (36 4 °C)-97 8 °F (36 6 °C)] 97 6 °F (36 4 °C)  HR:  [68-69] 68  Resp:  [18-20] 18  BP: (157-161)/(79-89) 157/79  SpO2:  [97 %-98 %] 97 %  Body mass index is 23 82 kg/m²  Input and Output Summary (last 24 hours): Intake/Output Summary (Last 24 hours) at 2022 0711  Last data filed at 2022 0401  Gross per 24 hour   Intake 960 ml   Output 1750 ml   Net -790 ml       Physical Exam:     Physical Exam  Vitals and nursing note reviewed  Constitutional:       Appearance: He is well-developed  HENT:      Head: Normocephalic and atraumatic     Eyes:      Conjunctiva/sclera: Conjunctivae normal    Cardiovascular:      Rate and Rhythm: Normal rate and regular rhythm  Heart sounds: No murmur heard  Pulmonary:      Effort: Pulmonary effort is normal  No respiratory distress  Breath sounds: Normal breath sounds  Abdominal:      Palpations: Abdomen is soft  Tenderness: There is no abdominal tenderness  Musculoskeletal:      Cervical back: Neck supple  Skin:     General: Skin is warm and dry  Neurological:      Mental Status: He is alert  Additional Data:     Labs: I have reviewed pertinent results     Results from last 7 days   Lab Units 02/01/22  0445   WBC Thousand/uL 7 40   HEMOGLOBIN g/dL 11 8*   HEMATOCRIT % 35 9*   PLATELETS Thousands/uL 286   NEUTROS PCT % 62   LYMPHS PCT % 23*   MONOS PCT % 11*   EOS PCT % 3     Results from last 7 days   Lab Units 02/02/22  0501 02/01/22  0445 01/28/22  1047   SODIUM mmol/L 138   < > 141   POTASSIUM mmol/L 4 0   < > 4 2   CHLORIDE mmol/L 101   < > 105   CO2 mmol/L 30   < > 34*   BUN mg/dL 16   < > 19   CREATININE mg/dL 0 70   < > 0 73   ANION GAP mmol/L 7   < > 2*   CALCIUM mg/dL 8 3*   < > 8 5   ALBUMIN g/dL  --   --  3 5   TOTAL BILIRUBIN mg/dL  --   --  0 42   ALK PHOS U/L  --   --  88   ALT U/L  --   --  16   AST U/L  --   --  23   GLUCOSE RANDOM mg/dL 94   < > 86    < > = values in this interval not displayed                           Imaging: I have reviewed pertinent imaging       Recent Cultures (last 7 days):           Last 24 Hours Medication List:   Current Facility-Administered Medications   Medication Dose Route Frequency Provider Last Rate    acetaminophen  650 mg Oral Q4H PRN Tejinder Manzanares PA-C      amLODIPine  10 mg Oral Daily Yuliana Huddle, DO      ARIPiprazole  5 mg Oral Daily DOV Fischer      carbamide peroxide  5 drop Both Ears BID Vicente Rodrigues, DO      diphenhydrAMINE  25 mg Oral Q6H PRN Yuliana Huddle, DO      enoxaparin  40 mg Subcutaneous Q24H [address] [facility], DO      gabapentin  400 mg Oral TID [place] L Faisal, DO      hydrOXYzine HCL  25 mg Oral Q6H PRN DOV Vasquez      nicotine  1 patch Transdermal Daily Sherly Garcia PA-C      nystatin   Topical BID Jose Pope MD      ondansetron  4 mg Oral Q6H PRN Russ Emperor, DO      polyethylene glycol  17 g Oral Daily Russ Emperor, DO      senna-docusate sodium  1 tablet Oral BID Russ Emperor, DO      terbinafine  250 mg Oral Daily Russ Emperor, DO      traMADol  50 mg Oral Q6H PRN Vito Lam DO      triamcinolone   Topical BID Jose Pope MD      white petrolatum-mineral oil   Topical TID PRN Vito Lam DO          Today, Patient Was Seen By: Vito Lam DO    ** Please Note: Dictation voice to text software may have been used in the creation of this document   **

## 2022-02-02 NOTE — PHYSICAL THERAPY NOTE
Physical TherapyTreatment Note    Patient's Name: Jocelyn Correa    Admitting Diagnosis  Cellulitis [L03 90]  Homeless [Z59 00]  Wound check, abscess [Z51 89]    Problem List  Patient Active Problem List   Diagnosis    Homeless    Psoriasis, unspecified    Ambulatory dysfunction    Essential hypertension    Strange and inexplicable behavior    Onychomycosis    Constipation       Past Medical History  Past Medical History:   Diagnosis Date    Arthritis     Hypertension     Sciatica        Past Surgical History  Past Surgical History:   Procedure Laterality Date    BACK SURGERY         Recent Imaging  CT abdomen pelvis w contrast   Final Result by Francia Mccormack MD (01/28 1435)      Stool filled colon with possible impaction  Diverticulosis with no diverticulitis  Workstation performed: [id number]             Recent Vital Signs  Vitals:    01/31/22 1516 01/31/22 2311 02/01/22 0713 02/01/22 1513   BP: 157/93 152/80 161/89 157/79   BP Location: Left arm Left arm Left arm Left arm   Pulse: 66 71 69 68   Resp: 19 20 20 18   Temp: (!) 97 4 °F (36 3 °C) 97 5 °F (36 4 °C) 97 8 °F (36 6 °C) 97 6 °F (36 4 °C)   TempSrc: Temporal Temporal Temporal Temporal   SpO2: 98% 98% 98% 97%   Weight:       Height:           PT Treatment Time: 40 minutes       02/01/22 1320   PT Last Visit   PT Visit Date 02/01/22   Note Type   Note Type Treatment   Pain Assessment   Pain Assessment Tool 0-10   Pain Score 8   Pain Location/Orientation Orientation: Right;Location: Leg;Orientation: Lower; Location: Back   Restrictions/Precautions   Weight Bearing Precautions Per Order No   Other Precautions Fall Risk;Pain   General   Chart Reviewed Yes   Response to Previous Treatment Patient with no complaints from previous session  Family/Caregiver Present No   Cognition   Overall Cognitive Status Impaired   Arousal/Participation Alert; Cooperative   Attention Attends with cues to redirect   Orientation Level Oriented X4 Memory Within functional limits   Following Commands Follows one step commands without difficulty   Bed Mobility   Supine to Sit 6  Modified independent   Additional items Increased time required   Sit to Supine 4  Minimal assistance   Additional items Leg ;LE management   Additional Comments pt required assistance to lift LE into bed due to pain   Transfers   Sit to Stand 4  Minimal assistance   Additional items Assist x 1; Armrests; Increased time required;Verbal cues   Stand to Sit 4  Minimal assistance   Additional items Assist x 1; Armrests; Increased time required;Verbal cues   Stand pivot 4  Minimal assistance   Additional items Assist x 1; Armrests; Increased time required;Verbal cues   Toilet transfer 4  Minimal assistance   Additional items Assist x 1; Armrests; Increased time required;Verbal cues;Standard toilet   Additional Comments with RW   Ambulation/Elevation   Ambulation/Elevation Additional Comments pt not able to tolerate ambulation today due to LBP and RLE pain   Balance   Static Sitting Fair +   Dynamic Sitting Fair +   Static Standing Fair -   Dynamic Standing Poor +   Endurance Deficit   Endurance Deficit Yes   Endurance Deficit Description pain   Activity Tolerance   Activity Tolerance Patient limited by pain   Medical Staff Made Aware spoke to CM   Nurse Made Aware spoke to RN   Assessment   Prognosis Fair   Problem List Decreased strength;Decreased endurance; Impaired balance;Decreased mobility; Impaired sensation;Pain;Decreased coordination;Decreased cognition; Impaired judgement;Decreased safety awareness   Assessment pt is not able to tolerate ambulation today due to LBP and pain radiating into the RLE, able to stand for a few seconds but weaker than in previous treatments   Pt able to transfer to and from standard toilet with min A     Barriers to Discharge Inaccessible home environment;Decreased caregiver support   Goals   Patient Goals to go to rehab   STG Expiration Date 02/03/22   Short Term Goal #1 see eval note   PT Treatment Day 8   Plan   Treatment/Interventions ADL retraining;Functional transfer training;LE strengthening/ROM; Elevations; Therapeutic exercise; Endurance training;Patient/family training;Equipment eval/education; Bed mobility;Gait training;Spoke to case management;Spoke to nursing;OT   Progress Progressing toward goals   PT Frequency 3-5x/wk   Recommendation   PT Discharge Recommendation Post acute rehabilitation services   Equipment Recommended Walker; Wheelchair   AM-PAC Basic Mobility Inpatient   Turning in Bed Without Bedrails 4   Lying on Back to Sitting on Edge of Flat Bed 4   Moving Bed to Chair 3   Standing Up From Chair 2   Walk in Room 2   Climb 3-5 Stairs 1   Basic Mobility Inpatient Raw Score 16   Basic Mobility Standardized Score 38 32   Highest Level Of Mobility   JH-HLM Goal 5: Stand one or more mins   JH-HLM Highest Level of Mobility 4: Move to chair/commode   JH-HLM Goal Achieved No   Education   Education Provided Mobility training;Home exercise program;Assistive device   Patient Explanation/teachback used;Demonstrates verbal understanding;Reinforcement needed       Price Cano PT, DPT

## 2022-02-02 NOTE — PLAN OF CARE
Problem: PHYSICAL THERAPY ADULT  Goal: Performs mobility at highest level of function for planned discharge setting  See evaluation for individualized goals  Description: Treatment/Interventions: ADL retraining,Functional transfer training,LE strengthening/ROM,Elevations,Therapeutic exercise,Endurance training,Patient/family training,Equipment eval/education,Bed mobility,Gait training,Spoke to case management,Spoke to nursing,OT  Equipment Recommended: Odilon Hroton       See flowsheet documentation for full assessment, interventions and recommendations  Outcome: Progressing  Note: Prognosis: Fair  Problem List: Decreased strength,Decreased endurance,Impaired balance,Decreased mobility,Impaired sensation,Pain,Decreased coordination,Decreased cognition,Impaired judgement,Decreased safety awareness  Assessment: pt is not able to tolerate ambulation today due to LBP and pain radiating into the RLE, able to stand for a few seconds but weaker than in previous treatments  Pt able to transfer to and from standard toilet with min A    Barriers to Discharge: Inaccessible home environment,Decreased caregiver support        PT Discharge Recommendation: Post acute rehabilitation services          See flowsheet documentation for full assessment

## 2022-02-02 NOTE — NURSING NOTE
PT was found sitting on the floor  PT stated that he was trying to get into bed and put him self on the floor  Assessment noted no edema or erythema  Denies any pain  Will continue to monitor call bell within reach

## 2022-02-03 PROCEDURE — 99232 SBSQ HOSP IP/OBS MODERATE 35: CPT | Performed by: INTERNAL MEDICINE

## 2022-02-03 RX ORDER — METHOCARBAMOL 500 MG/1
500 TABLET, FILM COATED ORAL EVERY 6 HOURS PRN
Status: DISCONTINUED | OUTPATIENT
Start: 2022-02-03 | End: 2022-02-08

## 2022-02-03 RX ADMIN — TRIAMCINOLONE ACETONIDE: 1 CREAM TOPICAL at 08:18

## 2022-02-03 RX ADMIN — TRAMADOL HYDROCHLORIDE 50 MG: 50 TABLET, FILM COATED ORAL at 13:41

## 2022-02-03 RX ADMIN — GABAPENTIN 400 MG: 400 CAPSULE ORAL at 17:21

## 2022-02-03 RX ADMIN — GABAPENTIN 400 MG: 400 CAPSULE ORAL at 20:27

## 2022-02-03 RX ADMIN — SENNOSIDES AND DOCUSATE SODIUM 1 TABLET: 50; 8.6 TABLET ORAL at 08:16

## 2022-02-03 RX ADMIN — TRIAMCINOLONE ACETONIDE: 1 CREAM TOPICAL at 17:21

## 2022-02-03 RX ADMIN — ARIPIPRAZOLE 5 MG: 5 TABLET ORAL at 08:16

## 2022-02-03 RX ADMIN — METHOCARBAMOL 500 MG: 500 TABLET ORAL at 17:21

## 2022-02-03 RX ADMIN — METHOCARBAMOL 500 MG: 500 TABLET ORAL at 11:45

## 2022-02-03 RX ADMIN — AMLODIPINE BESYLATE 10 MG: 10 TABLET ORAL at 08:16

## 2022-02-03 RX ADMIN — CARBAMIDE PEROXIDE 6.5% 5 DROP: 6.5 LIQUID AURICULAR (OTIC) at 17:21

## 2022-02-03 RX ADMIN — POLYETHYLENE GLYCOL 3350 17 G: 17 POWDER, FOR SOLUTION ORAL at 08:18

## 2022-02-03 RX ADMIN — NYSTATIN: 100000 POWDER TOPICAL at 08:18

## 2022-02-03 RX ADMIN — TRAMADOL HYDROCHLORIDE 50 MG: 50 TABLET, FILM COATED ORAL at 20:27

## 2022-02-03 RX ADMIN — NYSTATIN: 100000 POWDER TOPICAL at 17:21

## 2022-02-03 RX ADMIN — SENNOSIDES AND DOCUSATE SODIUM 1 TABLET: 50; 8.6 TABLET ORAL at 17:21

## 2022-02-03 RX ADMIN — ENOXAPARIN SODIUM 40 MG: 100 INJECTION SUBCUTANEOUS at 08:18

## 2022-02-03 RX ADMIN — TRAMADOL HYDROCHLORIDE 50 MG: 50 TABLET, FILM COATED ORAL at 08:16

## 2022-02-03 RX ADMIN — TERBINAFINE HYDROCHLORIDE 250 MG: 250 TABLET ORAL at 08:47

## 2022-02-03 RX ADMIN — ACETAMINOPHEN 650 MG: 325 TABLET ORAL at 20:28

## 2022-02-03 RX ADMIN — HYDROXYZINE HYDROCHLORIDE 25 MG: 25 TABLET ORAL at 20:27

## 2022-02-03 RX ADMIN — GABAPENTIN 400 MG: 400 CAPSULE ORAL at 08:16

## 2022-02-03 RX ADMIN — CARBAMIDE PEROXIDE 6.5% 5 DROP: 6.5 LIQUID AURICULAR (OTIC) at 11:45

## 2022-02-03 NOTE — PLAN OF CARE
Problem: PAIN - ADULT  Goal: Verbalizes/displays adequate comfort level or baseline comfort level  Description: Interventions:  - Encourage patient to monitor pain and request assistance  - Assess pain using appropriate pain scale  - Administer analgesics based on type and severity of pain and evaluate response  - Implement non-pharmacological measures as appropriate and evaluate response  - Consider cultural and social influences on pain and pain management  - Notify physician/advanced practitioner if interventions unsuccessful or patient reports new pain  Outcome: Progressing     Problem: INFECTION - ADULT  Goal: Absence or prevention of progression during hospitalization  Description: INTERVENTIONS:  - Assess and monitor for signs and symptoms of infection  - Monitor lab/diagnostic results  - Monitor all insertion sites, i e  indwelling lines, tubes, and drains  - Monitor endotracheal if appropriate and nasal secretions for changes in amount and color  - [place] appropriate cooling/warming therapies per order  - Administer medications as ordered  - Instruct and encourage patient and family to use good hand hygiene technique  - Identify and instruct in appropriate isolation precautions for identified infection/condition  Outcome: Progressing     Problem: SAFETY ADULT  Goal: Patient will remain free of falls  Description: INTERVENTIONS:  - Educate patient/family on patient safety including physical limitations  - Instruct patient to call for assistance with activity   - Consult OT/PT to assist with strengthening/mobility   - Keep Call bell within reach  - Keep bed low and locked with side rails adjusted as appropriate  - Keep care items and personal belongings within reach  - Initiate and maintain comfort rounds  - Make Fall Risk Sign visible to staff    - Apply yellow socks and bracelet for high fall risk patients  - Consider moving patient to room near nurses station  Outcome: Progressing  Goal: Maintain or return to baseline ADL function  Description: INTERVENTIONS:  -  Assess patient's ability to carry out ADLs; assess patient's baseline for ADL function and identify physical deficits which impact ability to perform ADLs (bathing, care of mouth/teeth, toileting, grooming, dressing, etc )  - Assess/evaluate cause of self-care deficits   - Assess range of motion  - Assess patient's mobility; develop plan if impaired  - Assess patient's need for assistive devices and provide as appropriate  - Encourage maximum independence but intervene and supervise when necessary  - Involve family in performance of ADLs  - Assess for home care needs following discharge   - Consider OT consult to assist with ADL evaluation and planning for discharge  - Provide patient education as appropriate  Outcome: Progressing  Goal: Maintains/Returns to pre admission functional level  Description: INTERVENTIONS:  - Perform BMAT or MOVE assessment daily    - Set and communicate daily mobility goal to care team and patient/family/caregiver     - Collaborate with rehabilitation services on mobility goals if consulted    - Out of bed to chair 3 times a day     - Out of bed for toileting  - Record patient progress and toleration of activity level   Outcome: Progressing     Problem: DISCHARGE PLANNING  Goal: Discharge to home or other facility with appropriate resources  Description: INTERVENTIONS:  - Identify barriers to discharge w/patient and caregiver  - Arrange for needed discharge resources and transportation as appropriate  - Identify discharge learning needs (meds, wound care, etc )  - Arrange for interpretive services to assist at discharge as needed  - Refer to Case Management Department for coordinating discharge planning if the patient needs post-hospital services based on physician/advanced practitioner order or complex needs related to functional status, cognitive ability, or social support system  Outcome: Progressing     Problem: Knowledge Deficit  Goal: Patient/family/caregiver demonstrates understanding of disease process, treatment plan, medications, and discharge instructions  Description: Complete learning assessment and assess knowledge base  Interventions:  - Provide teaching at level of understanding  - Provide teaching via preferred learning methods  Outcome: Progressing     Problem: MOBILITY - ADULT  Goal: Maintain or return to baseline ADL function  Description: INTERVENTIONS:  -  Assess patient's ability to carry out ADLs; assess patient's baseline for ADL function and identify physical deficits which impact ability to perform ADLs (bathing, care of mouth/teeth, toileting, grooming, dressing, etc )  - Assess/evaluate cause of self-care deficits   - Assess range of motion  - Assess patient's mobility; develop plan if impaired  - Assess patient's need for assistive devices and provide as appropriate  - Encourage maximum independence but intervene and supervise when necessary  - Involve family in performance of ADLs  - Assess for home care needs following discharge   - Consider OT consult to assist with ADL evaluation and planning for discharge  - Provide patient education as appropriate  Outcome: Progressing  Goal: Maintains/Returns to pre admission functional level  Description: INTERVENTIONS:  - Perform BMAT or MOVE assessment daily    - Set and communicate daily mobility goal to care team and patient/family/caregiver     - Collaborate with rehabilitation services on mobility goals if consulted      - Out of bed to chair 3 times a day     - Out of bed for toileting  - Record patient progress and toleration of activity level   Outcome: Progressing     Problem: Potential for Falls  Goal: Patient will remain free of falls  Description: INTERVENTIONS:  - Educate patient/family on patient safety including physical limitations  - Instruct patient to call for assistance with activity   - Consult OT/PT to assist with strengthening/mobility   - Keep Call bell within reach  - Keep bed low and locked with side rails adjusted as appropriate  - Keep care items and personal belongings within reach  - Initiate and maintain comfort rounds  - Make Fall Risk Sign visible to staff    - Apply yellow socks and bracelet for high fall risk patients  - Consider moving patient to room near nurses station  Outcome: Progressing     Problem: Prexisting or High Potential for Compromised Skin Integrity  Goal: Skin integrity is maintained or improved  Description: INTERVENTIONS:  - Identify patients at risk for skin breakdown  - Assess and monitor skin integrity  - Assess and monitor nutrition and hydration status  - Monitor labs   - Assess for incontinence   - Turn and reposition patient  - Assist with mobility/ambulation  - Relieve pressure over bony prominences  - Avoid friction and shearing  - Provide appropriate hygiene as needed including keeping skin clean and dry  - Evaluate need for skin moisturizer/barrier cream  - Collaborate with interdisciplinary team   - Patient/family teaching  - Consider wound care consult   Outcome: Progressing

## 2022-02-03 NOTE — PROGRESS NOTES
[facility]  Progress Note Annabelle Hogan 1965, 60 y o  male MRN: [id number]  Unit/Bed#: 7T [facility] 711-01 Encounter: [id number]  Primary Care Provider: Nathan Storey MD   Date and time admitted to hospital: 12/29/2021  5:38 PM    * Ambulatory dysfunction  Assessment & Plan  · Patient believes that his ambulatory dysfunction as directly related to 'being wrapped up in a fungus'  · His participation with PT has been limited due to the above and therefore his progress seems more self impeded at this point  · Patient believes gabapentin and steroids will help with his discomfort and fungus and therefore help improve his progress with ambulation  · Continue gabapentin 400 milligrams t i d  · PT/OT recommending post acute rehab  · Case management following to aide in discharge planning  · Medically stable for discharge    [facility]  · Patient [facility] however they would not take him back  · PT recommending post acute rehab placement    Psoriasis, unspecified  Assessment & Plan  · Patient with psoriasis, was on Enbrel in the past however was lost to follow-up  · Discussed with dermatology- appreciate recommendations for topical creams  · Patient will need outpatient Dermatology and Rheumatology follow-up      Essential hypertension  Assessment & Plan  · Continue amlodipine 10 mg daily    Strange and inexplicable behavior  Assessment & Plan  Patient with hyper fixation on fungal infection arms (which was treated), states he can feel it moving throughout his body  Sometimes with strange tangential thoughts  Psychiatry consulted, not appropriate for inpatient psychiatry unit at this time    -continue Abilify 5 mg p o  daily  -continue Atarax 25 mg p o  Q 6 hours p r n   For anxiety    Onychomycosis  Assessment & Plan  Present on almost all toes and fingers  Podiatry performed nail debridement on 01/27  Terbinafine 250 mg PO daily for 12 weeks (ordered through )    Constipation  Assessment & Plan  Patient complaining of vague abdominal pain  CT imaging obtained on  revealed large stool burden  Patient with bowel movement on  reports improvement in pain  Continue MiraLax, Senokot S, lactulose      VTE Pharmacologic Prophylaxis:   Pharmacologic: Enoxaparin (Lovenox)  Mechanical VTE Prophylaxis in Place: Yes    Patient Centered Rounds: I have performed bedside rounds with nursing staff today  Discussions with Specialists or Other Care Team Provider:  Case Management, Nursing, PT/OT    Education and Discussions with Family / Patient:  Spoke with patient    Time Spent for Care: 45 minutes  More than 50% of total time spent on counseling and coordination of care as described above  Current Length of Stay: 35 day(s)    Current Patient Status: Inpatient   Certification Statement: The patient will continue to require additional inpatient hospital stay due to Stable for discharge to short-term rehab    Discharge Plan:  Post acute rehabilitation services    Code Status: Level 1 - Full Code      Subjective:   Patient seen and examined at bedside  No acute events overnight  Objective:     Vitals:   Temp (24hrs), Av °F (36 1 °C), Min:96 4 °F (35 8 °C), Max:97 6 °F (36 4 °C)    Temp:  [96 4 °F (35 8 °C)-97 6 °F (36 4 °C)] 97 6 °F (36 4 °C)  HR:  [57-72] 69  Resp:  [18-20] 18  BP: (135-171)/(65-87) 143/65  SpO2:  [96 %-98 %] 98 %  Body mass index is 23 82 kg/m²  Input and Output Summary (last 24 hours): Intake/Output Summary (Last 24 hours) at 2/3/2022 0727  Last data filed at 2/3/2022 0401  Gross per 24 hour   Intake 1380 ml   Output 1300 ml   Net 80 ml       Physical Exam:     Physical Exam  Vitals and nursing note reviewed  Constitutional:       Appearance: He is well-developed  HENT:      Head: Normocephalic and atraumatic     Eyes:      Conjunctiva/sclera: Conjunctivae normal    Cardiovascular:      Rate and Rhythm: Normal rate and regular rhythm  Heart sounds: No murmur heard  Pulmonary:      Effort: Pulmonary effort is normal  No respiratory distress  Breath sounds: Normal breath sounds  Abdominal:      Palpations: Abdomen is soft  Tenderness: There is no abdominal tenderness  Musculoskeletal:      Cervical back: Neck supple  Skin:     General: Skin is warm and dry  Neurological:      Mental Status: He is alert  Additional Data:     Labs: I have reviewed pertinent results     Results from last 7 days   Lab Units 02/02/22  0500   WBC Thousand/uL 6 80   HEMOGLOBIN g/dL 12 6*   HEMATOCRIT % 39 0*   PLATELETS Thousands/uL 313   NEUTROS PCT % 61   LYMPHS PCT % 25   MONOS PCT % 10   EOS PCT % 3     Results from last 7 days   Lab Units 02/02/22  0501 02/01/22  0445 01/28/22  1047   SODIUM mmol/L 138   < > 141   POTASSIUM mmol/L 4 0   < > 4 2   CHLORIDE mmol/L 101   < > 105   CO2 mmol/L 30   < > 34*   BUN mg/dL 16   < > 19   CREATININE mg/dL 0 70   < > 0 73   ANION GAP mmol/L 7   < > 2*   CALCIUM mg/dL 8 3*   < > 8 5   ALBUMIN g/dL  --   --  3 5   TOTAL BILIRUBIN mg/dL  --   --  0 42   ALK PHOS U/L  --   --  88   ALT U/L  --   --  16   AST U/L  --   --  23   GLUCOSE RANDOM mg/dL 94   < > 86    < > = values in this interval not displayed                           Imaging: I have reviewed pertinent imaging       Recent Cultures (last 7 days):           Last 24 Hours Medication List:   Current Facility-Administered Medications   Medication Dose Route Frequency Provider Last Rate    acetaminophen  650 mg Oral Q4H PRN Froilan Solis PA-C      amLODIPine  10 mg Oral Daily Helen Palma, DO      ARIPiprazole  5 mg Oral Daily DOV Kelley      carbamide peroxide  5 drop Both Ears BID Giselle Spotted, DO      diphenhydrAMINE  25 mg Oral Q6H PRN Helen Palma, DO      enoxaparin  40 mg Subcutaneous Q24H [address] [facility], DO      gabapentin  400 mg Oral TID [place] L Malone, DO      hydrOXYzine HCL  25 mg Oral Q6H PRN DOV Robertson      nicotine  1 patch Transdermal Daily Aroldo Gallardo PA-C      nystatin   Topical BID Curt Cutler MD      ondansetron  4 mg Oral Q6H PRN Trudy Basil, DO      polyethylene glycol  17 g Oral Daily Trudy Basil, DO      senna-docusate sodium  1 tablet Oral BID Trudy Basil, DO      terbinafine  250 mg Oral Daily Trudydanna Lopez, DO      traMADol  50 mg Oral Q6H PRN Skyla Boucher DO      triamcinolone   Topical BID Curt Cutler MD      white petrolatum-mineral oil   Topical TID PRN Skyla Boucher DO          Today, Patient Was Seen By: Skyla Boucher DO    ** Please Note: Dictation voice to text software may have been used in the creation of this document   **

## 2022-02-03 NOTE — PLAN OF CARE
Problem: PAIN - ADULT  Goal: Verbalizes/displays adequate comfort level or baseline comfort level  Description: Interventions:  - Encourage patient to monitor pain and request assistance  - Assess pain using appropriate pain scale  - Administer analgesics based on type and severity of pain and evaluate response  - Implement non-pharmacological measures as appropriate and evaluate response  - Consider cultural and social influences on pain and pain management  - Notify physician/advanced practitioner if interventions unsuccessful or patient reports new pain  Outcome: Progressing     Problem: INFECTION - ADULT  Goal: Absence or prevention of progression during hospitalization  Description: INTERVENTIONS:  - Assess and monitor for signs and symptoms of infection  - Monitor lab/diagnostic results  - Monitor all insertion sites, i e  indwelling lines, tubes, and drains  - Monitor endotracheal if appropriate and nasal secretions for changes in amount and color  - [place] appropriate cooling/warming therapies per order  - Administer medications as ordered  - Instruct and encourage patient and family to use good hand hygiene technique  - Identify and instruct in appropriate isolation precautions for identified infection/condition  Outcome: Progressing     Problem: SAFETY ADULT  Goal: Patient will remain free of falls  Description: INTERVENTIONS:  - Educate patient/family on patient safety including physical limitations  - Instruct patient to call for assistance with activity   - Consult OT/PT to assist with strengthening/mobility   - Keep Call bell within reach  - Keep bed low and locked with side rails adjusted as appropriate  - Keep care items and personal belongings within reach  - Initiate and maintain comfort rounds  - Make Fall Risk Sign visible to staff  - Apply yellow socks and bracelet for high fall risk patients  - Consider moving patient to room near nurses station  Outcome: Progressing  Goal: Maintain or return to baseline ADL function  Description: INTERVENTIONS:  -  Assess patient's ability to carry out ADLs; assess patient's baseline for ADL function and identify physical deficits which impact ability to perform ADLs (bathing, care of mouth/teeth, toileting, grooming, dressing, etc )  - Assess/evaluate cause of self-care deficits   - Assess range of motion  - Assess patient's mobility; develop plan if impaired  - Assess patient's need for assistive devices and provide as appropriate  - Encourage maximum independence but intervene and supervise when necessary  - Involve family in performance of ADLs  - Assess for home care needs following discharge   - Consider OT consult to assist with ADL evaluation and planning for discharge  - Provide patient education as appropriate  Outcome: Progressing  Goal: Maintains/Returns to pre admission functional level  Description: INTERVENTIONS:  - Perform BMAT or MOVE assessment daily    - Set and communicate daily mobility goal to care team and patient/family/caregiver     - Collaborate with rehabilitation services on mobility goals if consulted  - Out of bed for toileting  - Record patient progress and toleration of activity level   Outcome: Progressing     Problem: DISCHARGE PLANNING  Goal: Discharge to home or other facility with appropriate resources  Description: INTERVENTIONS:  - Identify barriers to discharge w/patient and caregiver  - Arrange for needed discharge resources and transportation as appropriate  - Identify discharge learning needs (meds, wound care, etc )  - Arrange for interpretive services to assist at discharge as needed  - Refer to Case Management Department for coordinating discharge planning if the patient needs post-hospital services based on physician/advanced practitioner order or complex needs related to functional status, cognitive ability, or social support system  Outcome: Progressing     Problem: Knowledge Deficit  Goal: Patient/family/caregiver demonstrates understanding of disease process, treatment plan, medications, and discharge instructions  Description: Complete learning assessment and assess knowledge base  Interventions:  - Provide teaching at level of understanding  - Provide teaching via preferred learning methods  Outcome: Progressing     Problem: MOBILITY - ADULT  Goal: Maintain or return to baseline ADL function  Description: INTERVENTIONS:  -  Assess patient's ability to carry out ADLs; assess patient's baseline for ADL function and identify physical deficits which impact ability to perform ADLs (bathing, care of mouth/teeth, toileting, grooming, dressing, etc )  - Assess/evaluate cause of self-care deficits   - Assess range of motion  - Assess patient's mobility; develop plan if impaired  - Assess patient's need for assistive devices and provide as appropriate  - Encourage maximum independence but intervene and supervise when necessary  - Involve family in performance of ADLs  - Assess for home care needs following discharge   - Consider OT consult to assist with ADL evaluation and planning for discharge  - Provide patient education as appropriate  Outcome: Progressing  Goal: Maintains/Returns to pre admission functional level  Description: INTERVENTIONS:  - Perform BMAT or MOVE assessment daily    - Set and communicate daily mobility goal to care team and patient/family/caregiver     - Collaborate with rehabilitation services on mobility goals if consulted  - Out of bed for toileting  - Record patient progress and toleration of activity level   Outcome: Progressing     Problem: Potential for Falls  Goal: Patient will remain free of falls  Description: INTERVENTIONS:  - Educate patient/family on patient safety including physical limitations  - Instruct patient to call for assistance with activity   - Consult OT/PT to assist with strengthening/mobility   - Keep Call bell within reach  - Keep bed low and locked with side rails adjusted as appropriate  - Keep care items and personal belongings within reach  - Initiate and maintain comfort rounds  - Make Fall Risk Sign visible to staff  - Apply yellow socks and bracelet for high fall risk patients  - Consider moving patient to room near nurses station  Outcome: Progressing     Problem: Prexisting or High Potential for Compromised Skin Integrity  Goal: Skin integrity is maintained or improved  Description: INTERVENTIONS:  - Identify patients at risk for skin breakdown  - Assess and monitor skin integrity  - Assess and monitor nutrition and hydration status  - Monitor labs   - Assess for incontinence   - Turn and reposition patient  - Assist with mobility/ambulation  - Relieve pressure over bony prominences  - Avoid friction and shearing  - Provide appropriate hygiene as needed including keeping skin clean and dry  - Evaluate need for skin moisturizer/barrier cream  - Collaborate with interdisciplinary team   - Patient/family teaching  - Consider wound care consult   Outcome: Progressing

## 2022-02-04 LAB
ANION GAP SERPL CALCULATED.3IONS-SCNC: 7 MMOL/L (ref 5–14)
BASOPHILS # BLD AUTO: 0 THOUSANDS/ΜL (ref 0–0.1)
BASOPHILS NFR BLD AUTO: 1 % (ref 0–1)
BUN SERPL-MCNC: 16 MG/DL (ref 5–25)
CALCIUM SERPL-MCNC: 8.3 MG/DL (ref 8.4–10.2)
CHLORIDE SERPL-SCNC: 102 MMOL/L (ref 97–108)
CO2 SERPL-SCNC: 30 MMOL/L (ref 22–30)
CREAT SERPL-MCNC: 0.64 MG/DL (ref 0.7–1.5)
EOSINOPHIL # BLD AUTO: 0.2 THOUSAND/ΜL (ref 0–0.4)
EOSINOPHIL NFR BLD AUTO: 4 % (ref 0–6)
ERYTHROCYTE [DISTWIDTH] IN BLOOD BY AUTOMATED COUNT: 17.5 %
GFR SERPL CREATININE-BSD FRML MDRD: 107 ML/MIN/1.73SQ M
GLUCOSE SERPL-MCNC: 91 MG/DL (ref 70–99)
HCT VFR BLD AUTO: 37.1 % (ref 41–53)
HGB BLD-MCNC: 12.3 G/DL (ref 13.5–17.5)
LYMPHOCYTES # BLD AUTO: 1.6 THOUSANDS/ΜL (ref 0.5–4)
LYMPHOCYTES NFR BLD AUTO: 26 % (ref 25–45)
MAGNESIUM SERPL-MCNC: 2 MG/DL (ref 1.6–2.3)
MCH RBC QN AUTO: 28.8 PG (ref 26–34)
MCHC RBC AUTO-ENTMCNC: 33.1 G/DL (ref 31–36)
MCV RBC AUTO: 87 FL (ref 80–100)
MONOCYTES # BLD AUTO: 0.7 THOUSAND/ΜL (ref 0.2–0.9)
MONOCYTES NFR BLD AUTO: 12 % (ref 1–10)
NEUTROPHILS # BLD AUTO: 3.5 THOUSANDS/ΜL (ref 1.8–7.8)
NEUTS SEG NFR BLD AUTO: 58 % (ref 45–65)
PHOSPHATE SERPL-MCNC: 3.5 MG/DL (ref 2.5–4.8)
PLATELET # BLD AUTO: 294 THOUSANDS/UL (ref 150–450)
PMV BLD AUTO: 7.1 FL (ref 8.9–12.7)
POTASSIUM SERPL-SCNC: 3.8 MMOL/L (ref 3.6–5)
RBC # BLD AUTO: 4.27 MILLION/UL (ref 4.5–5.9)
SODIUM SERPL-SCNC: 139 MMOL/L (ref 137–147)
WBC # BLD AUTO: 6 THOUSAND/UL (ref 4.5–11)

## 2022-02-04 PROCEDURE — 97530 THERAPEUTIC ACTIVITIES: CPT

## 2022-02-04 PROCEDURE — 99232 SBSQ HOSP IP/OBS MODERATE 35: CPT | Performed by: INTERNAL MEDICINE

## 2022-02-04 PROCEDURE — 85025 COMPLETE CBC W/AUTO DIFF WBC: CPT | Performed by: INTERNAL MEDICINE

## 2022-02-04 PROCEDURE — 80048 BASIC METABOLIC PNL TOTAL CA: CPT | Performed by: INTERNAL MEDICINE

## 2022-02-04 PROCEDURE — 84100 ASSAY OF PHOSPHORUS: CPT | Performed by: INTERNAL MEDICINE

## 2022-02-04 PROCEDURE — 83735 ASSAY OF MAGNESIUM: CPT | Performed by: INTERNAL MEDICINE

## 2022-02-04 RX ADMIN — AMLODIPINE BESYLATE 10 MG: 10 TABLET ORAL at 08:53

## 2022-02-04 RX ADMIN — TERBINAFINE HYDROCHLORIDE 250 MG: 250 TABLET ORAL at 08:52

## 2022-02-04 RX ADMIN — CARBAMIDE PEROXIDE 6.5% 5 DROP: 6.5 LIQUID AURICULAR (OTIC) at 17:10

## 2022-02-04 RX ADMIN — NYSTATIN: 100000 POWDER TOPICAL at 17:10

## 2022-02-04 RX ADMIN — METHOCARBAMOL 500 MG: 500 TABLET ORAL at 08:59

## 2022-02-04 RX ADMIN — GABAPENTIN 400 MG: 400 CAPSULE ORAL at 17:09

## 2022-02-04 RX ADMIN — SENNOSIDES AND DOCUSATE SODIUM 1 TABLET: 50; 8.6 TABLET ORAL at 17:09

## 2022-02-04 RX ADMIN — SENNOSIDES AND DOCUSATE SODIUM 1 TABLET: 50; 8.6 TABLET ORAL at 08:54

## 2022-02-04 RX ADMIN — TRIAMCINOLONE ACETONIDE: 1 CREAM TOPICAL at 17:09

## 2022-02-04 RX ADMIN — GABAPENTIN 400 MG: 400 CAPSULE ORAL at 08:54

## 2022-02-04 RX ADMIN — TRAMADOL HYDROCHLORIDE 50 MG: 50 TABLET, FILM COATED ORAL at 17:17

## 2022-02-04 RX ADMIN — TRAMADOL HYDROCHLORIDE 50 MG: 50 TABLET, FILM COATED ORAL at 08:59

## 2022-02-04 RX ADMIN — NYSTATIN: 100000 POWDER TOPICAL at 08:53

## 2022-02-04 RX ADMIN — ENOXAPARIN SODIUM 40 MG: 100 INJECTION SUBCUTANEOUS at 08:53

## 2022-02-04 RX ADMIN — TRIAMCINOLONE ACETONIDE: 1 CREAM TOPICAL at 08:53

## 2022-02-04 RX ADMIN — METHOCARBAMOL 500 MG: 500 TABLET ORAL at 17:17

## 2022-02-04 RX ADMIN — ARIPIPRAZOLE 5 MG: 5 TABLET ORAL at 08:53

## 2022-02-04 RX ADMIN — GABAPENTIN 400 MG: 400 CAPSULE ORAL at 20:56

## 2022-02-04 RX ADMIN — CARBAMIDE PEROXIDE 6.5% 5 DROP: 6.5 LIQUID AURICULAR (OTIC) at 08:54

## 2022-02-04 NOTE — PROGRESS NOTES
[facility]  Progress Note Moises Anderson 1965, 60 y o  male MRN: [id number]  Unit/Bed#: 7T [facility] 711-01 Encounter: [id number]  Primary Care Provider: Reyna Blancas MD   Date and time admitted to hospital: 12/29/2021  5:38 PM    * Ambulatory dysfunction  Assessment & Plan  · Patient believes that his ambulatory dysfunction as directly related to 'being wrapped up in a fungus'  · His participation with PT has been limited due to the above and therefore his progress seems more self impeded at this point  · Patient believes gabapentin and steroids will help with his discomfort and fungus and therefore help improve his progress with ambulation  · Continue gabapentin 400mg t i d  · PT/OT recommending post acute rehab  · Case management following to aide in discharge planning  · Medically stable for discharge    Constipation  Assessment & Plan  · CT imaging obtained on 01/28 revealed large stool burden  · Patient with bowel movement on 01/29 reports improvement in pain  · Continue MiraLax, Senokot S, lactulose      Onychomycosis  Assessment & Plan  · Present on almost all toes and fingers  · Podiatry performed nail debridement on 01/27  · Terbinafine 250 mg PO daily for 12 weeks (ordered through 4/21)    Strange and inexplicable behavior  Assessment & Plan  · Patient with hyper fixation on fungal infection arms (which was treated)  · Sometimes with strange tangential thoughts  · Psychiatry consulted, not appropriate for inpatient psychiatry unit at this time    -continue Abilify 5 mg p o  daily  -continue Atarax 25 mg p o  Q 6 hours p r n   For anxiety    Essential hypertension  Assessment & Plan  · Continue amlodipine 10 mg daily    Psoriasis, unspecified  Assessment & Plan  · Patient with psoriasis, was on Enbrel in the past however was lost to follow-up  · Discussed with dermatology- appreciate recommendations for topical creams  · Patient will need outpatient Dermatology and Rheumatology follow-up      Homeless  Assessment & Plan  · Patient was to be discharged to the [facility] mission however they would not take him back  · PT recommending post acute rehab placement      VTE Pharmacologic Prophylaxis:   Pharmacologic: Enoxaparin (Lovenox)  Mechanical VTE Prophylaxis in Place: Yes    Patient Centered Rounds: I have performed bedside rounds with nursing staff today  Discussions with Specialists or Other Care Team Provider:  Discussed with Case Management, nurse    Education and Discussions with Family / Patient:  Discussed with patient    Time Spent for Care: 45 minutes  More than 50% of total time spent on counseling and coordination of care as described above  Current Length of Stay: 36 day(s)    Current Patient Status: Inpatient   Certification Statement: The patient will continue to require additional inpatient hospital stay due to Pending placement to rehab    Discharge Plan / Estimated Discharge Date:  Pending, patient is medically stable to be discharged      Code Status: Level 1 - Full Code      Subjective:   Patient was seen and examined at bedside  The patient said he has sciatica and asking for stronger pain medication than tramadol  I explained that physical therapy is better for sciatica rather than taking narcotics  He denies any chest pain, palpitation, shortness of breath, N/V, abd pain  Objective:     Vitals:   Temp (24hrs), Av 6 °F (36 4 °C), Min:97 4 °F (36 3 °C), Max:97 8 °F (36 6 °C)    Temp:  [97 4 °F (36 3 °C)-97 8 °F (36 6 °C)] 97 8 °F (36 6 °C)  HR:  [57-64] 57  Resp:  [18-20] 18  BP: (139-144)/(70-86) 139/83  SpO2:  [94 %-97 %] 97 %  Body mass index is 23 82 kg/m²  Input and Output Summary (last 24 hours):        Intake/Output Summary (Last 24 hours) at 2022 1354  Last data filed at 2022 1200  Gross per 24 hour   Intake 840 ml   Output 500 ml   Net 340 ml       Physical Exam:     Physical Exam  General: breathing well on room air, no acute distress  HEENT: NC/AT, PERRL, EOM - normal  Neck: Supple  Pulm/Chest: Normal chest wall expansion, clear breath sounds on both side, no wheezing/rhonchi or crackles appreciated  CVS: RRR, normal S1&S2, no murmur appreciated, capillary refill <2s  Abd: soft, non tender, non distended, bowel sounds +  MSK: move all 4 extremities spontaneously  Skin: warm, onychomycosis, psoriasis rash  CNS: no acute focal neuro deficit      Additional Data:     Labs:    Results from last 7 days   Lab Units 02/04/22  0453   WBC Thousand/uL 6 00   HEMOGLOBIN g/dL 12 3*   HEMATOCRIT % 37 1*   PLATELETS Thousands/uL 294   NEUTROS PCT % 58   LYMPHS PCT % 26   MONOS PCT % 12*   EOS PCT % 4     Results from last 7 days   Lab Units 02/04/22  0453   POTASSIUM mmol/L 3 8   CHLORIDE mmol/L 102   CO2 mmol/L 30   BUN mg/dL 16   CREATININE mg/dL 0 64*   CALCIUM mg/dL 8 3*           * I Have Reviewed All Lab Data Listed Above  * Additional Pertinent Lab Tests Reviewed:  [address] Admission Reviewed    Imaging:    Imaging Reports Reviewed Today Include: None  Imaging Personally Reviewed by Myself Includes:  None      Recent Cultures (last 7 days):           Last 24 Hours Medication List:   Current Facility-Administered Medications   Medication Dose Route Frequency Provider Last Rate    acetaminophen  650 mg Oral Q4H PRN Travis Barry PA-C      amLODIPine  10 mg Oral Daily Miriam Edwards, DO      ARIPiprazole  5 mg Oral Daily DOV Riggs      carbamide peroxide  5 drop Both Ears BID Aleena De Souza, DO      diphenhydrAMINE  25 mg Oral Q6H PRN Miriam Boscht, DO      enoxaparin  40 mg Subcutaneous Q24H [address] [facility], DO      gabapentin  400 mg Oral TID [place] L Malone, DO      hydrOXYzine HCL  25 mg Oral Q6H PRN DOV Riggs      methocarbamol  500 mg Oral Q6H PRN Aleena De Souza, DO      nicotine  1 patch Transdermal Daily Travis Barry PA-C      nystatin   Topical BID Bere Cristina MD      ondansetron  4 mg Oral Q6H PRN Uvaldo Peck, DO      polyethylene glycol  17 g Oral Daily Uvaldo Peck, DO      senna-docusate sodium  1 tablet Oral BID Uvaldo Peck, DO      terbinafine  250 mg Oral Daily Uvaldo Peck, DO      traMADol  50 mg Oral Q6H PRN Kathy Rings, DO      triamcinolone   Topical BID Bere Cristina MD      white petrolatum-mineral oil   Topical TID PRN Kathy Rings, DO          Today, Patient Was Seen By: Bere Cristina MD    ** Please Note: Dragon 360 Dictation voice to text software may have been used in the creation of this document   **

## 2022-02-04 NOTE — PLAN OF CARE
Problem: PAIN - ADULT  Goal: Verbalizes/displays adequate comfort level or baseline comfort level  Description: Interventions:  - Encourage patient to monitor pain and request assistance  - Assess pain using appropriate pain scale  - Administer analgesics based on type and severity of pain and evaluate response  - Implement non-pharmacological measures as appropriate and evaluate response  - Consider cultural and social influences on pain and pain management  - Notify physician/advanced practitioner if interventions unsuccessful or patient reports new pain  Outcome: Progressing     Problem: INFECTION - ADULT  Goal: Absence or prevention of progression during hospitalization  Description: INTERVENTIONS:  - Assess and monitor for signs and symptoms of infection  - Monitor lab/diagnostic results  - Monitor all insertion sites, i e  indwelling lines, tubes, and drains  - Monitor endotracheal if appropriate and nasal secretions for changes in amount and color  - [place] appropriate cooling/warming therapies per order  - Administer medications as ordered  - Instruct and encourage patient and family to use good hand hygiene technique  - Identify and instruct in appropriate isolation precautions for identified infection/condition  Outcome: Progressing     Problem: SAFETY ADULT  Goal: Patient will remain free of falls  Description: INTERVENTIONS:  - Educate patient/family on patient safety including physical limitations  - Instruct patient to call for assistance with activity   - Consult OT/PT to assist with strengthening/mobility   - Keep Call bell within reach  - Keep bed low and locked with side rails adjusted as appropriate  - Keep care items and personal belongings within reach  - Initiate and maintain comfort rounds  - Make Fall Risk Sign visible to staff    - Apply yellow socks and bracelet for high fall risk patients  - Consider moving patient to room near nurses station  Outcome: Progressing  Goal: Maintain or return to baseline ADL function  Description: INTERVENTIONS:  -  Assess patient's ability to carry out ADLs; assess patient's baseline for ADL function and identify physical deficits which impact ability to perform ADLs (bathing, care of mouth/teeth, toileting, grooming, dressing, etc )  - Assess/evaluate cause of self-care deficits   - Assess range of motion  - Assess patient's mobility; develop plan if impaired  - Assess patient's need for assistive devices and provide as appropriate  - Encourage maximum independence but intervene and supervise when necessary  - Involve family in performance of ADLs  - Assess for home care needs following discharge   - Consider OT consult to assist with ADL evaluation and planning for discharge  - Provide patient education as appropriate  Outcome: Progressing  Goal: Maintains/Returns to pre admission functional level  Description: INTERVENTIONS:  - Perform BMAT or MOVE assessment daily    - Set and communicate daily mobility goal to care team and patient/family/caregiver     - Collaborate with rehabilitation services on mobility goals if consulted    - Out of bed for toileting  - Record patient progress and toleration of activity level   Outcome: Progressing     Problem: DISCHARGE PLANNING  Goal: Discharge to home or other facility with appropriate resources  Description: INTERVENTIONS:  - Identify barriers to discharge w/patient and caregiver  - Arrange for needed discharge resources and transportation as appropriate  - Identify discharge learning needs (meds, wound care, etc )  - Arrange for interpretive services to assist at discharge as needed  - Refer to Case Management Department for coordinating discharge planning if the patient needs post-hospital services based on physician/advanced practitioner order or complex needs related to functional status, cognitive ability, or social support system  Outcome: Progressing     Problem: Knowledge Deficit  Goal: Patient/family/caregiver demonstrates understanding of disease process, treatment plan, medications, and discharge instructions  Description: Complete learning assessment and assess knowledge base  Interventions:  - Provide teaching at level of understanding  - Provide teaching via preferred learning methods  Outcome: Progressing     Problem: MOBILITY - ADULT  Goal: Maintain or return to baseline ADL function  Description: INTERVENTIONS:  -  Assess patient's ability to carry out ADLs; assess patient's baseline for ADL function and identify physical deficits which impact ability to perform ADLs (bathing, care of mouth/teeth, toileting, grooming, dressing, etc )  - Assess/evaluate cause of self-care deficits   - Assess range of motion  - Assess patient's mobility; develop plan if impaired  - Assess patient's need for assistive devices and provide as appropriate  - Encourage maximum independence but intervene and supervise when necessary  - Involve family in performance of ADLs  - Assess for home care needs following discharge   - Consider OT consult to assist with ADL evaluation and planning for discharge  - Provide patient education as appropriate  Outcome: Progressing  Goal: Maintains/Returns to pre admission functional level  Description: INTERVENTIONS:  - Perform BMAT or MOVE assessment daily    - Set and communicate daily mobility goal to care team and patient/family/caregiver     - Collaborate with rehabilitation services on mobility goals if consulted    - Out of bed for toileting  - Record patient progress and toleration of activity level   Outcome: Progressing     Problem: Potential for Falls  Goal: Patient will remain free of falls  Description: INTERVENTIONS:  - Educate patient/family on patient safety including physical limitations  - Instruct patient to call for assistance with activity   - Consult OT/PT to assist with strengthening/mobility   - Keep Call bell within reach  - Keep bed low and locked with side rails adjusted as appropriate  - Keep care items and personal belongings within reach  - Initiate and maintain comfort rounds  - Make Fall Risk Sign visible to staff    - Apply yellow socks and bracelet for high fall risk patients  - Consider moving patient to room near nurses station  Outcome: Progressing     Problem: Prexisting or High Potential for Compromised Skin Integrity  Goal: Skin integrity is maintained or improved  Description: INTERVENTIONS:  - Identify patients at risk for skin breakdown  - Assess and monitor skin integrity  - Assess and monitor nutrition and hydration status  - Monitor labs   - Assess for incontinence   - Turn and reposition patient  - Assist with mobility/ambulation  - Relieve pressure over bony prominences  - Avoid friction and shearing  - Provide appropriate hygiene as needed including keeping skin clean and dry  - Evaluate need for skin moisturizer/barrier cream  - Collaborate with interdisciplinary team   - Patient/family teaching  - Consider wound care consult   Outcome: Progressing

## 2022-02-05 PROCEDURE — 99232 SBSQ HOSP IP/OBS MODERATE 35: CPT | Performed by: INTERNAL MEDICINE

## 2022-02-05 RX ADMIN — NYSTATIN: 100000 POWDER TOPICAL at 17:05

## 2022-02-05 RX ADMIN — METHOCARBAMOL 500 MG: 500 TABLET ORAL at 16:07

## 2022-02-05 RX ADMIN — TRIAMCINOLONE ACETONIDE: 1 CREAM TOPICAL at 17:04

## 2022-02-05 RX ADMIN — TRAMADOL HYDROCHLORIDE 50 MG: 50 TABLET, FILM COATED ORAL at 01:42

## 2022-02-05 RX ADMIN — ENOXAPARIN SODIUM 40 MG: 100 INJECTION SUBCUTANEOUS at 08:54

## 2022-02-05 RX ADMIN — ARIPIPRAZOLE 5 MG: 5 TABLET ORAL at 08:52

## 2022-02-05 RX ADMIN — AMLODIPINE BESYLATE 10 MG: 10 TABLET ORAL at 08:52

## 2022-02-05 RX ADMIN — GABAPENTIN 400 MG: 400 CAPSULE ORAL at 08:52

## 2022-02-05 RX ADMIN — GABAPENTIN 400 MG: 400 CAPSULE ORAL at 15:45

## 2022-02-05 RX ADMIN — TRIAMCINOLONE ACETONIDE: 1 CREAM TOPICAL at 08:53

## 2022-02-05 RX ADMIN — GABAPENTIN 400 MG: 400 CAPSULE ORAL at 21:13

## 2022-02-05 RX ADMIN — TRAMADOL HYDROCHLORIDE 50 MG: 50 TABLET, FILM COATED ORAL at 15:45

## 2022-02-05 RX ADMIN — TERBINAFINE HYDROCHLORIDE 250 MG: 250 TABLET ORAL at 08:55

## 2022-02-05 RX ADMIN — HYDROXYZINE HYDROCHLORIDE 25 MG: 25 TABLET ORAL at 21:13

## 2022-02-05 RX ADMIN — CARBAMIDE PEROXIDE 6.5% 5 DROP: 6.5 LIQUID AURICULAR (OTIC) at 17:04

## 2022-02-05 RX ADMIN — NYSTATIN: 100000 POWDER TOPICAL at 08:53

## 2022-02-05 RX ADMIN — TRAMADOL HYDROCHLORIDE 50 MG: 50 TABLET, FILM COATED ORAL at 08:52

## 2022-02-05 RX ADMIN — SENNOSIDES AND DOCUSATE SODIUM 1 TABLET: 50; 8.6 TABLET ORAL at 17:04

## 2022-02-05 RX ADMIN — METHOCARBAMOL 500 MG: 500 TABLET ORAL at 08:52

## 2022-02-05 RX ADMIN — Medication: at 17:04

## 2022-02-05 RX ADMIN — CARBAMIDE PEROXIDE 6.5% 5 DROP: 6.5 LIQUID AURICULAR (OTIC) at 08:53

## 2022-02-05 NOTE — ASSESSMENT & PLAN NOTE
· Patient was to be discharged to the [facility] rescue mission however they would not take him back  · PT recommending post acute rehab placement

## 2022-02-05 NOTE — PROGRESS NOTES
[facility]  Progress Note Ammy Loser 1965, 60 y o  male MRN: [id number]  Unit/Bed#: 7T [facility] 711-01 Encounter: [id number]  Primary Care Provider: Amish Talavera MD   Date and time admitted to hospital: 12/29/2021  5:38 PM    * Ambulatory dysfunction  Assessment & Plan  · Patient believes that his ambulatory dysfunction as directly related to 'being wrapped up in a fungus'  · His participation with PT has been limited due to the above and therefore his progress seems more self impeded at this point  · Patient believes gabapentin and steroids will help with his discomfort and fungus and therefore help improve his progress with ambulation  · Continue gabapentin 400mg t i d  · PT/OT recommending post acute rehab  · Case management following to aide in discharge planning  · Medically stable for discharge    Constipation  Assessment & Plan  · CT imaging obtained on 01/28 revealed large stool burden  · Patient with bowel movement on 01/29 reports improvement in pain  · Continue MiraLax, Senokot S, lactulose      Onychomycosis  Assessment & Plan  · Present on almost all toes and fingers  · Podiatry performed nail debridement on 01/27  · Terbinafine 250 mg PO daily for 12 weeks (ordered through 4/21)    Strange and inexplicable behavior  Assessment & Plan  · Patient with hyper fixation on fungal infection arms (which was treated)  · Sometimes with strange tangential thoughts  · Psychiatry consulted, not appropriate for inpatient psychiatry unit at this time    -continue Abilify 5 mg p o  daily  -continue Atarax 25 mg p o  Q 6 hours p r n   For anxiety    Essential hypertension  Assessment & Plan  · Continue amlodipine 10 mg daily    Psoriasis, unspecified  Assessment & Plan  · Patient with psoriasis, was on Enbrel in the past however was lost to follow-up  · Discussed with dermatology- appreciate recommendations for topical creams  · Patient will need outpatient Dermatology and Rheumatology follow-up      Homeless  Assessment & Plan  · Patient was to be discharged to the [facility] rescue mission however they would not take him back  · PT recommending post acute rehab placement      VTE Pharmacologic Prophylaxis:   Pharmacologic: Enoxaparin (Lovenox)  Mechanical VTE Prophylaxis in Place: Yes    Patient Centered Rounds: I have performed bedside rounds with nursing staff today  Discussions with Specialists or Other Care Team Provider:  Discussed with Case Management, nurse    Education and Discussions with Family / Patient:  Discussed with patient    Time Spent for Care: 45 minutes  More than 50% of total time spent on counseling and coordination of care as described above  Current Length of Stay: 37 day(s)    Current Patient Status: Inpatient   Certification Statement: The patient will continue to require additional inpatient hospital stay due to Pending placement    Discharge Plan / Estimated Discharge Date:  Pending placement      Code Status: Level 1 - Full Code      Subjective:   Patient was seen and examined at bedside  The patient denies any chest pain, palpitation, shortness of breath, N/V, abd pain  No acute overnight changes      Objective:     Vitals:   Temp (24hrs), Av 8 °F (36 6 °C), Min:97 5 °F (36 4 °C), Max:98 2 °F (36 8 °C)    Temp:  [97 5 °F (36 4 °C)-98 2 °F (36 8 °C)] 97 5 °F (36 4 °C)  HR:  [76-80] 80  Resp:  [18] 18  BP: (140-175)/(77-87) 156/77  SpO2:  [92 %-98 %] 98 %  Body mass index is 23 82 kg/m²  Input and Output Summary (last 24 hours):        Intake/Output Summary (Last 24 hours) at 2022 1046  Last data filed at 2022 0900  Gross per 24 hour   Intake 480 ml   Output --   Net 480 ml       Physical Exam:     Physical Exam  General: breathing well on room air, no acute distress  HEENT: NC/AT, PERRL, EOM - normal  Neck: Supple  Pulm/Chest: Normal chest wall expansion, clear breath sounds on both side, no wheezing/rhonchi or crackles appreciated  CVS: RRR, normal S1&S2, no murmur appreciated, capillary refill <2s  Abd: soft, non tender, non distended, bowel sounds +  MSK: move all 4 extremities spontaneously, psoriatic rash  Skin: warm  CNS: no acute focal neuro deficit      Additional Data:     Labs:    Results from last 7 days   Lab Units 02/04/22  0453   WBC Thousand/uL 6 00   HEMOGLOBIN g/dL 12 3*   HEMATOCRIT % 37 1*   PLATELETS Thousands/uL 294   NEUTROS PCT % 58   LYMPHS PCT % 26   MONOS PCT % 12*   EOS PCT % 4     Results from last 7 days   Lab Units 02/04/22  0453   POTASSIUM mmol/L 3 8   CHLORIDE mmol/L 102   CO2 mmol/L 30   BUN mg/dL 16   CREATININE mg/dL 0 64*   CALCIUM mg/dL 8 3*           * I Have Reviewed All Lab Data Listed Above  * Additional Pertinent Lab Tests Reviewed:  Bola Torrez Admission Reviewed    Imaging:    Imaging Reports Reviewed Today Include: None  Imaging Personally Reviewed by Myself Includes:  None      Recent Cultures (last 7 days):           Last 24 Hours Medication List:   Current Facility-Administered Medications   Medication Dose Route Frequency Provider Last Rate    acetaminophen  650 mg Oral Q4H PRN Sherly Garcia PA-C      amLODIPine  10 mg Oral Daily [place] Emperor, DO      ARIPiprazole  5 mg Oral Daily DOV Vasquez      carbamide peroxide  5 drop Both Ears BID Vito Lam, DO      diphenhydrAMINE  25 mg Oral Q6H PRN Russ Emperor, DO      enoxaparin  40 mg Subcutaneous Q24H [facility] Malone, DO      gabapentin  400 mg Oral TID [facility] Malone, DO      hydrOXYzine HCL  25 mg Oral Q6H PRN DOV Vasquez      methocarbamol  500 mg Oral Q6H PRN Vito Lam, DO      nicotine  1 patch Transdermal Daily [place], [place]      nystatin   Topical BID Jose Pope MD      ondansetron  4 mg Oral Q6H PRN Russ Emperor, DO      polyethylene glycol  17 g Oral Daily [place] Emperor, DO      senna-docusate sodium  1 tablet Oral BID Michael Graff DO      terbinafine  250 mg Oral Daily Michael Graff DO      traMADol  50 mg Oral Q6H PRN Iker Hobbs DO      triamcinolone   Topical BID Maty Bailey MD      white petrolatum-mineral oil   Topical TID PRN Iker Hobbs DO          Today, Patient Was Seen By: Maty Bailey MD    ** Please Note: Dragon 360 Dictation voice to text software may have been used in the creation of this document   **

## 2022-02-05 NOTE — PLAN OF CARE
Problem: PAIN - ADULT  Goal: Verbalizes/displays adequate comfort level or baseline comfort level  Description: Interventions:  - Encourage patient to monitor pain and request assistance  - Assess pain using appropriate pain scale  - Administer analgesics based on type and severity of pain and evaluate response  - Implement non-pharmacological measures as appropriate and evaluate response  - Consider cultural and social influences on pain and pain management  - Notify physician/advanced practitioner if interventions unsuccessful or patient reports new pain  Outcome: Progressing     Problem: INFECTION - ADULT  Goal: Absence or prevention of progression during hospitalization  Description: INTERVENTIONS:  - Assess and monitor for signs and symptoms of infection  - Monitor lab/diagnostic results  - Monitor all insertion sites, i e  indwelling lines, tubes, and drains  - Monitor endotracheal if appropriate and nasal secretions for changes in amount and color  - [place] appropriate cooling/warming therapies per order  - Administer medications as ordered  - Instruct and encourage patient and family to use good hand hygiene technique  - Identify and instruct in appropriate isolation precautions for identified infection/condition  Outcome: Progressing     Problem: SAFETY ADULT  Goal: Patient will remain free of falls  Description: INTERVENTIONS:  - Educate patient/family on patient safety including physical limitations  - Instruct patient to call for assistance with activity   - Consult OT/PT to assist with strengthening/mobility   - Keep Call bell within reach  - Keep bed low and locked with side rails adjusted as appropriate  - Keep care items and personal belongings within reach  - Initiate and maintain comfort rounds  - Make Fall Risk Sign visible to staff  - Apply yellow socks and bracelet for high fall risk patients  - Consider moving patient to room near nurses station  Outcome: Progressing     Problem: DISCHARGE PLANNING  Goal: Discharge to home or other facility with appropriate resources  Description: INTERVENTIONS:  - Identify barriers to discharge w/patient and caregiver  - Arrange for needed discharge resources and transportation as appropriate  - Identify discharge learning needs (meds, wound care, etc )  - Arrange for interpretive services to assist at discharge as needed  - Refer to Case Management Department for coordinating discharge planning if the patient needs post-hospital services based on physician/advanced practitioner order or complex needs related to functional status, cognitive ability, or social support system  Outcome: Progressing     Problem: Knowledge Deficit  Goal: Patient/family/caregiver demonstrates understanding of disease process, treatment plan, medications, and discharge instructions  Description: Complete learning assessment and assess knowledge base    Interventions:  - Provide teaching at level of understanding  - Provide teaching via preferred learning methods  Outcome: Progressing

## 2022-02-06 PROCEDURE — 99232 SBSQ HOSP IP/OBS MODERATE 35: CPT | Performed by: INTERNAL MEDICINE

## 2022-02-06 RX ADMIN — NICOTINE 1 PATCH: 21 PATCH, EXTENDED RELEASE TRANSDERMAL at 08:17

## 2022-02-06 RX ADMIN — Medication: at 08:20

## 2022-02-06 RX ADMIN — SENNOSIDES AND DOCUSATE SODIUM 1 TABLET: 50; 8.6 TABLET ORAL at 08:17

## 2022-02-06 RX ADMIN — TRIAMCINOLONE ACETONIDE: 1 CREAM TOPICAL at 17:21

## 2022-02-06 RX ADMIN — AMLODIPINE BESYLATE 10 MG: 10 TABLET ORAL at 08:17

## 2022-02-06 RX ADMIN — TRIAMCINOLONE ACETONIDE: 1 CREAM TOPICAL at 08:16

## 2022-02-06 RX ADMIN — CARBAMIDE PEROXIDE 6.5% 5 DROP: 6.5 LIQUID AURICULAR (OTIC) at 08:16

## 2022-02-06 RX ADMIN — Medication: at 08:16

## 2022-02-06 RX ADMIN — GABAPENTIN 400 MG: 400 CAPSULE ORAL at 15:58

## 2022-02-06 RX ADMIN — NYSTATIN: 100000 POWDER TOPICAL at 08:16

## 2022-02-06 RX ADMIN — TRAMADOL HYDROCHLORIDE 50 MG: 50 TABLET, FILM COATED ORAL at 00:33

## 2022-02-06 RX ADMIN — ENOXAPARIN SODIUM 40 MG: 100 INJECTION SUBCUTANEOUS at 08:17

## 2022-02-06 RX ADMIN — TRAMADOL HYDROCHLORIDE 50 MG: 50 TABLET, FILM COATED ORAL at 13:44

## 2022-02-06 RX ADMIN — GABAPENTIN 400 MG: 400 CAPSULE ORAL at 08:17

## 2022-02-06 RX ADMIN — HYDROXYZINE HYDROCHLORIDE 25 MG: 25 TABLET ORAL at 21:11

## 2022-02-06 RX ADMIN — POLYETHYLENE GLYCOL 3350 17 G: 17 POWDER, FOR SOLUTION ORAL at 08:30

## 2022-02-06 RX ADMIN — Medication: at 23:33

## 2022-02-06 RX ADMIN — TRAMADOL HYDROCHLORIDE 50 MG: 50 TABLET, FILM COATED ORAL at 21:11

## 2022-02-06 RX ADMIN — ARIPIPRAZOLE 5 MG: 5 TABLET ORAL at 08:17

## 2022-02-06 RX ADMIN — NYSTATIN: 100000 POWDER TOPICAL at 17:22

## 2022-02-06 RX ADMIN — TERBINAFINE HYDROCHLORIDE 250 MG: 250 TABLET ORAL at 08:16

## 2022-02-06 RX ADMIN — CARBAMIDE PEROXIDE 6.5% 5 DROP: 6.5 LIQUID AURICULAR (OTIC) at 17:21

## 2022-02-06 RX ADMIN — SENNOSIDES AND DOCUSATE SODIUM 1 TABLET: 50; 8.6 TABLET ORAL at 17:23

## 2022-02-06 RX ADMIN — METHOCARBAMOL 500 MG: 500 TABLET ORAL at 23:33

## 2022-02-06 RX ADMIN — GABAPENTIN 400 MG: 400 CAPSULE ORAL at 21:11

## 2022-02-06 RX ADMIN — TRAMADOL HYDROCHLORIDE 50 MG: 50 TABLET, FILM COATED ORAL at 08:17

## 2022-02-06 NOTE — PROGRESS NOTES
[facility]  Progress Note Jones Alvarenga 1965, 60 y o  male MRN: [id number]  Unit/Bed#: 7T [facility] 711-01 Encounter: [id number]  Primary Care Provider: Vasile Bradshaw MD   Date and time admitted to hospital: 12/29/2021  5:38 PM    * Ambulatory dysfunction  Assessment & Plan  · Patient believes that his ambulatory dysfunction as directly related to 'being wrapped up in a fungus'  · His participation with PT has been limited due to the above and therefore his progress seems more self impeded at this point  · Patient believes gabapentin and steroids will help with his discomfort and fungus and therefore help improve his progress with ambulation  · Continue gabapentin 400mg t i d  · PT/OT recommending post acute rehab  · Case management following to aide in discharge planning  · Medically stable for discharge    Constipation  Assessment & Plan  · CT imaging obtained on 01/28 revealed large stool burden  · Patient with bowel movement on 01/29 reports improvement in pain  · Continue MiraLax, Senokot S, lactulose      Onychomycosis  Assessment & Plan  · Present on almost all toes and fingers  · Podiatry performed nail debridement on 01/27  · Terbinafine 250 mg PO daily for 12 weeks (ordered through 4/21)    Strange and inexplicable behavior  Assessment & Plan  · Patient with hyper fixation on fungal infection arms (which was treated)  · Sometimes with strange tangential thoughts  · Psychiatry consulted, not appropriate for inpatient psychiatry unit at this time    -continue Abilify 5 mg p o  daily  -continue Atarax 25 mg p o  Q 6 hours p r n   For anxiety    Essential hypertension  Assessment & Plan  · Continue amlodipine 10 mg daily    Psoriasis, unspecified  Assessment & Plan  · Patient with psoriasis, was on Enbrel in the past however was lost to follow-up  · Discussed with dermatology- appreciate recommendations for topical creams  · Patient will need outpatient Dermatology and Rheumatology follow-up      Homeless  Assessment & Plan  · Patient was to be discharged to the [facility] rescue mission however they would not take him back  · PT recommending post acute rehab placement      VTE Pharmacologic Prophylaxis:   Pharmacologic: Enoxaparin (Lovenox)  Mechanical VTE Prophylaxis in Place: Yes    Patient Centered Rounds: I have performed bedside rounds with nursing staff today  Discussions with Specialists or Other Care Team Provider:  Discussed with Case Management, nurse    Education and Discussions with Family / Patient:  Discussed with patient    Time Spent for Care: 45 minutes  More than 50% of total time spent on counseling and coordination of care as described above  Current Length of Stay: 38 day(s)    Current Patient Status: Inpatient   Certification Statement: The patient will continue to require additional inpatient hospital stay due to Pending placement, patient is medically stable    Discharge Plan / Estimated Discharge Date:  Pending      Code Status: Level 1 - Full Code      Subjective:   Patient was seen and examined at bedside  The patient denies any pain, headache, blurry vision, chest pain, palpitation, shortness of breath, N/V, abd pain  Objective:     Vitals:   Temp (24hrs), Av 6 °F (36 4 °C), Min:97 2 °F (36 2 °C), Max:98 °F (36 7 °C)    Temp:  [97 2 °F (36 2 °C)-98 °F (36 7 °C)] 97 5 °F (36 4 °C)  HR:  [63-74] 66  Resp:  [18-20] 20  BP: (158-165)/(86-96) 159/96  SpO2:  [95 %-99 %] 95 %  Body mass index is 23 82 kg/m²  Input and Output Summary (last 24 hours):        Intake/Output Summary (Last 24 hours) at 2022 1025  Last data filed at 2022 1545  Gross per 24 hour   Intake 480 ml   Output --   Net 480 ml       Physical Exam:     Physical Exam  General: breathing well on room air, no acute distress  HEENT: NC/AT, PERRL, EOM - normal  Neck: Supple  Pulm/Chest: Normal chest wall expansion, clear breath sounds on both side, no wheezing/rhonchi or crackles appreciated  CVS: RRR, normal S1&S2, no murmur appreciated, capillary refill <2s  Abd: soft, non tender, non distended, bowel sounds +  MSK: move all 4 extremities spontaneously  Skin: warm  CNS: no acute focal neuro deficit      Additional Data:     Labs:    Results from last 7 days   Lab Units 02/04/22  0453   WBC Thousand/uL 6 00   HEMOGLOBIN g/dL 12 3*   HEMATOCRIT % 37 1*   PLATELETS Thousands/uL 294   NEUTROS PCT % 58   LYMPHS PCT % 26   MONOS PCT % 12*   EOS PCT % 4     Results from last 7 days   Lab Units 02/04/22  0453   POTASSIUM mmol/L 3 8   CHLORIDE mmol/L 102   CO2 mmol/L 30   BUN mg/dL 16   CREATININE mg/dL 0 64*   CALCIUM mg/dL 8 3*           * I Have Reviewed All Lab Data Listed Above  * Additional Pertinent Lab Tests Reviewed:  Bola Torrez Admission Reviewed    Imaging:    Imaging Reports Reviewed Today Include: None  Imaging Personally Reviewed by Myself Includes:  None      Recent Cultures (last 7 days):           Last 24 Hours Medication List:   Current Facility-Administered Medications   Medication Dose Route Frequency Provider Last Rate    acetaminophen  650 mg Oral Q4H PRN Jessica Elaine PA-C      amLODIPine  10 mg Oral Daily Niranjan Kaur, DO      ARIPiprazole  5 mg Oral Daily DOV Stevens      carbamide peroxide  5 drop Both Ears BID Lavnaima Milder, DO      diphenhydrAMINE  25 mg Oral Q6H PRN Niranjan Welshe, DO      enoxaparin  40 mg Subcutaneous Q24H [address] [facility] Malone, DO      gabapentin  400 mg Oral TID [facility] Malone, DO      hydrOXYzine HCL  25 mg Oral Q6H PRN DOV Stevens      methocarbamol  500 mg Oral Q6H PRN Maria M Wang, DO      nicotine  1 patch Transdermal Daily [place], [place]      nystatin   Topical BID Hai Beckford MD      ondansetron  4 mg Oral Q6H PRN Niranjan Kaur, DO      polyethylene glycol  17 g Oral Daily Niranjan Kaur, DO      senna-docusate sodium  1 tablet Oral BID Razia Kendall Carmelo Cohen, DO      terbinafine  250 mg Oral Daily Niranjan Kaur, DO      traMADol  50 mg Oral Q6H PRN Lavada Milder, DO      triamcinolone   Topical BID Hai Beckford MD      white petrolatum-mineral oil   Topical TID PRN Lavada Milder, DO          Today, Patient Was Seen By: Hai Beckford MD    ** Please Note: Dragon 360 Dictation voice to text software may have been used in the creation of this document   **

## 2022-02-06 NOTE — ASSESSMENT & PLAN NOTE
· Patient was to be discharged to the [facility] rescue mission however they would not take him back  · PT recommending post acute rehab placement

## 2022-02-07 PROCEDURE — 99232 SBSQ HOSP IP/OBS MODERATE 35: CPT | Performed by: INTERNAL MEDICINE

## 2022-02-07 PROCEDURE — 97530 THERAPEUTIC ACTIVITIES: CPT

## 2022-02-07 PROCEDURE — 97116 GAIT TRAINING THERAPY: CPT

## 2022-02-07 RX ADMIN — GABAPENTIN 400 MG: 400 CAPSULE ORAL at 08:41

## 2022-02-07 RX ADMIN — HYDROXYZINE HYDROCHLORIDE 25 MG: 25 TABLET ORAL at 21:33

## 2022-02-07 RX ADMIN — Medication: at 08:47

## 2022-02-07 RX ADMIN — TRAMADOL HYDROCHLORIDE 50 MG: 50 TABLET, FILM COATED ORAL at 15:16

## 2022-02-07 RX ADMIN — ENOXAPARIN SODIUM 40 MG: 100 INJECTION SUBCUTANEOUS at 08:41

## 2022-02-07 RX ADMIN — AMLODIPINE BESYLATE 10 MG: 10 TABLET ORAL at 08:41

## 2022-02-07 RX ADMIN — GABAPENTIN 400 MG: 400 CAPSULE ORAL at 21:33

## 2022-02-07 RX ADMIN — CARBAMIDE PEROXIDE 6.5% 5 DROP: 6.5 LIQUID AURICULAR (OTIC) at 08:58

## 2022-02-07 RX ADMIN — HYDROXYZINE HYDROCHLORIDE 25 MG: 25 TABLET ORAL at 08:52

## 2022-02-07 RX ADMIN — NYSTATIN: 100000 POWDER TOPICAL at 08:41

## 2022-02-07 RX ADMIN — ARIPIPRAZOLE 5 MG: 5 TABLET ORAL at 08:42

## 2022-02-07 RX ADMIN — SENNOSIDES AND DOCUSATE SODIUM 1 TABLET: 50; 8.6 TABLET ORAL at 18:21

## 2022-02-07 RX ADMIN — TRAMADOL HYDROCHLORIDE 50 MG: 50 TABLET, FILM COATED ORAL at 21:33

## 2022-02-07 RX ADMIN — METHOCARBAMOL 500 MG: 500 TABLET ORAL at 08:52

## 2022-02-07 RX ADMIN — CARBAMIDE PEROXIDE 6.5% 5 DROP: 6.5 LIQUID AURICULAR (OTIC) at 17:54

## 2022-02-07 RX ADMIN — TRAMADOL HYDROCHLORIDE 50 MG: 50 TABLET, FILM COATED ORAL at 08:52

## 2022-02-07 RX ADMIN — METHOCARBAMOL 500 MG: 500 TABLET ORAL at 22:45

## 2022-02-07 RX ADMIN — TRIAMCINOLONE ACETONIDE: 1 CREAM TOPICAL at 17:54

## 2022-02-07 RX ADMIN — TERBINAFINE HYDROCHLORIDE 250 MG: 250 TABLET ORAL at 08:42

## 2022-02-07 RX ADMIN — NYSTATIN: 100000 POWDER TOPICAL at 18:22

## 2022-02-07 RX ADMIN — TRIAMCINOLONE ACETONIDE: 1 CREAM TOPICAL at 08:41

## 2022-02-07 RX ADMIN — GABAPENTIN 400 MG: 400 CAPSULE ORAL at 15:16

## 2022-02-07 RX ADMIN — SENNOSIDES AND DOCUSATE SODIUM 1 TABLET: 50; 8.6 TABLET ORAL at 08:41

## 2022-02-07 NOTE — PLAN OF CARE
Problem: PAIN - ADULT  Goal: Verbalizes/displays adequate comfort level or baseline comfort level  Description: Interventions:  - Encourage patient to monitor pain and request assistance  - Assess pain using appropriate pain scale  - Administer analgesics based on type and severity of pain and evaluate response  - Implement non-pharmacological measures as appropriate and evaluate response  - Consider cultural and social influences on pain and pain management  - Notify physician/advanced practitioner if interventions unsuccessful or patient reports new pain  Outcome: Progressing     Problem: INFECTION - ADULT  Goal: Absence or prevention of progression during hospitalization  Description: INTERVENTIONS:  - Assess and monitor for signs and symptoms of infection  - Monitor lab/diagnostic results  - Monitor all insertion sites, i e  indwelling lines, tubes, and drains  - Monitor endotracheal if appropriate and nasal secretions for changes in amount and color  - [place] appropriate cooling/warming therapies per order  - Administer medications as ordered  - Instruct and encourage patient and family to use good hand hygiene technique  - Identify and instruct in appropriate isolation precautions for identified infection/condition  Outcome: Progressing     Problem: SAFETY ADULT  Goal: Patient will remain free of falls  Description: INTERVENTIONS:  - Educate patient/family on patient safety including physical limitations  - Instruct patient to call for assistance with activity   - Consult OT/PT to assist with strengthening/mobility   - Keep Call bell within reach  - Keep bed low and locked with side rails adjusted as appropriate  - Keep care items and personal belongings within reach  - Initiate and maintain comfort rounds  - Make Fall Risk Sign visible to staff  - Apply yellow socks and bracelet for high fall risk patients  - Consider moving patient to room near nurses station  Outcome: Progressing  Goal: Maintain or return to baseline ADL function  Description: INTERVENTIONS:  -  Assess patient's ability to carry out ADLs; assess patient's baseline for ADL function and identify physical deficits which impact ability to perform ADLs (bathing, care of mouth/teeth, toileting, grooming, dressing, etc )  - Assess/evaluate cause of self-care deficits   - Assess range of motion  - Assess patient's mobility; develop plan if impaired  - Assess patient's need for assistive devices and provide as appropriate  - Encourage maximum independence but intervene and supervise when necessary  - Involve family in performance of ADLs  - Assess for home care needs following discharge   - Consider OT consult to assist with ADL evaluation and planning for discharge  - Provide patient education as appropriate  Outcome: Progressing  Goal: Maintains/Returns to pre admission functional level  Description: INTERVENTIONS:  - Perform BMAT or MOVE assessment daily    - Set and communicate daily mobility goal to care team and patient/family/caregiver     - Collaborate with rehabilitation services on mobility goals if consulted  - Out of bed for toileting  - Record patient progress and toleration of activity level   Outcome: Progressing     Problem: DISCHARGE PLANNING  Goal: Discharge to home or other facility with appropriate resources  Description: INTERVENTIONS:  - Identify barriers to discharge w/patient and caregiver  - Arrange for needed discharge resources and transportation as appropriate  - Identify discharge learning needs (meds, wound care, etc )  - Arrange for interpretive services to assist at discharge as needed  - Refer to Case Management Department for coordinating discharge planning if the patient needs post-hospital services based on physician/advanced practitioner order or complex needs related to functional status, cognitive ability, or social support system  Outcome: Progressing     Problem: Knowledge Deficit  Goal: Patient/family/caregiver demonstrates understanding of disease process, treatment plan, medications, and discharge instructions  Description: Complete learning assessment and assess knowledge base  Interventions:  - Provide teaching at level of understanding  - Provide teaching via preferred learning methods  Outcome: Progressing     Problem: MOBILITY - ADULT  Goal: Maintain or return to baseline ADL function  Description: INTERVENTIONS:  -  Assess patient's ability to carry out ADLs; assess patient's baseline for ADL function and identify physical deficits which impact ability to perform ADLs (bathing, care of mouth/teeth, toileting, grooming, dressing, etc )  - Assess/evaluate cause of self-care deficits   - Assess range of motion  - Assess patient's mobility; develop plan if impaired  - Assess patient's need for assistive devices and provide as appropriate  - Encourage maximum independence but intervene and supervise when necessary  - Involve family in performance of ADLs  - Assess for home care needs following discharge   - Consider OT consult to assist with ADL evaluation and planning for discharge  - Provide patient education as appropriate  Outcome: Progressing  Goal: Maintains/Returns to pre admission functional level  Description: INTERVENTIONS:  - Perform BMAT or MOVE assessment daily    - Set and communicate daily mobility goal to care team and patient/family/caregiver     - Collaborate with rehabilitation services on mobility goals if consulted  - Out of bed for toileting  - Record patient progress and toleration of activity level   Outcome: Progressing     Problem: Potential for Falls  Goal: Patient will remain free of falls  Description: INTERVENTIONS:  - Educate patient/family on patient safety including physical limitations  - Instruct patient to call for assistance with activity   - Consult OT/PT to assist with strengthening/mobility   - Keep Call bell within reach  - Keep bed low and locked with side rails adjusted as appropriate  - Keep care items and personal belongings within reach  - Initiate and maintain comfort rounds  - Make Fall Risk Sign visible to staff  - Apply yellow socks and bracelet for high fall risk patients  - Consider moving patient to room near nurses station  Outcome: Progressing     Problem: Prexisting or High Potential for Compromised Skin Integrity  Goal: Skin integrity is maintained or improved  Description: INTERVENTIONS:  - Identify patients at risk for skin breakdown  - Assess and monitor skin integrity  - Assess and monitor nutrition and hydration status  - Monitor labs   - Assess for incontinence   - Turn and reposition patient  - Assist with mobility/ambulation  - Relieve pressure over bony prominences  - Avoid friction and shearing  - Provide appropriate hygiene as needed including keeping skin clean and dry  - Evaluate need for skin moisturizer/barrier cream  - Collaborate with interdisciplinary team   - Patient/family teaching  - Consider wound care consult   Outcome: Progressing

## 2022-02-07 NOTE — PROGRESS NOTES
[facility]  Progress Note Demetris Mireles 1965, 60 y o  male MRN: [id number]  Unit/Bed#: 7T [facility] 711-01 Encounter: [id number]  Primary Care Provider: Eric Tyler MD   Date and time admitted to hospital: 12/29/2021  5:38 PM    * Ambulatory dysfunction  Assessment & Plan  · Patient believes that his ambulatory dysfunction as directly related to 'being wrapped up in a fungus'  · His participation with PT has been limited due to the above and therefore his progress seems more self impeded at this point  · Patient believes gabapentin and steroids will help with his discomfort and fungus and therefore help improve his progress with ambulation  · Continue gabapentin 400mg t i d  · PT/OT recommending post acute rehab  · Case management following to aide in discharge planning  · Medically stable for discharge    Constipation  Assessment & Plan  · CT imaging obtained on 01/28 revealed large stool burden  · Patient with bowel movement on 01/29 reports improvement in pain  · Continue MiraLax, Senokot S, lactulose      Onychomycosis  Assessment & Plan  · Present on almost all toes and fingers  · Podiatry performed nail debridement on 01/27  · Terbinafine 250 mg PO daily for 12 weeks (ordered through 4/21)    Strange and inexplicable behavior  Assessment & Plan  · Patient with hyper fixation on fungal infection arms (which was treated)  · Sometimes with strange tangential thoughts  · Psychiatry consulted, not appropriate for inpatient psychiatry unit at this time    -continue Abilify 5 mg p o  daily  -continue Atarax 25 mg p o  Q 6 hours p r n   For anxiety    Essential hypertension  Assessment & Plan  · Continue amlodipine 10 mg daily    Psoriasis, unspecified  Assessment & Plan  · Patient with psoriasis, was on Enbrel in the past however was lost to follow-up  · Discussed with dermatology- appreciate recommendations for topical creams  · Patient will need outpatient Dermatology and Rheumatology follow-up      Homeless  Assessment & Plan  · Patient was to be discharged to the [facility] rescue mission however they would not take him back  · PT recommending post acute rehab placement        VTE Pharmacologic Prophylaxis:   Pharmacologic: Enoxaparin (Lovenox)  Mechanical VTE Prophylaxis in Place: Yes    Patient Centered Rounds: I have performed bedside rounds with nursing staff today  Discussions with Specialists or Other Care Team Provider:  Discussed with Case Management, nurse    Education and Discussions with Family / Patient:  Discussed with patient    Time Spent for Care: 30 minutes  More than 50% of total time spent on counseling and coordination of care as described above  Current Length of Stay: 39 day(s)    Current Patient Status: Inpatient   Certification Statement: The patient will continue to require additional inpatient hospital stay due to Pending placement    Discharge Plan / Estimated Discharge Date:  Pending placement, patient is medically cleared      Code Status: Level 1 - Full Code      Subjective:   Patient was seen and examined at bedside  The patient denies any chest pain, palpitation, shortness of breath, N/V, abd pain  Objective:     Vitals:   Temp (24hrs), Av 4 °F (36 3 °C), Min:96 4 °F (35 8 °C), Max:98 1 °F (36 7 °C)    Temp:  [96 4 °F (35 8 °C)-98 1 °F (36 7 °C)] 96 4 °F (35 8 °C)  HR:  [64-71] 64  Resp:  [18-20] 20  BP: (151-181)/(85-98) 179/98  SpO2:  [98 %-100 %] 100 %  Body mass index is 23 82 kg/m²  Input and Output Summary (last 24 hours):        Intake/Output Summary (Last 24 hours) at 2022 1038  Last data filed at 2022 0900  Gross per 24 hour   Intake 960 ml   Output 600 ml   Net 360 ml       Physical Exam:     Physical Exam   General: breathing well on room air, no acute distress  HEENT: NC/AT, PERRL, EOM - normal  Neck: Supple  Pulm/Chest: Normal chest wall expansion, clear breath sounds on both side, no wheezing/rhonchi or crackles appreciated  CVS: RRR, normal S1&S2, no murmur appreciated, capillary refill <2s  Abd: soft, non tender, non distended, bowel sounds +  MSK: move all 4 extremities spontaneously, onychomycosis noted  Skin: warm  CNS: no acute focal neuro deficit      Additional Data:     Labs:    Results from last 7 days   Lab Units 02/04/22  0453   WBC Thousand/uL 6 00   HEMOGLOBIN g/dL 12 3*   HEMATOCRIT % 37 1*   PLATELETS Thousands/uL 294   NEUTROS PCT % 58   LYMPHS PCT % 26   MONOS PCT % 12*   EOS PCT % 4     Results from last 7 days   Lab Units 02/04/22  0453   POTASSIUM mmol/L 3 8   CHLORIDE mmol/L 102   CO2 mmol/L 30   BUN mg/dL 16   CREATININE mg/dL 0 64*   CALCIUM mg/dL 8 3*           * I Have Reviewed All Lab Data Listed Above  * Additional Pertinent Lab Tests Reviewed:  Bola Torrez Admission Reviewed    Imaging:    Imaging Reports Reviewed Today Include: None  Imaging Personally Reviewed by Myself Includes:  None      Recent Cultures (last 7 days):           Last 24 Hours Medication List:   Current Facility-Administered Medications   Medication Dose Route Frequency Provider Last Rate    acetaminophen  650 mg Oral Q4H PRN Millie David PA-C      amLODIPine  10 mg Oral Daily Moon Aschoff, DO      ARIPiprazole  5 mg Oral Daily DOV Land      carbamide peroxide  5 drop Both Ears BID Rexie Brennen, DO      diphenhydrAMINE  25 mg Oral Q6H PRN Moon Aschoff, DO      enoxaparin  40 mg Subcutaneous Q24H [address] [facility] Malone, DO      gabapentin  400 mg Oral TID [facility] Malone, DO      hydrOXYzine HCL  25 mg Oral Q6H PRN DOV Land      methocarbamol  500 mg Oral Q6H PRN Rexie Brennen, DO      nicotine  1 patch Transdermal Daily [place] Briscoe PA-C      nystatin   Topical BID Matthew Lim MD      ondansetron  4 mg Oral Q6H PRN Moon Aschoff, DO      polyethylene glycol  17 g Oral Daily Moon Aschoff, DO      senna-docusate sodium  1 tablet Oral BID Dinah Leash, DO      terbinafine  250 mg Oral Daily Dinah Leash, DO      traMADol  50 mg Oral Q6H PRN Shiv Palm, DO      triamcinolone   Topical BID Bernard Palacios MD      white petrolatum-mineral oil   Topical TID PRN Shiv Palm, DO          Today, Patient Was Seen By: Bernard Palacios MD    ** Please Note: Dragon 360 Dictation voice to text software may have been used in the creation of this document   **

## 2022-02-07 NOTE — CASE MANAGEMENT
Case Management Discharge Planning Note    Patient name Marie Reyes  Location 7T U 711/7T U 505-12 MRN [id number]  : 1965 Date 2022       Current Admission Date: 2021  Current Admission Diagnosis:Ambulatory dysfunction   Patient Active Problem List    Diagnosis Date Noted    Constipation 2022    Strange and inexplicable behavior     Onychomycosis 2022    Essential hypertension 2022    Psoriasis, unspecified 2022    Ambulatory dysfunction 2022    Homeless 2021      LOS (days): 39  Geometric Mean LOS (GMLOS) (days): 3 20  Days to GMLOS:-35 8     OBJECTIVE:  Risk of Unplanned Readmission Score: 18         Current admission status: Inpatient   Preferred Pharmacy:   [facility], [address] [facility],[address]  [address]  Phone: [phone] Fax: [phone] [address] [facility],[address], [address]  [address]  Phone: [phone] Fax: [phone]    Primary Care Provider: Cathy Correa MD    Primary Insurance: [facility]  Secondary Insurance:     DISCHARGE DETAILS: CM was notified at morning rounds that pt is medically cleared to be discharged today  Pt is pending bed for SNF  CM was notified by [facility] that [facility] and medical evaluation should be updated as previous one   CM sent completed PASSR and MA for optioning process; Determination for eligibility for SNF will be faxed to CM soon  CM called [facility] personal care home and they stated pt cannot accept as pt uses a Wheel chair  Personal care home and SNF bed search in progress     CM department will continue to follow through pt's D/C

## 2022-02-08 PROCEDURE — 97112 NEUROMUSCULAR REEDUCATION: CPT

## 2022-02-08 PROCEDURE — 97530 THERAPEUTIC ACTIVITIES: CPT

## 2022-02-08 PROCEDURE — 99232 SBSQ HOSP IP/OBS MODERATE 35: CPT | Performed by: INTERNAL MEDICINE

## 2022-02-08 PROCEDURE — 97110 THERAPEUTIC EXERCISES: CPT

## 2022-02-08 RX ORDER — OXYCODONE HYDROCHLORIDE 5 MG/1
5 TABLET ORAL EVERY 8 HOURS PRN
Status: DISCONTINUED | OUTPATIENT
Start: 2022-02-08 | End: 2022-02-14

## 2022-02-08 RX ADMIN — CARBAMIDE PEROXIDE 6.5% 5 DROP: 6.5 LIQUID AURICULAR (OTIC) at 17:11

## 2022-02-08 RX ADMIN — NYSTATIN: 100000 POWDER TOPICAL at 17:11

## 2022-02-08 RX ADMIN — SENNOSIDES AND DOCUSATE SODIUM 1 TABLET: 50; 8.6 TABLET ORAL at 17:10

## 2022-02-08 RX ADMIN — ARIPIPRAZOLE 5 MG: 5 TABLET ORAL at 09:49

## 2022-02-08 RX ADMIN — GABAPENTIN 400 MG: 400 CAPSULE ORAL at 20:34

## 2022-02-08 RX ADMIN — OXYCODONE HYDROCHLORIDE 5 MG: 5 TABLET ORAL at 09:49

## 2022-02-08 RX ADMIN — TRIAMCINOLONE ACETONIDE: 1 CREAM TOPICAL at 17:11

## 2022-02-08 RX ADMIN — ENOXAPARIN SODIUM 40 MG: 100 INJECTION SUBCUTANEOUS at 09:49

## 2022-02-08 RX ADMIN — SENNOSIDES AND DOCUSATE SODIUM 1 TABLET: 50; 8.6 TABLET ORAL at 09:49

## 2022-02-08 RX ADMIN — TRIAMCINOLONE ACETONIDE: 1 CREAM TOPICAL at 09:48

## 2022-02-08 RX ADMIN — TERBINAFINE HYDROCHLORIDE 250 MG: 250 TABLET ORAL at 09:54

## 2022-02-08 RX ADMIN — CARBAMIDE PEROXIDE 6.5% 5 DROP: 6.5 LIQUID AURICULAR (OTIC) at 09:49

## 2022-02-08 RX ADMIN — OXYCODONE HYDROCHLORIDE 5 MG: 5 TABLET ORAL at 18:29

## 2022-02-08 RX ADMIN — GABAPENTIN 400 MG: 400 CAPSULE ORAL at 09:49

## 2022-02-08 RX ADMIN — POLYETHYLENE GLYCOL 3350 17 G: 17 POWDER, FOR SOLUTION ORAL at 09:48

## 2022-02-08 RX ADMIN — AMLODIPINE BESYLATE 10 MG: 10 TABLET ORAL at 09:49

## 2022-02-08 RX ADMIN — GABAPENTIN 400 MG: 400 CAPSULE ORAL at 16:50

## 2022-02-08 RX ADMIN — NYSTATIN: 100000 POWDER TOPICAL at 09:50

## 2022-02-08 NOTE — PHYSICAL THERAPY NOTE
Physical TherapyTreatment Note    Patient's Name: Kari Chaparro    Admitting Diagnosis  Cellulitis [L03 90]  Homeless [Z59 00]  Wound check, abscess [Z51 89]    Problem List  Patient Active Problem List   Diagnosis    Homeless    Psoriasis, unspecified    Ambulatory dysfunction    Essential hypertension    Strange and inexplicable behavior    Onychomycosis    Constipation       Past Medical History  Past Medical History:   Diagnosis Date    Arthritis     Hypertension     Sciatica        Past Surgical History  Past Surgical History:   Procedure Laterality Date    BACK SURGERY         Recent Imaging  CT abdomen pelvis w contrast   Final Result by Everardo Singletary MD (01/28 1435)      Stool filled colon with possible impaction  Diverticulosis with no diverticulitis  Workstation performed: [id number]             Recent Vital Signs  Vitals:    02/07/22 0727 02/07/22 1453 02/07/22 2305 02/08/22 0728   BP: (!) 179/98 (!) 176/86 165/91 154/80   BP Location: Left arm Left arm Left arm Right arm   Pulse: 64 76 77 67   Resp: 20 20 18 18   Temp: (!) 96 4 °F (35 8 °C) 97 8 °F (36 6 °C) (!) 97 2 °F (36 2 °C) 97 6 °F (36 4 °C)   TempSrc: Temporal Temporal Temporal Temporal   SpO2: 100% 99% 99% 97%   Weight:       Height:           PT Treatment Time: 30 minutes       02/04/22 1600   PT Last Visit   PT Visit Date 02/04/22   Note Type   Note Type Treatment   Pain Assessment   Pain Assessment Tool 0-10   Pain Score 7   Pain Location/Orientation Orientation: Lower; Location: Back   Restrictions/Precautions   Weight Bearing Precautions Per Order No   Other Precautions Fall Risk;Pain   General   Chart Reviewed Yes   Response to Previous Treatment Patient with no complaints from previous session  Family/Caregiver Present No   Cognition   Arousal/Participation Alert; Cooperative   Attention Attends with cues to redirect   Orientation Level Oriented X4   Memory Within functional limits   Following Commands Follows one step commands without difficulty   Bed Mobility   Supine to Sit 6  Modified independent   Additional items Increased time required   Sit to Supine 6  Modified independent   Additional items Increased time required   Transfers   Sit to Stand 4  Minimal assistance   Additional items Assist x 1; Armrests; Increased time required;Verbal cues   Stand to Sit 4  Minimal assistance   Additional items Assist x 1; Armrests; Increased time required;Verbal cues   Stand pivot 4  Minimal assistance   Additional items Assist x 1; Armrests; Increased time required;Verbal cues   Additional Comments floor to w/c transfers on mat: mod A, pt transition from side lying to side sit to UE on seat and armrest requries assist to boost into seat   Ambulation/Elevation   Gait pattern Step through pattern;Decreased heel strike;Decreased toe off; Short stride; Ataxia; Decreased foot clearance;Narrow HARJIT;Scissoring; Improper Weight shift   Gait Assistance 4  Minimal assist   Additional items Assist x 1;Verbal cues; Tactile cues   Assistive Device Rolling walker   Distance 10ft x4   Balance   Static Sitting Fair +   Dynamic Sitting Fair +   Static Standing Fair   Dynamic Standing Poor +   Ambulatory Poor +   Endurance Deficit   Endurance Deficit Yes   Endurance Deficit Description pain and fatigue   Activity Tolerance   Activity Tolerance Patient limited by fatigue   Medical Staff Made Aware spoke to CM   Nurse Made Aware spoke to RN   Assessment   Prognosis Fair   Problem List Decreased strength;Decreased endurance; Impaired balance;Decreased mobility; Impaired sensation;Pain;Decreased coordination;Decreased cognition; Impaired judgement;Decreased safety awareness   Assessment Pt limited more by pain today not able to tolerate normal level of ambulation  Requested to address floor to w/c transfers as this has been a problem for him in the past  Pt requires mod A with frequent VC to complete floor to w/c transfers     Barriers to Discharge Inaccessible home environment;Decreased caregiver support   Goals   Patient Goals to go to rehab   STG Expiration Date 02/03/22   Short Term Goal #1 see eval note   PT Treatment Day 9   Plan   Treatment/Interventions ADL retraining;Functional transfer training;LE strengthening/ROM; Elevations; Endurance training; Therapeutic exercise;Patient/family training;Equipment eval/education; Bed mobility;Gait training;Spoke to nursing;Spoke to case management;OT   Progress Progressing toward goals   PT Frequency 3-5x/wk   Recommendation   PT Discharge Recommendation Post acute rehabilitation services   Equipment Recommended [place]; Wheelchair   AM-PAC Basic Mobility Inpatient   Turning in Bed Without Bedrails 4   Lying on Back to Sitting on Edge of Flat Bed 4   Moving Bed to Chair 3   Standing Up From Chair 2   Walk in Room 2   Climb 3-5 Stairs 1   Basic Mobility Inpatient Raw Score 16   Basic Mobility Standardized Score 38 32   Highest Level Of Mobility   JH-HLM Goal 5: Stand one or more mins   JH-HLM Highest Level of Mobility 7: Walk 25 feet or more   JH-HLM Goal Achieved Yes   Education   Education Provided Mobility training;Home exercise program;Assistive device   Patient Explanation/teachback used;Demonstrates verbal understanding;Reinforcement needed   End of Consult   Patient Position at End of Consult Supine; All needs within reach       [facility] PT, DPT

## 2022-02-08 NOTE — ASSESSMENT & PLAN NOTE
· Patient stated his back pain is not well controlled with tramadol 50 mg and seems to be limiting his activities with physical therapy per PT  · Upon reviewing his MRI results from [facility] - it showed severe spinal stenosis L3-L4, and L5-S1 with more moderate stenosis L2-3  showed severe spinal stenosis  · Will switch from tramadol to oxycodone 5 mg Q 8 p r n    · Continue gabapentin 400mg t i d  · PT/OT recommending post acute rehab  · Case management following to aide in discharge planning  · Medically stable for discharge

## 2022-02-08 NOTE — PROGRESS NOTES
[facility]  Progress Note Chris Millan 1965, 60 y o  male MRN: [id number]  Unit/Bed#: 7T [facility] 711-01 Encounter: [id number]  Primary Care Provider: Rolo Robins MD   Date and time admitted to hospital: 12/29/2021  5:38 PM    * Ambulatory dysfunction  Assessment & Plan  · Patient stated his back pain is not well controlled with tramadol 50 mg and seems to be limiting his activities with physical therapy per PT  · Upon reviewing his MRI results from [facility] - it showed severe spinal stenosis L3-L4, and L5-S1 with more moderate stenosis L2-3  showed severe spinal stenosis  · Will switch from tramadol to oxycodone 5 mg Q 8 p r n  · Continue gabapentin 400mg t i d  · PT/OT recommending post acute rehab  · Case management following to aide in discharge planning  · Medically stable for discharge    Constipation  Assessment & Plan  · CT imaging obtained on 01/28 revealed large stool burden  · Patient with bowel movement on 01/29 reports improvement in pain  · Continue MiraLax, Senokot S, lactulose      Onychomycosis  Assessment & Plan  · Present on almost all toes and fingers  · Podiatry performed nail debridement on 01/27  · Terbinafine 250 mg PO daily for 12 weeks (ordered through 4/21)    Strange and inexplicable behavior  Assessment & Plan  · Patient with hyper fixation on fungal infection arms (which was treated)  · Sometimes with strange tangential thoughts  · Psychiatry consulted, not appropriate for inpatient psychiatry unit at this time    -continue Abilify 5 mg p o  daily  -continue Atarax 25 mg p o  Q 6 hours p r n   For anxiety    Essential hypertension  Assessment & Plan  · Continue amlodipine 10 mg daily    Psoriasis, unspecified  Assessment & Plan  · Patient with psoriasis, was on Enbrel in the past however was lost to follow-up  · Discussed with dermatology- appreciate recommendations for topical creams  · Patient will need outpatient Dermatology and Rheumatology follow-up      Homeless  Assessment & Plan  · Patient was to be discharged to the [facility] mission however they would not take him back  · PT recommending post acute rehab placement      VTE Pharmacologic Prophylaxis:   Pharmacologic: Enoxaparin (Lovenox)  Mechanical VTE Prophylaxis in Place: Yes    Patient Centered Rounds: I have performed bedside rounds with nursing staff today  Discussions with Specialists or Other Care Team Provider:  Discussed with Case Management, nurse    Education and Discussions with Family / Patient:  Discussed with patient    Time Spent for Care: 45 minutes  More than 50% of total time spent on counseling and coordination of care as described above  Current Length of Stay: 40 day(s)    Current Patient Status: Inpatient   Certification Statement: The patient will continue to require additional inpatient hospital stay due to Pending placement to SNF    Discharge Plan / Estimated Discharge Date:  Pending      Code Status: Level 1 - Full Code      Subjective:   Patient was seen and examined at bedside  The patient said his back pain is not well controlled with tramadol  It is also limiting his activities during PT session  No acute overnight changes  Objective:     Vitals:   Temp (24hrs), Av 5 °F (36 4 °C), Min:97 2 °F (36 2 °C), Max:97 8 °F (36 6 °C)    Temp:  [97 2 °F (36 2 °C)-97 8 °F (36 6 °C)] 97 6 °F (36 4 °C)  HR:  [67-77] 67  Resp:  [18-20] 18  BP: (154-176)/(80-91) 154/80  SpO2:  [97 %-99 %] 97 %  Body mass index is 23 82 kg/m²  Input and Output Summary (last 24 hours):        Intake/Output Summary (Last 24 hours) at 2022 1049  Last data filed at 2022 0728  Gross per 24 hour   Intake 960 ml   Output 300 ml   Net 660 ml       Physical Exam:     Physical Exam  General: breathing well on room air, no acute distress  HEENT: NC/AT, PERRL, EOM - normal  Neck: Supple  Pulm/Chest: Normal chest wall expansion, clear breath sounds on both side, no wheezing/rhonchi or crackles appreciated  CVS: RRR, normal S1&S2, no murmur appreciated, capillary refill <2s  Abd: soft, non tender, non distended, bowel sounds +  MSK: move all 4 extremities spontaneously  Skin: warm  CNS: no acute focal neuro deficit      Additional Data:     Labs:    Results from last 7 days   Lab Units 02/04/22  0453   WBC Thousand/uL 6 00   HEMOGLOBIN g/dL 12 3*   HEMATOCRIT % 37 1*   PLATELETS Thousands/uL 294   NEUTROS PCT % 58   LYMPHS PCT % 26   MONOS PCT % 12*   EOS PCT % 4     Results from last 7 days   Lab Units 02/04/22  0453   POTASSIUM mmol/L 3 8   CHLORIDE mmol/L 102   CO2 mmol/L 30   BUN mg/dL 16   CREATININE mg/dL 0 64*   CALCIUM mg/dL 8 3*           * I Have Reviewed All Lab Data Listed Above  * Additional Pertinent Lab Tests Reviewed:  Bola Torrez Admission Reviewed    Imaging:    Imaging Reports Reviewed Today Include: None  Imaging Personally Reviewed by Myself Includes:  None      Recent Cultures (last 7 days):           Last 24 Hours Medication List:   Current Facility-Administered Medications   Medication Dose Route Frequency Provider Last Rate    acetaminophen  650 mg Oral Q4H PRN Muna Osborne PA-C      amLODIPine  10 mg Oral Daily Brianne Ward, DO      ARIPiprazole  5 mg Oral Daily Birdie Dye, CRNP      carbamide peroxide  5 drop Both Ears BID hSeryl Gr, DO      diphenhydrAMINE  25 mg Oral Q6H PRN Brianne Ward, DO      enoxaparin  40 mg Subcutaneous Q24H [address] [facility] Malone, DO      gabapentin  400 mg Oral TID [facility] Malone, DO      hydrOXYzine HCL  25 mg Oral Q6H PRN Birdweston Dye, CRNP      nicotine  1 patch Transdermal Daily [place] PA-C      nystatin   Topical BID Celeste Wallace MD      ondansetron  4 mg Oral Q6H PRN Brianne Ward, DO      oxyCODONE  5 mg Oral Q8H PRN Celeste Wallace MD      polyethylene glycol  17 g Oral Daily Brianne Ward, DO      senna-docusate sodium  1 tablet Oral BID Mariana Prather DO      terbinafine  250 mg Oral Daily Mariana Prather DO      triamcinolone   Topical BID Araceli Rosen MD      white petrolatum-mineral oil   Topical TID PRN Keila Parham DO          Today, Patient Was Seen By: Araceli Rosen MD    ** Please Note: Dragon 360 Dictation voice to text software may have been used in the creation of this document   **

## 2022-02-08 NOTE — PLAN OF CARE
Problem: PHYSICAL THERAPY ADULT  Goal: Performs mobility at highest level of function for planned discharge setting  See evaluation for individualized goals  Description: Treatment/Interventions: ADL retraining,Functional transfer training,LE strengthening/ROM,Elevations,Endurance training,Therapeutic exercise,Patient/family training,Equipment eval/education,Bed mobility,Gait training,Spoke to nursing,Spoke to case management,OT  Equipment Recommended: [facility]       See flowsheet documentation for full assessment, interventions and recommendations  Outcome: Progressing  Note: Prognosis: Fair  Problem List: Decreased strength,Decreased endurance,Impaired balance,Decreased mobility,Impaired sensation,Pain,Decreased coordination,Decreased cognition,Impaired judgement,Decreased safety awareness  Assessment: Pt limited more by pain today not able to tolerate normal level of ambulation  Requested to address floor to w/c transfers as this has been a problem for him in the past  Pt requires mod A with frequent VC to complete floor to w/c transfers  Barriers to Discharge: Inaccessible home environment,Decreased caregiver support        PT Discharge Recommendation: Post acute rehabilitation services          See flowsheet documentation for full assessment

## 2022-02-08 NOTE — PHYSICAL THERAPY NOTE
Physical TherapyTreatment Note    Patient's Name: Raphael Witt    Admitting Diagnosis  Cellulitis [L03 90]  Homeless [Z59 00]  Wound check, abscess [Z51 89]    Problem List  Patient Active Problem List   Diagnosis    Homeless    Psoriasis, unspecified    Ambulatory dysfunction    Essential hypertension    Strange and inexplicable behavior    Onychomycosis    Constipation       Past Medical History  Past Medical History:   Diagnosis Date    Arthritis     Hypertension     Sciatica        Past Surgical History  Past Surgical History:   Procedure Laterality Date    BACK SURGERY         Recent Imaging  CT abdomen pelvis w contrast   Final Result by Opal Nguyễn MD (01/28 1435)      Stool filled colon with possible impaction  Diverticulosis with no diverticulitis  Workstation performed: [id number]             Recent Vital Signs  Vitals:    02/07/22 0727 02/07/22 1453 02/07/22 2305 02/08/22 0728   BP: (!) 179/98 (!) 176/86 165/91 154/80   BP Location: Left arm Left arm Left arm Right arm   Pulse: 64 76 77 67   Resp: 20 20 18 18   Temp: (!) 96 4 °F (35 8 °C) 97 8 °F (36 6 °C) (!) 97 2 °F (36 2 °C) 97 6 °F (36 4 °C)   TempSrc: Temporal Temporal Temporal Temporal   SpO2: 100% 99% 99% 97%   Weight:       Height:           PT Treatment Time: 40 minutes       02/07/22 1545   PT Last Visit   PT Visit Date 02/07/22   Note Type   Note Type Treatment   Pain Assessment   Pain Assessment Tool 0-10   Pain Score 6   Pain Location/Orientation Orientation: Lower; Location: Back;Orientation: Right;Location: Leg   Restrictions/Precautions   Weight Bearing Precautions Per Order No   Other Precautions Fall Risk;Pain   General   Chart Reviewed Yes   Response to Previous Treatment Patient with no complaints from previous session  Family/Caregiver Present No   Cognition   Arousal/Participation Alert; Cooperative   Attention Attends with cues to redirect   Orientation Level Oriented X4   Memory Within functional limits   Following Commands Follows one step commands without difficulty   Subjective   Subjective feeling better today with less LBP   Bed Mobility   Supine to Sit 6  Modified independent   Additional items Increased time required   Sit to Supine 6  Modified independent   Additional items Increased time required   Transfers   Sit to Stand 4  Minimal assistance   Additional items Assist x 1; Armrests; Increased time required;Verbal cues   Stand to Sit 4  Minimal assistance   Additional items Assist x 1; Armrests; Increased time required;Verbal cues   Stand pivot 4  Minimal assistance   Additional items Assist x 1; Armrests; Increased time required;Verbal cues   Additional Comments with RW   Ambulation/Elevation   Gait pattern Step through pattern;Decreased toe off;Decreased heel strike;Decreased foot clearance; Foward flexed;Scissoring; Ataxia; Improper Weight shift; Antalgic;Narrow HARJIT   Gait Assistance 4  Minimal assist   Additional items Assist x 1;Verbal cues; Tactile cues   Assistive Device Rolling walker   Distance 40ft x3; 60ft x3;   Stair Management Assistance 3  Moderate assist   Additional items Assist x 1;Verbal cues; Tactile cues; Increased time required   Stair Management Technique Step to pattern; Foreward; With walker   Number of Stairs 4   Ambulation/Elevation Additional Comments ataxia greater in RLE vs LLE; pain also increased in RLE vs LLE   Balance   Static Sitting Fair +   Dynamic Sitting Fair +   Static Standing Fair   Dynamic Standing Poor +   Ambulatory Poor +   Endurance Deficit   Endurance Deficit Yes   Endurance Deficit Description pain and fatigue   Activity Tolerance   Activity Tolerance Patient limited by fatigue;Patient limited by pain   Medical Staff Made Aware spoke to [phone]  spoke to RN   Exercises   Neuro re-ed hitting target with RLE) multiple tape targets placed on floor, pt seated in w/c using RLE to reach and touch each tape target   Assessment   Prognosis Fair Problem List Decreased strength;Decreased endurance; Impaired balance;Decreased mobility; Impaired sensation;Pain;Decreased coordination;Decreased cognition; Impaired judgement;Decreased safety awareness   Assessment Pt with improved pain today able to tolerate more activity  He is able to improve ambulation pattern when looking down especially when placing RLE; noted to have LOB when RLE not placed well or not fully ext at the knee and then luna during stance, LLE less ataxic with increased strength and decreased pain  Pt able to participate well in target touching exercise for RLE  Tolerated stairs well again limited due to RLE weakness and RLE ataxia  Pt remains appropriate for inpt PT treatment and STG date extended by 14 days  Barriers to Discharge Inaccessible home environment;Decreased caregiver support   Goals   Patient Goals to go to rehab   STG Expiration Date 02/22/22   Short Term Goal #1 see eval note   PT Treatment Day 10   Plan   Treatment/Interventions ADL retraining;Functional transfer training;Elevations;LE strengthening/ROM; Therapeutic exercise; Endurance training;Patient/family training;Equipment eval/education; Bed mobility;Gait training;Spoke to nursing;Spoke to case management;OT   Progress Progressing toward goals   PT Frequency 3-5x/wk   Recommendation   PT Discharge Recommendation Post acute rehabilitation services   Equipment Recommended Sunitha Christy; Wheelchair   AM-PAC Basic Mobility Inpatient   Turning in Bed Without Bedrails 4   Lying on Back to Sitting on Edge of Flat Bed 4   Moving Bed to Chair 3   Standing Up From Chair 2   Walk in Room 2   Climb 3-5 Stairs 1   Basic Mobility Inpatient Raw Score 16   Basic Mobility Standardized Score 38 32   Highest Level Of Mobility   JH-HLM Goal 5: Stand one or more mins   JH-HLM Highest Level of Mobility 7: Walk 25 feet or more   JH-HLM Goal Achieved Yes   Education   Education Provided Home exercise program;Mobility training;Assistive device   Patient Explanation/teachback used;Demonstrates verbal understanding;Reinforcement needed   End of Consult   Patient Position at End of Consult Bedside chair; All needs within reach       [facility] PT, DPT

## 2022-02-08 NOTE — ASSESSMENT & PLAN NOTE
· Patient was to be discharged to the [facility] rescue mission however they would not take him back  · PT recommending post acute rehab placement

## 2022-02-08 NOTE — CASE MANAGEMENT
Case Management Progress Note    Patient name Trenton Leon  Location 7T /7T -01 MRN [id number]  : 1965 Date 2022       LOS (days): 40  Geometric Mean LOS (GMLOS) (days): 3 20  Days to GMLOS:-36 9        OBJECTIVE:        Current admission status: Inpatient  Preferred Pharmacy:   [address]  [address]  [address]  Phone: [phone] Fax: [phone] [address] [facility] [address], [address]  [address]  Phone: [phone] Fax: [phone]    Primary Care Provider: Yolanda Cazares MD    Primary Insurance: [facility]  Secondary Insurance:     PROGRESS NOTE:    CM review of referrals-more referrals sent  Facilities responded with no bed availability  [address] [place] [address] w/ [facility] ([phone]) requesting revised PASSR faxed to [phone]    Requested Revisions:  POSITIVE SCREEN (SECOND BOX ON LAST PAGE) NEEDS TO BE CHECKED  SIGNATURES REQUIRED of PATIENT &   In addition, Amira Arvizu requesting the following documentation be faxed:  Most Recent PT/OT /SPEECH NOTES, PSYCH NOTE, MED LIST, H&P    NOTES ARE PRINTED AND PLACED IN CM DRAWER ON TOWER 7     CM will continue to follow

## 2022-02-08 NOTE — PLAN OF CARE
Problem: PHYSICAL THERAPY ADULT  Goal: Performs mobility at highest level of function for planned discharge setting  See evaluation for individualized goals  Description: Treatment/Interventions: ADL retraining,Functional transfer training,Elevations,LE strengthening/ROM,Therapeutic exercise,Endurance training,Patient/family training,Equipment eval/education,Bed mobility,Gait training,Spoke to nursing,Spoke to case management,OT  Equipment Recommended: [facility]       See flowsheet documentation for full assessment, interventions and recommendations  2/8/2022 1052 by Vicente Kingsley PT  Outcome: Progressing  Note: Prognosis: Fair  Problem List: Decreased strength,Decreased endurance,Impaired balance,Decreased mobility,Impaired sensation,Pain,Decreased coordination,Decreased cognition,Impaired judgement,Decreased safety awareness  Assessment: Pt with improved pain today able to tolerate more activity  He is able to improve ambulation pattern when looking down especially when placing RLE; noted to have LOB when RLE not placed well or not fully ext at the knee and then luna during stance, LLE less ataxic with increased strength and decreased pain  Pt able to participate well in target touching exercise for RLE  Tolerated stairs well again limited due to RLE weakness and RLE ataxia  Pt remains appropriate for inpt PT treatment and STG date extended by 14 days  Barriers to Discharge: Inaccessible home environment,Decreased caregiver support        PT Discharge Recommendation: Post acute rehabilitation services          See flowsheet documentation for full assessment

## 2022-02-08 NOTE — PLAN OF CARE
Problem: PAIN - ADULT  Goal: Verbalizes/displays adequate comfort level or baseline comfort level  Description: Interventions:  - Encourage patient to monitor pain and request assistance  - Assess pain using appropriate pain scale  - Administer analgesics based on type and severity of pain and evaluate response  - Implement non-pharmacological measures as appropriate and evaluate response  - Consider cultural and social influences on pain and pain management  - Notify physician/advanced practitioner if interventions unsuccessful or patient reports new pain  Outcome: Progressing     Problem: INFECTION - ADULT  Goal: Absence or prevention of progression during hospitalization  Description: INTERVENTIONS:  - Assess and monitor for signs and symptoms of infection  - Monitor lab/diagnostic results  - Monitor all insertion sites, i e  indwelling lines, tubes, and drains  - Monitor endotracheal if appropriate and nasal secretions for changes in amount and color  - [place] appropriate cooling/warming therapies per order  - Administer medications as ordered  - Instruct and encourage patient and family to use good hand hygiene technique  - Identify and instruct in appropriate isolation precautions for identified infection/condition  Outcome: Progressing     Problem: SAFETY ADULT  Goal: Patient will remain free of falls  Description: INTERVENTIONS:  - Educate patient/family on patient safety including physical limitations  - Instruct patient to call for assistance with activity   - Consult OT/PT to assist with strengthening/mobility   - Keep Call bell within reach  - Keep bed low and locked with side rails adjusted as appropriate  - Keep care items and personal belongings within reach  - Initiate and maintain comfort rounds  - Make Fall Risk Sign visible to staff  - Offer Toileting   - Obtain necessary fall risk management equipment:   - Apply yellow socks and bracelet for high fall risk patients  - Consider moving patient to room near nurses station  Outcome: Progressing  Goal: Maintain or return to baseline ADL function  Description: INTERVENTIONS:  -  Assess patient's ability to carry out ADLs; assess patient's baseline for ADL function and identify physical deficits which impact ability to perform ADLs (bathing, care of mouth/teeth, toileting, grooming, dressing, etc )  - Assess/evaluate cause of self-care deficits   - Assess range of motion  - Assess patient's mobility; develop plan if impaired  - Assess patient's need for assistive devices and provide as appropriate  - Encourage maximum independence but intervene and supervise when necessary  - Involve family in performance of ADLs  - Assess for home care needs following discharge   - Consider OT consult to assist with ADL evaluation and planning for discharge  - Provide patient education as appropriate  Outcome: Progressing  Goal: Maintains/Returns to pre admission functional level  Description: INTERVENTIONS:  - Perform BMAT or MOVE assessment daily    - Set and communicate daily mobility goal to care team and patient/family/caregiver  - Collaborate with rehabilitation services on mobility goals if consulted  - Perform Range of Motion 2 times a day  - Reposition patient every 2 hours    - Dangle patient 2 times a day  - Stand patient 2 times a day  - Ambulate patient 2 times a day  - Out of bed to chair 2 times a day   - Out of bed for meals 2 times a day  - Out of bed for toileting  - Record patient progress and toleration of activity level   Outcome: Progressing     Problem: DISCHARGE PLANNING  Goal: Discharge to home or other facility with appropriate resources  Description: INTERVENTIONS:  - Identify barriers to discharge w/patient and caregiver  - Arrange for needed discharge resources and transportation as appropriate  - Identify discharge learning needs (meds, wound care, etc )  - Arrange for interpretive services to assist at discharge as needed  - Refer to Case Management Department for coordinating discharge planning if the patient needs post-hospital services based on physician/advanced practitioner order or complex needs related to functional status, cognitive ability, or social support system  Outcome: Progressing     Problem: Knowledge Deficit  Goal: Patient/family/caregiver demonstrates understanding of disease process, treatment plan, medications, and discharge instructions  Description: Complete learning assessment and assess knowledge base  Interventions:  - Provide teaching at level of understanding  - Provide teaching via preferred learning methods  Outcome: Progressing     Problem: MOBILITY - ADULT  Goal: Maintain or return to baseline ADL function  Description: INTERVENTIONS:  -  Assess patient's ability to carry out ADLs; assess patient's baseline for ADL function and identify physical deficits which impact ability to perform ADLs (bathing, care of mouth/teeth, toileting, grooming, dressing, etc )  - Assess/evaluate cause of self-care deficits   - Assess range of motion  - Assess patient's mobility; develop plan if impaired  - Assess patient's need for assistive devices and provide as appropriate  - Encourage maximum independence but intervene and supervise when necessary  - Involve family in performance of ADLs  - Assess for home care needs following discharge   - Consider OT consult to assist with ADL evaluation and planning for discharge  - Provide patient education as appropriate  Outcome: Progressing  Goal: Maintains/Returns to pre admission functional level  Description: INTERVENTIONS:  - Perform BMAT or MOVE assessment daily    - Set and communicate daily mobility goal to care team and patient/family/caregiver  - Collaborate with rehabilitation services on mobility goals if consulted  - Perform Range of Motion 2 times a day  - Reposition patient every 2 hours    - Dangle patient 2 times a day  - Stand patient 2 times a day  - Ambulate patient 2 times a day  - Out of bed to chair 2 times a day   - Out of bed for meals 2 times a day  - Out of bed for toileting  - Record patient progress and toleration of activity level   Outcome: Progressing     Problem: Potential for Falls  Goal: Patient will remain free of falls  Description: INTERVENTIONS:  - Educate patient/family on patient safety including physical limitations  - Instruct patient to call for assistance with activity   - Consult OT/PT to assist with strengthening/mobility   - Keep Call bell within reach  - Keep bed low and locked with side rails adjusted as appropriate  - Keep care items and personal belongings within reach  - Initiate and maintain comfort rounds  - Make Fall Risk Sign visible to staff  - Offer Toileting   - Obtain necessary fall risk management equipment:   - Apply yellow socks and bracelet for high fall risk patients  - Consider moving patient to room near nurses station  Outcome: Progressing     Problem: Prexisting or High Potential for Compromised Skin Integrity  Goal: Skin integrity is maintained or improved  Description: INTERVENTIONS:  - Identify patients at risk for skin breakdown  - Assess and monitor skin integrity  - Assess and monitor nutrition and hydration status  - Monitor labs   - Assess for incontinence   - Turn and reposition patient  - Assist with mobility/ambulation  - Relieve pressure over bony prominences  - Avoid friction and shearing  - Provide appropriate hygiene as needed including keeping skin clean and dry  - Evaluate need for skin moisturizer/barrier cream  - Collaborate with interdisciplinary team   - Patient/family teaching  - Consider wound care consult   Outcome: Progressing

## 2022-02-09 ENCOUNTER — BMR PREADMISSION ASSESSMENT (OUTPATIENT)
Dept: ADMISSIONS | Facility: REHABILITATION | Age: 57
End: 2022-02-09

## 2022-02-09 PROBLEM — M48.00 SPINAL STENOSIS: Status: ACTIVE | Noted: 2022-02-09

## 2022-02-09 PROCEDURE — 99232 SBSQ HOSP IP/OBS MODERATE 35: CPT | Performed by: INTERNAL MEDICINE

## 2022-02-09 RX ORDER — METHOCARBAMOL 500 MG/1
500 TABLET, FILM COATED ORAL EVERY 6 HOURS PRN
Status: DISCONTINUED | OUTPATIENT
Start: 2022-02-09 | End: 2022-03-11 | Stop reason: HOSPADM

## 2022-02-09 RX ADMIN — NYSTATIN: 100000 POWDER TOPICAL at 18:22

## 2022-02-09 RX ADMIN — SENNOSIDES AND DOCUSATE SODIUM 1 TABLET: 50; 8.6 TABLET ORAL at 18:20

## 2022-02-09 RX ADMIN — GABAPENTIN 400 MG: 400 CAPSULE ORAL at 08:58

## 2022-02-09 RX ADMIN — OXYCODONE HYDROCHLORIDE 5 MG: 5 TABLET ORAL at 04:28

## 2022-02-09 RX ADMIN — SENNOSIDES AND DOCUSATE SODIUM 1 TABLET: 50; 8.6 TABLET ORAL at 08:58

## 2022-02-09 RX ADMIN — METHOCARBAMOL 500 MG: 500 TABLET, FILM COATED ORAL at 18:20

## 2022-02-09 RX ADMIN — ENOXAPARIN SODIUM 40 MG: 100 INJECTION SUBCUTANEOUS at 08:57

## 2022-02-09 RX ADMIN — HYDROXYZINE HYDROCHLORIDE 25 MG: 25 TABLET ORAL at 09:07

## 2022-02-09 RX ADMIN — GABAPENTIN 400 MG: 400 CAPSULE ORAL at 16:04

## 2022-02-09 RX ADMIN — GABAPENTIN 400 MG: 400 CAPSULE ORAL at 20:25

## 2022-02-09 RX ADMIN — TRIAMCINOLONE ACETONIDE: 1 CREAM TOPICAL at 18:21

## 2022-02-09 RX ADMIN — NYSTATIN: 100000 POWDER TOPICAL at 08:59

## 2022-02-09 RX ADMIN — OXYCODONE HYDROCHLORIDE 5 MG: 5 TABLET ORAL at 16:04

## 2022-02-09 RX ADMIN — ACETAMINOPHEN 650 MG: 325 TABLET ORAL at 09:06

## 2022-02-09 RX ADMIN — ARIPIPRAZOLE 5 MG: 5 TABLET ORAL at 08:58

## 2022-02-09 RX ADMIN — CARBAMIDE PEROXIDE 6.5% 5 DROP: 6.5 LIQUID AURICULAR (OTIC) at 08:59

## 2022-02-09 RX ADMIN — TERBINAFINE HYDROCHLORIDE 250 MG: 250 TABLET ORAL at 09:00

## 2022-02-09 RX ADMIN — TRIAMCINOLONE ACETONIDE: 1 CREAM TOPICAL at 08:59

## 2022-02-09 RX ADMIN — AMLODIPINE BESYLATE 10 MG: 10 TABLET ORAL at 08:58

## 2022-02-09 RX ADMIN — CARBAMIDE PEROXIDE 6.5% 5 DROP: 6.5 LIQUID AURICULAR (OTIC) at 18:21

## 2022-02-09 NOTE — PLAN OF CARE
Problem: PAIN - ADULT  Goal: Verbalizes/displays adequate comfort level or baseline comfort level  Description: Interventions:  - Encourage patient to monitor pain and request assistance  - Assess pain using appropriate pain scale  - Administer analgesics based on type and severity of pain and evaluate response  - Implement non-pharmacological measures as appropriate and evaluate response  - Consider cultural and social influences on pain and pain management  - Notify physician/advanced practitioner if interventions unsuccessful or patient reports new pain  Outcome: Progressing     Problem: INFECTION - ADULT  Goal: Absence or prevention of progression during hospitalization  Description: INTERVENTIONS:  - Assess and monitor for signs and symptoms of infection  - Monitor lab/diagnostic results  - Monitor all insertion sites, i e  indwelling lines, tubes, and drains  - Monitor endotracheal if appropriate and nasal secretions for changes in amount and color  - [place] appropriate cooling/warming therapies per order  - Administer medications as ordered  - Instruct and encourage patient and family to use good hand hygiene technique  - Identify and instruct in appropriate isolation precautions for identified infection/condition  Outcome: Progressing     Problem: Knowledge Deficit  Goal: Patient/family/caregiver demonstrates understanding of disease process, treatment plan, medications, and discharge instructions  Description: Complete learning assessment and assess knowledge base    Interventions:  - Provide teaching at level of understanding  - Provide teaching via preferred learning methods  Outcome: Progressing     Problem: MOBILITY - ADULT  Goal: Maintain or return to baseline ADL function  Description: INTERVENTIONS:  -  Assess patient's ability to carry out ADLs; assess patient's baseline for ADL function and identify physical deficits which impact ability to perform ADLs (bathing, care of mouth/teeth, toileting, grooming, dressing, etc )  - Assess/evaluate cause of self-care deficits   - Assess range of motion  - Assess patient's mobility; develop plan if impaired  - Assess patient's need for assistive devices and provide as appropriate  - Encourage maximum independence but intervene and supervise when necessary  - Involve family in performance of ADLs  - Assess for home care needs following discharge   - Consider OT consult to assist with ADL evaluation and planning for discharge  - Provide patient education as appropriate  Outcome: Progressing

## 2022-02-09 NOTE — ASSESSMENT & PLAN NOTE
· Upon reviewing his MRI results from [facility] - it showed severe spinal stenosis L3-L4, and L5-S1 with more moderate stenosis L2-3  showed severe spinal stenosis  · Continue oxycodone 5 mg Q 8 p r n    · Continue Robaxin

## 2022-02-09 NOTE — ASSESSMENT & PLAN NOTE
· Patient was to be discharged to the [facility] rescue mission however they would not take him back  · PT recommending post acute rehab placement

## 2022-02-09 NOTE — CASE MANAGEMENT
Case Management Progress Note    Patient name Na Eduardo  Location 7T /7T -01 MRN [id number]  : 1965 Date 2022       LOS (days): 41  Geometric Mean LOS (GMLOS) (days): 3 20  Days to GMLOS:-37 7        OBJECTIVE:        Current admission status: Inpatient  Preferred Pharmacy:   [address]  [address]  [address] [facility] [address]  Phone: [phone] Fax: [phone] [address] [facility],[address], [address]  [address]  Phone: [phone] Fax: [phone]    Primary Care Provider: Myles Cm MD    Primary Insurance: [address] [place] [address]  Secondary Insurance:       PROGRESS NOTE: CM contacted [facility]  ph: [phone] and left a detailed message to see if [facility] accept pt for Short or long term rehab  CM left detailed voicemail, pending phone call and bed availability  CM called Roxanna Nietothelma: Ph:  [phone]  CM spoke with Jesus Negrete stated to [phone]   CM faxed clinicals; pending review and bed availability   CM department will continue to follow through pt's D/C

## 2022-02-09 NOTE — PLAN OF CARE
Problem: PHYSICAL THERAPY ADULT  Goal: Performs mobility at highest level of function for planned discharge setting  See evaluation for individualized goals  Description: Treatment/Interventions: ADL retraining,Functional transfer training,LE strengthening/ROM,Elevations,Therapeutic exercise,Endurance training,Patient/family training,Equipment eval/education,Bed mobility,Gait training,Spoke to nursing,Spoke to case management,OT  Equipment Recommended: [facility]       See flowsheet documentation for full assessment, interventions and recommendations  Outcome: Progressing  Note: Prognosis: Fair  Problem List: Decreased strength,Decreased endurance,Impaired balance,Decreased mobility,Impaired sensation,Pain,Decreased coordination,Decreased cognition,Impaired judgement,Decreased safety awareness  Assessment: Pt is more limited by pain today unable to tolerate much ambulation or standing  Focused on improvement of LE coordination with targeting touching exercises with BLEs  Also completed LE therex in seated as much less pain in this position  Barriers to Discharge: Inaccessible home environment,Decreased caregiver support        PT Discharge Recommendation: Post acute rehabilitation services          See flowsheet documentation for full assessment

## 2022-02-09 NOTE — PHYSICAL THERAPY NOTE
Physical TherapyTreatment Note    Patient's Name: Sadi Nazario    Admitting Diagnosis  Cellulitis [L03 90]  Homeless [Z59 00]  Wound check, abscess [Z51 89]    Problem List  Patient Active Problem List   Diagnosis    Homeless    Psoriasis, unspecified    Ambulatory dysfunction    Essential hypertension    Strange and inexplicable behavior    Onychomycosis    Constipation       Past Medical History  Past Medical History:   Diagnosis Date    Arthritis     Hypertension     Sciatica        Past Surgical History  Past Surgical History:   Procedure Laterality Date    BACK SURGERY         Recent Imaging  CT abdomen pelvis w contrast   Final Result by Chica Pollard MD (01/28 1435)      Stool filled colon with possible impaction  Diverticulosis with no diverticulitis  Workstation performed: [id number]             Recent Vital Signs  Vitals:    02/08/22 1500 02/08/22 2300 02/09/22 0100 02/09/22 0732   BP: 161/94 (!) 175/87 165/90 154/85   BP Location: Left arm Left arm Left arm Right arm   Pulse: 75 82  70   Resp: 19 16  20   Temp: 97 6 °F (36 4 °C) 98 1 °F (36 7 °C)  (!) 97 °F (36 1 °C)   TempSrc: Temporal Temporal  Temporal   SpO2: 97% 100%  98%   Weight:       Height:           PT Treatment Time: 40 minutes       02/08/22 1100   PT Last Visit   PT Visit Date 02/08/22   Note Type   Note Type Treatment   Pain Assessment   Pain Assessment Tool 0-10   Pain Score 9   Pain Location/Orientation Orientation: Lower; Location: Back;Orientation: Right;Location: Leg   Restrictions/Precautions   Other Precautions Fall Risk;Pain   General   Chart Reviewed Yes   Response to Previous Treatment Patient with no complaints from previous session     Family/Caregiver Present No   Cognition   Overall Cognitive Status Impaired   Bed Mobility   Supine to Sit 6  Modified independent   Sit to Supine 6  Modified independent   Transfers   Sit to Stand 4  Minimal assistance   Additional items Assist x 1;Armrests; Increased time required;Verbal cues   Stand to Sit 4  Minimal assistance   Additional items Assist x 1; Armrests; Increased time required;Verbal cues   Stand pivot 4  Minimal assistance   Additional items Assist x 1; Armrests; Increased time required;Verbal cues   Sit pivot 5  Supervision   Additional items Assist x 1; Armrests; Increased time required;Verbal cues   Additional Comments with RW for standing transfers   Ambulation/Elevation   Gait pattern Step through pattern;Decreased heel strike;Decreased toe off; Short stride; Foward flexed;Decreased foot clearance   Gait Assistance 3  Moderate assist   Additional items Assist x 1;Verbal cues; Tactile cues   Assistive Device Rolling walker   Distance 10ft x2   Ambulation/Elevation Additional Comments pt not tolerating ambulation well today due to pain in lower back   Balance   Static Sitting Fair +   Dynamic Sitting Fair +   Static Standing Fair -   Dynamic Standing Poor +   Ambulatory Poor +   Endurance Deficit   Endurance Deficit Yes   Endurance Deficit Description pain and fatigue   Activity Tolerance   Activity Tolerance Patient limited by fatigue;Patient limited by pain   Medical Staff Made Aware spoke to CM, MD   Nurse Made Aware spoke to RN   Exercises   Hip Flexion Sitting;15 reps;AROM; Bilateral   Hip Abduction Sitting;15 reps;AROM; Bilateral   Hip Adduction Sitting;15 reps;AROM; Bilateral   Knee AROM Long Arc Quad Sitting;15 reps;AROM; Bilateral   Neuro re-ed hitting target with RLE and LLE   Assessment   Prognosis Fair   Problem List Decreased strength;Decreased endurance; Impaired balance;Decreased mobility; Impaired sensation;Pain;Decreased coordination;Decreased cognition; Impaired judgement;Decreased safety awareness   Assessment Pt is more limited by pain today unable to tolerate much ambulation or standing  Focused on improvement of LE coordination with targeting touching exercises with BLEs   Also completed LE therex in seated as much less pain in this position  Barriers to Discharge Inaccessible home environment;Decreased caregiver support   Goals   Patient Goals go to rehab   STG Expiration Date 02/22/22   Short Term Goal #1 see eval note   PT Treatment Day 11   Plan   Treatment/Interventions ADL retraining;Functional transfer training;LE strengthening/ROM; Elevations; Therapeutic exercise; Endurance training;Patient/family training;Equipment eval/education; Bed mobility;Gait training;Spoke to nursing;Spoke to case management;OT   Progress Progressing toward goals   PT Frequency 3-5x/wk   Recommendation   PT Discharge Recommendation Post acute rehabilitation services   Equipment Recommended Cecy Limon; Wheelchair   AM-PAC Basic Mobility Inpatient   Turning in Bed Without Bedrails 4   Lying on Back to Sitting on Edge of Flat Bed 4   Moving Bed to Chair 3   Standing Up From Chair 2   Walk in Room 2   Climb 3-5 Stairs 1   Basic Mobility Inpatient Raw Score 16   Basic Mobility Standardized Score 38 32   Highest Level Of Mobility   JH-HLM Goal 5: Stand one or more mins   JH-HLM Highest Level of Mobility 4: Move to chair/commode   JH-HLM Goal Achieved No   Education   Education Provided Mobility training;Home exercise program;Assistive device   Patient Explanation/teachback used;Demonstrates verbal understanding;Reinforcement needed   End of Consult   Patient Position at End of Consult Bedside chair; All needs within reach       [facility] PT, DPT

## 2022-02-09 NOTE — PROGRESS NOTES
[facility]  Progress Note Michael Toth 1965, 60 y o  male MRN: [id number]  Unit/Bed#: 7T [facility] 711-01 Encounter: [id number]  Primary Care Provider: Nabil Jett MD   Date and time admitted to hospital: 12/29/2021  5:38 PM    * Ambulatory dysfunction  Assessment & Plan  · Patient stated his back pain is not well controlled with tramadol 50 mg and seems to be limiting his activities with physical therapy per PT  · Continue gabapentin 400mg t i d  · PT/OT recommending post acute rehab  · Case management following to aide in discharge planning  · Medically stable for discharge    Spinal stenosis  Assessment & Plan  · Upon reviewing his MRI results from [facility] - it showed severe spinal stenosis L3-L4, and L5-S1 with more moderate stenosis L2-3  showed severe spinal stenosis  · Continue oxycodone 5 mg Q 8 p r n  · Continue Robaxin    Constipation  Assessment & Plan  · CT imaging obtained on 01/28 revealed large stool burden  · Patient with bowel movement on 01/29 reports improvement in pain  · Continue MiraLax, Senokot S, lactulose      Onychomycosis  Assessment & Plan  · Present on almost all toes and fingers  · Podiatry performed nail debridement on 01/27  · Terbinafine 250 mg PO daily for 12 weeks (ordered through 4/21)    Strange and inexplicable behavior  Assessment & Plan  · Patient with hyper fixation on fungal infection arms (which was treated)  · Sometimes with strange tangential thoughts  · Psychiatry consulted, not appropriate for inpatient psychiatry unit at this time    -continue Abilify 5 mg p o  daily  -continue Atarax 25 mg p o  Q 6 hours p r n   For anxiety    Essential hypertension  Assessment & Plan  · Continue amlodipine 10 mg daily    Psoriasis, unspecified  Assessment & Plan  · Patient with psoriasis, was on Enbrel in the past however was lost to follow-up  · Discussed with dermatology- appreciate recommendations for topical creams  · Patient will need outpatient Dermatology and Rheumatology follow-up      Homeless  Assessment & Plan  · Patient was to be discharged to the [facility] mission however they would not take him back  · PT recommending post acute rehab placement      VTE Pharmacologic Prophylaxis:   Pharmacologic: Enoxaparin (Lovenox)  Mechanical VTE Prophylaxis in Place: Yes    Patient Centered Rounds: I have performed bedside rounds with nursing staff today  Discussions with Specialists or Other Care Team Provider:  Discussed with Case Management, nurse    Education and Discussions with Family / Patient:  Discussed with patient    Time Spent for Care: 45 minutes  More than 50% of total time spent on counseling and coordination of care as described above  Current Length of Stay: 41 day(s)    Current Patient Status: Inpatient   Certification Statement: The patient will continue to require additional inpatient hospital stay due to Pending placement  Discharge Plan / Estimated Discharge Date:  Pending placement      Code Status: Level 1 - Full Code      Subjective:   Patient was seen and examined at bedside  The patient still having back pain and ambulatory issues  PT and OT on board      Objective:     Vitals:   Temp (24hrs), Av 6 °F (36 4 °C), Min:97 °F (36 1 °C), Max:98 1 °F (36 7 °C)    Temp:  [97 °F (36 1 °C)-98 1 °F (36 7 °C)] 97 °F (36 1 °C)  HR:  [70-82] 70  Resp:  [16-20] 20  BP: (154-175)/(85-94) 154/85  SpO2:  [97 %-100 %] 98 %  Body mass index is 23 82 kg/m²  Input and Output Summary (last 24 hours):        Intake/Output Summary (Last 24 hours) at 2022 1258  Last data filed at 2022 0906  Gross per 24 hour   Intake 1560 ml   Output 1000 ml   Net 560 ml       Physical Exam:     Physical Exam  General: breathing well on room air, no acute distress  HEENT: NC/AT, PERRL, EOM - normal  Neck: Supple  Pulm/Chest: Normal chest wall expansion, clear breath sounds on both side, no wheezing/rhonchi or crackles appreciated  CVS: RRR, normal S1&S2, no murmur appreciated, capillary refill <2s  Abd: soft, non tender, non distended, bowel sounds +  MSK: move all 4 extremities spontaneously  Skin: warm, psoriatic rash improved  CNS: no acute focal neuro deficit      Additional Data:     Labs:    Results from last 7 days   Lab Units 02/04/22  0453   WBC Thousand/uL 6 00   HEMOGLOBIN g/dL 12 3*   HEMATOCRIT % 37 1*   PLATELETS Thousands/uL 294   NEUTROS PCT % 58   LYMPHS PCT % 26   MONOS PCT % 12*   EOS PCT % 4     Results from last 7 days   Lab Units 02/04/22  0453   POTASSIUM mmol/L 3 8   CHLORIDE mmol/L 102   CO2 mmol/L 30   BUN mg/dL 16   CREATININE mg/dL 0 64*   CALCIUM mg/dL 8 3*           * I Have Reviewed All Lab Data Listed Above  * Additional Pertinent Lab Tests Reviewed:  Bola Torrez Admission Reviewed    Imaging:    Imaging Reports Reviewed Today Include:  MRI lumbar spine  Imaging Personally Reviewed by Myself Includes:  None      Recent Cultures (last 7 days):           Last 24 Hours Medication List:   Current Facility-Administered Medications   Medication Dose Route Frequency Provider Last Rate    acetaminophen  650 mg Oral Q4H PRN Min Eli PA-C      amLODIPine  10 mg Oral Daily Jessica Sanes, DO      ARIPiprazole  5 mg Oral Daily DOV Soria      carbamide peroxide  5 drop Both Ears BID Martyn Lesch, DO      diphenhydrAMINE  25 mg Oral Q6H PRN Jessica Sanes, DO      enoxaparin  40 mg Subcutaneous Q24H [address] [facility] Malone, DO      gabapentin  400 mg Oral TID [facility] Malone, DO      hydrOXYzine HCL  25 mg Oral Q6H PRN DOV Soria      methocarbamol  500 mg Oral Q6H PRN Rei Luna MD      nicotine  1 patch Transdermal Daily [place] PA-C      nystatin   Topical BID Rei Luna MD      ondansetron  4 mg Oral Q6H PRN Jessica Sanes, DO      oxyCODONE  5 mg Oral Q8H PRN Rei Luna MD      polyethylene glycol  17 g Oral Daily Rolly Lara Carly Shipley,       senna-docusate sodium  1 tablet Oral BID Haddad Bowels, DO      terbinafine  250 mg Oral Daily Haddad Bowels, DO      triamcinolone   Topical BID Mayito Ballesteros MD      white petrolatum-mineral oil   Topical TID PRN Dontrell Nguyen DO          Today, Patient Was Seen By: Mayito Ballesteros MD    ** Please Note: Dragon 360 Dictation voice to text software may have been used in the creation of this document   **

## 2022-02-09 NOTE — ASSESSMENT & PLAN NOTE
· Patient stated his back pain is not well controlled with tramadol 50 mg and seems to be limiting his activities with physical therapy per PT  · Continue gabapentin 400mg t i d  · PT/OT recommending post acute rehab  · Case management following to aide in discharge planning  · Medically stable for discharge

## 2022-02-10 VITALS — BODY MASS INDEX: 23.77 KG/M2 | HEIGHT: 70 IN | WEIGHT: 166 LBS

## 2022-02-10 PROBLEM — M48.00 SPINAL STENOSIS: Status: ACTIVE | Noted: 2022-02-09

## 2022-02-10 PROBLEM — K59.00 CONSTIPATION: Status: ACTIVE | Noted: 2022-01-29

## 2022-02-10 PROBLEM — R46.2 STRANGE AND INEXPLICABLE BEHAVIOR: Status: ACTIVE | Noted: 2022-01-27

## 2022-02-10 PROBLEM — I10 ESSENTIAL HYPERTENSION: Status: ACTIVE | Noted: 2022-01-09

## 2022-02-10 PROBLEM — B35.1 ONYCHOMYCOSIS: Status: ACTIVE | Noted: 2022-01-27

## 2022-02-10 PROBLEM — R26.2 AMBULATORY DYSFUNCTION: Status: ACTIVE | Noted: 2022-01-01

## 2022-02-10 PROBLEM — L40.9 PSORIASIS, UNSPECIFIED: Status: ACTIVE | Noted: 2022-01-01

## 2022-02-10 PROBLEM — Z59.00 HOMELESS: Status: ACTIVE | Noted: 2021-12-30

## 2022-02-10 PROCEDURE — 99232 SBSQ HOSP IP/OBS MODERATE 35: CPT | Performed by: INTERNAL MEDICINE

## 2022-02-10 RX ADMIN — METHOCARBAMOL 500 MG: 500 TABLET, FILM COATED ORAL at 23:43

## 2022-02-10 RX ADMIN — DIPHENHYDRAMINE HCL 25 MG: 25 TABLET ORAL at 17:25

## 2022-02-10 RX ADMIN — GABAPENTIN 400 MG: 400 CAPSULE ORAL at 22:00

## 2022-02-10 RX ADMIN — HYDROXYZINE HYDROCHLORIDE 25 MG: 25 TABLET ORAL at 08:37

## 2022-02-10 RX ADMIN — TRIAMCINOLONE ACETONIDE: 1 CREAM TOPICAL at 08:39

## 2022-02-10 RX ADMIN — METHOCARBAMOL 500 MG: 500 TABLET, FILM COATED ORAL at 00:21

## 2022-02-10 RX ADMIN — OXYCODONE HYDROCHLORIDE 5 MG: 5 TABLET ORAL at 08:38

## 2022-02-10 RX ADMIN — ENOXAPARIN SODIUM 40 MG: 100 INJECTION SUBCUTANEOUS at 08:39

## 2022-02-10 RX ADMIN — SENNOSIDES AND DOCUSATE SODIUM 1 TABLET: 50; 8.6 TABLET ORAL at 17:25

## 2022-02-10 RX ADMIN — CARBAMIDE PEROXIDE 6.5% 5 DROP: 6.5 LIQUID AURICULAR (OTIC) at 17:26

## 2022-02-10 RX ADMIN — AMLODIPINE BESYLATE 10 MG: 10 TABLET ORAL at 08:40

## 2022-02-10 RX ADMIN — HYDROXYZINE HYDROCHLORIDE 25 MG: 25 TABLET ORAL at 23:43

## 2022-02-10 RX ADMIN — ARIPIPRAZOLE 5 MG: 5 TABLET ORAL at 08:36

## 2022-02-10 RX ADMIN — OXYCODONE HYDROCHLORIDE 5 MG: 5 TABLET ORAL at 00:21

## 2022-02-10 RX ADMIN — TERBINAFINE HYDROCHLORIDE 250 MG: 250 TABLET ORAL at 08:38

## 2022-02-10 RX ADMIN — NYSTATIN: 100000 POWDER TOPICAL at 08:39

## 2022-02-10 RX ADMIN — OXYCODONE HYDROCHLORIDE 5 MG: 5 TABLET ORAL at 17:25

## 2022-02-10 RX ADMIN — TRIAMCINOLONE ACETONIDE: 1 CREAM TOPICAL at 17:26

## 2022-02-10 RX ADMIN — GABAPENTIN 400 MG: 400 CAPSULE ORAL at 08:37

## 2022-02-10 RX ADMIN — SENNOSIDES AND DOCUSATE SODIUM 1 TABLET: 50; 8.6 TABLET ORAL at 08:36

## 2022-02-10 RX ADMIN — CARBAMIDE PEROXIDE 6.5% 5 DROP: 6.5 LIQUID AURICULAR (OTIC) at 08:40

## 2022-02-10 RX ADMIN — NYSTATIN: 100000 POWDER TOPICAL at 17:26

## 2022-02-10 RX ADMIN — GABAPENTIN 400 MG: 400 CAPSULE ORAL at 17:25

## 2022-02-10 NOTE — ASSESSMENT & PLAN NOTE
· Patient states his pain and rash/fungal infection is limiting his improvement  · Continue gabapentin 400mg t i d, robaxin 500mg Q6H PRN, and oxycodone 5mg Q6H PRN  · PT/OT recommending post acute rehab  · Case management following to aide in discharge planning  · Medically stable for discharge

## 2022-02-10 NOTE — PLAN OF CARE
Problem: PAIN - ADULT  Goal: Verbalizes/displays adequate comfort level or baseline comfort level  Description: Interventions:  - Encourage patient to monitor pain and request assistance  - Assess pain using appropriate pain scale  - Administer analgesics based on type and severity of pain and evaluate response  - Implement non-pharmacological measures as appropriate and evaluate response  - Consider cultural and social influences on pain and pain management  - Notify physician/advanced practitioner if interventions unsuccessful or patient reports new pain  Outcome: Progressing     Problem: INFECTION - ADULT  Goal: Absence or prevention of progression during hospitalization  Description: INTERVENTIONS:  - Assess and monitor for signs and symptoms of infection  - Monitor lab/diagnostic results  - Monitor all insertion sites, i e  indwelling lines, tubes, and drains  - Monitor endotracheal if appropriate and nasal secretions for changes in amount and color  - Olive appropriate cooling/warming therapies per order  - Administer medications as ordered  - Instruct and encourage patient and family to use good hand hygiene technique  - Identify and instruct in appropriate isolation precautions for identified infection/condition  Outcome: Progressing     Problem: MOBILITY - ADULT  Goal: Maintain or return to baseline ADL function  Description: INTERVENTIONS:  -  Assess patient's ability to carry out ADLs; assess patient's baseline for ADL function and identify physical deficits which impact ability to perform ADLs (bathing, care of mouth/teeth, toileting, grooming, dressing, etc )  - Assess/evaluate cause of self-care deficits   - Assess range of motion  - Assess patient's mobility; develop plan if impaired  - Assess patient's need for assistive devices and provide as appropriate  - Encourage maximum independence but intervene and supervise when necessary  - Involve family in performance of ADLs  - Assess for home care needs following discharge   - Consider OT consult to assist with ADL evaluation and planning for discharge  - Provide patient education as appropriate  Outcome: Progressing     Problem: Prexisting or High Potential for Compromised Skin Integrity  Goal: Skin integrity is maintained or improved  Description: INTERVENTIONS:  - Identify patients at risk for skin breakdown  - Assess and monitor skin integrity  - Assess and monitor nutrition and hydration status  - Monitor labs   - Assess for incontinence   - Turn and reposition patient  - Assist with mobility/ambulation  - Relieve pressure over bony prominences  - Avoid friction and shearing  - Provide appropriate hygiene as needed including keeping skin clean and dry  - Evaluate need for skin moisturizer/barrier cream  - Collaborate with interdisciplinary team   - Patient/family teaching  - Consider wound care consult   Outcome: Progressing

## 2022-02-10 NOTE — ASSESSMENT & PLAN NOTE
· Patient with hyper fixation on fungal infection arms (which was treated)  · Sometimes with strange tangential thoughts  · Psychiatry consulted, not appropriate for inpatient psychiatry unit at this time  · Continue Abilify 5 mg p o  daily and Atarax 25 mg p o  Q 6 hours p r n   For anxiety

## 2022-02-10 NOTE — ASSESSMENT & PLAN NOTE
· MRI  [facility]): Severe spinal stenosis L3-L4, and L5-S1 with more moderate stenosis L2-  · Continue pain medications as above and PT

## 2022-02-10 NOTE — PROGRESS NOTES
[address] [facility]  Progress Note Graham Flood 1965, 60 y o  male MRN: [id number]  Unit/Bed#: 7T [facility] 711-01 Encounter: [id number]  Primary Care Provider: Valentín Blankenship MD   Date and time admitted to hospital: 12/29/2021  5:38 PM    * Ambulatory dysfunction  Assessment & Plan  · Patient states his pain and rash/fungal infection is limiting his improvement  · Continue gabapentin 400mg t i d, robaxin 500mg Q6H PRN, and oxycodone 5mg Q6H PRN  · PT/OT recommending post acute rehab  · Case management following to aide in discharge planning  · Medically stable for discharge    [facility]  · Patient was to be [facility] however they would not take him back  · Unable to go home with his sister with current physical needs  · PT recommending post acute rehab placement    Spinal stenosis  Assessment & Plan  · MRI  [facility]): Severe spinal stenosis L3-L4, and L5-S1 with more moderate stenosis L2-  · Continue pain medications as above and PT    Strange and inexplicable behavior  Assessment & Plan  · Patient with hyper fixation on fungal infection arms (which was treated)  · Sometimes with strange tangential thoughts  · Psychiatry consulted, not appropriate for inpatient psychiatry unit at this time  · Continue Abilify 5 mg p o  daily and Atarax 25 mg p o  Q 6 hours p r n  For anxiety      Onychomycosis  Assessment & Plan  · Present on almost all toes and fingers  · Podiatry performed nail debridement on 01/27  · Terbinafine 250 mg PO daily for 12 weeks (Ordered through 4/21)    Essential hypertension  Assessment & Plan  · Continue amlodipine 10 mg daily      VTE Pharmacologic Prophylaxis: VTE Score: 1 Low Risk (Score 0-2) - Encourage Ambulation  Lovenox    Patient Centered Rounds: I performed bedside rounds with nursing staff today    Discussions with Specialists or Other Care Team Provider: CM, PT, and Nursing    Education and Discussions with Family / Patient: Patient declined call to   Time Spent for Care: 30 minutes  More than 50% of total time spent on counseling and coordination of care as described above  Current Length of Stay: 42 day(s)  Current Patient Status: Inpatient   Certification Statement: The patient will continue to require additional inpatient hospital stay due to awaiting placement  Discharge Plan: Patient is homeless with ambulatory dysfunction  PT is recommending post acute rehab placement  He has been medically cleared fro discharge pending placement for several weeks  Code Status: Level 1 - Full Code    Subjective:   Patient is resting comfortably in bed without any acute complaints  He notes is rash/fungus is really bothering him this morning but has improved since I last saw him  No over night events reported  Objective:     Vitals:   Temp (24hrs), Av 7 °F (36 5 °C), Min:97 5 °F (36 4 °C), Max:97 8 °F (36 6 °C)    Temp:  [97 5 °F (36 4 °C)-97 8 °F (36 6 °C)] 97 8 °F (36 6 °C)  HR:  [65-80] 65  Resp:  [18] 18  BP: (155-159)/(81-84) 158/81  SpO2:  [97 %] 97 %  Body mass index is 23 82 kg/m²  Input and Output Summary (last 24 hours): Intake/Output Summary (Last 24 hours) at 2/10/2022 0849  Last data filed at 2/10/2022 0518  Gross per 24 hour   Intake 1080 ml   Output 1200 ml   Net -120 ml       Physical Exam:   Physical Exam  Constitutional:       General: He is not in acute distress  Appearance: Normal appearance  HENT:      Head: Normocephalic and atraumatic  Mouth/Throat:      Mouth: Mucous membranes are moist       Pharynx: Oropharynx is clear  Eyes:      Extraocular Movements: Extraocular movements intact  Conjunctiva/sclera: Conjunctivae normal    Cardiovascular:      Rate and Rhythm: Normal rate and regular rhythm  Pulses: Normal pulses  Heart sounds: Normal heart sounds  Pulmonary:      Effort: Pulmonary effort is normal  No respiratory distress        Breath sounds: Normal breath sounds  No wheezing  Abdominal:      General: Bowel sounds are normal  There is no distension  Palpations: Abdomen is soft  Musculoskeletal:         General: Normal range of motion  Cervical back: Normal range of motion and neck supple  Skin:     General: Skin is warm and dry  Comments: Fungal infection of nearly all finger/toe nails; psoriasis upper and lower extremities   Neurological:      General: No focal deficit present  Mental Status: He is alert and oriented to person, place, and time     Psychiatric:         Mood and Affect: Mood normal          Behavior: Behavior normal        Labs:  Results from last 7 days   Lab Units 02/04/22  0453   WBC Thousand/uL 6 00   HEMOGLOBIN g/dL 12 3*   HEMATOCRIT % 37 1*   PLATELETS Thousands/uL 294   NEUTROS PCT % 58   LYMPHS PCT % 26   MONOS PCT % 12*   EOS PCT % 4     Results from last 7 days   Lab Units 02/04/22  0453   SODIUM mmol/L 139   POTASSIUM mmol/L 3 8   CHLORIDE mmol/L 102   CO2 mmol/L 30   BUN mg/dL 16   CREATININE mg/dL 0 64*   ANION GAP mmol/L 7   CALCIUM mg/dL 8 3*   GLUCOSE RANDOM mg/dL 91                       Lines/Drains:  Invasive Devices  Report    None                 Recent Cultures (last 7 days):         Last 24 Hours Medication List:   Current Facility-Administered Medications   Medication Dose Route Frequency Provider Last Rate    acetaminophen  650 mg Oral Q4H PRN Buck Goodrich PA-C      amLODIPine  10 mg Oral Daily Haddad Bowels, DO      ARIPiprazole  5 mg Oral Daily DOV Campbell      carbamide peroxide  5 drop Both Ears BID Dontrell Nguyen, DO      diphenhydrAMINE  25 mg Oral Q6H PRN Haddad Bowels, DO      enoxaparin  40 mg Subcutaneous Q24H [address] [facility] Malone, DO      gabapentin  400 mg Oral TID [facility] Malone, DO      hydrOXYzine HCL  25 mg Oral Q6H PRN BARON CampbellNP      methocarbamol  500 mg Oral Q6H PRN Mayito Ballesteros MD      nicotine  1 patch Transdermal Daily Billy Eubanks      nystatin   Topical BID Landon Nielsen MD      ondansetron  4 mg Oral Q6H PRN Frederick Regulus, DO      oxyCODONE  5 mg Oral Q8H PRN Landon Nielsen MD      polyethylene glycol  17 g Oral Daily Frederick Regulus, DO      senna-docusate sodium  1 tablet Oral BID Frederick Regulus, DO      terbinafine  250 mg Oral Daily Frederick Regulus, DO      triamcinolone   Topical BID Landon Nielsen MD      white petrolatum-mineral oil   Topical TID PRN Raisa Hernandez DO          Today, Patient Was Seen By: Noreen Deal DO    **Please Note: This note may have been constructed using a voice recognition system  **

## 2022-02-10 NOTE — ASSESSMENT & PLAN NOTE
· Patient was to be discharged to the [facility] rescue mission however they would not take him back  · Unable to go home with his sister with current physical needs  · PT recommending post acute rehab placement

## 2022-02-10 NOTE — PLAN OF CARE
Problem: PAIN - ADULT  Goal: Verbalizes/displays adequate comfort level or baseline comfort level  Description: Interventions:  - Encourage patient to monitor pain and request assistance  - Assess pain using appropriate pain scale  - Administer analgesics based on type and severity of pain and evaluate response  - Implement non-pharmacological measures as appropriate and evaluate response  - Consider cultural and social influences on pain and pain management  - Notify physician/advanced practitioner if interventions unsuccessful or patient reports new pain  Outcome: Progressing     Problem: INFECTION - ADULT  Goal: Absence or prevention of progression during hospitalization  Description: INTERVENTIONS:  - Assess and monitor for signs and symptoms of infection  - Monitor lab/diagnostic results  - Monitor all insertion sites, i e  indwelling lines, tubes, and drains  - Monitor endotracheal if appropriate and nasal secretions for changes in amount and color  - [place] appropriate cooling/warming therapies per order  - Administer medications as ordered  - Instruct and encourage patient and family to use good hand hygiene technique  - Identify and instruct in appropriate isolation precautions for identified infection/condition  Outcome: Progressing     Problem: SAFETY ADULT  Goal: Patient will remain free of falls  Description: INTERVENTIONS:  - Educate patient/family on patient safety including physical limitations  - Instruct patient to call for assistance with activity   - Consult OT/PT to assist with strengthening/mobility   - Keep Call bell within reach  - Keep bed low and locked with side rails adjusted as appropriate  - Keep care items and personal belongings within reach  - Initiate and maintain comfort rounds  - Make Fall Risk Sign visible to staff  - Apply yellow socks and bracelet for high fall risk patients  - Consider moving patient to room near nurses station  Outcome: Progressing  Goal: Maintain or return to baseline ADL function  Description: INTERVENTIONS:  -  Assess patient's ability to carry out ADLs; assess patient's baseline for ADL function and identify physical deficits which impact ability to perform ADLs (bathing, care of mouth/teeth, toileting, grooming, dressing, etc )  - Assess/evaluate cause of self-care deficits   - Assess range of motion  - Assess patient's mobility; develop plan if impaired  - Assess patient's need for assistive devices and provide as appropriate  - Encourage maximum independence but intervene and supervise when necessary  - Involve family in performance of ADLs  - Assess for home care needs following discharge   - Consider OT consult to assist with ADL evaluation and planning for discharge  - Provide patient education as appropriate  Outcome: Progressing  Goal: Maintains/Returns to pre admission functional level  Description: INTERVENTIONS:  - Perform BMAT or MOVE assessment daily    - Set and communicate daily mobility goal to care team and patient/family/caregiver     - Collaborate with rehabilitation services on mobility goals if consulted  - Out of bed for toileting  - Record patient progress and toleration of activity level   Outcome: Progressing     Problem: DISCHARGE PLANNING  Goal: Discharge to home or other facility with appropriate resources  Description: INTERVENTIONS:  - Identify barriers to discharge w/patient and caregiver  - Arrange for needed discharge resources and transportation as appropriate  - Identify discharge learning needs (meds, wound care, etc )  - Arrange for interpretive services to assist at discharge as needed  - Refer to Case Management Department for coordinating discharge planning if the patient needs post-hospital services based on physician/advanced practitioner order or complex needs related to functional status, cognitive ability, or social support system  Outcome: Progressing     Problem: Knowledge Deficit  Goal: Patient/family/caregiver demonstrates understanding of disease process, treatment plan, medications, and discharge instructions  Description: Complete learning assessment and assess knowledge base  Interventions:  - Provide teaching at level of understanding  - Provide teaching via preferred learning methods  Outcome: Progressing     Problem: MOBILITY - ADULT  Goal: Maintain or return to baseline ADL function  Description: INTERVENTIONS:  -  Assess patient's ability to carry out ADLs; assess patient's baseline for ADL function and identify physical deficits which impact ability to perform ADLs (bathing, care of mouth/teeth, toileting, grooming, dressing, etc )  - Assess/evaluate cause of self-care deficits   - Assess range of motion  - Assess patient's mobility; develop plan if impaired  - Assess patient's need for assistive devices and provide as appropriate  - Encourage maximum independence but intervene and supervise when necessary  - Involve family in performance of ADLs  - Assess for home care needs following discharge   - Consider OT consult to assist with ADL evaluation and planning for discharge  - Provide patient education as appropriate  Outcome: Progressing  Goal: Maintains/Returns to pre admission functional level  Description: INTERVENTIONS:  - Perform BMAT or MOVE assessment daily    - Set and communicate daily mobility goal to care team and patient/family/caregiver     - Collaborate with rehabilitation services on mobility goals if consulted  - Out of bed for toileting  - Record patient progress and toleration of activity level   Outcome: Progressing     Problem: Potential for Falls  Goal: Patient will remain free of falls  Description: INTERVENTIONS:  - Educate patient/family on patient safety including physical limitations  - Instruct patient to call for assistance with activity   - Consult OT/PT to assist with strengthening/mobility   - Keep Call bell within reach  - Keep bed low and locked with side rails adjusted as appropriate  - Keep care items and personal belongings within reach  - Initiate and maintain comfort rounds  - Make Fall Risk Sign visible to staff  - Apply yellow socks and bracelet for high fall risk patients  - Consider moving patient to room near nurses station  Outcome: Progressing     Problem: Prexisting or High Potential for Compromised Skin Integrity  Goal: Skin integrity is maintained or improved  Description: INTERVENTIONS:  - Identify patients at risk for skin breakdown  - Assess and monitor skin integrity  - Assess and monitor nutrition and hydration status  - Monitor labs   - Assess for incontinence   - Turn and reposition patient  - Assist with mobility/ambulation  - Relieve pressure over bony prominences  - Avoid friction and shearing  - Provide appropriate hygiene as needed including keeping skin clean and dry  - Evaluate need for skin moisturizer/barrier cream  - Collaborate with interdisciplinary team   - Patient/family teaching  - Consider wound care consult   Outcome: Progressing

## 2022-02-10 NOTE — CASE MANAGEMENT
Case Management Progress Note    Patient name Moon Sebastian  Location 7T U 711/7T U 711-01 MRN [id number]  : 1965 Date 2/10/2022       LOS (days): 42  Geometric Mean LOS (GMLOS) (days): 3 20  Days to GMLOS:-38 8        OBJECTIVE:        Current admission status: Inpatient  Preferred Pharmacy:   [address]  [address] [facility],[address]  [facility] [address]  Phone: [phone] Fax: [phone] [address] [facility],[address], [address]  [address]  Phone: [phone] Fax: [phone]    Primary Care Provider: Shanon Franks MD    Primary Insurance: [facility]  Secondary Insurance:     PROGRESS NOTE: CM called [facility] cannot accept pt for rehab  CM was notified that there are no bed available at the moment and  pt is on the waiting list for [facility]  CM called [facility]: Ph:  [phone]; no male beds available at the moment  Search was expanded and pt is still waiting for SNF bed availability    CM was notified by [facility] Liaison that they have reviewed and pt is a denial       CM department will continue to follow through pt's D/C

## 2022-02-11 PROBLEM — E87.6 HYPOKALEMIA: Status: ACTIVE | Noted: 2022-02-11

## 2022-02-11 LAB
ANION GAP SERPL CALCULATED.3IONS-SCNC: 7 MMOL/L (ref 5–14)
BUN SERPL-MCNC: 15 MG/DL (ref 5–25)
CALCIUM SERPL-MCNC: 8.5 MG/DL (ref 8.4–10.2)
CHLORIDE SERPL-SCNC: 101 MMOL/L (ref 97–108)
CO2 SERPL-SCNC: 31 MMOL/L (ref 22–30)
CREAT SERPL-MCNC: 0.71 MG/DL (ref 0.7–1.5)
ERYTHROCYTE [DISTWIDTH] IN BLOOD BY AUTOMATED COUNT: 17.1 %
GFR SERPL CREATININE-BSD FRML MDRD: 103 ML/MIN/1.73SQ M
GLUCOSE SERPL-MCNC: 129 MG/DL (ref 70–99)
HCT VFR BLD AUTO: 36.6 % (ref 41–53)
HGB BLD-MCNC: 12.1 G/DL (ref 13.5–17.5)
MCH RBC QN AUTO: 28.6 PG (ref 26–34)
MCHC RBC AUTO-ENTMCNC: 33.1 G/DL (ref 31–36)
MCV RBC AUTO: 86 FL (ref 80–100)
PLATELET # BLD AUTO: 309 THOUSANDS/UL (ref 150–450)
PMV BLD AUTO: 6.8 FL (ref 8.9–12.7)
POTASSIUM SERPL-SCNC: 3.5 MMOL/L (ref 3.6–5)
RBC # BLD AUTO: 4.24 MILLION/UL (ref 4.5–5.9)
SODIUM SERPL-SCNC: 139 MMOL/L (ref 137–147)
WBC # BLD AUTO: 6.8 THOUSAND/UL (ref 4.5–11)

## 2022-02-11 PROCEDURE — 80048 BASIC METABOLIC PNL TOTAL CA: CPT | Performed by: INTERNAL MEDICINE

## 2022-02-11 PROCEDURE — 99231 SBSQ HOSP IP/OBS SF/LOW 25: CPT | Performed by: INTERNAL MEDICINE

## 2022-02-11 PROCEDURE — 85027 COMPLETE CBC AUTOMATED: CPT | Performed by: INTERNAL MEDICINE

## 2022-02-11 RX ORDER — POTASSIUM CHLORIDE 20 MEQ/1
40 TABLET, EXTENDED RELEASE ORAL ONCE
Status: COMPLETED | OUTPATIENT
Start: 2022-02-11 | End: 2022-02-11

## 2022-02-11 RX ADMIN — OXYCODONE HYDROCHLORIDE 5 MG: 5 TABLET ORAL at 06:37

## 2022-02-11 RX ADMIN — GABAPENTIN 400 MG: 400 CAPSULE ORAL at 08:09

## 2022-02-11 RX ADMIN — TRIAMCINOLONE ACETONIDE: 1 CREAM TOPICAL at 08:17

## 2022-02-11 RX ADMIN — ENOXAPARIN SODIUM 40 MG: 100 INJECTION SUBCUTANEOUS at 08:08

## 2022-02-11 RX ADMIN — TRIAMCINOLONE ACETONIDE: 1 CREAM TOPICAL at 17:22

## 2022-02-11 RX ADMIN — ARIPIPRAZOLE 5 MG: 5 TABLET ORAL at 08:08

## 2022-02-11 RX ADMIN — AMLODIPINE BESYLATE 10 MG: 10 TABLET ORAL at 08:08

## 2022-02-11 RX ADMIN — SENNOSIDES AND DOCUSATE SODIUM 1 TABLET: 50; 8.6 TABLET ORAL at 08:09

## 2022-02-11 RX ADMIN — SENNOSIDES AND DOCUSATE SODIUM 1 TABLET: 50; 8.6 TABLET ORAL at 17:21

## 2022-02-11 RX ADMIN — CARBAMIDE PEROXIDE 6.5% 5 DROP: 6.5 LIQUID AURICULAR (OTIC) at 08:09

## 2022-02-11 RX ADMIN — DIPHENHYDRAMINE HCL 25 MG: 25 TABLET ORAL at 20:12

## 2022-02-11 RX ADMIN — NYSTATIN: 100000 POWDER TOPICAL at 08:08

## 2022-02-11 RX ADMIN — HYDROXYZINE HYDROCHLORIDE 25 MG: 25 TABLET ORAL at 20:12

## 2022-02-11 RX ADMIN — HYDROXYZINE HYDROCHLORIDE 25 MG: 25 TABLET ORAL at 08:08

## 2022-02-11 RX ADMIN — POLYETHYLENE GLYCOL 3350 17 G: 17 POWDER, FOR SOLUTION ORAL at 08:15

## 2022-02-11 RX ADMIN — GABAPENTIN 400 MG: 400 CAPSULE ORAL at 21:51

## 2022-02-11 RX ADMIN — POTASSIUM CHLORIDE 40 MEQ: 1500 TABLET, EXTENDED RELEASE ORAL at 08:08

## 2022-02-11 RX ADMIN — OXYCODONE HYDROCHLORIDE 5 MG: 5 TABLET ORAL at 15:22

## 2022-02-11 RX ADMIN — METHOCARBAMOL 500 MG: 500 TABLET, FILM COATED ORAL at 08:09

## 2022-02-11 RX ADMIN — TERBINAFINE HYDROCHLORIDE 250 MG: 250 TABLET ORAL at 08:08

## 2022-02-11 RX ADMIN — NYSTATIN: 100000 POWDER TOPICAL at 17:22

## 2022-02-11 RX ADMIN — ACETAMINOPHEN 650 MG: 325 TABLET ORAL at 20:12

## 2022-02-11 RX ADMIN — GABAPENTIN 400 MG: 400 CAPSULE ORAL at 17:00

## 2022-02-11 RX ADMIN — CARBAMIDE PEROXIDE 6.5% 5 DROP: 6.5 LIQUID AURICULAR (OTIC) at 17:21

## 2022-02-11 RX ADMIN — METHOCARBAMOL 500 MG: 500 TABLET, FILM COATED ORAL at 20:13

## 2022-02-11 NOTE — CASE MANAGEMENT
Case Management Discharge Planning Note    Patient name Roberto Haywood  Location 7T U 711/7T U 849-17 MRN [id number]  : 1965 Date 2022       Current Admission Date: 2021  Current Admission Diagnosis:Ambulatory dysfunction   Patient Active Problem List    Diagnosis Date Noted    Hypokalemia 2022    Spinal stenosis 2022    Constipation 2022    Strange and inexplicable behavior     Onychomycosis 2022    Essential hypertension 2022    Psoriasis, unspecified 2022    Ambulatory dysfunction 2022    Homeless 2021      LOS (days): 43  Geometric Mean LOS (GMLOS) (days): 3 20  Days to GMLOS:-39 8     OBJECTIVE:  Risk of Unplanned Readmission Score: 19         Current admission status: Inpatient   Preferred Pharmacy:   [address], [address]  [address] [facility],[address]  [address] [facility] [address]  Phone: [phone] Fax: [phone] [address] [facility],[address], [address]  [facility] [address]  Phone: [phone] Fax: [phone]    Primary Care Provider: Supriya Chan MD    Primary Insurance: [address] [place] [address] REP  Secondary Insurance:     DISCHARGE DETAILS: ERIBERTO spoke with  [id number] [facility] ph: [phone], to see if pt will need Neuropsych evaluation or if psychiatrist evaluation is fine  ERIBERTO left message and is waiting for [place]  Jakub Like to get back to ERIBERTO regarding evaluation  ERIBERTO contacted [facility] ph: [phone],  to see if they can accept as pt is feeling a little better with ambulatory dysfunction  [facility] stated that they can accept if pt can [place] or use toilet by himself  ERIBERTO was notified by nurse that pt can be independent with toileting needs if he tries   Unfortunately [facility] will need plastic ID upfront for the pt to be admitted( No printed or faxed copy), Pt does not have any ID with him          CM department will continue to follow through pt's D/C

## 2022-02-11 NOTE — ASSESSMENT & PLAN NOTE
· Patient was to be discharged to the [facility] however they would not take him back  · Unable to go home with his sister with current physical needs  · PT recommending post acute rehab placement

## 2022-02-11 NOTE — CASE MANAGEMENT
Case Management Discharge Planning Note    Patient name Izabella Masters  Location 7T /7T U 719-54 MRN [id number]  : 1965 Date 2022       Current Admission Date: 2021  Current Admission Diagnosis:Ambulatory dysfunction   Patient Active Problem List    Diagnosis Date Noted    Spinal stenosis 2022    Constipation 2022    Strange and inexplicable behavior     Onychomycosis 2022    Essential hypertension 2022    Psoriasis, unspecified 2022    Ambulatory dysfunction 2022    Homeless 2021      LOS (days): 43  Geometric Mean LOS (GMLOS) (days): 3 20  Days to GMLOS:-39 7     OBJECTIVE:  Risk of Unplanned Readmission Score: 19         Current admission status: Inpatient   Preferred Pharmacy:   [address], [address] [facility],[address]  [address] [place] [address] Drive  Phone: [phone] Fax: [phone]    [facility],[address], [address]  [address]  Phone: [phone] Fax: [phone]    Primary Care Provider: Rolo Robins MD    Primary Insurance: [address] [place] [address]  Secondary Insurance:     DISCHARGE DETAILS: CM received call from [facility] ph: [phone] stating that pt will need neuropsych evaluation as pt is PASSr 2 positive screening  CM notified Dr to place neuropsych evaluation  Case manger following pt's case at [facility] is [employer]    CM department will continue to follow through pt's D/C

## 2022-02-11 NOTE — ASSESSMENT & PLAN NOTE
· Patient states his pain and rash/fungal infection is limiting his improvement  · Continue gabapentin 400mg t i d, robaxin 500mg Q6H PRN, and oxycodone 5mg Q6H PRN  · Neuropsychiatric evaluation requested by [facility] for PASSR  · PT/OT recommending post acute rehab  · Case management following to aide in discharge planning  · Medically stable for discharge

## 2022-02-11 NOTE — PROGRESS NOTES
[facility]  Progress Note Graham Flood 1965, 60 y o  male MRN: [id number]  Unit/Bed#: 7T [facility] 711-01 Encounter: [id number]  Primary Care Provider: Valentín Blankenship MD   Date and time admitted to hospital: 12/29/2021  5:38 PM    * Ambulatory dysfunction  Assessment & Plan  · Patient states his pain and rash/fungal infection is limiting his improvement  · Continue gabapentin 400mg t i d, robaxin 500mg Q6H PRN, and oxycodone 5mg Q6H PRN  · Neuropsychiatric evaluation requested by [facility] for PASSR  · PT/OT recommending post acute rehab  · Case management following to aide in discharge planning  · Medically stable for discharge    [address]  · Patient was to be discharged to the [facility] however they would not take him back  · Unable to go home with his sister with current physical needs  · PT recommending post acute rehab placement    Spinal stenosis  Assessment & Plan  · MRI  [facility]): Severe spinal stenosis L3-L4, and L5-S1 with more moderate stenosis L2-  · Continue pain medications as above and PT    Hypokalemia  Assessment & Plan  · K: 3 5; replete and recheck    Strange and inexplicable behavior  Assessment & Plan  · Patient with hyper fixation on fungal infection arms (which was treated)  · Sometimes with strange tangential thoughts  · Psychiatry consulted, not appropriate for inpatient psychiatry unit at this time  · Continue Abilify 5 mg p o  daily and Atarax 25 mg p o  Q 6 hours p r n  For anxiety  · Neuropsychiatric evaluation requested      Onychomycosis  Assessment & Plan  · Present on almost all toes and fingers  · Podiatry performed nail debridement on 01/27  · Terbinafine 250 mg PO daily for 12 weeks (Ordered through 4/21)    Essential hypertension  Assessment & Plan  · Continue amlodipine 10 mg daily      VTE Pharmacologic Prophylaxis: VTE Score: 1 Low Risk (Score 0-2) - Encourage Ambulation  Lovenox    Patient Centered Rounds: I performed bedside rounds with nursing staff today  Discussions with Specialists or Other Care Team Provider: PT, CM, and Nursing    Education and Discussions with Family / Patient: Patient declined call to   Time Spent for Care: 30 minutes  More than 50% of total time spent on counseling and coordination of care as described above  Current Length of Stay: 43 day(s)  Current Patient Status: Inpatient   Certification Statement: The patient will continue to require additional inpatient hospital stay due to awaiting placement  Discharge Plan: Patient has been medically cleared for discharge for several weeks  Awaiting placement at this time  Code Status: Level 1 - Full Code    Subjective:   Patient resting comfortably in bed without any acute complaints  Overnight events reported by nursing  Objective:     Vitals:   Temp (24hrs), Av 7 °F (36 5 °C), Min:97 3 °F (36 3 °C), Max:98 °F (36 7 °C)    Temp:  [97 3 °F (36 3 °C)-98 °F (36 7 °C)] 98 °F (36 7 °C)  HR:  [65-73] 73  Resp:  [18] 18  BP: (150-160)/(72-80) 160/80  SpO2:  [97 %-98 %] 97 %  Body mass index is 23 82 kg/m²  Input and Output Summary (last 24 hours): Intake/Output Summary (Last 24 hours) at 2022 1233  Last data filed at 2022 0313  Gross per 24 hour   Intake 480 ml   Output 800 ml   Net -320 ml       Physical Exam:   Physical Exam  Constitutional:       General: He is not in acute distress  Appearance: Normal appearance  He is normal weight  HENT:      Head: Normocephalic and atraumatic  Mouth/Throat:      Mouth: Mucous membranes are moist       Pharynx: Oropharynx is clear  Eyes:      Extraocular Movements: Extraocular movements intact  Conjunctiva/sclera: Conjunctivae normal    Cardiovascular:      Rate and Rhythm: Normal rate and regular rhythm  Pulses: Normal pulses  Heart sounds: Normal heart sounds     Pulmonary:      Effort: Pulmonary effort is normal       Breath sounds: Normal breath sounds  No wheezing  Abdominal:      General: Bowel sounds are normal  There is no distension  Palpations: Abdomen is soft  Tenderness: There is no abdominal tenderness  Musculoskeletal:         General: Normal range of motion  Cervical back: Normal range of motion  Skin:     General: Skin is warm and dry  Comments: Psoriatic lesions on upper and lower extremities   Neurological:      General: No focal deficit present  Mental Status: He is alert and oriented to person, place, and time  Psychiatric:         Mood and Affect: Mood normal          Behavior: Behavior normal        Labs:  Results from last 7 days   Lab Units 02/11/22  0507   WBC Thousand/uL 6 80   HEMOGLOBIN g/dL 12 1*   HEMATOCRIT % 36 6*   PLATELETS Thousands/uL 309     Results from last 7 days   Lab Units 02/11/22  0507   SODIUM mmol/L 139   POTASSIUM mmol/L 3 5*   CHLORIDE mmol/L 101   CO2 mmol/L 31*   BUN mg/dL 15   CREATININE mg/dL 0 71   ANION GAP mmol/L 7   CALCIUM mg/dL 8 5   GLUCOSE RANDOM mg/dL 129*                       Lines/Drains:  Invasive Devices  Report    None               Imaging: No new imaging       Recent Cultures (last 7 days):         Last 24 Hours Medication List:   Current Facility-Administered Medications   Medication Dose Route Frequency Provider Last Rate    acetaminophen  650 mg Oral Q4H PRN Milad Frey PA-C      amLODIPine  10 mg Oral Daily Lisa Henrry, DO      ARIPiprazole  5 mg Oral Daily LawDOV Short      carbamide peroxide  5 drop Both Ears BID Marval Dupont, DO      diphenhydrAMINE  25 mg Oral Q6H PRN Lisa Henrry, DO      enoxaparin  40 mg Subcutaneous Q24H [facility] Malone, DO      gabapentin  400 mg Oral TID [facility] Malone, DO      hydrOXYzine HCL  25 mg Oral Q6H PRN LawDOV Short      methocarbamol  500 mg Oral Q6H PRN Michelle Mccullough MD      nicotine  1 patch Transdermal Daily Rik Esteves Edwin Morgan PA-C      nystatin   Topical BID Altagracia Razo MD      ondansetron  4 mg Oral Q6H PRN Florentin Hdz DO      oxyCODONE  5 mg Oral Q8H PRN Altagracia Razo MD      polyethylene glycol  17 g Oral Daily Florentin Hdz DO      senna-docusate sodium  1 tablet Oral BID Florentin Hdz DO      terbinafine  250 mg Oral Daily Florentin Hdz DO      triamcinolone   Topical BID Altagracia Razo MD      white petrolatum-mineral oil   Topical TID PRN She Gay DO          Today, Patient Was Seen By: Joao Fernandez DO    **Please Note: This note may have been constructed using a voice recognition system  **

## 2022-02-11 NOTE — ASSESSMENT & PLAN NOTE
· Patient with hyper fixation on fungal infection arms (which was treated)  · Sometimes with strange tangential thoughts  · Psychiatry consulted, not appropriate for inpatient psychiatry unit at this time  · Continue Abilify 5 mg p o  daily and Atarax 25 mg p o  Q 6 hours p r n   For anxiety  · Neuropsychiatric evaluation requested

## 2022-02-11 NOTE — PLAN OF CARE
Problem: PAIN - ADULT  Goal: Verbalizes/displays adequate comfort level or baseline comfort level  Description: Interventions:  - Encourage patient to monitor pain and request assistance  - Assess pain using appropriate pain scale  - Administer analgesics based on type and severity of pain and evaluate response  - Implement non-pharmacological measures as appropriate and evaluate response  - Consider cultural and social influences on pain and pain management  - Notify physician/advanced practitioner if interventions unsuccessful or patient reports new pain  Outcome: Progressing     Problem: INFECTION - ADULT  Goal: Absence or prevention of progression during hospitalization  Description: INTERVENTIONS:  - Assess and monitor for signs and symptoms of infection  - Monitor lab/diagnostic results  - Monitor all insertion sites, i e  indwelling lines, tubes, and drains  - Monitor endotracheal if appropriate and nasal secretions for changes in amount and color  - [place] appropriate cooling/warming therapies per order  - Administer medications as ordered  - Instruct and encourage patient and family to use good hand hygiene technique  - Identify and instruct in appropriate isolation precautions for identified infection/condition  Outcome: Progressing     Problem: SAFETY ADULT  Goal: Patient will remain free of falls  Description: INTERVENTIONS:  - Educate patient/family on patient safety including physical limitations  - Instruct patient to call for assistance with activity   - Consult OT/PT to assist with strengthening/mobility   - Keep Call bell within reach  - Keep bed low and locked with side rails adjusted as appropriate  - Keep care items and personal belongings within reach  - Initiate and maintain comfort rounds  - Make Fall Risk Sign visible to staff  - Apply yellow socks and bracelet for high fall risk patients  - Consider moving patient to room near nurses station  Outcome: Progressing  Goal: Maintain or return to baseline ADL function  Description: INTERVENTIONS:  -  Assess patient's ability to carry out ADLs; assess patient's baseline for ADL function and identify physical deficits which impact ability to perform ADLs (bathing, care of mouth/teeth, toileting, grooming, dressing, etc )  - Assess/evaluate cause of self-care deficits   - Assess range of motion  - Assess patient's mobility; develop plan if impaired  - Assess patient's need for assistive devices and provide as appropriate  - Encourage maximum independence but intervene and supervise when necessary  - Involve family in performance of ADLs  - Assess for home care needs following discharge   - Consider OT consult to assist with ADL evaluation and planning for discharge  - Provide patient education as appropriate  Outcome: Progressing  Goal: Maintains/Returns to pre admission functional level  Description: INTERVENTIONS:  - Perform BMAT or MOVE assessment daily    - Set and communicate daily mobility goal to care team and patient/family/caregiver     - Collaborate with rehabilitation services on mobility goals if consulted  - Out of bed for toileting  - Record patient progress and toleration of activity level   Outcome: Progressing     Problem: DISCHARGE PLANNING  Goal: Discharge to home or other facility with appropriate resources  Description: INTERVENTIONS:  - Identify barriers to discharge w/patient and caregiver  - Arrange for needed discharge resources and transportation as appropriate  - Identify discharge learning needs (meds, wound care, etc )  - Arrange for interpretive services to assist at discharge as needed  - Refer to Case Management Department for coordinating discharge planning if the patient needs post-hospital services based on physician/advanced practitioner order or complex needs related to functional status, cognitive ability, or social support system  Outcome: Progressing     Problem: Knowledge Deficit  Goal: Patient/family/caregiver demonstrates understanding of disease process, treatment plan, medications, and discharge instructions  Description: Complete learning assessment and assess knowledge base  Interventions:  - Provide teaching at level of understanding  - Provide teaching via preferred learning methods  Outcome: Progressing     Problem: MOBILITY - ADULT  Goal: Maintain or return to baseline ADL function  Description: INTERVENTIONS:  -  Assess patient's ability to carry out ADLs; assess patient's baseline for ADL function and identify physical deficits which impact ability to perform ADLs (bathing, care of mouth/teeth, toileting, grooming, dressing, etc )  - Assess/evaluate cause of self-care deficits   - Assess range of motion  - Assess patient's mobility; develop plan if impaired  - Assess patient's need for assistive devices and provide as appropriate  - Encourage maximum independence but intervene and supervise when necessary  - Involve family in performance of ADLs  - Assess for home care needs following discharge   - Consider OT consult to assist with ADL evaluation and planning for discharge  - Provide patient education as appropriate  Outcome: Progressing  Goal: Maintains/Returns to pre admission functional level  Description: INTERVENTIONS:  - Perform BMAT or MOVE assessment daily    - Set and communicate daily mobility goal to care team and patient/family/caregiver     - Collaborate with rehabilitation services on mobility goals if consulted  - Out of bed for toileting  - Record patient progress and toleration of activity level   Outcome: Progressing     Problem: Potential for Falls  Goal: Patient will remain free of falls  Description: INTERVENTIONS:  - Educate patient/family on patient safety including physical limitations  - Instruct patient to call for assistance with activity   - Consult OT/PT to assist with strengthening/mobility   - Keep Call bell within reach  - Keep bed low and locked with side rails adjusted as appropriate  - Keep care items and personal belongings within reach  - Initiate and maintain comfort rounds  - Make Fall Risk Sign visible to staff  - Apply yellow socks and bracelet for high fall risk patients  - Consider moving patient to room near nurses station  Outcome: Progressing     Problem: Prexisting or High Potential for Compromised Skin Integrity  Goal: Skin integrity is maintained or improved  Description: INTERVENTIONS:  - Identify patients at risk for skin breakdown  - Assess and monitor skin integrity  - Assess and monitor nutrition and hydration status  - Monitor labs   - Assess for incontinence   - Turn and reposition patient  - Assist with mobility/ambulation  - Relieve pressure over bony prominences  - Avoid friction and shearing  - Provide appropriate hygiene as needed including keeping skin clean and dry  - Evaluate need for skin moisturizer/barrier cream  - Collaborate with interdisciplinary team   - Patient/family teaching  - Consider wound care consult   Outcome: Progressing

## 2022-02-11 NOTE — ASSESSMENT & PLAN NOTE
· MRI  [facility]): Severe spinal stenosis L3-L4, and L5-S1 with more moderate stenosis L2-  · Continue pain medications as above and PT

## 2022-02-12 PROBLEM — E87.6 HYPOKALEMIA: Status: RESOLVED | Noted: 2022-02-11 | Resolved: 2022-02-12

## 2022-02-12 PROCEDURE — 99232 SBSQ HOSP IP/OBS MODERATE 35: CPT | Performed by: INTERNAL MEDICINE

## 2022-02-12 RX ORDER — LISINOPRIL 10 MG/1
10 TABLET ORAL DAILY
Status: DISCONTINUED | OUTPATIENT
Start: 2022-02-12 | End: 2022-02-15

## 2022-02-12 RX ADMIN — TERBINAFINE HYDROCHLORIDE 250 MG: 250 TABLET ORAL at 08:49

## 2022-02-12 RX ADMIN — OXYCODONE HYDROCHLORIDE 5 MG: 5 TABLET ORAL at 17:35

## 2022-02-12 RX ADMIN — ARIPIPRAZOLE 5 MG: 5 TABLET ORAL at 08:49

## 2022-02-12 RX ADMIN — HYDROCORTISONE: 25 OINTMENT TOPICAL at 15:58

## 2022-02-12 RX ADMIN — OXYCODONE HYDROCHLORIDE 5 MG: 5 TABLET ORAL at 00:42

## 2022-02-12 RX ADMIN — OXYCODONE HYDROCHLORIDE 5 MG: 5 TABLET ORAL at 08:50

## 2022-02-12 RX ADMIN — POLYETHYLENE GLYCOL 3350 17 G: 17 POWDER, FOR SOLUTION ORAL at 08:48

## 2022-02-12 RX ADMIN — CARBAMIDE PEROXIDE 6.5% 5 DROP: 6.5 LIQUID AURICULAR (OTIC) at 08:49

## 2022-02-12 RX ADMIN — METHOCARBAMOL 500 MG: 500 TABLET, FILM COATED ORAL at 15:56

## 2022-02-12 RX ADMIN — TRIAMCINOLONE ACETONIDE: 1 CREAM TOPICAL at 17:33

## 2022-02-12 RX ADMIN — METHOCARBAMOL 500 MG: 500 TABLET, FILM COATED ORAL at 03:45

## 2022-02-12 RX ADMIN — AMLODIPINE BESYLATE 10 MG: 10 TABLET ORAL at 08:49

## 2022-02-12 RX ADMIN — NYSTATIN: 100000 POWDER TOPICAL at 17:34

## 2022-02-12 RX ADMIN — NYSTATIN: 100000 POWDER TOPICAL at 08:49

## 2022-02-12 RX ADMIN — ENOXAPARIN SODIUM 40 MG: 100 INJECTION SUBCUTANEOUS at 08:48

## 2022-02-12 RX ADMIN — GABAPENTIN 400 MG: 400 CAPSULE ORAL at 08:49

## 2022-02-12 RX ADMIN — GABAPENTIN 400 MG: 400 CAPSULE ORAL at 15:56

## 2022-02-12 RX ADMIN — GABAPENTIN 400 MG: 400 CAPSULE ORAL at 21:04

## 2022-02-12 RX ADMIN — HYDROXYZINE HYDROCHLORIDE 25 MG: 25 TABLET ORAL at 15:56

## 2022-02-12 RX ADMIN — SENNOSIDES AND DOCUSATE SODIUM 1 TABLET: 50; 8.6 TABLET ORAL at 17:33

## 2022-02-12 RX ADMIN — HYDROXYZINE HYDROCHLORIDE 25 MG: 25 TABLET ORAL at 08:50

## 2022-02-12 RX ADMIN — TRIAMCINOLONE ACETONIDE: 1 CREAM TOPICAL at 08:49

## 2022-02-12 RX ADMIN — METHOCARBAMOL 500 MG: 500 TABLET, FILM COATED ORAL at 22:02

## 2022-02-12 RX ADMIN — HYDROXYZINE HYDROCHLORIDE 25 MG: 25 TABLET ORAL at 22:02

## 2022-02-12 RX ADMIN — LISINOPRIL 10 MG: 10 TABLET ORAL at 08:49

## 2022-02-12 RX ADMIN — SENNOSIDES AND DOCUSATE SODIUM 1 TABLET: 50; 8.6 TABLET ORAL at 08:49

## 2022-02-12 NOTE — PROGRESS NOTES
[facility]  Progress Note Moises Anderson 1965, 60 y o  male MRN: [id number]  Unit/Bed#: 7T [facility] 711-01 Encounter: [id number]  Primary Care Provider: Reyna Blancas MD   Date and time admitted to hospital: 12/29/2021  5:38 PM    * Ambulatory dysfunction  Assessment & Plan  · Continue gabapentin 400mg t i d, robaxin 500mg Q6H PRN, and oxycodone 5mg Q6H PRN  · Neuropsychiatric evaluation requested by [facility] [place] Department of Aging for PASSR, pending  · PT/OT recommending post acute rehab  · Case management following to aide in discharge planning  · Patient remains medically stable for discharge as of 2/12/22    Spinal stenosis  Assessment & Plan  · MRI  [facility]): Severe spinal stenosis L3-L4, and L5-S1 with more moderate stenosis L2-  · Continue pain medications as above and PT    Onychomycosis  Assessment & Plan  · Present on almost all toes and fingers  · Podiatry performed nail debridement on 01/27  · Terbinafine 250 mg PO daily for 12 weeks (Ordered through 4/21)  Check LFTs    Strange and inexplicable behavior  Assessment & Plan  · Patient with hyper fixation on fungal infection arms (which was treated)  · Sometimes with strange tangential thoughts  · Psychiatry consulted, not appropriate for inpatient psychiatry unit at this time  · Continue Abilify 5 mg p o  daily and Atarax 25 mg p o  Q 6 hours p r n  For anxiety  · Neuropsychiatric evaluation requested as noted above      Essential hypertension  Assessment & Plan  · Continue amlodipine 10 mg daily    Blood pressure is not at goal   Will start additional lisinopril 10 mg daily  · Monitor serum creatinine with initiation of ACE-inhibitor    Homeless  Assessment & Plan  · Patient was to be discharged to the [facility] rescue mission however they would not take him back  · Unable to go home with his sister with current physical needs  · PT recommending post acute rehab placement          VTE Pharmacologic Prophylaxis: VTE Score: 1 Low Risk (Score 0-2) - Encourage Ambulation  Patient Centered Rounds: I performed bedside rounds with nursing staff today  Discussions with Specialists or Other Care Team Provider:  None    Education and Discussions with Family / Patient: Patient declined call to   Time Spent for Care: 30 minutes  More than 50% of total time spent on counseling and coordination of care as described above  Current Length of Stay: 44 day(s)  Current Patient Status: Inpatient   Certification Statement: The patient will continue to require additional inpatient hospital stay due to Medically cleared for discharge  Pending neuropsychiatric evaluation for [facility]  Discharge Plan: Anticipate discharge in 48-72 hrs to rehab facility  Code Status: Level 1 - Full Code    Subjective:   Patient seen and examined this morning at bedside  No acute events overnight  Patient reports burning his testicles however I do not appreciate any significant excoriation or erythema  Otherwise he denies any complaints  Objective:     Vitals:   Temp (24hrs), Av 7 °F (36 5 °C), Min:97 6 °F (36 4 °C), Max:97 8 °F (36 6 °C)    Temp:  [97 6 °F (36 4 °C)-97 8 °F (36 6 °C)] 97 8 °F (36 6 °C)  HR:  [61-83] 67  Resp:  [18] 18  BP: (149-196)/() 162/87  SpO2:  [97 %-99 %] 97 %  Body mass index is 23 82 kg/m²  Input and Output Summary (last 24 hours): Intake/Output Summary (Last 24 hours) at 2022 1337  Last data filed at 2022 1008  Gross per 24 hour   Intake 480 ml   Output 400 ml   Net 80 ml       Physical Exam:   Physical Exam  Vitals reviewed  Constitutional:       General: He is not in acute distress  Appearance: He is not ill-appearing or toxic-appearing  Cardiovascular:      Rate and Rhythm: Normal rate and regular rhythm  Heart sounds: No murmur heard  Pulmonary:      Effort: No respiratory distress  Breath sounds: No wheezing or rhonchi     Abdominal:      General: There is no distension  Tenderness: There is no abdominal tenderness  Genitourinary:     Penis: Normal        Testes: Normal    Skin:     General: Skin is warm and dry  Findings: No erythema  Comments: Patchy areas of xerosis noted on lower extremities   Neurological:      Mental Status: Mental status is at baseline            Additional Data:     Labs:  Results from last 7 days   Lab Units 02/11/22  0507   WBC Thousand/uL 6 80   HEMOGLOBIN g/dL 12 1*   HEMATOCRIT % 36 6*   PLATELETS Thousands/uL 309     Results from last 7 days   Lab Units 02/11/22  0507   SODIUM mmol/L 139   POTASSIUM mmol/L 3 5*   CHLORIDE mmol/L 101   CO2 mmol/L 31*   BUN mg/dL 15   CREATININE mg/dL 0 71   ANION GAP mmol/L 7   CALCIUM mg/dL 8 5   GLUCOSE RANDOM mg/dL 129*                       Lines/Drains:  Invasive Devices  Report    None                     Recent Cultures (last 7 days):         Last 24 Hours Medication List:   Current Facility-Administered Medications   Medication Dose Route Frequency Provider Last Rate    acetaminophen  650 mg Oral Q4H PRN Millie David PA-C      amLODIPine  10 mg Oral Daily Moon Aschoff, DO      ARIPiprazole  5 mg Oral Daily DOV Land      carbamide peroxide  5 drop Both Ears BID Rexweston Brennen, DO      diphenhydrAMINE  25 mg Oral Q6H PRN Moon Aschoff, DO      enoxaparin  40 mg Subcutaneous Q24H [address] [facility] Malone, DO      gabapentin  400 mg Oral TID [facility] Malone, DO      hydrocortisone   Topical 4x Daily PRN Parth Esteves MD      hydrOXYzine HCL  25 mg Oral Q6H PRN DOV Land      lisinopril  10 mg Oral Daily Parth Esteves MD      methocarbamol  500 mg Oral Q6H PRN Matthew Lim MD      nicotine  1 patch Transdermal Daily [place] PA-C      nystatin   Topical BID Matthew Lim MD      ondansetron  4 mg Oral Q6H PRN Moon Aschoff, DO      oxyCODONE  5 mg Oral Q8H PRN Matthew Lim MD      polyethylene glycol  17 g Oral Daily Luna Medrano DO      senna-docusate sodium  1 tablet Oral BID Luna Medrano DO      terbinafine  250 mg Oral Daily Luna Medrano DO      triamcinolone   Topical BID Ed MD Tara      white petrolatum-mineral oil   Topical TID PRN Kolton Au DO          Today, Patient Was Seen By: Natan Rose MD    **Please Note: This note may have been constructed using a voice recognition system  **

## 2022-02-12 NOTE — ASSESSMENT & PLAN NOTE
· Patient was to be discharged to the [facility] rescue mission however they would not take him back  · Unable to go home with his sister with current physical needs  · PT recommending post acute rehab placement

## 2022-02-12 NOTE — ASSESSMENT & PLAN NOTE
· Continue gabapentin 400mg t i d, robaxin 500mg Q6H PRN, and oxycodone 5mg Q6H PRN  · Neuropsychiatric evaluation requested by [facility] Department of Aging for PASSR, pending  · PT/OT recommending post acute rehab  · Case management following to aide in discharge planning  · Patient remains medically stable for discharge as of 2/12/22

## 2022-02-12 NOTE — ASSESSMENT & PLAN NOTE
· Present on almost all toes and fingers  · Podiatry performed nail debridement on 01/27  · Terbinafine 250 mg PO daily for 12 weeks (Ordered through 4/21)    Check LFTs

## 2022-02-12 NOTE — ASSESSMENT & PLAN NOTE
· MRI  [facility]): Severe spinal stenosis L3-L4, and L5-S1 with more moderate stenosis L2-  · Continue pain medications as above and PT

## 2022-02-12 NOTE — ASSESSMENT & PLAN NOTE
· Continue amlodipine 10 mg daily    Blood pressure is not at goal   Will start additional lisinopril 10 mg daily  · Monitor serum creatinine with initiation of ACE-inhibitor

## 2022-02-12 NOTE — PLAN OF CARE
Problem: PAIN - ADULT  Goal: Verbalizes/displays adequate comfort level or baseline comfort level  Description: Interventions:  - Encourage patient to monitor pain and request assistance  - Assess pain using appropriate pain scale  - Administer analgesics based on type and severity of pain and evaluate response  - Implement non-pharmacological measures as appropriate and evaluate response  - Consider cultural and social influences on pain and pain management  - Notify physician/advanced practitioner if interventions unsuccessful or patient reports new pain  Outcome: Progressing     Problem: INFECTION - ADULT  Goal: Absence or prevention of progression during hospitalization  Description: INTERVENTIONS:  - Assess and monitor for signs and symptoms of infection  - Monitor lab/diagnostic results  - Monitor all insertion sites, i e  indwelling lines, tubes, and drains  - Monitor endotracheal if appropriate and nasal secretions for changes in amount and color  - [place] appropriate cooling/warming therapies per order  - Administer medications as ordered  - Instruct and encourage patient and family to use good hand hygiene technique  - Identify and instruct in appropriate isolation precautions for identified infection/condition  Outcome: Progressing     Problem: SAFETY ADULT  Goal: Patient will remain free of falls  Description: INTERVENTIONS:  - Educate patient/family on patient safety including physical limitations  - Instruct patient to call for assistance with activity   - Consult OT/PT to assist with strengthening/mobility   - Keep Call bell within reach  - Keep bed low and locked with side rails adjusted as appropriate  - Keep care items and personal belongings within reach  - Initiate and maintain comfort rounds  - Make Fall Risk Sign visible to staff    - Apply yellow socks and bracelet for high fall risk patients  - Consider moving patient to room near nurses station  Outcome: Progressing  Goal: Maintain or return to baseline ADL function  Description: INTERVENTIONS:  -  Assess patient's ability to carry out ADLs; assess patient's baseline for ADL function and identify physical deficits which impact ability to perform ADLs (bathing, care of mouth/teeth, toileting, grooming, dressing, etc )  - Assess/evaluate cause of self-care deficits   - Assess range of motion  - Assess patient's mobility; develop plan if impaired  - Assess patient's need for assistive devices and provide as appropriate  - Encourage maximum independence but intervene and supervise when necessary  - Involve family in performance of ADLs  - Assess for home care needs following discharge   - Consider OT consult to assist with ADL evaluation and planning for discharge  - Provide patient education as appropriate  Outcome: Progressing  Goal: Maintains/Returns to pre admission functional level  Description: INTERVENTIONS:  - Perform BMAT or MOVE assessment daily    - Set and communicate daily mobility goal to care team and patient/family/caregiver     - Collaborate with rehabilitation services on mobility goals if consulted    - Out of bed for toileting  - Record patient progress and toleration of activity level   Outcome: Progressing     Problem: DISCHARGE PLANNING  Goal: Discharge to home or other facility with appropriate resources  Description: INTERVENTIONS:  - Identify barriers to discharge w/patient and caregiver  - Arrange for needed discharge resources and transportation as appropriate  - Identify discharge learning needs (meds, wound care, etc )  - Arrange for interpretive services to assist at discharge as needed  - Refer to Case Management Department for coordinating discharge planning if the patient needs post-hospital services based on physician/advanced practitioner order or complex needs related to functional status, cognitive ability, or social support system  Outcome: Progressing     Problem: Knowledge Deficit  Goal: Patient/family/caregiver demonstrates understanding of disease process, treatment plan, medications, and discharge instructions  Description: Complete learning assessment and assess knowledge base  Interventions:  - Provide teaching at level of understanding  - Provide teaching via preferred learning methods  Outcome: Progressing     Problem: MOBILITY - ADULT  Goal: Maintain or return to baseline ADL function  Description: INTERVENTIONS:  -  Assess patient's ability to carry out ADLs; assess patient's baseline for ADL function and identify physical deficits which impact ability to perform ADLs (bathing, care of mouth/teeth, toileting, grooming, dressing, etc )  - Assess/evaluate cause of self-care deficits   - Assess range of motion  - Assess patient's mobility; develop plan if impaired  - Assess patient's need for assistive devices and provide as appropriate  - Encourage maximum independence but intervene and supervise when necessary  - Involve family in performance of ADLs  - Assess for home care needs following discharge   - Consider OT consult to assist with ADL evaluation and planning for discharge  - Provide patient education as appropriate  Outcome: Progressing  Goal: Maintains/Returns to pre admission functional level  Description: INTERVENTIONS:  - Perform BMAT or MOVE assessment daily    - Set and communicate daily mobility goal to care team and patient/family/caregiver     - Collaborate with rehabilitation services on mobility goals if consulted    - Out of bed for toileting  - Record patient progress and toleration of activity level   Outcome: Progressing     Problem: Potential for Falls  Goal: Patient will remain free of falls  Description: INTERVENTIONS:  - Educate patient/family on patient safety including physical limitations  - Instruct patient to call for assistance with activity   - Consult OT/PT to assist with strengthening/mobility   - Keep Call bell within reach  - Keep bed low and locked with side rails adjusted as appropriate  - Keep care items and personal belongings within reach  - Initiate and maintain comfort rounds  - Make Fall Risk Sign visible to staff    - Apply yellow socks and bracelet for high fall risk patients  - Consider moving patient to room near nurses station  Outcome: Progressing     Problem: Prexisting or High Potential for Compromised Skin Integrity  Goal: Skin integrity is maintained or improved  Description: INTERVENTIONS:  - Identify patients at risk for skin breakdown  - Assess and monitor skin integrity  - Assess and monitor nutrition and hydration status  - Monitor labs   - Assess for incontinence   - Turn and reposition patient  - Assist with mobility/ambulation  - Relieve pressure over bony prominences  - Avoid friction and shearing  - Provide appropriate hygiene as needed including keeping skin clean and dry  - Evaluate need for skin moisturizer/barrier cream  - Collaborate with interdisciplinary team   - Patient/family teaching  - Consider wound care consult   Outcome: Progressing

## 2022-02-12 NOTE — ASSESSMENT & PLAN NOTE
· Patient with hyper fixation on fungal infection arms (which was treated)  · Sometimes with strange tangential thoughts  · Psychiatry consulted, not appropriate for inpatient psychiatry unit at this time  · Continue Abilify 5 mg p o  daily and Atarax 25 mg p o  Q 6 hours p r n   For anxiety  · Neuropsychiatric evaluation requested as noted above

## 2022-02-13 LAB
ALBUMIN SERPL BCP-MCNC: 3.7 G/DL (ref 3–5.2)
ALP SERPL-CCNC: 85 U/L (ref 43–122)
ALT SERPL W P-5'-P-CCNC: 10 U/L
ANION GAP SERPL CALCULATED.3IONS-SCNC: 5 MMOL/L (ref 5–14)
AST SERPL W P-5'-P-CCNC: 19 U/L (ref 17–59)
BILIRUB SERPL-MCNC: 0.36 MG/DL
BUN SERPL-MCNC: 14 MG/DL (ref 5–25)
CALCIUM SERPL-MCNC: 8.4 MG/DL (ref 8.4–10.2)
CHLORIDE SERPL-SCNC: 101 MMOL/L (ref 97–108)
CO2 SERPL-SCNC: 32 MMOL/L (ref 22–30)
CREAT SERPL-MCNC: 0.69 MG/DL (ref 0.7–1.5)
GFR SERPL CREATININE-BSD FRML MDRD: 104 ML/MIN/1.73SQ M
GLUCOSE SERPL-MCNC: 118 MG/DL (ref 70–99)
POTASSIUM SERPL-SCNC: 3.8 MMOL/L (ref 3.6–5)
PROT SERPL-MCNC: 7.1 G/DL (ref 5.9–8.4)
SODIUM SERPL-SCNC: 138 MMOL/L (ref 137–147)

## 2022-02-13 PROCEDURE — 99232 SBSQ HOSP IP/OBS MODERATE 35: CPT | Performed by: FAMILY MEDICINE

## 2022-02-13 PROCEDURE — 80053 COMPREHEN METABOLIC PANEL: CPT | Performed by: INTERNAL MEDICINE

## 2022-02-13 RX ADMIN — TERBINAFINE HYDROCHLORIDE 250 MG: 250 TABLET ORAL at 08:55

## 2022-02-13 RX ADMIN — SENNOSIDES AND DOCUSATE SODIUM 1 TABLET: 50; 8.6 TABLET ORAL at 17:08

## 2022-02-13 RX ADMIN — ARIPIPRAZOLE 5 MG: 5 TABLET ORAL at 08:54

## 2022-02-13 RX ADMIN — LISINOPRIL 10 MG: 10 TABLET ORAL at 08:54

## 2022-02-13 RX ADMIN — POLYETHYLENE GLYCOL 3350 17 G: 17 POWDER, FOR SOLUTION ORAL at 08:55

## 2022-02-13 RX ADMIN — OXYCODONE HYDROCHLORIDE 5 MG: 5 TABLET ORAL at 11:29

## 2022-02-13 RX ADMIN — GABAPENTIN 400 MG: 400 CAPSULE ORAL at 16:12

## 2022-02-13 RX ADMIN — TRIAMCINOLONE ACETONIDE: 1 CREAM TOPICAL at 17:10

## 2022-02-13 RX ADMIN — OXYCODONE HYDROCHLORIDE 5 MG: 5 TABLET ORAL at 02:48

## 2022-02-13 RX ADMIN — HYDROXYZINE HYDROCHLORIDE 25 MG: 25 TABLET ORAL at 08:54

## 2022-02-13 RX ADMIN — AMLODIPINE BESYLATE 10 MG: 10 TABLET ORAL at 08:54

## 2022-02-13 RX ADMIN — METHOCARBAMOL 500 MG: 500 TABLET, FILM COATED ORAL at 14:11

## 2022-02-13 RX ADMIN — DIPHENHYDRAMINE HCL 25 MG: 25 TABLET ORAL at 16:14

## 2022-02-13 RX ADMIN — METHOCARBAMOL 500 MG: 500 TABLET, FILM COATED ORAL at 20:05

## 2022-02-13 RX ADMIN — NYSTATIN: 100000 POWDER TOPICAL at 08:56

## 2022-02-13 RX ADMIN — TRIAMCINOLONE ACETONIDE: 1 CREAM TOPICAL at 08:56

## 2022-02-13 RX ADMIN — ENOXAPARIN SODIUM 40 MG: 100 INJECTION SUBCUTANEOUS at 08:56

## 2022-02-13 RX ADMIN — GABAPENTIN 400 MG: 400 CAPSULE ORAL at 08:56

## 2022-02-13 RX ADMIN — GABAPENTIN 400 MG: 400 CAPSULE ORAL at 20:05

## 2022-02-13 RX ADMIN — HYDROCORTISONE: 25 OINTMENT TOPICAL at 08:56

## 2022-02-13 RX ADMIN — SENNOSIDES AND DOCUSATE SODIUM 1 TABLET: 50; 8.6 TABLET ORAL at 08:54

## 2022-02-13 RX ADMIN — METHOCARBAMOL 500 MG: 500 TABLET, FILM COATED ORAL at 06:15

## 2022-02-13 RX ADMIN — ACETAMINOPHEN 650 MG: 325 TABLET ORAL at 16:14

## 2022-02-13 RX ADMIN — HYDROXYZINE HYDROCHLORIDE 25 MG: 25 TABLET ORAL at 14:11

## 2022-02-13 RX ADMIN — HYDROXYZINE HYDROCHLORIDE 25 MG: 25 TABLET ORAL at 20:05

## 2022-02-13 RX ADMIN — NYSTATIN: 100000 POWDER TOPICAL at 17:10

## 2022-02-13 NOTE — ASSESSMENT & PLAN NOTE
· Patient was to be discharged to the [facility] rescue [facility] however they would not take him back  · Unable to go home with his sister with current physical needs  · PT recommending post acute rehab placement

## 2022-02-13 NOTE — PLAN OF CARE
Problem: PAIN - ADULT  Goal: Verbalizes/displays adequate comfort level or baseline comfort level  Description: Interventions:  - Encourage patient to monitor pain and request assistance  - Assess pain using appropriate pain scale  - Administer analgesics based on type and severity of pain and evaluate response  - Implement non-pharmacological measures as appropriate and evaluate response  - Consider cultural and social influences on pain and pain management  - Notify physician/advanced practitioner if interventions unsuccessful or patient reports new pain  Outcome: Progressing     Problem: INFECTION - ADULT  Goal: Absence or prevention of progression during hospitalization  Description: INTERVENTIONS:  - Assess and monitor for signs and symptoms of infection  - Monitor lab/diagnostic results  - Monitor all insertion sites, i e  indwelling lines, tubes, and drains  - Monitor endotracheal if appropriate and nasal secretions for changes in amount and color  - [place] appropriate cooling/warming therapies per order  - Administer medications as ordered  - Instruct and encourage patient and family to use good hand hygiene technique  - Identify and instruct in appropriate isolation precautions for identified infection/condition  Outcome: Progressing     Problem: SAFETY ADULT  Goal: Patient will remain free of falls  Description: INTERVENTIONS:  - Educate patient/family on patient safety including physical limitations  - Instruct patient to call for assistance with activity   - Consult OT/PT to assist with strengthening/mobility   - Keep Call bell within reach  - Keep bed low and locked with side rails adjusted as appropriate  - Keep care items and personal belongings within reach  - Initiate and maintain comfort rounds  - Make Fall Risk Sign visible to staff  - Apply yellow socks and bracelet for high fall risk patients  - Consider moving patient to room near nurses station  Outcome: Progressing  Goal: Maintain or return to baseline ADL function  Description: INTERVENTIONS:  -  Assess patient's ability to carry out ADLs; assess patient's baseline for ADL function and identify physical deficits which impact ability to perform ADLs (bathing, care of mouth/teeth, toileting, grooming, dressing, etc )  - Assess/evaluate cause of self-care deficits   - Assess range of motion  - Assess patient's mobility; develop plan if impaired  - Assess patient's need for assistive devices and provide as appropriate  - Encourage maximum independence but intervene and supervise when necessary  - Involve family in performance of ADLs  - Assess for home care needs following discharge   - Consider OT consult to assist with ADL evaluation and planning for discharge  - Provide patient education as appropriate  Outcome: Progressing  Goal: Maintains/Returns to pre admission functional level  Description: INTERVENTIONS:  - Perform BMAT or MOVE assessment daily    - Set and communicate daily mobility goal to care team and patient/family/caregiver     - Collaborate with rehabilitation services on mobility goals if consulted  - Out of bed for toileting  - Record patient progress and toleration of activity level   Outcome: Progressing     Problem: DISCHARGE PLANNING  Goal: Discharge to home or other facility with appropriate resources  Description: INTERVENTIONS:  - Identify barriers to discharge w/patient and caregiver  - Arrange for needed discharge resources and transportation as appropriate  - Identify discharge learning needs (meds, wound care, etc )  - Arrange for interpretive services to assist at discharge as needed  - Refer to Case Management Department for coordinating discharge planning if the patient needs post-hospital services based on physician/advanced practitioner order or complex needs related to functional status, cognitive ability, or social support system  Outcome: Progressing     Problem: Knowledge Deficit  Goal: Patient/family/caregiver demonstrates understanding of disease process, treatment plan, medications, and discharge instructions  Description: Complete learning assessment and assess knowledge base  Interventions:  - Provide teaching at level of understanding  - Provide teaching via preferred learning methods  Outcome: Progressing     Problem: MOBILITY - ADULT  Goal: Maintain or return to baseline ADL function  Description: INTERVENTIONS:  -  Assess patient's ability to carry out ADLs; assess patient's baseline for ADL function and identify physical deficits which impact ability to perform ADLs (bathing, care of mouth/teeth, toileting, grooming, dressing, etc )  - Assess/evaluate cause of self-care deficits   - Assess range of motion  - Assess patient's mobility; develop plan if impaired  - Assess patient's need for assistive devices and provide as appropriate  - Encourage maximum independence but intervene and supervise when necessary  - Involve family in performance of ADLs  - Assess for home care needs following discharge   - Consider OT consult to assist with ADL evaluation and planning for discharge  - Provide patient education as appropriate  Outcome: Progressing  Goal: Maintains/Returns to pre admission functional level  Description: INTERVENTIONS:  - Perform BMAT or MOVE assessment daily    - Set and communicate daily mobility goal to care team and patient/family/caregiver     - Collaborate with rehabilitation services on mobility goals if consulted  - Out of bed for toileting  - Record patient progress and toleration of activity level   Outcome: Progressing     Problem: Potential for Falls  Goal: Patient will remain free of falls  Description: INTERVENTIONS:  - Educate patient/family on patient safety including physical limitations  - Instruct patient to call for assistance with activity   - Consult OT/PT to assist with strengthening/mobility   - Keep Call bell within reach  - Keep bed low and locked with side rails adjusted as appropriate  - Keep care items and personal belongings within reach  - Initiate and maintain comfort rounds  - Make Fall Risk Sign visible to staff    Problem: Knowledge Deficit  Goal: Patient/family/caregiver demonstrates understanding of disease process, treatment plan, medications, and discharge instructions  Description: Complete learning assessment and assess knowledge base    Interventions:  - Provide teaching at level of understanding  - Provide teaching via preferred learning methods  Outcome: Progressing  - Apply yellow socks and bracelet for high fall risk patients  - Consider moving patient to room near nurses station  Outcome: Progressing     Problem: Prexisting or High Potential for Compromised Skin Integrity  Goal: Skin integrity is maintained or improved  Description: INTERVENTIONS:  - Identify patients at risk for skin breakdown  - Assess and monitor skin integrity  - Assess and monitor nutrition and hydration status  - Monitor labs   - Assess for incontinence   - Turn and reposition patient  - Assist with mobility/ambulation  - Relieve pressure over bony prominences  - Avoid friction and shearing  - Provide appropriate hygiene as needed including keeping skin clean and dry  - Evaluate need for skin moisturizer/barrier cream  - Collaborate with interdisciplinary team   - Patient/family teaching  - Consider wound care consult   Outcome: Progressing

## 2022-02-13 NOTE — ASSESSMENT & PLAN NOTE
· MRI  [facility]): Severe spinal stenosis L3-L4, and L5-S1 with more moderate stenosis L2-  · Continue pain medications as above and PT

## 2022-02-13 NOTE — PROGRESS NOTES
[facility] Internal Medicine Progress Note  Patient: Roberto Haywood 60 y o  male   MRN: [id number]  PCP: Supriya Chan MD  Unit/Bed#: 7T [facility] 711-01 Encounter: [id number]  Date Of Visit: 02/13/22    Assessment/Plan:  * Ambulatory dysfunction  Assessment & Plan  · Continue gabapentin 400mg t i d, robaxin 500mg Q6H PRN, and oxycodone 5mg Q6H PRN  · Neuropsychiatric evaluation requested by [facility] Department of Aging for PASSR, pending  · PT/OT recommending post acute rehab  · Case management following to aide in discharge planning  · Patient remains medically stable for discharge as of 2/13/22    HTN, goal below 140/90  Assessment & Plan  · Continue amlodipine 10 mg daily  Blood pressure is not at goal     · Continue lisinopril 10 mg daily  Will increase to 20 tomorrow if blood pressure still not controlled  · Monitor serum creatinine with initiation of ACE-inhibitor    Spinal stenosis  Assessment & Plan  · MRI  [facility]): Severe spinal stenosis L3-L4, and L5-S1 with more moderate stenosis L2-  · Continue pain medications as above and PT    Onychomycosis  Assessment & Plan  · Present on almost all toes and fingers  · Podiatry performed nail debridement on 01/27  · Continue Terbinafine 250 mg PO daily for 12 weeks (Ordered through 4/21)  Check LFTs    Strange and inexplicable behavior  Assessment & Plan  · Patient with hyper fixation on fungal infection arms (which was treated)  · Sometimes with strange tangential thoughts  · Psychiatry consulted, not appropriate for inpatient psychiatry unit at this time  · Continue Abilify 5 mg p o  daily and Atarax 25 mg p o  Q 6 hours p r n   For anxiety  · Neuropsychiatric evaluation requested as noted above      Homeless  Assessment & Plan  · Patient was to be discharged to the [facility] rescue mission however they would not take him back  · Unable to go home with his sister with current physical needs  · PT recommending post acute rehab placement    VTE Pharmacologic Prophylaxis:   Pharmacologic: Enoxaparin (Lovenox)  Mechanical VTE Prophylaxis in Place: No    Patient Centered Rounds: I have performed bedside rounds with nursing staff today  Discussions with Specialists or Other Care Team Provider: No    Education and Discussions with Family / Patient:  Discussed case with patient and all questions answered  Time Spent for Care: 30 minutes  More than 50% of total time spent on counseling and coordination of care as described above  Current Length of Stay: 45 day(s)    Current Patient Status: Inpatient   Certification Statement: The patient will continue to require additional inpatient hospital stay due to Waiting neuropsychiatric evaluation and placement  Discharge Plan / Estimated Discharge Date:  Anticipate discharge within 48-72 hours to rehab facility  Code Status: Level 1 - Full Code    Subjective:   Patient seen and examined at the bedside  No acute distress  Continues to state that he would like something done for the fungus all over his body  Otherwise no complaints  Objective:   Vitals:   Temp (24hrs), Av 4 °F (36 3 °C), Min:97 °F (36 1 °C), Max:98 °F (36 7 °C)    Temp:  [97 °F (36 1 °C)-98 °F (36 7 °C)] 98 °F (36 7 °C)  HR:  [60-67] 60  Resp:  [18] 18  BP: (146-160)/(81-92) 160/92  SpO2:  [98 %-100 %] 98 %  Body mass index is 23 82 kg/m²  Input and Output Summary (last 24 hours): Intake/Output Summary (Last 24 hours) at 2022 1254  Last data filed at 2022 0900  Gross per 24 hour   Intake 1560 ml   Output 650 ml   Net 910 ml     Physical Exam:   Physical Exam  Constitutional:       Appearance: He is well-developed  HENT:      Head: Normocephalic and atraumatic  Eyes:      Conjunctiva/sclera: Conjunctivae normal       Pupils: Pupils are equal, round, and reactive to light  Neck:      Vascular: No JVD  Cardiovascular:      Rate and Rhythm: Normal rate and regular rhythm  Heart sounds: Normal heart sounds   No murmur heard   No friction rub  No gallop  Pulmonary:      Effort: Pulmonary effort is normal  No respiratory distress  Breath sounds: Normal breath sounds  No wheezing  Abdominal:      General: Bowel sounds are normal  There is no distension  Palpations: Abdomen is soft  Tenderness: There is no abdominal tenderness  Musculoskeletal:         General: Normal range of motion  Cervical back: Normal range of motion and neck supple  Skin:     General: Skin is warm and dry  Neurological:      General: No focal deficit present  Mental Status: He is alert  Mental status is at baseline  Deep Tendon Reflexes: Reflexes are normal and symmetric  Additional Data:   Basic Laboratory Review:   Results from last 7 days   Lab Units 02/13/22  0502 02/11/22  0507   SODIUM mmol/L 138 139   POTASSIUM mmol/L 3 8 3 5*   CHLORIDE mmol/L 101 101   CO2 mmol/L 32* 31*   BUN mg/dL 14 15   CREATININE mg/dL 0 69* 0 71   GLUCOSE RANDOM mg/dL 118* 129*   CALCIUM mg/dL 8 4 8 5   ALBUMIN g/dL 3 7  --    ALK PHOS U/L 85  --    ALT U/L 10  --    AST U/L 19  --    EGFR ml/min/1 73sq m 104 103       Results from last 7 days   Lab Units 02/11/22  0507   WBC Thousand/uL 6 80   HEMOGLOBIN g/dL 12 1*   HEMATOCRIT % 36 6*   PLATELETS Thousands/uL 309               Additional Labs:                 Recent Cultures (last 7 days):         * I Have Reviewed All Lab Data Listed Above  * Additional Pertinent Lab Tests Reviewed:  All Labs Within Last 24 Hours Reviewed    Imaging:  Prior imaging studies and reports reviewed    Last 24 Hours Medication List:   Current Facility-Administered Medications   Medication Dose Route Frequency Provider Last Rate    acetaminophen  650 mg Oral Q4H PRN Shanita Londono PA-C      amLODIPine  10 mg Oral Daily Celena Pringle DO      ARIPiprazole  5 mg Oral Daily DOV Garay      carbamide peroxide  5 drop Both Ears BID PRN Duglas Whittaker MD      diphenhydrAMINE  25 mg Oral Q6H PRN Raisa Hancock,       enoxaparin  40 mg Subcutaneous Q24H [place], [place]      gabapentin  400 mg Oral TID Gov[facility], DO      hydrocortisone   Topical 4x Daily PRN Zabrina Hernandez MD      hydrOXYzine HCL  25 mg Oral Q6H PRN DOV Marroquin      lisinopril  10 mg Oral Daily Zabrina Hernandez MD      methocarbamol  500 mg Oral Q6H PRN Marquise Clark MD      nicotine  1 patch Transdermal Daily [place], [place]      nystatin   Topical BID Marquise Clark MD      ondansetron  4 mg Oral Q6H PRN Raisa Hancock,       oxyCODONE  5 mg Oral Q8H PRN Marquise Clark MD      polyethylene glycol  17 g Oral Daily Raisa Hancock DO      senna-docusate sodium  1 tablet Oral BID Raisa Hancock,       terbinafine  250 mg Oral Daily Raisa Hancock DO      triamcinolone   Topical BID Marquise Clark MD      white petrolatum-mineral oil   Topical TID PRN Haris Zaldivar DO       Today, Patient Was Seen By: aJne Loyola MD

## 2022-02-13 NOTE — ASSESSMENT & PLAN NOTE
· Present on almost all toes and fingers  · Podiatry performed nail debridement on 01/27  · Continue Terbinafine 250 mg PO daily for 12 weeks (Ordered through 4/21)    Check LFTs

## 2022-02-13 NOTE — ASSESSMENT & PLAN NOTE
· Continue amlodipine 10 mg daily  Blood pressure is not at goal     · Continue lisinopril 10 mg daily  Will increase to 20 tomorrow if blood pressure still not controlled    · Monitor serum creatinine with initiation of ACE-inhibitor

## 2022-02-13 NOTE — ASSESSMENT & PLAN NOTE
· Continue gabapentin 400mg t i d, robaxin 500mg Q6H PRN, and oxycodone 5mg Q6H PRN  · Neuropsychiatric evaluation requested by [facility] Department of Aging for PASSR, pending  · PT/OT recommending post acute rehab  · Case management following to aide in discharge planning  · Patient remains medically stable for discharge as of 2/13/22

## 2022-02-14 ENCOUNTER — APPOINTMENT (INPATIENT)
Dept: ULTRASOUND IMAGING | Facility: HOSPITAL | Age: 57
DRG: 603 | End: 2022-02-14
Payer: COMMERCIAL

## 2022-02-14 PROBLEM — N50.811 PAIN IN BOTH TESTICLES: Status: ACTIVE | Noted: 2022-02-14

## 2022-02-14 PROBLEM — N50.812 PAIN IN BOTH TESTICLES: Status: ACTIVE | Noted: 2022-02-14

## 2022-02-14 LAB
BILIRUB UR QL STRIP: NEGATIVE
CLARITY UR: CLEAR
COLOR UR: NORMAL
GLUCOSE UR STRIP-MCNC: NEGATIVE MG/DL
HGB UR QL STRIP.AUTO: NEGATIVE
KETONES UR STRIP-MCNC: NEGATIVE MG/DL
LEUKOCYTE ESTERASE UR QL STRIP: NEGATIVE
NITRITE UR QL STRIP: NEGATIVE
PH UR STRIP.AUTO: 6 [PH]
PROT UR STRIP-MCNC: NEGATIVE MG/DL
SP GR UR STRIP.AUTO: 1.01 (ref 1–1.04)
UROBILINOGEN UA: NEGATIVE MG/DL

## 2022-02-14 PROCEDURE — 76870 US EXAM SCROTUM: CPT

## 2022-02-14 PROCEDURE — 99232 SBSQ HOSP IP/OBS MODERATE 35: CPT | Performed by: INTERNAL MEDICINE

## 2022-02-14 PROCEDURE — 81003 URINALYSIS AUTO W/O SCOPE: CPT | Performed by: INTERNAL MEDICINE

## 2022-02-14 RX ORDER — OXYCODONE HYDROCHLORIDE 5 MG/1
5 TABLET ORAL EVERY 6 HOURS PRN
Status: DISCONTINUED | OUTPATIENT
Start: 2022-02-14 | End: 2022-03-11 | Stop reason: HOSPADM

## 2022-02-14 RX ADMIN — TERBINAFINE HYDROCHLORIDE 250 MG: 250 TABLET ORAL at 08:44

## 2022-02-14 RX ADMIN — GABAPENTIN 400 MG: 400 CAPSULE ORAL at 20:00

## 2022-02-14 RX ADMIN — ACETAMINOPHEN 650 MG: 325 TABLET ORAL at 20:00

## 2022-02-14 RX ADMIN — NYSTATIN: 100000 POWDER TOPICAL at 08:44

## 2022-02-14 RX ADMIN — ARIPIPRAZOLE 5 MG: 5 TABLET ORAL at 08:44

## 2022-02-14 RX ADMIN — LISINOPRIL 10 MG: 10 TABLET ORAL at 08:43

## 2022-02-14 RX ADMIN — METHOCARBAMOL 500 MG: 500 TABLET, FILM COATED ORAL at 20:00

## 2022-02-14 RX ADMIN — ACETAMINOPHEN 650 MG: 325 TABLET ORAL at 08:54

## 2022-02-14 RX ADMIN — OXYCODONE HYDROCHLORIDE 5 MG: 5 TABLET ORAL at 17:03

## 2022-02-14 RX ADMIN — OXYCODONE HYDROCHLORIDE 5 MG: 5 TABLET ORAL at 01:46

## 2022-02-14 RX ADMIN — HYDROXYZINE HYDROCHLORIDE 25 MG: 25 TABLET ORAL at 20:00

## 2022-02-14 RX ADMIN — SENNOSIDES AND DOCUSATE SODIUM 1 TABLET: 50; 8.6 TABLET ORAL at 17:03

## 2022-02-14 RX ADMIN — GABAPENTIN 400 MG: 400 CAPSULE ORAL at 17:03

## 2022-02-14 RX ADMIN — SENNOSIDES AND DOCUSATE SODIUM 1 TABLET: 50; 8.6 TABLET ORAL at 08:43

## 2022-02-14 RX ADMIN — METHOCARBAMOL 500 MG: 500 TABLET, FILM COATED ORAL at 14:09

## 2022-02-14 RX ADMIN — NYSTATIN: 100000 POWDER TOPICAL at 17:03

## 2022-02-14 RX ADMIN — POLYETHYLENE GLYCOL 3350 17 G: 17 POWDER, FOR SOLUTION ORAL at 08:43

## 2022-02-14 RX ADMIN — METHOCARBAMOL 500 MG: 500 TABLET, FILM COATED ORAL at 05:06

## 2022-02-14 RX ADMIN — OXYCODONE HYDROCHLORIDE 5 MG: 5 TABLET ORAL at 09:44

## 2022-02-14 RX ADMIN — TRIAMCINOLONE ACETONIDE: 1 CREAM TOPICAL at 08:44

## 2022-02-14 RX ADMIN — AMLODIPINE BESYLATE 10 MG: 10 TABLET ORAL at 08:43

## 2022-02-14 RX ADMIN — GABAPENTIN 400 MG: 400 CAPSULE ORAL at 08:44

## 2022-02-14 RX ADMIN — TRIAMCINOLONE ACETONIDE: 1 CREAM TOPICAL at 17:03

## 2022-02-14 NOTE — ASSESSMENT & PLAN NOTE
· He believes this may be related to the fungus trying to hold on  · No significant dysuria or erythema  · Check UA and testicular ultrasound

## 2022-02-14 NOTE — ASSESSMENT & PLAN NOTE
· Present on almost all toes and fingers  · Podiatry performed nail debridement on 01/27  · Continue Terbinafine 250 mg PO daily for 12 weeks (Ordered through 4/21)

## 2022-02-14 NOTE — PLAN OF CARE
Problem: PAIN - ADULT  Goal: Verbalizes/displays adequate comfort level or baseline comfort level  Description: Interventions:  - Encourage patient to monitor pain and request assistance  - Assess pain using appropriate pain scale  - Administer analgesics based on type and severity of pain and evaluate response  - Implement non-pharmacological measures as appropriate and evaluate response  - Consider cultural and social influences on pain and pain management  - Notify physician/advanced practitioner if interventions unsuccessful or patient reports new pain  Outcome: Progressing     Problem: INFECTION - ADULT  Goal: Absence or prevention of progression during hospitalization  Description: INTERVENTIONS:  - Assess and monitor for signs and symptoms of infection  - Monitor lab/diagnostic results  - Monitor all insertion sites, i e  indwelling lines, tubes, and drains  - Monitor endotracheal if appropriate and nasal secretions for changes in amount and color  - [place] appropriate cooling/warming therapies per order  - Administer medications as ordered  - Instruct and encourage patient and family to use good hand hygiene technique  - Identify and instruct in appropriate isolation precautions for identified infection/condition  Outcome: Progressing     Problem: SAFETY ADULT  Goal: Patient will remain free of falls  Description: INTERVENTIONS:  - Educate patient/family on patient safety including physical limitations  - Instruct patient to call for assistance with activity   - Consult OT/PT to assist with strengthening/mobility   - Keep Call bell within reach  - Keep bed low and locked with side rails adjusted as appropriate  - Keep care items and personal belongings within reach  - Initiate and maintain comfort rounds  - Make Fall Risk Sign visible to staff  - Apply yellow socks and bracelet for high fall risk patients  - Consider moving patient to room near nurses station  Outcome: Progressing  Goal: Maintain or return to baseline ADL function  Description: INTERVENTIONS:  -  Assess patient's ability to carry out ADLs; assess patient's baseline for ADL function and identify physical deficits which impact ability to perform ADLs (bathing, care of mouth/teeth, toileting, grooming, dressing, etc )  - Assess/evaluate cause of self-care deficits   - Assess range of motion  - Assess patient's mobility; develop plan if impaired  - Assess patient's need for assistive devices and provide as appropriate  - Encourage maximum independence but intervene and supervise when necessary  - Involve family in performance of ADLs  - Assess for home care needs following discharge   - Consider OT consult to assist with ADL evaluation and planning for discharge  - Provide patient education as appropriate  Outcome: Progressing  Goal: Maintains/Returns to pre admission functional level  Description: INTERVENTIONS:  - Perform BMAT or MOVE assessment daily    - Set and communicate daily mobility goal to care team and patient/family/caregiver     - Collaborate with rehabilitation services on mobility goals if consulted  - Out of bed for toileting  - Record patient progress and toleration of activity level   Outcome: Progressing     Problem: DISCHARGE PLANNING  Goal: Discharge to home or other facility with appropriate resources  Description: INTERVENTIONS:  - Identify barriers to discharge w/patient and caregiver  - Arrange for needed discharge resources and transportation as appropriate  - Identify discharge learning needs (meds, wound care, etc )  - Arrange for interpretive services to assist at discharge as needed  - Refer to Case Management Department for coordinating discharge planning if the patient needs post-hospital services based on physician/advanced practitioner order or complex needs related to functional status, cognitive ability, or social support system  Outcome: Progressing     Problem: Knowledge Deficit  Goal: Patient/family/caregiver demonstrates understanding of disease process, treatment plan, medications, and discharge instructions  Description: Complete learning assessment and assess knowledge base  Interventions:  - Provide teaching at level of understanding  - Provide teaching via preferred learning methods  Outcome: Progressing     Problem: MOBILITY - ADULT  Goal: Maintain or return to baseline ADL function  Description: INTERVENTIONS:  -  Assess patient's ability to carry out ADLs; assess patient's baseline for ADL function and identify physical deficits which impact ability to perform ADLs (bathing, care of mouth/teeth, toileting, grooming, dressing, etc )  - Assess/evaluate cause of self-care deficits   - Assess range of motion  - Assess patient's mobility; develop plan if impaired  - Assess patient's need for assistive devices and provide as appropriate  - Encourage maximum independence but intervene and supervise when necessary  - Involve family in performance of ADLs  - Assess for home care needs following discharge   - Consider OT consult to assist with ADL evaluation and planning for discharge  - Provide patient education as appropriate  Outcome: Progressing  Goal: Maintains/Returns to pre admission functional level  Description: INTERVENTIONS:  - Perform BMAT or MOVE assessment daily    - Set and communicate daily mobility goal to care team and patient/family/caregiver     - Collaborate with rehabilitation services on mobility goals if consulted  - Out of bed for toileting  - Record patient progress and toleration of activity level   Outcome: Progressing     Problem: Potential for Falls  Goal: Patient will remain free of falls  Description: INTERVENTIONS:  - Educate patient/family on patient safety including physical limitations  - Instruct patient to call for assistance with activity   - Consult OT/PT to assist with strengthening/mobility   - Keep Call bell within reach  - Keep bed low and locked with side rails adjusted as appropriate  - Keep care items and personal belongings within reach  - Initiate and maintain comfort rounds  - Make Fall Risk Sign visible to staff  - Apply yellow socks and bracelet for high fall risk patients  - Consider moving patient to room near nurses station  Outcome: Progressing     Problem: Prexisting or High Potential for Compromised Skin Integrity  Goal: Skin integrity is maintained or improved  Description: INTERVENTIONS:  - Identify patients at risk for skin breakdown  - Assess and monitor skin integrity  - Assess and monitor nutrition and hydration status  - Monitor labs   - Assess for incontinence   - Turn and reposition patient  - Assist with mobility/ambulation  - Relieve pressure over bony prominences  - Avoid friction and shearing  - Provide appropriate hygiene as needed including keeping skin clean and dry  - Evaluate need for skin moisturizer/barrier cream  - Collaborate with interdisciplinary team   - Patient/family teaching  - Consider wound care consult   Outcome: Progressing

## 2022-02-14 NOTE — ASSESSMENT & PLAN NOTE
· Continue gabapentin 400mg t i d, robaxin 500mg Q6H PRN, and oxycodone 5mg Q6H PRN  · Neuropsychiatric evaluation requested by [facility] Department of Aging for PASSR  · PT/OT recommending post acute rehab  · Case management following to aide in discharge planning  · Patient remains medically stable for discharge

## 2022-02-14 NOTE — PROGRESS NOTES
51 [facility]  Progress Note José Paiz 1965, 60 y o  male MRN: [id number]  Unit/Bed#: 7T [facility] 711-01 Encounter: [id number]  Primary Care Provider: Roseline Dillon MD   Date and time admitted to hospital: 12/29/2021  5:38 PM    * Ambulatory dysfunction  Assessment & Plan  · Continue gabapentin 400mg t i d, robaxin 500mg Q6H PRN, and oxycodone 5mg Q6H PRN  · Neuropsychiatric evaluation requested by [facility] for PASSR  · PT/OT recommending post acute rehab  · Case management following to aide in discharge planning  · Patient remains medically stable for discharge     Homeless  Assessment & Plan  · [facility] however they will not take him back as he does not have any photo ID  · PT recommending post acute rehab placement    Pain in both testicles  Assessment & Plan  · He believes this may be related to the fungus trying to hold on  · No significant dysuria or erythema  · Check UA and testicular ultrasound    Spinal stenosis  Assessment & Plan  · MRI  [facility]): Severe spinal stenosis L3-L4, and L5-S1 with more moderate stenosis L2-  · Continue pain medications as above and PT    Strange and inexplicable behavior  Assessment & Plan  · Patient with hyper fixation on fungal infection arms (which was treated)  · Sometimes with strange tangential thoughts  · Psychiatry consulted, not appropriate for inpatient psychiatry unit at this time  · Continue Abilify 5 mg p o  daily and Atarax 25 mg p o  Q 6 hours p r n  For anxiety  · Neuropsychiatric evaluation requested as noted above      Onychomycosis  Assessment & Plan  · Present on almost all toes and fingers  · Podiatry performed nail debridement on 01/27  · Continue Terbinafine 250 mg PO daily for 12 weeks (Ordered through 4/21)  HTN, goal below 140/90  Assessment & Plan  · Continue amlodipine 10 mg daily and lisinopril 10 mg daily          VTE Pharmacologic Prophylaxis: VTE Score: 1 Low Risk (Score 0-2) - Encourage Ambulation  Lovenox, patient is relatively non-ambulatory  Patient Centered Rounds: I performed bedside rounds with nursing staff today  Discussions with Specialists or Other Care Team Provider: PT, CM, and Nursing    Education and Discussions with Family / Patient: Patient declined call to   Time Spent for Care: 45 minutes  More than 50% of total time spent on counseling and coordination of care as described above  Current Length of Stay: 46 day(s)  Current Patient Status: Inpatient   Certification Statement: The patient will continue to require additional inpatient hospital stay due to awaiting placement  Discharge Plan: Patient has been medically cleared for discharge  Unfortunately, PT continues to recommend post acute rehab placement and he has had no accepting facilities for several weeks  Unfortunately, [facility] has declined to take him back as he has no photo ID  Code Status: Level 1 - Full Code    Subjective:   Patient is resting in bed  Today, he notes that he has significant testicular/penile pain  Notes that this is been present since his admission but he has not brought it up in the past because he felt as if it were related to the fungus  No overnight events reported  Objective:     Vitals:   Temp (24hrs), Av 5 °F (36 4 °C), Min:97 3 °F (36 3 °C), Max:97 7 °F (36 5 °C)    Temp:  [97 3 °F (36 3 °C)-97 7 °F (36 5 °C)] 97 4 °F (36 3 °C)  HR:  [58-73] 58  Resp:  [18] 18  BP: (114-171)/(62-86) 122/64  SpO2:  [96 %-98 %] 98 %  Body mass index is 23 82 kg/m²  Input and Output Summary (last 24 hours): Intake/Output Summary (Last 24 hours) at 2022 1135  Last data filed at 2022 0944  Gross per 24 hour   Intake 1560 ml   Output --   Net 1560 ml       Physical Exam:   Physical Exam  Constitutional:       General: He is not in acute distress  Appearance: Normal appearance   He is normal weight  HENT:      Head: Normocephalic and atraumatic  Mouth/Throat:      Mouth: Mucous membranes are moist       Pharynx: Oropharynx is clear  Eyes:      Extraocular Movements: Extraocular movements intact  Conjunctiva/sclera: Conjunctivae normal    Cardiovascular:      Rate and Rhythm: Normal rate and regular rhythm  Pulses: Normal pulses  Heart sounds: Normal heart sounds  Pulmonary:      Effort: Pulmonary effort is normal  No respiratory distress  Breath sounds: Normal breath sounds  No wheezing  Abdominal:      General: Bowel sounds are normal  There is no distension  Palpations: Abdomen is soft  Tenderness: There is no abdominal tenderness  Genitourinary:     Penis: Normal        Testes: Normal    Musculoskeletal:      Cervical back: Normal range of motion and neck supple  Skin:     General: Skin is warm and dry  Neurological:      General: No focal deficit present  Mental Status: He is alert and oriented to person, place, and time  Mental status is at baseline  Psychiatric:         Mood and Affect: Mood normal          Behavior: Behavior normal         Labs:  Results from last 7 days   Lab Units 02/11/22  0507   WBC Thousand/uL 6 80   HEMOGLOBIN g/dL 12 1*   HEMATOCRIT % 36 6*   PLATELETS Thousands/uL 309     Results from last 7 days   Lab Units 02/13/22  0502   SODIUM mmol/L 138   POTASSIUM mmol/L 3 8   CHLORIDE mmol/L 101   CO2 mmol/L 32*   BUN mg/dL 14   CREATININE mg/dL 0 69*   ANION GAP mmol/L 5   CALCIUM mg/dL 8 4   ALBUMIN g/dL 3 7   TOTAL BILIRUBIN mg/dL 0 36   ALK PHOS U/L 85   ALT U/L 10   AST U/L 19   GLUCOSE RANDOM mg/dL 118*                       Lines/Drains:  Invasive Devices  Report    None               Imaging:  Testicular ultrasound pending      Recent Cultures (last 7 days):         Last 24 Hours Medication List:   Current Facility-Administered Medications   Medication Dose Route Frequency Provider Last Rate    acetaminophen  650 mg Oral Q4H PRN Veto Allen PA-C      amLODIPine  10 mg Oral Daily Caryle Shove, DO      ARIPiprazole  5 mg Oral Daily Nolene Bone, CRNP      carbamide peroxide  5 drop Both Ears BID PRN Lennox Mahad, MD      diphenhydrAMINE  25 mg Oral Q6H PRN Caryle Shove, DO      enoxaparin  40 mg Subcutaneous Q24H [facility] Malone, DO      gabapentin  400 mg Oral TID [facility] Malone, DO      hydrocortisone   Topical 4x Daily PRN Lennox Pap, MD      hydrOXYzine HCL  25 mg Oral Q6H PRN Kisha Bone, CRBENSON      lisinopril  10 mg Oral Daily Lennox MD Mahad      methocarbamol  500 mg Oral Q6H PRN Marty Nunez MD      nicotine  1 patch Transdermal Daily [place] PA-C      nystatin   Topical BID Marty Nunez MD      ondansetron  4 mg Oral Q6H PRN Caryle Shove, DO      oxyCODONE  5 mg Oral Q6H PRN [facility] Malone, DO      polyethylene glycol  17 g Oral Daily Caryle Shove, DO      senna-docusate sodium  1 tablet Oral BID Caryle Shove, DO      terbinafine  250 mg Oral Daily Caryle Shove, DO      triamcinolone   Topical BID Marty Nunez MD      white petrolatum-mineral oil   Topical TID PRN Senthil Moura DO          Today, Patient Was Seen By: Pamela Boggs DO    **Please Note: This note may have been constructed using a voice recognition system  **

## 2022-02-14 NOTE — ASSESSMENT & PLAN NOTE
· Patient was to be discharged to the [facility] rescue mission however they will not take him back as he does not have any photo ID  · PT recommending post acute rehab placement

## 2022-02-14 NOTE — ASSESSMENT & PLAN NOTE
· MRI  [facility]): Severe spinal stenosis L3-L4, and L5-S1 with more moderate stenosis L2-  · Continue pain medications as above and PT

## 2022-02-15 PROCEDURE — 99232 SBSQ HOSP IP/OBS MODERATE 35: CPT | Performed by: INTERNAL MEDICINE

## 2022-02-15 RX ORDER — LISINOPRIL 20 MG/1
20 TABLET ORAL DAILY
Status: DISCONTINUED | OUTPATIENT
Start: 2022-02-15 | End: 2022-03-11 | Stop reason: HOSPADM

## 2022-02-15 RX ADMIN — METHOCARBAMOL 500 MG: 500 TABLET, FILM COATED ORAL at 23:23

## 2022-02-15 RX ADMIN — NYSTATIN: 100000 POWDER TOPICAL at 09:57

## 2022-02-15 RX ADMIN — GABAPENTIN 400 MG: 400 CAPSULE ORAL at 16:15

## 2022-02-15 RX ADMIN — OXYCODONE HYDROCHLORIDE 5 MG: 5 TABLET ORAL at 16:15

## 2022-02-15 RX ADMIN — OXYCODONE HYDROCHLORIDE 5 MG: 5 TABLET ORAL at 02:36

## 2022-02-15 RX ADMIN — ARIPIPRAZOLE 5 MG: 5 TABLET ORAL at 09:57

## 2022-02-15 RX ADMIN — GABAPENTIN 400 MG: 400 CAPSULE ORAL at 09:58

## 2022-02-15 RX ADMIN — SENNOSIDES AND DOCUSATE SODIUM 1 TABLET: 50; 8.6 TABLET ORAL at 09:58

## 2022-02-15 RX ADMIN — OXYCODONE HYDROCHLORIDE 5 MG: 5 TABLET ORAL at 10:03

## 2022-02-15 RX ADMIN — TERBINAFINE HYDROCHLORIDE 250 MG: 250 TABLET ORAL at 10:06

## 2022-02-15 RX ADMIN — LISINOPRIL 20 MG: 20 TABLET ORAL at 09:58

## 2022-02-15 RX ADMIN — GABAPENTIN 400 MG: 400 CAPSULE ORAL at 21:10

## 2022-02-15 RX ADMIN — TRIAMCINOLONE ACETONIDE: 1 CREAM TOPICAL at 09:59

## 2022-02-15 RX ADMIN — AMLODIPINE BESYLATE 10 MG: 10 TABLET ORAL at 09:58

## 2022-02-15 RX ADMIN — POLYETHYLENE GLYCOL 3350 17 G: 17 POWDER, FOR SOLUTION ORAL at 09:57

## 2022-02-15 RX ADMIN — HYDROCORTISONE: 25 OINTMENT TOPICAL at 09:58

## 2022-02-15 NOTE — PLAN OF CARE
Problem: PAIN - ADULT  Goal: Verbalizes/displays adequate comfort level or baseline comfort level  Description: Interventions:  - Encourage patient to monitor pain and request assistance  - Assess pain using appropriate pain scale  - Administer analgesics based on type and severity of pain and evaluate response  - Implement non-pharmacological measures as appropriate and evaluate response  - Consider cultural and social influences on pain and pain management  - Notify physician/advanced practitioner if interventions unsuccessful or patient reports new pain  Outcome: Progressing     Problem: INFECTION - ADULT  Goal: Absence or prevention of progression during hospitalization  Description: INTERVENTIONS:  - Assess and monitor for signs and symptoms of infection  - Monitor lab/diagnostic results  - Monitor all insertion sites, i e  indwelling lines, tubes, and drains  - Monitor endotracheal if appropriate and nasal secretions for changes in amount and color  - Martin appropriate cooling/warming therapies per order  - Administer medications as ordered  - Instruct and encourage patient and family to use good hand hygiene technique  - Identify and instruct in appropriate isolation precautions for identified infection/condition  Outcome: Progressing     Problem: SAFETY ADULT  Goal: Patient will remain free of falls  Description: INTERVENTIONS:  - Educate patient/family on patient safety including physical limitations  - Instruct patient to call for assistance with activity   - Consult OT/PT to assist with strengthening/mobility   - Keep Call bell within reach  - Keep bed low and locked with side rails adjusted as appropriate  - Keep care items and personal belongings within reach  - Initiate and maintain comfort rounds  - Make Fall Risk Sign visible to staff  - Apply yellow socks and bracelet for high fall risk patients  - Consider moving patient to room near nurses station  Outcome: Progressing  Goal: Maintain or return to baseline ADL function  Description: INTERVENTIONS:  -  Assess patient's ability to carry out ADLs; assess patient's baseline for ADL function and identify physical deficits which impact ability to perform ADLs (bathing, care of mouth/teeth, toileting, grooming, dressing, etc )  - Assess/evaluate cause of self-care deficits   - Assess range of motion  - Assess patient's mobility; develop plan if impaired  - Assess patient's need for assistive devices and provide as appropriate  - Encourage maximum independence but intervene and supervise when necessary  - Involve family in performance of ADLs  - Assess for home care needs following discharge   - Consider OT consult to assist with ADL evaluation and planning for discharge  - Provide patient education as appropriate  Outcome: Progressing  Goal: Maintains/Returns to pre admission functional level  Description: INTERVENTIONS:  - Perform BMAT or MOVE assessment daily    - Set and communicate daily mobility goal to care team and patient/family/caregiver     - Out of bed for toileting  - Record patient progress and toleration of activity level   Outcome: Progressing     Problem: DISCHARGE PLANNING  Goal: Discharge to home or other facility with appropriate resources  Description: INTERVENTIONS:  - Identify barriers to discharge w/patient and caregiver  - Arrange for needed discharge resources and transportation as appropriate  - Identify discharge learning needs (meds, wound care, etc )  - Arrange for interpretive services to assist at discharge as needed  - Refer to Case Management Department for coordinating discharge planning if the patient needs post-hospital services based on physician/advanced practitioner order or complex needs related to functional status, cognitive ability, or social support system  Outcome: Progressing     Problem: Knowledge Deficit  Goal: Patient/family/caregiver demonstrates understanding of disease process, treatment plan, medications, and discharge instructions  Description: Complete learning assessment and assess knowledge base  Interventions:  - Provide teaching at level of understanding  - Provide teaching via preferred learning methods  Outcome: Progressing     Problem: MOBILITY - ADULT  Goal: Maintain or return to baseline ADL function  Description: INTERVENTIONS:  -  Assess patient's ability to carry out ADLs; assess patient's baseline for ADL function and identify physical deficits which impact ability to perform ADLs (bathing, care of mouth/teeth, toileting, grooming, dressing, etc )  - Assess/evaluate cause of self-care deficits   - Assess range of motion  - Assess patient's mobility; develop plan if impaired  - Assess patient's need for assistive devices and provide as appropriate  - Encourage maximum independence but intervene and supervise when necessary  - Involve family in performance of ADLs  - Assess for home care needs following discharge   - Consider OT consult to assist with ADL evaluation and planning for discharge  - Provide patient education as appropriate  Outcome: Progressing  Goal: Maintains/Returns to pre admission functional level  Description: INTERVENTIONS:  - Perform BMAT or MOVE assessment daily    - Set and communicate daily mobility goal to care team and patient/family/caregiver     - Collaborate with rehabilitation services on mobility goals if consulted  - Out of bed for toileting  - Record patient progress and toleration of activity level   Outcome: Progressing     Problem: Potential for Falls  Goal: Patient will remain free of falls  Description: INTERVENTIONS:  - Educate patient/family on patient safety including physical limitations  - Instruct patient to call for assistance with activity   - Consult OT/PT to assist with strengthening/mobility   - Keep Call bell within reach  - Keep bed low and locked with side rails adjusted as appropriate  - Keep care items and personal belongings within reach  - Initiate and maintain comfort rounds  - Make Fall Risk Sign visible to staff  - Apply yellow socks and bracelet for high fall risk patients  - Consider moving patient to room near nurses station  Outcome: Progressing     Problem: Prexisting or High Potential for Compromised Skin Integrity  Goal: Skin integrity is maintained or improved  Description: INTERVENTIONS:  - Identify patients at risk for skin breakdown  - Assess and monitor skin integrity  - Assess and monitor nutrition and hydration status  - Monitor labs   - Assess for incontinence   - Turn and reposition patient  - Assist with mobility/ambulation  - Relieve pressure over bony prominences  - Avoid friction and shearing  - Provide appropriate hygiene as needed including keeping skin clean and dry  - Evaluate need for skin moisturizer/barrier cream  - Collaborate with interdisciplinary team   - Patient/family teaching  - Consider wound care consult   Outcome: Progressing

## 2022-02-15 NOTE — ASSESSMENT & PLAN NOTE
· Continue gabapentin 400mg t i d, robaxin 500mg Q6H PRN, and oxycodone 5mg Q6H PRN  · Psychiatric evaluation requested by [facility] [place] operated by [facility] for PASSR  · PT/OT recommending post acute rehab  · Case management following to aide in discharge planning  · Patient remains medically stable for discharge

## 2022-02-15 NOTE — ASSESSMENT & PLAN NOTE
· He believes this may be related to "the fungus trying to hold on"  · UA grossly negative for acute infection  · Testicular ultrasound (2/14/22): No evidence of testicular torsion  Minimally complex hydroceles moderate on the right and small on the left

## 2022-02-15 NOTE — PROGRESS NOTES
51 [facility]  Progress Note Joseph Palmer 1965, 60 y o  male MRN: [id number]  Unit/Bed#: 7T [facility] 711-01 Encounter: [id number]  Primary Care Provider: Glenna Read MD   Date and time admitted to hospital: 12/29/2021  5:38 PM    * Ambulatory dysfunction  Assessment & Plan  · Continue gabapentin 400mg t i d, robaxin 500mg Q6H PRN, and oxycodone 5mg Q6H PRN  · Psychiatric evaluation requested by [facility] [place], operated by [facility] for PASSR  · PT/OT recommending post acute rehab  · Case management following to aide in discharge planning  · Patient remains medically stable for discharge     [address]  · Patient was to be discharged to the [facility]"" rescue mission however they will not take him back as he does not have any photo ID  · PT recommending post acute rehab placement    Pain in both testicles  Assessment & Plan  · He believes this may be related to "the fungus trying to hold on"  · UA grossly negative for acute infection  · Testicular ultrasound (2/14/22): No evidence of testicular torsion  Minimally complex hydroceles moderate on the right and small on the left  Spinal stenosis  Assessment & Plan  · MRI  [facility]): Severe spinal stenosis L3-L4, and L5-S1 with more moderate stenosis L2-  · Continue pain medications as above and PT    Strange and inexplicable behavior  Assessment & Plan  · Patient with hyper fixation on fungal infection arms (which was treated)  · Sometimes with strange tangential thoughts  · Psychiatry consulted, not appropriate for inpatient psychiatry unit at this time  · Continue Abilify 5 mg p o  daily and Atarax 25 mg p o  Q 6 hours p r n  For anxiety  · Psychiatric re-evaluation requested as noted above      Onychomycosis  Assessment & Plan  · Present on almost all toes and fingers  · Podiatry performed nail debridement on 01/27  · Continue Terbinafine 250 mg PO daily for 12 weeks (Ordered through 4/21)      HTN, goal below 140/90  Assessment & Plan  · Continue amlodipine 10 mg daily and increase lisinopril to 20 mg daily  VTE Pharmacologic Prophylaxis: VTE Score: 1 Low Risk (Score 0-2) - Encourage Ambulation  Lovenox     Patient Centered Rounds: I performed bedside rounds with nursing staff today  Discussions with Specialists or Other Care Team Provider: PT, Nursing, and Case managment    Education and Discussions with Family / Patient: Patient declined call to   Time Spent for Care: 30 minutes  More than 50% of total time spent on counseling and coordination of care as described above  Current Length of Stay: 47 day(s)  Current Patient Status: Inpatient   Certification Statement: The patient will continue to require additional inpatient hospital stay due to awaiting placement  Discharge Plan: To be determined based on accepting facility in bed availability  Patient has been medically cleared for discharge for several weeks  Code Status: Level 1 - Full Code    Subjective:   Patient resting comfortably in bed without any acute complaints  No overnight events reported by nursing  Objective:     Vitals:   Temp (24hrs), Av 6 °F (36 4 °C), Min:97 4 °F (36 3 °C), Max:98 1 °F (36 7 °C)    Temp:  [97 4 °F (36 3 °C)-98 1 °F (36 7 °C)] 97 4 °F (36 3 °C)  HR:  [60-83] 60  Resp:  [16-18] 17  BP: (150-156)/(74-81) 156/81  SpO2:  [96 %-97 %] 96 %  Body mass index is 23 82 kg/m²  Input and Output Summary (last 24 hours): Intake/Output Summary (Last 24 hours) at 2/15/2022 1005  Last data filed at 2/15/2022 9641  Gross per 24 hour   Intake 1440 ml   Output 1200 ml   Net 240 ml       Physical Exam:   Physical Exam  Vitals and nursing note reviewed  Constitutional:       General: He is not in acute distress  Appearance: Normal appearance  HENT:      Head: Normocephalic and atraumatic  Mouth/Throat:      Mouth: Mucous membranes are moist       Pharynx: Oropharynx is clear     Eyes: Extraocular Movements: Extraocular movements intact  Conjunctiva/sclera: Conjunctivae normal    Cardiovascular:      Rate and Rhythm: Normal rate and regular rhythm  Pulses: Normal pulses  Heart sounds: Normal heart sounds  Pulmonary:      Effort: Pulmonary effort is normal  No respiratory distress  Breath sounds: Normal breath sounds  No wheezing  Abdominal:      General: Bowel sounds are normal  There is no distension  Palpations: Abdomen is soft  Tenderness: There is no abdominal tenderness  Musculoskeletal:         General: Normal range of motion  Cervical back: Normal range of motion and neck supple  Skin:     General: Skin is warm and dry  Neurological:      General: No focal deficit present  Mental Status: He is alert  Mental status is at baseline     Psychiatric:         Mood and Affect: Mood normal          Behavior: Behavior normal         Labs:  Results from last 7 days   Lab Units 02/11/22  0507   WBC Thousand/uL 6 80   HEMOGLOBIN g/dL 12 1*   HEMATOCRIT % 36 6*   PLATELETS Thousands/uL 309     Results from last 7 days   Lab Units 02/13/22  0502   SODIUM mmol/L 138   POTASSIUM mmol/L 3 8   CHLORIDE mmol/L 101   CO2 mmol/L 32*   BUN mg/dL 14   CREATININE mg/dL 0 69*   ANION GAP mmol/L 5   CALCIUM mg/dL 8 4   ALBUMIN g/dL 3 7   TOTAL BILIRUBIN mg/dL 0 36   ALK PHOS U/L 85   ALT U/L 10   AST U/L 19   GLUCOSE RANDOM mg/dL 118*                       Lines/Drains:  Invasive Devices  Report    None               Imaging: Reviewed radiology reports from this admission including: ultrasound(s)    Recent Cultures (last 7 days):         Last 24 Hours Medication List:   Current Facility-Administered Medications   Medication Dose Route Frequency Provider Last Rate    acetaminophen  650 mg Oral Q4H PRN Emily Austin PA-C      amLODIPine  10 mg Oral Daily Jorge Bhat DO      ARIPiprazole  5 mg Oral Daily DOV Noel      carbamide peroxide  5 drop Both Ears BID PRN Cristo Cuba MD      diphenhydrAMINE  25 mg Oral Q6H PRN Jeff Moore,       enoxaparin  40 mg Subcutaneous Q24H [facility] Malone, DO      gabapentin  400 mg Oral TID [facility], DO      hydrocortisone   Topical 4x Daily PRN Cristo Cuba MD      hydrOXYzine HCL  25 mg Oral Q6H PRN DOV Rodriguez      lisinopril  20 mg Oral Daily [facility] [place]      methocarbamol  500 mg Oral Q6H PRN Lawyer Luis MD      nicotine  1 patch Transdermal Daily [place], [place]      nystatin   Topical BID Lawyer Luis MD      ondansetron  4 mg Oral Q6H PRN Jeff Moore,       oxyCODONE  5 mg Oral Q6H PRN [facility], DO      polyethylene glycol  17 g Oral Daily Jeff Moore,       senna-docusate sodium  1 tablet Oral BID Jeff Moore, DO      terbinafine  250 mg Oral Daily Jeff Moore, DO      triamcinolone   Topical BID Lawyer Luis MD      white petrolatum-mineral oil   Topical TID PRN John Perez DO          Today, Patient Was Seen By: Shannan Crooks DO    **Please Note: This note may have been constructed using a voice recognition system  **

## 2022-02-15 NOTE — ASSESSMENT & PLAN NOTE
· Patient was to be discharged to the [facility] rescue mission however they will not take him back as he does not have any photo ID  · PT recommending post acute rehab placement

## 2022-02-15 NOTE — ASSESSMENT & PLAN NOTE
· MRI  [facility]): Severe spinal stenosis L3-L4, and L5-S1 with more moderate stenosis L2-  · Continue pain medications as above and PT

## 2022-02-15 NOTE — ASSESSMENT & PLAN NOTE
· Patient with hyper fixation on fungal infection arms (which was treated)  · Sometimes with strange tangential thoughts  · Psychiatry consulted, not appropriate for inpatient psychiatry unit at this time  · Continue Abilify 5 mg p o  daily and Atarax 25 mg p o  Q 6 hours p r n   For anxiety  · Psychiatric re-evaluation requested as noted above

## 2022-02-16 PROCEDURE — 99222 1ST HOSP IP/OBS MODERATE 55: CPT | Performed by: STUDENT IN AN ORGANIZED HEALTH CARE EDUCATION/TRAINING PROGRAM

## 2022-02-16 PROCEDURE — 99231 SBSQ HOSP IP/OBS SF/LOW 25: CPT | Performed by: INTERNAL MEDICINE

## 2022-02-16 RX ADMIN — OXYCODONE HYDROCHLORIDE 5 MG: 5 TABLET ORAL at 00:11

## 2022-02-16 RX ADMIN — TRIAMCINOLONE ACETONIDE: 1 CREAM TOPICAL at 11:17

## 2022-02-16 RX ADMIN — NYSTATIN: 100000 POWDER TOPICAL at 18:16

## 2022-02-16 RX ADMIN — NYSTATIN: 100000 POWDER TOPICAL at 11:17

## 2022-02-16 RX ADMIN — SENNOSIDES AND DOCUSATE SODIUM 1 TABLET: 50; 8.6 TABLET ORAL at 17:55

## 2022-02-16 RX ADMIN — METHOCARBAMOL 500 MG: 500 TABLET, FILM COATED ORAL at 06:11

## 2022-02-16 RX ADMIN — METHOCARBAMOL 500 MG: 500 TABLET, FILM COATED ORAL at 20:14

## 2022-02-16 RX ADMIN — OXYCODONE HYDROCHLORIDE 5 MG: 5 TABLET ORAL at 13:52

## 2022-02-16 RX ADMIN — TERBINAFINE HYDROCHLORIDE 250 MG: 250 TABLET ORAL at 11:16

## 2022-02-16 RX ADMIN — AMLODIPINE BESYLATE 10 MG: 10 TABLET ORAL at 11:17

## 2022-02-16 RX ADMIN — OXYCODONE HYDROCHLORIDE 5 MG: 5 TABLET ORAL at 20:12

## 2022-02-16 RX ADMIN — OXYCODONE HYDROCHLORIDE 5 MG: 5 TABLET ORAL at 06:52

## 2022-02-16 RX ADMIN — SENNOSIDES AND DOCUSATE SODIUM 1 TABLET: 50; 8.6 TABLET ORAL at 11:16

## 2022-02-16 RX ADMIN — HYDROXYZINE HYDROCHLORIDE 25 MG: 25 TABLET ORAL at 11:21

## 2022-02-16 RX ADMIN — TRIAMCINOLONE ACETONIDE: 1 CREAM TOPICAL at 18:16

## 2022-02-16 RX ADMIN — ARIPIPRAZOLE 5 MG: 5 TABLET ORAL at 11:16

## 2022-02-16 RX ADMIN — LISINOPRIL 20 MG: 20 TABLET ORAL at 11:16

## 2022-02-16 RX ADMIN — METHOCARBAMOL 500 MG: 500 TABLET, FILM COATED ORAL at 13:52

## 2022-02-16 RX ADMIN — GABAPENTIN 400 MG: 400 CAPSULE ORAL at 17:56

## 2022-02-16 RX ADMIN — GABAPENTIN 400 MG: 400 CAPSULE ORAL at 11:16

## 2022-02-16 RX ADMIN — GABAPENTIN 400 MG: 400 CAPSULE ORAL at 20:12

## 2022-02-16 RX ADMIN — ACETAMINOPHEN 650 MG: 325 TABLET ORAL at 17:58

## 2022-02-16 NOTE — PROGRESS NOTES
[facility]  Progress Note Codie Israel 1965, 60 y o  male MRN: [id number]  Unit/Bed#: 7T [facility] 711-01 Encounter: [id number]  Primary Care Provider: Odette Marquez MD   Date and time admitted to hospital: 12/29/2021  5:38 PM    * Ambulatory dysfunction  Assessment & Plan  · Continue gabapentin 400mg t i d, robaxin 500mg Q6H PRN, and oxycodone 5mg Q6H PRN  · Psychiatric evaluation requested by [facility] Department of Aging for PASSR  · PT/OT recommending post acute rehab  · Case management following to aide in discharge planning  · Patient remains medically stable for discharge     [facility]  · Patient [facility] to be [facility] however they will not take him back as he does not have any photo ID  · PT recommending post acute rehab placement    Pain in both testicles  Assessment & Plan  · He believes this may be related to "the fungus trying to hold on"  · UA grossly negative for acute infection  · Testicular ultrasound (2/14/22): No evidence of testicular torsion  Minimally complex hydroceles moderate on the right and small on the left  Spinal stenosis  Assessment & Plan  · MRI  [facility]): Severe spinal stenosis L3-L4, and L5-S1 with more moderate stenosis L2-  · Continue pain medications as above and PT    Strange and inexplicable behavior  Assessment & Plan  · Patient with hyper fixation on fungal infection arms (which was treated)  · Sometimes with strange tangential thoughts  · Psychiatry consulted, not appropriate for inpatient psychiatry unit at this time  · Continue Abilify 5 mg p o  daily and Atarax 25 mg p o  Q 6 hours p r n  For anxiety  · Psychiatric re-evaluation requested as noted above      Onychomycosis  Assessment & Plan  · Present on almost all toes and fingers  · Podiatry performed nail debridement on 01/27  · Continue Terbinafine 250 mg PO daily for 12 weeks (Ordered through 4/21)      HTN, goal below 140/90  Assessment & Plan  · Continue amlodipine 10 mg daily and lisinopril 20 mg daily  VTE Pharmacologic Prophylaxis: VTE Score: 1 Moderate Risk (Score 3-4) - Pharmacological DVT Prophylaxis Ordered: enoxaparin (Lovenox)  Patient Centered Rounds: I performed bedside rounds with nursing staff today  Discussions with Specialists or Other Care Team Provider: Case management, PT, and Nursing    Education and Discussions with Family / Patient: Patient declined call to   Time Spent for Care: 30 minutes  More than 50% of total time spent on counseling and coordination of care as described above  Current Length of Stay: 48 day(s)  Current Patient Status: Inpatient   Certification Statement: The patient will continue to require additional inpatient hospital stay due to awaiting placement  Discharge Plan: TBD  Patient has been medically cleared for discharge for several weeks  He is homeless and PT is recommending STR  Additionally, the patient has no photo ID  Code Status: Level 1 - Full Code    Subjective:   Patient resting comfortably in bed without any acute complaints  No over night events reported  Objective:     Vitals:   Temp (24hrs), Av 6 °F (36 4 °C), Min:97 4 °F (36 3 °C), Max:97 8 °F (36 6 °C)    Temp:  [97 4 °F (36 3 °C)-97 8 °F (36 6 °C)] 97 8 °F (36 6 °C)  HR:  [61-78] 64  Resp:  [18] 18  BP: (144-158)/(82-89) 152/86  SpO2:  [97 %] 97 %  Body mass index is 23 82 kg/m²  Input and Output Summary (last 24 hours): Intake/Output Summary (Last 24 hours) at 2022 6101  Last data filed at 2/15/2022 1700  Gross per 24 hour   Intake 720 ml   Output 500 ml   Net 220 ml       Physical Exam:   Physical Exam  Constitutional:       General: He is not in acute distress  Appearance: He is normal weight  HENT:      Head: Normocephalic and atraumatic        Mouth/Throat:      Mouth: Mucous membranes are moist    Eyes:      Extraocular Movements: Extraocular movements intact  Conjunctiva/sclera: Conjunctivae normal    Cardiovascular:      Rate and Rhythm: Normal rate and regular rhythm  Pulses: Normal pulses  Heart sounds: Normal heart sounds  Pulmonary:      Effort: Pulmonary effort is normal  No respiratory distress  Breath sounds: Normal breath sounds  No wheezing  Abdominal:      General: Bowel sounds are normal  There is no distension  Palpations: Abdomen is soft  Tenderness: There is no abdominal tenderness  Musculoskeletal:         General: Normal range of motion  Cervical back: Normal range of motion and neck supple  Skin:     General: Skin is warm and dry  Neurological:      General: No focal deficit present  Mental Status: He is alert  Mental status is at baseline  Psychiatric:         Mood and Affect: Mood normal          Behavior: Behavior normal        Labs:  Results from last 7 days   Lab Units 02/11/22  0507   WBC Thousand/uL 6 80   HEMOGLOBIN g/dL 12 1*   HEMATOCRIT % 36 6*   PLATELETS Thousands/uL 309     Results from last 7 days   Lab Units 02/13/22  0502   SODIUM mmol/L 138   POTASSIUM mmol/L 3 8   CHLORIDE mmol/L 101   CO2 mmol/L 32*   BUN mg/dL 14   CREATININE mg/dL 0 69*   ANION GAP mmol/L 5   CALCIUM mg/dL 8 4   ALBUMIN g/dL 3 7   TOTAL BILIRUBIN mg/dL 0 36   ALK PHOS U/L 85   ALT U/L 10   AST U/L 19   GLUCOSE RANDOM mg/dL 118*                       Lines/Drains:  Invasive Devices  Report    None               Imaging: No new imaging      Recent Cultures (last 7 days):         Last 24 Hours Medication List:   Current Facility-Administered Medications   Medication Dose Route Frequency Provider Last Rate    acetaminophen  650 mg Oral Q4H PRN Millie David PA-C      amLODIPine  10 mg Oral Daily Moon Aschoff, DO      ARIPiprazole  5 mg Oral Daily DOV Land      carbamide peroxide  5 drop Both Ears BID PRN Parth Esteves MD      diphenhydrAMINE  25 mg Oral Q6H PRN Moon Aschoff, DO  enoxaparin  40 mg Subcutaneous Q24H [address] [facility] Esperanza Malone, DO      gabapentin  400 mg Oral TID [facility] Malone, DO      hydrocortisone   Topical 4x Daily PRN Racheal Hernandez MD      hydrOXYzine HCL  25 mg Oral Q6H PRN DOV Noel      lisinopril  20 mg Oral Daily [facility] [place]      methocarbamol  500 mg Oral Q6H PRN Kathryn Johnson MD      nicotine  1 patch Transdermal Daily [place], [place]      nystatin   Topical BID Kathryn Johnson MD      ondansetron  4 mg Oral Q6H PRN Jorge Bhat DO      oxyCODONE  5 mg Oral Q6H PRN [facility] Malone, DO      polyethylene glycol  17 g Oral Daily Jorge Bhat DO      senna-docusate sodium  1 tablet Oral BID Jorge Bhat, DO      terbinafine  250 mg Oral Daily Jorge Bhat DO      triamcinolone   Topical BID Kathryn Johnson MD      white petrolatum-mineral oil   Topical TID PRN Mamie Cedillo DO          Today, Patient Was Seen By: Kamila Mosher DO    **Please Note: This note may have been constructed using a voice recognition system  **

## 2022-02-16 NOTE — ASSESSMENT & PLAN NOTE
· MRI  [facility]): Severe spinal stenosis L3-L4, and L5-S1 with more moderate stenosis L2-  · Continue pain medications as above and PT

## 2022-02-16 NOTE — CASE MANAGEMENT
Case Management Discharge Planning Note    Patient name Vijaya Hadley  Location 7T /7T -82 MRN [id number]  : 1965 Date 2022       Current Admission Date: 2021  Current Admission Diagnosis:Ambulatory dysfunction   Patient Active Problem List    Diagnosis Date Noted    Pain in both testicles 2022    Spinal stenosis 2022    Constipation 2022    Strange and inexplicable behavior     Onychomycosis 2022    HTN, goal below 140/90 2022    Psoriasis, unspecified 2022    Ambulatory dysfunction 2022    Homeless 2021      LOS (days): 48  Geometric Mean LOS (GMLOS) (days): 3 20  Days to GMLOS:-44 8     OBJECTIVE:  Risk of Unplanned Readmission Score: 18         Current admission status: Inpatient   Preferred Pharmacy:     [address] [facility]  [address]  [address]  Phone: [phone] Fax: [phone] [address] [facility], [address]  [place] [address]  Phone: [phone] Fax: [phone]    Primary Care Provider: Steph Barone MD    Primary Insurance: [facility] REP  Secondary Insurance:     DISCHARGE DETAILS: CM was notified that [facility] inpatient Rehab ph: [phone] might have opening for a male bed rehab  CM faxed pt's Clinicals fax : [phone], pending bed availability and acceptance    CM department will continue to follow through pt's D/C

## 2022-02-16 NOTE — PLAN OF CARE
Problem: PAIN - ADULT  Goal: Verbalizes/displays adequate comfort level or baseline comfort level  Description: Interventions:  - Encourage patient to monitor pain and request assistance  - Assess pain using appropriate pain scale  - Administer analgesics based on type and severity of pain and evaluate response  - Implement non-pharmacological measures as appropriate and evaluate response  - Consider cultural and social influences on pain and pain management  - Notify physician/advanced practitioner if interventions unsuccessful or patient reports new pain  Outcome: Progressing     Problem: INFECTION - ADULT  Goal: Absence or prevention of progression during hospitalization  Description: INTERVENTIONS:  - Assess and monitor for signs and symptoms of infection  - Monitor lab/diagnostic results  - Monitor all insertion sites, i e  indwelling lines, tubes, and drains  - Monitor endotracheal if appropriate and nasal secretions for changes in amount and color  - [place] appropriate cooling/warming therapies per order  - Administer medications as ordered  - Instruct and encourage patient and family to use good hand hygiene technique  - Identify and instruct in appropriate isolation precautions for identified infection/condition  Outcome: Progressing     Problem: SAFETY ADULT  Goal: Patient will remain free of falls  Description: INTERVENTIONS:  - Educate patient/family on patient safety including physical limitations  - Instruct patient to call for assistance with activity   - Consult OT/PT to assist with strengthening/mobility   - Keep Call bell within reach  - Keep bed low and locked with side rails adjusted as appropriate  - Keep care items and personal belongings within reach  - Initiate and maintain comfort rounds  - Make Fall Risk Sign visible to staff  - Offer Toileting   - Obtain necessary fall risk management equipment:   - Apply yellow socks and bracelet for high fall risk patients  - Consider moving patient to room near nurses station  Outcome: Progressing  Goal: Maintain or return to baseline ADL function  Description: INTERVENTIONS:  -  Assess patient's ability to carry out ADLs; assess patient's baseline for ADL function and identify physical deficits which impact ability to perform ADLs (bathing, care of mouth/teeth, toileting, grooming, dressing, etc )  - Assess/evaluate cause of self-care deficits   - Assess range of motion  - Assess patient's mobility; develop plan if impaired  - Assess patient's need for assistive devices and provide as appropriate  - Encourage maximum independence but intervene and supervise when necessary  - Involve family in performance of ADLs  - Assess for home care needs following discharge   - Consider OT consult to assist with ADL evaluation and planning for discharge  - Provide patient education as appropriate  Outcome: Progressing  Goal: Maintains/Returns to pre admission functional level  Description: INTERVENTIONS:  - Perform BMAT or MOVE assessment daily    - Set and communicate daily mobility goal to care team and patient/family/caregiver  - Collaborate with rehabilitation services on mobility goals if consulted  - Perform Range of Motion 2 times a day  - Reposition patient every 2 hours    - Dangle patient 2 times a day  - Stand patient 2 times a day  - Ambulate patient 2 times a day  - Out of bed to chair 2 times a day   - Out of bed for meals 2 times a day  - Out of bed for toileting  - Record patient progress and toleration of activity level   Outcome: Progressing     Problem: DISCHARGE PLANNING  Goal: Discharge to home or other facility with appropriate resources  Description: INTERVENTIONS:  - Identify barriers to discharge w/patient and caregiver  - Arrange for needed discharge resources and transportation as appropriate  - Identify discharge learning needs (meds, wound care, etc )  - Arrange for interpretive services to assist at discharge as needed  - Refer to Case Management Department for coordinating discharge planning if the patient needs post-hospital services based on physician/advanced practitioner order or complex needs related to functional status, cognitive ability, or social support system  Outcome: Progressing     Problem: Knowledge Deficit  Goal: Patient/family/caregiver demonstrates understanding of disease process, treatment plan, medications, and discharge instructions  Description: Complete learning assessment and assess knowledge base  Interventions:  - Provide teaching at level of understanding  - Provide teaching via preferred learning methods  Outcome: Progressing     Problem: MOBILITY - ADULT  Goal: Maintain or return to baseline ADL function  Description: INTERVENTIONS:  -  Assess patient's ability to carry out ADLs; assess patient's baseline for ADL function and identify physical deficits which impact ability to perform ADLs (bathing, care of mouth/teeth, toileting, grooming, dressing, etc )  - Assess/evaluate cause of self-care deficits   - Assess range of motion  - Assess patient's mobility; develop plan if impaired  - Assess patient's need for assistive devices and provide as appropriate  - Encourage maximum independence but intervene and supervise when necessary  - Involve family in performance of ADLs  - Assess for home care needs following discharge   - Consider OT consult to assist with ADL evaluation and planning for discharge  - Provide patient education as appropriate  Outcome: Progressing  Goal: Maintains/Returns to pre admission functional level  Description: INTERVENTIONS:  - Perform BMAT or MOVE assessment daily    - Set and communicate daily mobility goal to care team and patient/family/caregiver  - Collaborate with rehabilitation services on mobility goals if consulted  - Perform Range of Motion 2 times a day  - Reposition patient every 2 hours    - Dangle patient 2 times a day  - Stand patient 2 times a day  - Ambulate patient 2 times a day  - Out of bed to chair 2 times a day   - Out of bed for meals 2 times a day  - Out of bed for toileting  - Record patient progress and toleration of activity level   Outcome: Progressing     Problem: Potential for Falls  Goal: Patient will remain free of falls  Description: INTERVENTIONS:  - Educate patient/family on patient safety including physical limitations  - Instruct patient to call for assistance with activity   - Consult OT/PT to assist with strengthening/mobility   - Keep Call bell within reach  - Keep bed low and locked with side rails adjusted as appropriate  - Keep care items and personal belongings within reach  - Initiate and maintain comfort rounds  - Make Fall Risk Sign visible to staff  - Offer Toileting   - Obtain necessary fall risk management equipment:  - Apply yellow socks and bracelet for high fall risk patients  - Consider moving patient to room near nurses station  Outcome: Progressing     Problem: Prexisting or High Potential for Compromised Skin Integrity  Goal: Skin integrity is maintained or improved  Description: INTERVENTIONS:  - Identify patients at risk for skin breakdown  - Assess and monitor skin integrity  - Assess and monitor nutrition and hydration status  - Monitor labs   - Assess for incontinence   - Turn and reposition patient  - Assist with mobility/ambulation  - Relieve pressure over bony prominences  - Avoid friction and shearing  - Provide appropriate hygiene as needed including keeping skin clean and dry  - Evaluate need for skin moisturizer/barrier cream  - Collaborate with interdisciplinary team   - Patient/family teaching  - Consider wound care consult   Outcome: Progressing

## 2022-02-16 NOTE — ASSESSMENT & PLAN NOTE
· Continue gabapentin 400mg t i d, robaxin 500mg Q6H PRN, and oxycodone 5mg Q6H PRN  · Psychiatric evaluation requested by [facility] Department of Aging for PASSR  · PT/OT recommending post acute rehab  · Case management following to aide in discharge planning  · Patient remains medically stable for discharge

## 2022-02-16 NOTE — CONSULTS
Progress Note - Behavioral Health   Eveline Garcia 60 y o  male MRN: [id number]  Unit/Bed#: 7T [facility] 711-01 Encounter: [id number]      Subjective  Neri Still is a 60 y o  male with a history of dementia who was admitted to medical service due to right arm cellulitis  Psychiatry consulted for unusual behaviors and fixation on fungal infection despite treatment  Patient was last seen by psychiatry service on 1/27, please see consult note dated 1/27 for full details, and at that time was started on Abilify to assist with mood and has been without any acute behavioral issues  He is calm, pleasant, and cooperative with interview today  He reports today that he is feeling somewhat depressed and attributes this to being in the hospital for a prolonged period of time and frustration over his legs, still fairly fixated on fungal infection  However, he has been tolerating medications well and he denies any SI, HI, or AVH  He additionally reports that he would let staff know if he were having any suicidal thoughts  He does ask about being started on Adderall, though was appropriate when it was explained that this would not be started on an inpatient basis  He currently does not feel that he would benefit from inpatient psychiatric treatment and expresses desire and optimism for physical rehab  Behavior over the last 24 hours: unchanged  Sleep: normal  Appetite: adequate  Medication side effects: none verbalized  ROS: Complete review of systems is negative except as noted above      Mental Status Exam:  Appearance:  alert, fair eye contact, appears stated age and appropriate grooming and hygiene   Behavior:  calm and cooperative   Motor: no abnormal movements and Unable to assess   Speech:  spontaneous, normal rate, normal volume and coherent   Mood:  mildly depressed   Affect:  mood-congruent   Thought Process:  Organized, logical, goal-directed   Thought Content: somatic preoccupation   Perceptual disturbances: no reported hallucinations and does not appear to be responding to internal stimuli at this time   Risk Potential: No active or passive suicidal or homicidal ideation was verbalized during interview   Cognition: oriented to self and situation, appears to be of average intelligence and cognition not formally tested   Insight:  Improving   Judgment: Fair     Risks, benefits and possible side effects of Medications:   Risks, benefits, and possible side effects of medications have been explained to the patient, who verbalizes understanding  Labs: I have personally reviewed all pertinent laboratory results  Assessment/Plan  Denny  is a 60 y o  male who presented to the hospital with right arm cellulitis  Psychiatry consulted due to concerns with odd behaviors and was initially seen 1/27  He was started on Abilify and is currently without any acute behavioral issues  His mood is somewhat depressed though attributed to inpatient stay without any further progress with rehab  He is somewhat preoccupied with concern for fungal infection in legs still and is also preoccupied on being started on Adderall but is taking medications as appropriate and responds well to redirection regarding medications  At this time, he does not appear to be a danger to self or others as he denies any SI, HI, or AVH and is in good behavioral control  He does not feel that he would benefit from inpatient psychiatric admission and there are no grounds for commitment as he has not been demonstrating any acutely dangerous behaviors as above  He remains hopeful for rehab placement  Diagnosis: Unspecified neurocognitive disorder, unspecified mood disorder    Recommended Treatment:    No indication for inpatient psychiatry at this time   Patient does not demonstrate behaviors concerning for imminent danger to self or others while admitted  He is able to contract for safety  He does not meet criteria for involuntary commitment     Recommend proceeding with referral for physical rehab   Increase Abilify to 10 mg daily for mood   Can continue Hydroxyzine 25 mg Q6 PRN for anxiety     Discussed with primary team    Gomez Davila MD

## 2022-02-16 NOTE — ASSESSMENT & PLAN NOTE
· Patient was to be discharged to the [facility] rescue mission however they will not take him back as he does not have any photo ID  · PT recommending post acute rehab placement

## 2022-02-17 PROCEDURE — 99231 SBSQ HOSP IP/OBS SF/LOW 25: CPT | Performed by: INTERNAL MEDICINE

## 2022-02-17 RX ORDER — ARIPIPRAZOLE 10 MG/1
10 TABLET ORAL DAILY
Status: DISCONTINUED | OUTPATIENT
Start: 2022-02-18 | End: 2022-03-11 | Stop reason: HOSPADM

## 2022-02-17 RX ADMIN — METHOCARBAMOL 500 MG: 500 TABLET, FILM COATED ORAL at 02:25

## 2022-02-17 RX ADMIN — OXYCODONE HYDROCHLORIDE 5 MG: 5 TABLET ORAL at 08:31

## 2022-02-17 RX ADMIN — SENNOSIDES AND DOCUSATE SODIUM 1 TABLET: 50; 8.6 TABLET ORAL at 08:32

## 2022-02-17 RX ADMIN — TRIAMCINOLONE ACETONIDE: 1 CREAM TOPICAL at 19:28

## 2022-02-17 RX ADMIN — METHOCARBAMOL 500 MG: 500 TABLET, FILM COATED ORAL at 21:24

## 2022-02-17 RX ADMIN — SENNOSIDES AND DOCUSATE SODIUM 1 TABLET: 50; 8.6 TABLET ORAL at 19:28

## 2022-02-17 RX ADMIN — OXYCODONE HYDROCHLORIDE 5 MG: 5 TABLET ORAL at 15:02

## 2022-02-17 RX ADMIN — OXYCODONE HYDROCHLORIDE 5 MG: 5 TABLET ORAL at 21:24

## 2022-02-17 RX ADMIN — NYSTATIN: 100000 POWDER TOPICAL at 19:28

## 2022-02-17 RX ADMIN — AMLODIPINE BESYLATE 10 MG: 10 TABLET ORAL at 08:31

## 2022-02-17 RX ADMIN — GABAPENTIN 400 MG: 400 CAPSULE ORAL at 21:24

## 2022-02-17 RX ADMIN — METHOCARBAMOL 500 MG: 500 TABLET, FILM COATED ORAL at 08:31

## 2022-02-17 RX ADMIN — TRIAMCINOLONE ACETONIDE: 1 CREAM TOPICAL at 08:33

## 2022-02-17 RX ADMIN — GABAPENTIN 400 MG: 400 CAPSULE ORAL at 08:31

## 2022-02-17 RX ADMIN — OXYCODONE HYDROCHLORIDE 5 MG: 5 TABLET ORAL at 02:25

## 2022-02-17 RX ADMIN — ARIPIPRAZOLE 5 MG: 5 TABLET ORAL at 08:31

## 2022-02-17 RX ADMIN — NYSTATIN: 100000 POWDER TOPICAL at 09:02

## 2022-02-17 RX ADMIN — METHOCARBAMOL 500 MG: 500 TABLET, FILM COATED ORAL at 15:02

## 2022-02-17 RX ADMIN — TERBINAFINE HYDROCHLORIDE 250 MG: 250 TABLET ORAL at 08:33

## 2022-02-17 RX ADMIN — GABAPENTIN 400 MG: 400 CAPSULE ORAL at 15:02

## 2022-02-17 RX ADMIN — LISINOPRIL 20 MG: 20 TABLET ORAL at 08:31

## 2022-02-17 NOTE — ASSESSMENT & PLAN NOTE
· Patient with hyper fixation on fungal infection arms (which was treated)  · Psychiatry consulted, not appropriate for inpatient psychiatry unit at this time  · Increase Abilify to 10 mg daily per psych recommendations  · Continue Atarax 25 mg p o  Q 6 hours p r n   For anxiety

## 2022-02-17 NOTE — CASE MANAGEMENT
DISCHARGE DETAILS:  CM spoke with Latrice Liaison of  [facility] inpatient Rehab ph: [phone] option 2  Latrice requested for recent updates notes for today  CM faxed clinicals fax : [phone]; pt is under review and they will notify CM tomorrow about acceptance  CM received Determination of [facility] faxed  CM Fax attached determination on the [facility]      CM department will continue to follow through pt's D/C

## 2022-02-17 NOTE — ASSESSMENT & PLAN NOTE
· Present on almost all toes and fingers  · Podiatry performed nail debridement on 01/27  · Continue Terbinafine 250 mg PO daily for 12 weeks (Ordered through 4/21)  · Check CMP weekly (Next check 2/20/22)

## 2022-02-17 NOTE — ASSESSMENT & PLAN NOTE
· Patient was to be discharged to the [facility] rescue mission however they will not take him back as he does not have any photo ID  · PT recommending post acute rehab placement

## 2022-02-17 NOTE — ASSESSMENT & PLAN NOTE
· Continue gabapentin 400mg t i d, robaxin 500mg Q6H PRN, and oxycodone 5mg Q6H PRN  · Psychiatric evaluation requested by [facility] Department of Aging for PASSR  · PT/OT recommending post acute rehab  · Case management following to aide in discharge planning  · Patient remains medically stable for discharge

## 2022-02-17 NOTE — PROGRESS NOTES
51 [facility]  Progress Note Juan Pablo Zepeda 1965, 60 y o  male MRN: [id number]  Unit/Bed#: 7T [facility] 711-01 Encounter: [id number]  Primary Care Provider: Angela Choudhary MD   Date and time admitted to hospital: 12/29/2021  5:38 PM    * Ambulatory dysfunction  Assessment & Plan  · Continue gabapentin 400mg t i d, robaxin 500mg Q6H PRN, and oxycodone 5mg Q6H PRN  · Psychiatric evaluation requested by [facility] for PASSR  · PT/OT recommending post acute rehab  · Case management following to aide in discharge planning  · Patient remains medically stable for discharge     Homeless  Assessment & Plan  · Patient was to be discharged to the [facility] rescue mission however they will not take him back as he does not have any photo ID  · PT recommending post acute rehab placement    Spinal stenosis  Assessment & Plan  · MRI  [facility]): Severe spinal stenosis L3-L4, and L5-S1 with more moderate stenosis L2-  · Continue pain medications as above and PT    Strange and inexplicable behavior  Assessment & Plan  · Patient with hyper fixation on fungal infection arms (which was treated)  · Psychiatry consulted, not appropriate for inpatient psychiatry unit at this time  · Increase Abilify to 10 mg daily per psych recommendations  · Continue Atarax 25 mg p o  Q 6 hours p r n  For anxiety      Onychomycosis  Assessment & Plan  · Present on almost all toes and fingers  · Podiatry performed nail debridement on 01/27  · Continue Terbinafine 250 mg PO daily for 12 weeks (Ordered through 4/21)  · Check CMP weekly (Next check 2/20/22)    HTN, goal below 140/90  Assessment & Plan  · Continue amlodipine 10 mg daily and lisinopril 20 mg daily  VTE Pharmacologic Prophylaxis: VTE Score: 1 Low Risk (Score 0-2) - Encourage Ambulation  Lovenox    Patient Centered Rounds: I performed bedside rounds with nursing staff today    Discussions with Specialists or Other Care Team Provider: PT, CM, and Nursing    Education and Discussions with Family / Patient: Patient declined call to   Time Spent for Care: 30 minutes  More than 50% of total time spent on counseling and coordination of care as described above  Current Length of Stay: 49 day(s)  Current Patient Status: Inpatient   Certification Statement: The patient will continue to require additional inpatient hospital stay due to awaiting placement  Discharge Plan: Patient is medically cleared for discharge once placement is obtained  Code Status: Level 1 - Full Code    Subjective:   Patient resting comfortably in bed without any acute complaints  No overnight events reported by nursing  Objective:     Vitals:   Temp (24hrs), Av 9 °F (36 1 °C), Min:96 4 °F (35 8 °C), Max:97 3 °F (36 3 °C)    Temp:  [96 4 °F (35 8 °C)-97 3 °F (36 3 °C)] 97 °F (36 1 °C)  HR:  [55-61] 60  Resp:  [18-20] 18  BP: (120-145)/(70-82) 145/82  SpO2:  [97 %-98 %] 98 %  Body mass index is 23 82 kg/m²  Input and Output Summary (last 24 hours): Intake/Output Summary (Last 24 hours) at 2022 7863  Last data filed at 2022 2139  Gross per 24 hour   Intake 1000 ml   Output 1000 ml   Net 0 ml       Physical Exam:   Physical Exam  Constitutional:       General: He is not in acute distress  Appearance: He is normal weight  HENT:      Head: Normocephalic and atraumatic  Mouth/Throat:      Mouth: Mucous membranes are moist       Pharynx: Oropharynx is clear  Eyes:      Extraocular Movements: Extraocular movements intact  Conjunctiva/sclera: Conjunctivae normal    Cardiovascular:      Rate and Rhythm: Normal rate and regular rhythm  Pulses: Normal pulses  Heart sounds: Normal heart sounds  Pulmonary:      Effort: Pulmonary effort is normal       Breath sounds: Normal breath sounds  No wheezing  Abdominal:      General: Bowel sounds are normal       Palpations: Abdomen is soft  Tenderness:  There is no abdominal tenderness  Musculoskeletal:         General: Normal range of motion  Cervical back: Normal range of motion and neck supple  Skin:     General: Skin is warm and dry  Neurological:      General: No focal deficit present  Mental Status: He is alert  Mental status is at baseline     Psychiatric:         Mood and Affect: Mood normal          Behavior: Behavior normal          Labs:  Results from last 7 days   Lab Units 02/11/22  0507   WBC Thousand/uL 6 80   HEMOGLOBIN g/dL 12 1*   HEMATOCRIT % 36 6*   PLATELETS Thousands/uL 309     Results from last 7 days   Lab Units 02/13/22  0502   SODIUM mmol/L 138   POTASSIUM mmol/L 3 8   CHLORIDE mmol/L 101   CO2 mmol/L 32*   BUN mg/dL 14   CREATININE mg/dL 0 69*   ANION GAP mmol/L 5   CALCIUM mg/dL 8 4   ALBUMIN g/dL 3 7   TOTAL BILIRUBIN mg/dL 0 36   ALK PHOS U/L 85   ALT U/L 10   AST U/L 19   GLUCOSE RANDOM mg/dL 118*                       Recent Cultures (last 7 days):         Last 24 Hours Medication List:   Current Facility-Administered Medications   Medication Dose Route Frequency Provider Last Rate    acetaminophen  650 mg Oral Q4H PRN Dorita Stout PA-C      amLODIPine  10 mg Oral Daily Jeff Moore DO      [START ON 2/18/2022] ARIPiprazole  10 mg Oral Daily Josie Mata Malone, DO      carbamide peroxide  5 drop Both Ears BID PRN Cristo Cuba MD      diphenhydrAMINE  25 mg Oral Q6H PRN Jeff Moore DO      enoxaparin  40 mg Subcutaneous Q24H [address] Josie Mata Malone, DO      gabapentin  400 mg Oral TID [facility]      hydrocortisone   Topical 4x Daily PRN Cristo Cuba MD      hydrOXYzine HCL  25 mg Oral Q6H PRN DOV Rodriguez      lisinopril  20 mg Oral Daily Josie Mata Malone, DO      methocarbamol  500 mg Oral Q6H PRN Lawyer Luis MD      nicotine  1 patch Transdermal Daily [place], [place]      nystatin   Topical BID Lawyer Luis MD      ondansetron  4 mg Oral Q6H PRN Jeff Moore DO     [facility] oxyCODONE  5 mg Oral Q6H PRN [facility] Malone, DO      polyethylene glycol  17 g Oral Daily Helen Palma, DO      senna-docusate sodium  1 tablet Oral BID Helen Palma,       terbinafine  250 mg Oral Daily Helen Palma,       triamcinolone   Topical BID Sanjiv Harris MD      white petrolatum-mineral oil   Topical TID PRN Giselle Spotted, DO          Today, Patient Was Seen By: Perez Schaefer DO    **Please Note: This note may have been constructed using a voice recognition system  **

## 2022-02-17 NOTE — ASSESSMENT & PLAN NOTE
· MRI  [facility]): Severe spinal stenosis L3-L4, and L5-S1 with more moderate stenosis L2-  · Continue pain medications as above and PT

## 2022-02-17 NOTE — ASSESSMENT & PLAN NOTE
Call regarding: left message to have patient call back to schedule pre op for her surgery on 3/22/19 with Dr Juliano Finney to leave detailed voice mail?  No    Pharmacy information verified:  No      · UA grossly negative for acute infection  · Testicular ultrasound (2/14/22): No evidence of testicular torsion  Minimally complex hydroceles moderate on the right and small on the left

## 2022-02-18 PROCEDURE — 99231 SBSQ HOSP IP/OBS SF/LOW 25: CPT | Performed by: INTERNAL MEDICINE

## 2022-02-18 PROCEDURE — 97530 THERAPEUTIC ACTIVITIES: CPT

## 2022-02-18 PROCEDURE — 97535 SELF CARE MNGMENT TRAINING: CPT

## 2022-02-18 PROCEDURE — 97168 OT RE-EVAL EST PLAN CARE: CPT

## 2022-02-18 RX ADMIN — ENOXAPARIN SODIUM 40 MG: 100 INJECTION SUBCUTANEOUS at 08:16

## 2022-02-18 RX ADMIN — ARIPIPRAZOLE 10 MG: 10 TABLET ORAL at 08:16

## 2022-02-18 RX ADMIN — HYDROXYZINE HYDROCHLORIDE 25 MG: 25 TABLET ORAL at 08:19

## 2022-02-18 RX ADMIN — OXYCODONE HYDROCHLORIDE 5 MG: 5 TABLET ORAL at 05:32

## 2022-02-18 RX ADMIN — SENNOSIDES AND DOCUSATE SODIUM 1 TABLET: 50; 8.6 TABLET ORAL at 17:00

## 2022-02-18 RX ADMIN — TERBINAFINE HYDROCHLORIDE 250 MG: 250 TABLET ORAL at 08:18

## 2022-02-18 RX ADMIN — NYSTATIN: 100000 POWDER TOPICAL at 17:00

## 2022-02-18 RX ADMIN — GABAPENTIN 400 MG: 400 CAPSULE ORAL at 21:38

## 2022-02-18 RX ADMIN — TRIAMCINOLONE ACETONIDE: 1 CREAM TOPICAL at 17:00

## 2022-02-18 RX ADMIN — TRIAMCINOLONE ACETONIDE: 1 CREAM TOPICAL at 08:17

## 2022-02-18 RX ADMIN — AMLODIPINE BESYLATE 10 MG: 10 TABLET ORAL at 08:16

## 2022-02-18 RX ADMIN — POLYETHYLENE GLYCOL 3350 17 G: 17 POWDER, FOR SOLUTION ORAL at 08:19

## 2022-02-18 RX ADMIN — LISINOPRIL 20 MG: 20 TABLET ORAL at 08:17

## 2022-02-18 RX ADMIN — SENNOSIDES AND DOCUSATE SODIUM 1 TABLET: 50; 8.6 TABLET ORAL at 08:18

## 2022-02-18 RX ADMIN — NICOTINE 1 PATCH: 21 PATCH, EXTENDED RELEASE TRANSDERMAL at 08:16

## 2022-02-18 RX ADMIN — OXYCODONE HYDROCHLORIDE 5 MG: 5 TABLET ORAL at 11:08

## 2022-02-18 RX ADMIN — HYDROCORTISONE: 25 OINTMENT TOPICAL at 08:17

## 2022-02-18 RX ADMIN — METHOCARBAMOL 500 MG: 500 TABLET, FILM COATED ORAL at 05:32

## 2022-02-18 RX ADMIN — NYSTATIN: 100000 POWDER TOPICAL at 08:16

## 2022-02-18 RX ADMIN — GABAPENTIN 400 MG: 400 CAPSULE ORAL at 15:00

## 2022-02-18 RX ADMIN — METHOCARBAMOL 500 MG: 500 TABLET, FILM COATED ORAL at 15:01

## 2022-02-18 RX ADMIN — GABAPENTIN 400 MG: 400 CAPSULE ORAL at 08:17

## 2022-02-18 RX ADMIN — OXYCODONE HYDROCHLORIDE 5 MG: 5 TABLET ORAL at 23:26

## 2022-02-18 NOTE — PHYSICAL THERAPY NOTE
Physical TherapyTreatment Note    Patient's Name: Carolyn Guzman    Admitting Diagnosis  Cellulitis [L03 90]  Homeless [Z59 00]  Wound check, abscess [Z51 89]    Problem List  Patient Active Problem List   Diagnosis    Homeless    Psoriasis, unspecified    Ambulatory dysfunction    HTN, goal below 140/90    Strange and inexplicable behavior    Onychomycosis    Constipation    Spinal stenosis       Past Medical History  Past Medical History:   Diagnosis Date    Arthritis     Hypertension     Sciatica        Past Surgical History  Past Surgical History:   Procedure Laterality Date    BACK SURGERY         Recent Imaging  US scrotum and testicles   Final Result by Terrell Ignacio MD (02/14 1158)       1  No evidence of testicular torsion  2   Minimally complex hydroceles moderate on the right and small on the left  Workstation performed: [id number]         CT abdomen pelvis w contrast   Final Result by Ayla Menendez MD (01/28 8671)      Stool filled colon with possible impaction  Diverticulosis with no diverticulitis  Workstation performed: [id number]             Recent Vital Signs  Vitals:    02/17/22 0801 02/17/22 1502 02/17/22 2330 02/18/22 0703   BP: 145/82 155/87 170/94 161/82   BP Location: Left arm Right arm Right arm Right arm   Pulse: 60 66 90 70   Resp: 18 18 18 19   Temp: (!) 97 °F (36 1 °C) 97 5 °F (36 4 °C) (!) 97 1 °F (36 2 °C) (!) 97 4 °F (36 3 °C)   TempSrc: Temporal Temporal Temporal Temporal   SpO2: 98% 97% 96% 98%   Weight:       Height:           PT Treatment Time: 30 minutes       02/18/22 0850   PT Last Visit   PT Visit Date 02/18/22   Note Type   Note Type Treatment   Pain Assessment   Pain Assessment Tool 0-10   Pain Score 7   Pain Location/Orientation Orientation: Lower; Location: Back   Restrictions/Precautions   Weight Bearing Precautions Per Order No   Other Precautions Fall Risk;Pain   General   Chart Reviewed Yes   Response to Previous Treatment Patient with no complaints from previous session  Family/Caregiver Present No   Cognition   Overall Cognitive Status Impaired   Arousal/Participation Alert; Cooperative   Attention Attends with cues to redirect   Orientation Level Oriented X4   Memory Within functional limits   Following Commands Follows all commands and directions without difficulty   Subjective   Subjective Doing well with w/c transfers in room ind, continues to be motivated to attempt to improve ambulation status   Bed Mobility   Rolling R 6  Modified independent   Additional items Increased time required   Rolling L 6  Modified independent   Additional items Increased time required   Supine to Sit 6  Modified independent   Additional items Increased time required   Sit to Supine 6  Modified independent   Additional items Increased time required   Transfers   Sit to Stand 4  Minimal assistance   Additional items Assist x 1; Armrests; Increased time required;Verbal cues   Stand to Sit 4  Minimal assistance   Additional items Assist x 1; Armrests; Increased time required;Verbal cues   Sit pivot 6  Modified independent   Additional items Armrests; Increased time required   Additional Comments pt able to complete sit pivot transfers to and from bed and w/c mod I, RN reports he has also been able to transfer to toilet mod I with sit pivot transfers   Ambulation/Elevation   Gait pattern Step through pattern;Decreased toe off;Decreased heel strike; Excessively slow; Ataxia; Short stride; Foward flexed;Decreased foot clearance; Wide HARJIT; Improper Weight shift   Gait Assistance 4  Minimal assist   Additional items Assist x 1;Verbal cues; Tactile cues   Assistive Device Rolling walker   Distance 10ft, 5ft   Wheelchair Activities   Wheelchair Cushion None   Pressure Relief Type Push up;Lateral lean;Self adjusts   Level of Assistance for Pressure Relief Activities Independent   Wheelchair Parts Management Yes   Left Brakes Level of Assistance Independent   Right Brakes Level of Assistance Independent   Propulsion Yes   Propulsion Type 1 Manual   Level 1 Level tile   Method 1 Right upper extremity; Left upper extremity;Right lower extremity; Left lower extremity   Level of Assistance 1 Independent   Description/ Details 1 propells 200ft on unit in hallway, able to do longer distances as needed   Balance   Static Sitting Fair +   Dynamic Sitting Fair +   Static Standing Fair -   Dynamic Standing Poor +   Ambulatory Poor +   Endurance Deficit   Endurance Deficit Yes   Endurance Deficit Description LE weakness, pain and fatigue   Activity Tolerance   Activity Tolerance Patient limited by pain   Medical Staff Made Aware spoke to [address] [place] [address] spoke to RN   Assessment   Prognosis Fair   Problem List Decreased strength;Decreased range of motion;Decreased endurance; Impaired balance;Decreased mobility; Decreased coordination; Impaired sensation;Pain   Assessment Claude Ponce has progressed to mod I with sit pivot transfers to and from w/c able to complete with no assistance  RN reporting that he is also able to transfer to and from toilet with sit pivot ind  He is able to propel w/c with combination of LE and UEs ind, also manages brakes ind  He continues to require assistance when attempting to come to full stand due to lower back pain and LE weakness, also requires assistance with attempts at ambulation  At this time pt would be appropriate for D/C to w/c accessable living environment with home PT services, if no w/c accessable environment available pt will continue to require post acute rehab services  Barriers to Discharge Inaccessible home environment;Decreased caregiver support   Goals   Patient Goals get to rehab and eventually walk again   STG Expiration Date 02/22/22   Short Term Goal #1 see eval note   PT Treatment Day 12   Plan   Treatment/Interventions ADL retraining;Functional transfer training;Elevations;LE strengthening/ROM; Therapeutic exercise; Endurance training;Patient/family training;Equipment eval/education; Bed mobility;Gait training;Spoke to nursing;Spoke to case management;OT;Compensatory technique education   Progress Progressing toward goals   PT Frequency 3-5x/wk   Recommendation   PT Discharge Recommendation   (w/c accessable environment with home PT vs Post acute rehab)   Equipment Recommended Wheelchair   Wheelchair Package Recommended Standard   Change or Add item to Wheelchair Package? Yes, Change Item   What would you like to CHANGE? Longer Armrest Length (Full Length)   AM-PAC Basic Mobility Inpatient   Turning in Bed Without Bedrails 4   Lying on Back to Sitting on Edge of Flat Bed 4   Moving Bed to Chair 4   Standing Up From Chair 2   Walk in Room 2   Climb 3-5 Stairs 1   Basic Mobility Inpatient Raw Score 17   Basic Mobility Standardized Score 39 67   Highest Level Of Mobility   -HLM Goal 5: Stand one or more mins   -HLM Highest Level of Mobility 4: Move to chair/commode   JH-HLM Goal Achieved No   Education   Education Provided Mobility training;Home exercise program;Assistive device   Patient Explanation/teachback used;Demonstrates verbal understanding   End of Consult   Patient Position at End of Consult Bedside chair; All needs within reach       Donavon Hill PT, DPT

## 2022-02-18 NOTE — OCCUPATIONAL THERAPY NOTE
Occupational Therapy Re- Evaluation & Treatment     Patient Name: Sacha Baker  Today's Date: 2/18/2022  Problem List  Principal Problem:    Ambulatory dysfunction  Active Problems:    Homeless    HTN, goal below 140/90    Strange and inexplicable behavior    Onychomycosis    Spinal stenosis    Past Medical History  Past Medical History:   Diagnosis Date    Arthritis     Hypertension     Sciatica      Past Surgical History  Past Surgical History:   Procedure Laterality Date    BACK SURGERY               02/18/22 1144   OT Last Visit   OT Visit Date 02/18/22   Note Type   Note type Re-Evaluation   Restrictions/Precautions   Weight Bearing Precautions Per Order No   Pain Assessment   Pain Assessment Tool 0-10   Pain Score No Pain   Home Living   Type of [address]   Prior Function   Level of [place] Independent with ADLs and functional mobility; Needs assistance with IADLs   ADL   Eating Assistance 6  Modified independent   Grooming Assistance 6  Modified Independent   UB Bathing Assistance 4  Minimal Assistance   LB Bathing Assistance 4  Minimal Assistance   UB Dressing Assistance 4  Minimal Assistance   LB Dressing Assistance 4  Minimal Assistance   LB Dressing Deficit Don/doff R shoe;Don/doff L shoe   Toileting Assistance  5  Supervision/Setup   Toileting Deficit Clothing management up;Clothing management down;Perineal hygiene   Transfers   Sit to Stand 4  Minimal assistance   Additional items Assist x 1; Increased time required   Stand to Sit 4  Minimal assistance   Additional items Assist x 1; Increased time required   Sit pivot 5  Supervision   Toilet transfer 5  Supervision   Additional items   (via sit-pivot )   Functional Mobility   Functional Mobility   (CGA)   Additional items Rolling walker   Balance   Static Sitting Fair +   Dynamic Sitting Fair   Static Standing Fair -   Dynamic Standing Fair -   Ambulatory Fair -   Activity Tolerance   Activity Tolerance Patient tolerated treatment well   Assessment   Limitation Decreased ADL status; Decreased high-level ADLs; Decreased Safe judgement during ADL   Prognosis Fair   Assessment/  Treatment 45' Pt seen for OT re-eval and txt  Pt pleasant, with noted improved cognition and insight  Pt required supervision for sit-->pivot transfers from wheelchair to EOB and to toilet  Pt continues to require Humza for sit-->stand transfers  Pt able to manage donning socks while seated with increased time and grooming tasks from [facility] level with set-up  Pt requires Humza for higher level ADLs including bathing/showering  Pt educated re: hand exercises, remains limited by decreased with bilateral hand ROM and decreased dexterity and [address]  Pt reports completing BUE there-ex while in room  Pt would benefit from continued OT services while in the hospital to further address deficits and maximize functional independence  Goals   STG Time Frame   (2 weeks )   Short Term Goals 1  Pt will complete UB/LB bathing Adolfo  2  Pt will complete toilet hygiene Adolfo  3  Pt will complete sit-->stand transfers Adolfo  Plan   Treatment Interventions ADL retraining;Functional transfer training;UE strengthening/ROM; Endurance training;Continued evaluation; Energy conservation; Activityengagement   Goal Expiration Date 03/04/22   OT Treatment Day 4   OT Frequency 2-3x/wk   Additional Treatment Session   Treatment Assessment see note    Recommendation   OT Discharge Recommendation Post acute rehabilitation services  (vs home with home health)

## 2022-02-18 NOTE — ASSESSMENT & PLAN NOTE
· MRI  [facility]): Severe spinal stenosis L3-L4, and L5-S1 with more moderate stenosis L2-  · Continue pain medications as above and PT

## 2022-02-18 NOTE — ASSESSMENT & PLAN NOTE
· Continue gabapentin 400mg t i d, robaxin 500mg Q6H PRN, and oxycodone 5mg Q6H PRN  · Psychiatric evaluation requested by [facility] for PASSR  · PT/OT recommending post acute rehab  · Case management following to aide in discharge planning  · Patient remains medically stable for discharge

## 2022-02-18 NOTE — PROGRESS NOTES
[facility]  Progress Note Joseph Palmer 1965, 60 y o  male MRN: [id number]  Unit/Bed#: 7T [facility] 711-01 Encounter: [id number]  Primary Care Provider: Glenna Read MD   Date and time admitted to hospital: 12/29/2021  5:38 PM    * Ambulatory dysfunction  Assessment & Plan  · Continue gabapentin 400mg t i d, robaxin 500mg Q6H PRN, and oxycodone 5mg Q6H PRN  · Psychiatric evaluation requested by [facility] Department of Aging for PASSR  · PT/OT recommending post acute rehab  · Case management following to aide in discharge planning  · Patient remains medically stable for discharge     Homeless  Assessment & Plan  · Patient was to be discharged to the [facility] rescue mission however they will not take him back as he does not have any photo ID  · PT recommending post acute rehab placement    Spinal stenosis  Assessment & Plan  · MRI  [facility]): Severe spinal stenosis L3-L4, and L5-S1 with more moderate stenosis L2-  · Continue pain medications as above and PT    Strange and inexplicable behavior  Assessment & Plan  · Patient with hyper fixation on fungal infection arms (which was treated)  · Psychiatry consulted, not appropriate for inpatient psychiatry unit at this time  · Continue Abilify 10 mg daily   · Continue Atarax 25 mg p o  Q 6 hours p r n  For anxiety      Onychomycosis  Assessment & Plan  · Present on almost all toes and fingers  · Podiatry performed nail debridement on 01/27  · Continue Terbinafine 250 mg PO daily for 12 weeks (Ordered through 4/21)  · Check CMP weekly (Next check 2/20/22)    HTN, goal below 140/90  Assessment & Plan  · Continue amlodipine 10 mg daily and lisinopril 20 mg daily  VTE Pharmacologic Prophylaxis: VTE Score: 1 Low Risk (Score 0-2) - Encourage Ambulation  Lovenox    Patient Centered Rounds: I performed bedside rounds with nursing staff today    Discussions with Specialists or Other Care Team Provider: PT, CM, and Nursing    Education and Discussions with Family / Patient: Patient declined call to   Time Spent for Care: 20 minutes  More than 50% of total time spent on counseling and coordination of care as described above  Current Length of Stay: 50 day(s)  Current Patient Status: Inpatient   Certification Statement: The patient will continue to require additional inpatient hospital stay due to awaiting placement  Discharge Plan: To be determined based on accepting facility and bed availability  Code Status: Level 1 - Full Code    Subjective:   Patient is resting comfortably in bed without any acute complaints  No overnight events reported  Objective:     Vitals:   Temp (24hrs), Av 3 °F (36 3 °C), Min:97 1 °F (36 2 °C), Max:97 5 °F (36 4 °C)    Temp:  [97 1 °F (36 2 °C)-97 5 °F (36 4 °C)] 97 4 °F (36 3 °C)  HR:  [66-90] 70  Resp:  [18-19] 19  BP: (155-170)/(82-94) 161/82  SpO2:  [96 %-98 %] 98 %  Body mass index is 23 82 kg/m²  Input and Output Summary (last 24 hours): Intake/Output Summary (Last 24 hours) at 2022 0840  Last data filed at 2022 0703  Gross per 24 hour   Intake 960 ml   Output 1300 ml   Net -340 ml       Physical Exam:   Physical Exam  Vitals and nursing note reviewed  Constitutional:       General: He is not in acute distress  Appearance: Normal appearance  He is normal weight  HENT:      Head: Normocephalic and atraumatic  Mouth/Throat:      Mouth: Mucous membranes are moist       Pharynx: Oropharynx is clear  Eyes:      Extraocular Movements: Extraocular movements intact  Conjunctiva/sclera: Conjunctivae normal    Cardiovascular:      Rate and Rhythm: Normal rate and regular rhythm  Pulses: Normal pulses  Heart sounds: Normal heart sounds  Pulmonary:      Effort: Pulmonary effort is normal  No respiratory distress  Abdominal:      General: Bowel sounds are normal  There is no distension  Palpations: Abdomen is soft  Tenderness:  There is no abdominal tenderness  Musculoskeletal:         General: Normal range of motion  Cervical back: Normal range of motion and neck supple  Skin:     General: Skin is warm and dry  Neurological:      General: No focal deficit present  Mental Status: He is alert  Mental status is at baseline     Psychiatric:         Mood and Affect: Mood normal          Labs:      Results from last 7 days   Lab Units 02/13/22  0502   SODIUM mmol/L 138   POTASSIUM mmol/L 3 8   CHLORIDE mmol/L 101   CO2 mmol/L 32*   BUN mg/dL 14   CREATININE mg/dL 0 69*   ANION GAP mmol/L 5   CALCIUM mg/dL 8 4   ALBUMIN g/dL 3 7   TOTAL BILIRUBIN mg/dL 0 36   ALK PHOS U/L 85   ALT U/L 10   AST U/L 19   GLUCOSE RANDOM mg/dL 118*                       Recent Cultures (last 7 days):         Last 24 Hours Medication List:   Current Facility-Administered Medications   Medication Dose Route Frequency Provider Last Rate    acetaminophen  650 mg Oral Q4H PRN River Cohen PA-C      amLODIPine  10 mg Oral Daily Maybelle Rod, DO      ARIPiprazole  10 mg Oral Daily [facility], DO      carbamide peroxide  5 drop Both Ears BID PRN Danyel Larson MD      diphenhydrAMINE  25 mg Oral Q6H PRN Rudolph Velasco, DO      enoxaparin  40 mg Subcutaneous Q24H [address] [facility], DO      gabapentin  400 mg Oral TID [facility], DO      hydrocortisone   Topical 4x Daily PRN Danyel Larson MD      hydrOXYzine HCL  25 mg Oral Q6H PRN DOV Byrd      lisinopril  20 mg Oral Daily [facility], DO      methocarbamol  500 mg Oral Q6H PRN Donald Stauffer MD      nicotine  1 patch Transdermal Daily [place], [place]      nystatin   Topical BID Donald Stauffer MD      ondansetron  4 mg Oral Q6H PRN Rudolph Rod, DO      oxyCODONE  5 mg Oral Q6H PRN [facility], DO      polyethylene glycol  17 g Oral Daily Maybeclaudia Rod, DO      senna-docusate sodium  1 tablet Oral BID Maybeclaudia Rod, DO      terbinafine  250 mg Oral Daily Lucy Meyers DO      triamcinolone   Topical BID Jefferson Light MD      white petrolatum-mineral oil   Topical TID PRN Luciano Alpers, DO          Today, Patient Was Seen By: Kishor Godfrey DO    **Please Note: This note may have been constructed using a voice recognition system  **

## 2022-02-18 NOTE — PLAN OF CARE
Problem: OCCUPATIONAL THERAPY ADULT  Goal: Performs self-care activities at highest level of function for planned discharge setting  See evaluation for individualized goals  Description: Treatment Interventions: ADL retraining,Functional transfer training,UE strengthening/ROM,Endurance training,Cognitive reorientation,Patient/family training,Equipment evaluation/education,Compensatory technique education,Fine motor coordination activities,Continued evaluation,Energy conservation,Activityengagement          See flowsheet documentation for full assessment, interventions and recommendations  Outcome: Progressing  Note: Limitation: Decreased ADL status,Decreased high-level ADLs,Decreased Safe judgement during ADL  Prognosis: Fair  Assessment: Pt seen for OT re-eval and txt  Pt pleasant, with noted improved cognition and insight  Pt required supervision for sit-->pivot transfers from wheelchair to EOB and to toilet  Pt continues to require Humza for sit-->stand transfers  Pt able to manage donning socks while seated with increased time and grooming tasks from [facility] level with set-up  Pt requires Humza for higher level ADLs including bathing/showering  Pt educated re: hand exercises, remains limited by decreased with bilateral hand ROM and decreased dexterity and DELTA [facility]  Pt reports completing BUE there-ex while in room  Pt would benefit from continued OT services while in the hospital to further address deficits and maximize functional independence        OT Discharge Recommendation: Post acute rehabilitation services

## 2022-02-18 NOTE — ASSESSMENT & PLAN NOTE
· Patient was to be discharged to the [facility] rescue mission however they will not take him back as he does not have any photo ID  · PT recommending post acute rehab placement

## 2022-02-18 NOTE — PLAN OF CARE
Problem: PHYSICAL THERAPY ADULT  Goal: Performs mobility at highest level of function for planned discharge setting  See evaluation for individualized goals  Description: Treatment/Interventions: ADL retraining,Functional transfer training,Elevations,LE strengthening/ROM,Therapeutic exercise,Endurance training,Patient/family training,Equipment eval/education,Bed mobility,Gait training,Spoke to nursing,Spoke to case management,OT,Compensatory technique education  Equipment Recommended: Wheelchair       See flowsheet documentation for full assessment, interventions and recommendations  Outcome: Progressing  Note: Prognosis: Fair  Problem List: Decreased strength,Decreased range of motion,Decreased endurance,Impaired balance,Decreased mobility,Decreased coordination,Impaired sensation,Pain  Assessment: Thiago Salazar has progressed to mod I with sit pivot transfers to and from w/c able to complete with no assistance  RN reporting that he is also able to transfer to and from toilet with sit pivot ind  He is able to propel w/c with combination of LE and UEs ind, also manages brakes ind  He continues to require assistance when attempting to come to full stand due to lower back pain and LE weakness, also requires assistance with attempts at ambulation  At this time pt would be appropriate for D/C to w/c accessable living environment with home PT services, if no w/c accessable environment available pt will continue to require post acute rehab services  Barriers to Discharge: Inaccessible home environment,Decreased caregiver support        PT Discharge Recommendation:  (w/c accessable environment with home PT vs Post acute rehab)          See flowsheet documentation for full assessment

## 2022-02-18 NOTE — ASSESSMENT & PLAN NOTE
· Patient with hyper fixation on fungal infection arms (which was treated)  · Psychiatry consulted, not appropriate for inpatient psychiatry unit at this time  · Continue Abilify 10 mg daily   · Continue Atarax 25 mg p o  Q 6 hours p r n   For anxiety

## 2022-02-19 PROCEDURE — 99231 SBSQ HOSP IP/OBS SF/LOW 25: CPT | Performed by: INTERNAL MEDICINE

## 2022-02-19 RX ADMIN — TERBINAFINE HYDROCHLORIDE 250 MG: 250 TABLET ORAL at 09:06

## 2022-02-19 RX ADMIN — OXYCODONE HYDROCHLORIDE 5 MG: 5 TABLET ORAL at 16:21

## 2022-02-19 RX ADMIN — TRIAMCINOLONE ACETONIDE: 1 CREAM TOPICAL at 09:04

## 2022-02-19 RX ADMIN — GABAPENTIN 400 MG: 400 CAPSULE ORAL at 09:04

## 2022-02-19 RX ADMIN — SENNOSIDES AND DOCUSATE SODIUM 1 TABLET: 50; 8.6 TABLET ORAL at 09:04

## 2022-02-19 RX ADMIN — NYSTATIN: 100000 POWDER TOPICAL at 09:02

## 2022-02-19 RX ADMIN — GABAPENTIN 400 MG: 400 CAPSULE ORAL at 21:05

## 2022-02-19 RX ADMIN — AMLODIPINE BESYLATE 10 MG: 10 TABLET ORAL at 09:04

## 2022-02-19 RX ADMIN — TRIAMCINOLONE ACETONIDE: 1 CREAM TOPICAL at 17:07

## 2022-02-19 RX ADMIN — ARIPIPRAZOLE 10 MG: 10 TABLET ORAL at 09:03

## 2022-02-19 RX ADMIN — HYDROXYZINE HYDROCHLORIDE 25 MG: 25 TABLET ORAL at 09:04

## 2022-02-19 RX ADMIN — NYSTATIN: 100000 POWDER TOPICAL at 17:07

## 2022-02-19 RX ADMIN — ENOXAPARIN SODIUM 40 MG: 100 INJECTION SUBCUTANEOUS at 09:03

## 2022-02-19 RX ADMIN — OXYCODONE HYDROCHLORIDE 5 MG: 5 TABLET ORAL at 09:04

## 2022-02-19 RX ADMIN — HYDROXYZINE HYDROCHLORIDE 25 MG: 25 TABLET ORAL at 23:19

## 2022-02-19 RX ADMIN — OXYCODONE HYDROCHLORIDE 5 MG: 5 TABLET ORAL at 23:19

## 2022-02-19 RX ADMIN — SENNOSIDES AND DOCUSATE SODIUM 1 TABLET: 50; 8.6 TABLET ORAL at 17:07

## 2022-02-19 RX ADMIN — HYDROCORTISONE: 25 OINTMENT TOPICAL at 09:04

## 2022-02-19 RX ADMIN — GABAPENTIN 400 MG: 400 CAPSULE ORAL at 16:22

## 2022-02-19 RX ADMIN — LISINOPRIL 20 MG: 20 TABLET ORAL at 09:04

## 2022-02-19 RX ADMIN — POLYETHYLENE GLYCOL 3350 17 G: 17 POWDER, FOR SOLUTION ORAL at 09:03

## 2022-02-19 NOTE — POST FALL NOTE
Post Fall Note    Post Fall Interventions: Fall risk precautions implemented/maitained; Comforts rounds continued      Brief Description of fall note  Pt found on the floor sitting up next to his WC, reported "I just slid out of WC, I am OK"  Denies hitting his head or injury to any other part of his body  VSS  Pt assisted back to [facility] then to bed  Slim PA made aware  Last Recorded Vitals  Blood pressure 165/93, pulse 75, temperature 97 8 °F (36 6 °C), temperature source Temporal, resp  rate 19, height 5' 10" (1 778 m), weight 75 3 kg (166 lb 0 1 oz), SpO2 99 %        Principal Problem:    Ambulatory dysfunction  Active Problems:    Homeless    HTN, goal below 140/90    Strange and inexplicable behavior    Onychomycosis    Spinal stenosis

## 2022-02-19 NOTE — PLAN OF CARE
Problem: PAIN - ADULT  Goal: Verbalizes/displays adequate comfort level or baseline comfort level  Description: Interventions:  - Encourage patient to monitor pain and request assistance  - Assess pain using appropriate pain scale  - Administer analgesics based on type and severity of pain and evaluate response  - Implement non-pharmacological measures as appropriate and evaluate response  - Consider cultural and social influences on pain and pain management  - Notify physician/advanced practitioner if interventions unsuccessful or patient reports new pain  Outcome: Progressing     Problem: SAFETY ADULT  Goal: Patient will remain free of falls  Description: INTERVENTIONS:  - Educate patient/family on patient safety including physical limitations  - Instruct patient to call for assistance with activity   - Consult OT/PT to assist with strengthening/mobility   - Keep Call bell within reach  - Keep bed low and locked with side rails adjusted as appropriate  - Keep care items and personal belongings within reach  - Initiate and maintain comfort rounds  - Make Fall Risk Sign visible to staff  - Apply yellow socks and bracelet for high fall risk patients  - Consider moving patient to room near nurses station  Outcome: Progressing     Problem: SAFETY ADULT  Goal: Maintain or return to baseline ADL function  Description: INTERVENTIONS:  -  Assess patient's ability to carry out ADLs; assess patient's baseline for ADL function and identify physical deficits which impact ability to perform ADLs (bathing, care of mouth/teeth, toileting, grooming, dressing, etc )  - Assess/evaluate cause of self-care deficits   - Assess range of motion  - Assess patient's mobility; develop plan if impaired  - Assess patient's need for assistive devices and provide as appropriate  - Encourage maximum independence but intervene and supervise when necessary  - Involve family in performance of ADLs  - Assess for home care needs following discharge - Consider OT consult to assist with ADL evaluation and planning for discharge  - Provide patient education as appropriate  Outcome: Progressing     Problem: Knowledge Deficit  Goal: Patient/family/caregiver demonstrates understanding of disease process, treatment plan, medications, and discharge instructions  Description: Complete learning assessment and assess knowledge base    Interventions:  - Provide teaching at level of understanding  - Provide teaching via preferred learning methods  Outcome: Progressing     Problem: DISCHARGE PLANNING  Goal: Discharge to home or other facility with appropriate resources  Description: INTERVENTIONS:  - Identify barriers to discharge w/patient and caregiver  - Arrange for needed discharge resources and transportation as appropriate  - Identify discharge learning needs (meds, wound care, etc )  - Arrange for interpretive services to assist at discharge as needed  - Refer to Case Management Department for coordinating discharge planning if the patient needs post-hospital services based on physician/advanced practitioner order or complex needs related to functional status, cognitive ability, or social support system  Outcome: Progressing     Problem: Prexisting or High Potential for Compromised Skin Integrity  Goal: Skin integrity is maintained or improved  Description: INTERVENTIONS:  - Identify patients at risk for skin breakdown  - Assess and monitor skin integrity  - Assess and monitor nutrition and hydration status  - Monitor labs   - Assess for incontinence   - Turn and reposition patient  - Assist with mobility/ambulation  - Relieve pressure over bony prominences  - Avoid friction and shearing  - Provide appropriate hygiene as needed including keeping skin clean and dry  - Evaluate need for skin moisturizer/barrier cream  - Collaborate with interdisciplinary team   - Patient/family teaching  - Consider wound care consult   Outcome: Progressing

## 2022-02-19 NOTE — PROGRESS NOTES
51 [facility]  Progress Note Annbaelle Hogan 1965, 60 y o  male MRN: [id number]  Unit/Bed#: 7T [facility] 711-01 Encounter: [id number]  Primary Care Provider: Nathan Storey MD   Date and time admitted to hospital: 12/29/2021  5:38 PM    * Ambulatory dysfunction  Assessment & Plan  · Continue gabapentin 400mg t i d, robaxin 500mg Q6H PRN, and oxycodone 5mg Q6H PRN  · PT/OT recommending post acute rehab  · Case management following to aide in discharge planning  · Patient remains medically stable for discharge     [address]  · Patient was to be discharged to the \[facility]\"" rescue mission however they will not take him back as he does not have any photo ID  · PT recommending post acute rehab placement    Spinal stenosis  Assessment & Plan  · MRI  [facility]): Severe spinal stenosis L3-L4, and L5-S1 with more moderate stenosis L2-  · Continue pain medications as above and PT    Strange and inexplicable behavior  Assessment & Plan  · Patient with hyper fixation on fungal infection arms (which was treated)  · Psychiatry consulted, not appropriate for inpatient psychiatry unit at this time  · Continue Abilify 10 mg daily and Atarax 25 mg p o  Q 6 hours p r n  For anxiety      Onychomycosis  Assessment & Plan  · Present on almost all toes and fingers  · Podiatry performed nail debridement on 01/27  · Continue Terbinafine 250 mg PO daily for 12 weeks (Ordered through 4/21)  · Check CMP weekly (Next check 2/20/22)    HTN, goal below 140/90  Assessment & Plan  · Continue amlodipine 10 mg daily and lisinopril 20 mg daily  VTE Pharmacologic Prophylaxis: VTE Score: 1 Low Risk (Score 0-2) - Encourage Ambulation  Lovenox    Patient Centered Rounds: I performed bedside rounds with nursing staff today  Discussions with Specialists or Other Care Team Provider: PT and Nursing    Education and Discussions with Family / Patient: Patient declined call to        Time Spent for Care: 30 minutes  More than 50% of total time spent on counseling and coordination of care as described above  Current Length of Stay: 51 day(s)  Current Patient Status: Inpatient   Certification Statement: The patient will continue to require additional inpatient hospital stay due to awaiting placement  Discharge Plan: To be determined based on accepting facility and bed availability  Code Status: Level 1 - Full Code    Subjective:   Patient sitting up at the bedside without any acute  Overnight he reportedly was found on the floor next to his wheelchair  He notes he just slipped out  No further overnight events reported  Objective:     Vitals:   Temp (24hrs), Av 6 °F (36 4 °C), Min:97 1 °F (36 2 °C), Max:97 8 °F (36 6 °C)    Temp:  [97 1 °F (36 2 °C)-97 8 °F (36 6 °C)] 97 5 °F (36 4 °C)  HR:  [72-78] 74  Resp:  [19-20] 20  BP: (157-174)/(82-96) 174/95  SpO2:  [96 %-99 %] 96 %  Body mass index is 23 82 kg/m²  Input and Output Summary (last 24 hours): Intake/Output Summary (Last 24 hours) at 2022 0910  Last data filed at 2022 0546  Gross per 24 hour   Intake 480 ml   Output 1200 ml   Net -720 ml       Physical Exam:   Physical Exam  Vitals and nursing note reviewed  Constitutional:       General: He is not in acute distress  Appearance: Normal appearance  HENT:      Head: Normocephalic and atraumatic  Mouth/Throat:      Mouth: Mucous membranes are moist       Pharynx: Oropharynx is clear  Eyes:      Extraocular Movements: Extraocular movements intact  Conjunctiva/sclera: Conjunctivae normal    Cardiovascular:      Rate and Rhythm: Normal rate and regular rhythm  Pulses: Normal pulses  Heart sounds: Normal heart sounds  Pulmonary:      Effort: Pulmonary effort is normal  No respiratory distress  Breath sounds: Normal breath sounds  No wheezing  Abdominal:      General: Bowel sounds are normal  There is no distension        Palpations: Abdomen is soft       Tenderness: There is no abdominal tenderness  Musculoskeletal:      Cervical back: Normal range of motion  Skin:     General: Skin is warm and dry  Comments: Psoriasis bilateral upper and lower extremities   Neurological:      General: No focal deficit present  Mental Status: He is alert and oriented to person, place, and time     Psychiatric:         Mood and Affect: Mood normal          Behavior: Behavior normal          Judgment: Judgment normal           Labs:      Results from last 7 days   Lab Units 02/13/22  0502   SODIUM mmol/L 138   POTASSIUM mmol/L 3 8   CHLORIDE mmol/L 101   CO2 mmol/L 32*   BUN mg/dL 14   CREATININE mg/dL 0 69*   ANION GAP mmol/L 5   CALCIUM mg/dL 8 4   ALBUMIN g/dL 3 7   TOTAL BILIRUBIN mg/dL 0 36   ALK PHOS U/L 85   ALT U/L 10   AST U/L 19   GLUCOSE RANDOM mg/dL 118*                       Recent Cultures (last 7 days):         Last 24 Hours Medication List:   Current Facility-Administered Medications   Medication Dose Route Frequency Provider Last Rate    acetaminophen  650 mg Oral Q4H PRN Kory Peraza PA-C      amLODIPine  10 mg Oral Daily Tedra Spare, DO      ARIPiprazole  10 mg Oral Daily [facility], DO      carbamide peroxide  5 drop Both Ears BID PRN Juvenal Arroyo MD      diphenhydrAMINE  25 mg Oral Q6H PRN Tedra Spare, DO      enoxaparin  40 mg Subcutaneous Q24H [address] [facility], DO      gabapentin  400 mg Oral TID [facility], DO      hydrocortisone   Topical 4x Daily PRN [facility], DO      hydrOXYzine HCL  25 mg Oral Q6H PRN DOV Duarte      lisinopril  20 mg Oral Daily [facility], DO      methocarbamol  500 mg Oral Q6H PRN Chacho Bess MD      nicotine  1 patch Transdermal Daily [place], [place]      nystatin   Topical BID Chacho Bess MD      ondansetron  4 mg Oral Q6H PRN Tedra Spare, DO      oxyCODONE  5 mg Oral Q6H PRN [facility], DO      polyethylene glycol  17 g Oral Daily Miriam Edwards, DO      senna-docusate sodium  1 tablet Oral BID Miriam Edwards, DO      terbinafine  250 mg Oral Daily Miriam Edwards,       triamcinolone   Topical BID Hortencia Pitt MD      white petrolatum-mineral oil   Topical TID PRN Aleena De Souza DO          Today, Patient Was Seen By: Mikayla Abel DO    **Please Note: This note may have been constructed using a voice recognition system  **

## 2022-02-19 NOTE — ASSESSMENT & PLAN NOTE
· Continue gabapentin 400mg t i d, robaxin 500mg Q6H PRN, and oxycodone 5mg Q6H PRN  · PT/OT recommending post acute rehab  · Case management following to aide in discharge planning  · Patient remains medically stable for discharge

## 2022-02-19 NOTE — ASSESSMENT & PLAN NOTE
· MRI  [facility]): Severe spinal stenosis L3-L4, and L5-S1 with more moderate stenosis L2-  · Continue pain medications as above and PT

## 2022-02-19 NOTE — ASSESSMENT & PLAN NOTE
· Patient was to be discharged to the [facility] rescue mission however they will not take him back as he does not have any photo ID  · PT recommending post acute rehab placement

## 2022-02-19 NOTE — ASSESSMENT & PLAN NOTE
· Patient with hyper fixation on fungal infection arms (which was treated)  · Psychiatry consulted, not appropriate for inpatient psychiatry unit at this time  · Continue Abilify 10 mg daily and Atarax 25 mg p o  Q 6 hours p r n   For anxiety

## 2022-02-20 LAB
ALBUMIN SERPL BCP-MCNC: 4.1 G/DL (ref 3–5.2)
ALP SERPL-CCNC: 101 U/L (ref 43–122)
ALT SERPL W P-5'-P-CCNC: 11 U/L
ANION GAP SERPL CALCULATED.3IONS-SCNC: 6 MMOL/L (ref 5–14)
AST SERPL W P-5'-P-CCNC: 92 U/L (ref 17–59)
BILIRUB DIRECT SERPL-MCNC: 0.19 MG/DL
BILIRUB SERPL-MCNC: 0.39 MG/DL
BUN SERPL-MCNC: 23 MG/DL (ref 5–25)
CALCIUM SERPL-MCNC: 8.5 MG/DL (ref 8.4–10.2)
CHLORIDE SERPL-SCNC: 105 MMOL/L (ref 97–108)
CO2 SERPL-SCNC: 29 MMOL/L (ref 22–30)
CREAT SERPL-MCNC: 0.73 MG/DL (ref 0.7–1.5)
ERYTHROCYTE [DISTWIDTH] IN BLOOD BY AUTOMATED COUNT: 17 %
GFR SERPL CREATININE-BSD FRML MDRD: 102 ML/MIN/1.73SQ M
GLUCOSE SERPL-MCNC: 100 MG/DL (ref 70–99)
HCT VFR BLD AUTO: 36.3 % (ref 41–53)
HGB BLD-MCNC: 12.2 G/DL (ref 13.5–17.5)
MCH RBC QN AUTO: 29 PG (ref 26–34)
MCHC RBC AUTO-ENTMCNC: 33.5 G/DL (ref 31–36)
MCV RBC AUTO: 86 FL (ref 80–100)
PLATELET # BLD AUTO: 346 THOUSANDS/UL (ref 150–450)
PMV BLD AUTO: 6.6 FL (ref 8.9–12.7)
POTASSIUM SERPL-SCNC: 4 MMOL/L (ref 3.6–5)
PROT SERPL-MCNC: 7.6 G/DL (ref 5.9–8.4)
RBC # BLD AUTO: 4.2 MILLION/UL (ref 4.5–5.9)
SODIUM SERPL-SCNC: 140 MMOL/L (ref 137–147)
WBC # BLD AUTO: 6 THOUSAND/UL (ref 4.5–11)

## 2022-02-20 PROCEDURE — 80076 HEPATIC FUNCTION PANEL: CPT | Performed by: INTERNAL MEDICINE

## 2022-02-20 PROCEDURE — 99231 SBSQ HOSP IP/OBS SF/LOW 25: CPT | Performed by: INTERNAL MEDICINE

## 2022-02-20 PROCEDURE — 80048 BASIC METABOLIC PNL TOTAL CA: CPT | Performed by: INTERNAL MEDICINE

## 2022-02-20 PROCEDURE — 97110 THERAPEUTIC EXERCISES: CPT

## 2022-02-20 PROCEDURE — 85027 COMPLETE CBC AUTOMATED: CPT | Performed by: INTERNAL MEDICINE

## 2022-02-20 PROCEDURE — 97530 THERAPEUTIC ACTIVITIES: CPT

## 2022-02-20 RX ADMIN — NYSTATIN: 100000 POWDER TOPICAL at 08:08

## 2022-02-20 RX ADMIN — ARIPIPRAZOLE 10 MG: 10 TABLET ORAL at 08:09

## 2022-02-20 RX ADMIN — OXYCODONE HYDROCHLORIDE 5 MG: 5 TABLET ORAL at 06:34

## 2022-02-20 RX ADMIN — GABAPENTIN 400 MG: 400 CAPSULE ORAL at 08:09

## 2022-02-20 RX ADMIN — ENOXAPARIN SODIUM 40 MG: 100 INJECTION SUBCUTANEOUS at 08:09

## 2022-02-20 RX ADMIN — SENNOSIDES AND DOCUSATE SODIUM 1 TABLET: 50; 8.6 TABLET ORAL at 17:04

## 2022-02-20 RX ADMIN — TRIAMCINOLONE ACETONIDE: 1 CREAM TOPICAL at 08:08

## 2022-02-20 RX ADMIN — TRIAMCINOLONE ACETONIDE: 1 CREAM TOPICAL at 17:05

## 2022-02-20 RX ADMIN — SENNOSIDES AND DOCUSATE SODIUM 1 TABLET: 50; 8.6 TABLET ORAL at 08:09

## 2022-02-20 RX ADMIN — NYSTATIN: 100000 POWDER TOPICAL at 17:04

## 2022-02-20 RX ADMIN — TERBINAFINE HYDROCHLORIDE 250 MG: 250 TABLET ORAL at 08:08

## 2022-02-20 RX ADMIN — LISINOPRIL 20 MG: 20 TABLET ORAL at 08:08

## 2022-02-20 RX ADMIN — AMLODIPINE BESYLATE 10 MG: 10 TABLET ORAL at 08:09

## 2022-02-20 RX ADMIN — GABAPENTIN 400 MG: 400 CAPSULE ORAL at 15:03

## 2022-02-20 RX ADMIN — GABAPENTIN 400 MG: 400 CAPSULE ORAL at 21:59

## 2022-02-20 RX ADMIN — OXYCODONE HYDROCHLORIDE 5 MG: 5 TABLET ORAL at 15:03

## 2022-02-20 RX ADMIN — POLYETHYLENE GLYCOL 3350 17 G: 17 POWDER, FOR SOLUTION ORAL at 08:10

## 2022-02-20 RX ADMIN — OXYCODONE HYDROCHLORIDE 5 MG: 5 TABLET ORAL at 21:59

## 2022-02-20 RX ADMIN — NICOTINE 1 PATCH: 21 PATCH, EXTENDED RELEASE TRANSDERMAL at 08:09

## 2022-02-20 NOTE — ASSESSMENT & PLAN NOTE
· MRI  [facility]): Severe spinal stenosis L3-L4, and L5-S1 with more moderate stenosis L2-  · Continue pain medications as above and PT

## 2022-02-20 NOTE — ASSESSMENT & PLAN NOTE
· Patient was to be discharged to the [facility] rescue mission however they will not take him back as he does not have any photo ID  · PT recommending post acute rehab placement

## 2022-02-20 NOTE — ASSESSMENT & PLAN NOTE
· Present on almost all toes and fingers  · Podiatry performed nail debridement on 01/27  · Continue Terbinafine 250 mg PO daily for 12 weeks (Ordered through 4/21)  · Check CMP weekly (Next check 2/27/22)

## 2022-02-20 NOTE — PROGRESS NOTES
[facility]  Progress Note Celeste Severiano 1965, 60 y o  male MRN: [id number]  Unit/Bed#: 7T [facility] 711-01 Encounter: [id number]  Primary Care Provider: Shyam Lion MD   Date and time admitted to hospital: 12/29/2021  5:38 PM    * Ambulatory dysfunction  Assessment & Plan  · Continue gabapentin 400mg t i d, robaxin 500mg Q6H PRN, and oxycodone 5mg Q6H PRN  · PT/OT recommending post acute rehab  · Case management following to aide in discharge planning  · Patient remains medically stable for discharge     [address]  · [facility] however they will not take him back as he does not have any photo ID  · PT recommending post acute rehab placement    Spinal stenosis  Assessment & Plan  · MRI  [facility]): Severe spinal stenosis L3-L4, and L5-S1 with more moderate stenosis L2-  · Continue pain medications as above and PT    Strange and inexplicable behavior  Assessment & Plan  · Patient with hyper fixation on fungal infection arms (which was treated)  · Psychiatry consulted, not appropriate for inpatient psychiatry unit at this time  · Continue Abilify 10 mg daily and Atarax 25 mg p o  Q 6 hours p r n  For anxiety      Onychomycosis  Assessment & Plan  · Present on almost all toes and fingers  · Podiatry performed nail debridement on 01/27  · Continue Terbinafine 250 mg PO daily for 12 weeks (Ordered through 4/21)  · Check CMP weekly (Next check 2/27/22)    HTN, goal below 140/90  Assessment & Plan  · Continue amlodipine 10 mg daily and lisinopril 20 mg daily  VTE Pharmacologic Prophylaxis: VTE Score: 1 Low Risk (Score 0-2) - Encourage Ambulation  Lovenox  Patient Centered Rounds: I performed bedside rounds with nursing staff today  Education and Discussions with Family / Patient: Patient declined call to   Time Spent for Care: 20 minutes   More than 50% of total time spent on counseling and coordination of care as described above  Current Length of Stay: 52 day(s)  Current Patient Status: Inpatient   Certification Statement: The patient will continue to require additional inpatient hospital stay due to awaiting placement  Discharge Plan: TBD based on accepting facility and bed availability  Code Status: Level 1 - Full Code    Subjective:   Patient resting in bed without any acute complaints  No over night events reported  Objective:     Vitals:   Temp (24hrs), Av 7 °F (36 5 °C), Min:97 3 °F (36 3 °C), Max:98 °F (36 7 °C)    Temp:  [97 3 °F (36 3 °C)-98 °F (36 7 °C)] 97 3 °F (36 3 °C)  HR:  [61-78] 61  Resp:  [18-20] 18  BP: (149-174)/(81-95) 154/92  SpO2:  [96 %-98 %] 98 %  Body mass index is 23 82 kg/m²  Input and Output Summary (last 24 hours): Intake/Output Summary (Last 24 hours) at 2022 0800  Last data filed at 2022 2137  Gross per 24 hour   Intake 480 ml   Output 300 ml   Net 180 ml       Physical Exam:   Physical Exam  Vitals and nursing note reviewed  Constitutional:       General: He is not in acute distress  Appearance: Normal appearance  HENT:      Head: Normocephalic  Mouth/Throat:      Mouth: Mucous membranes are moist       Pharynx: Oropharynx is clear  Eyes:      Extraocular Movements: Extraocular movements intact  Conjunctiva/sclera: Conjunctivae normal    Pulmonary:      Effort: No respiratory distress  Musculoskeletal:      Cervical back: Normal range of motion  Neurological:      General: No focal deficit present  Mental Status: He is alert  Mental status is at baseline     Psychiatric:         Mood and Affect: Mood normal          Behavior: Behavior normal          Judgment: Judgment normal           Labs:  Results from last 7 days   Lab Units 22  0528   WBC Thousand/uL 6 00   HEMOGLOBIN g/dL 12 2*   HEMATOCRIT % 36 3*   PLATELETS Thousands/uL 346     Results from last 7 days   Lab Units 22  0528   SODIUM mmol/L 140   POTASSIUM mmol/L 4 0   CHLORIDE mmol/L 105   CO2 mmol/L 29   BUN mg/dL 23   CREATININE mg/dL 0 73   ANION GAP mmol/L 6   CALCIUM mg/dL 8 5   GLUCOSE RANDOM mg/dL 100*                     Recent Cultures (last 7 days):         Last 24 Hours Medication List:   Current Facility-Administered Medications   Medication Dose Route Frequency Provider Last Rate    acetaminophen  650 mg Oral Q4H PRN Anna Myles PA-C      amLODIPine  10 mg Oral Daily Andry Hassan,       ARIPiprazole  10 mg Oral Daily [facility], DO      carbamide peroxide  5 drop Both Ears BID PRN Vidhya Inman MD      diphenhydrAMINE  25 mg Oral Q6H PRN Andry Cherryer, DO      enoxaparin  40 mg Subcutaneous Q24H [facility], DO      gabapentin  400 mg Oral TID [facility], DO      hydrocortisone   Topical 4x Daily PRN [facility], DO      hydrOXYzine HCL  25 mg Oral Q6H PRN DOV Aragon      lisinopril  20 mg Oral Daily [facility], DO      methocarbamol  500 mg Oral Q6H PRN Fanny West MD      nicotine  1 patch Transdermal Daily [facility], [place]      nystatin   Topical BID Fanny West MD      ondansetron  4 mg Oral Q6H PRN Andry Hassan, DO      oxyCODONE  5 mg Oral Q6H PRN [facility], DO      polyethylene glycol  17 g Oral Daily Andry Hassan,       senna-docusate sodium  1 tablet Oral BID Andry Hassan, DO      terbinafine  250 mg Oral Daily Andry Hassan, DO      triamcinolone   Topical BID Fanny West MD      white petrolatum-mineral oil   Topical TID PRN Annabelle Ashley DO          Today, Patient Was Seen By: Tejas Solis DO    **Please Note: This note may have been constructed using a voice recognition system  **

## 2022-02-20 NOTE — PLAN OF CARE
Problem: PAIN - ADULT  Goal: Verbalizes/displays adequate comfort level or baseline comfort level  Description: Interventions:  - Encourage patient to monitor pain and request assistance  - Assess pain using appropriate pain scale  - Administer analgesics based on type and severity of pain and evaluate response  - Implement non-pharmacological measures as appropriate and evaluate response  - Consider cultural and social influences on pain and pain management  - Notify physician/advanced practitioner if interventions unsuccessful or patient reports new pain  Outcome: Progressing     Problem: SAFETY ADULT  Goal: Patient will remain free of falls  Description: INTERVENTIONS:  - Educate patient/family on patient safety including physical limitations  - Instruct patient to call for assistance with activity   - Consult OT/PT to assist with strengthening/mobility   - Keep Call bell within reach  - Keep bed low and locked with side rails adjusted as appropriate  - Keep care items and personal belongings within reach  - Initiate and maintain comfort rounds  - Make Fall Risk Sign visible to staff  - Apply yellow socks and bracelet for high fall risk patients  - Consider moving patient to room near nurses station  Outcome: Progressing     Problem: DISCHARGE PLANNING  Goal: Discharge to home or other facility with appropriate resources  Description: INTERVENTIONS:  - Identify barriers to discharge w/patient and caregiver  - Arrange for needed discharge resources and transportation as appropriate  - Identify discharge learning needs (meds, wound care, etc )  - Arrange for interpretive services to assist at discharge as needed  - Refer to Case Management Department for coordinating discharge planning if the patient needs post-hospital services based on physician/advanced practitioner order or complex needs related to functional status, cognitive ability, or social support system  Outcome: Progressing     Problem: Knowledge Deficit  Goal: Patient/family/caregiver demonstrates understanding of disease process, treatment plan, medications, and discharge instructions  Description: Complete learning assessment and assess knowledge base    Interventions:  - Provide teaching at level of understanding  - Provide teaching via preferred learning methods  Outcome: Progressing     Problem: MOBILITY - ADULT  Goal: Maintain or return to baseline ADL function  Description: INTERVENTIONS:  -  Assess patient's ability to carry out ADLs; assess patient's baseline for ADL function and identify physical deficits which impact ability to perform ADLs (bathing, care of mouth/teeth, toileting, grooming, dressing, etc )  - Assess/evaluate cause of self-care deficits   - Assess range of motion  - Assess patient's mobility; develop plan if impaired  - Assess patient's need for assistive devices and provide as appropriate  - Encourage maximum independence but intervene and supervise when necessary  - Involve family in performance of ADLs  - Assess for home care needs following discharge   - Consider OT consult to assist with ADL evaluation and planning for discharge  - Provide patient education as appropriate  Outcome: Progressing

## 2022-02-21 PROCEDURE — 99232 SBSQ HOSP IP/OBS MODERATE 35: CPT | Performed by: STUDENT IN AN ORGANIZED HEALTH CARE EDUCATION/TRAINING PROGRAM

## 2022-02-21 RX ADMIN — NYSTATIN: 100000 POWDER TOPICAL at 09:56

## 2022-02-21 RX ADMIN — SENNOSIDES AND DOCUSATE SODIUM 1 TABLET: 50; 8.6 TABLET ORAL at 09:56

## 2022-02-21 RX ADMIN — GABAPENTIN 400 MG: 400 CAPSULE ORAL at 20:29

## 2022-02-21 RX ADMIN — TERBINAFINE HYDROCHLORIDE 250 MG: 250 TABLET ORAL at 09:57

## 2022-02-21 RX ADMIN — OXYCODONE HYDROCHLORIDE 5 MG: 5 TABLET ORAL at 09:56

## 2022-02-21 RX ADMIN — GABAPENTIN 400 MG: 400 CAPSULE ORAL at 16:53

## 2022-02-21 RX ADMIN — GABAPENTIN 400 MG: 400 CAPSULE ORAL at 09:56

## 2022-02-21 RX ADMIN — ARIPIPRAZOLE 10 MG: 10 TABLET ORAL at 09:55

## 2022-02-21 RX ADMIN — OXYCODONE HYDROCHLORIDE 5 MG: 5 TABLET ORAL at 20:29

## 2022-02-21 RX ADMIN — TRIAMCINOLONE ACETONIDE: 1 CREAM TOPICAL at 09:57

## 2022-02-21 RX ADMIN — AMLODIPINE BESYLATE 10 MG: 10 TABLET ORAL at 09:55

## 2022-02-21 RX ADMIN — LISINOPRIL 20 MG: 20 TABLET ORAL at 09:56

## 2022-02-21 NOTE — PLAN OF CARE
Problem: PHYSICAL THERAPY ADULT  Goal: Performs mobility at highest level of function for planned discharge setting  See evaluation for individualized goals  Description: Treatment/Interventions: ADL retraining,Functional transfer training,LE strengthening/ROM,Elevations,Therapeutic exercise,Endurance training,Patient/family training,Equipment eval/education,Bed mobility,Gait training,Spoke to MD,Spoke to nursing,Spoke to case management,OT  Equipment Recommended: Wheelchair       See flowsheet documentation for full assessment, interventions and recommendations  Outcome: Progressing  Note: Prognosis: Fair  Problem List: Decreased strength,Decreased range of motion,Decreased endurance,Impaired balance,Decreased mobility,Decreased coordination,Impaired sensation,Pain  Assessment: Pt with increased difficulty with transfers today due to increased pain and fatigue  He did participate in LE therex seated  Found to have profoundly weak hamstrings and hip ext/abd so focus on strengthening these groups at this time  Pt educated on HEP for same mm groups  Barriers to Discharge: Inaccessible home environment,Decreased caregiver support        PT Discharge Recommendation: Post acute rehabilitation services (vs w/c accessable environment)          See flowsheet documentation for full assessment

## 2022-02-21 NOTE — ASSESSMENT & PLAN NOTE
· MRI  [facility]): Severe spinal stenosis L3-L4, and L5-S1 with more moderate stenosis L2-  · Continue pain medications as above and PT

## 2022-02-21 NOTE — PHYSICAL THERAPY NOTE
Physical TherapyTreatment Note    Patient's Name: Lalo Tovar    Admitting Diagnosis  Cellulitis [L03 90]  Homeless [Z59 00]  Wound check, abscess [Z51 89]    Problem List  Patient Active Problem List   Diagnosis    Homeless    Psoriasis, unspecified    Ambulatory dysfunction    HTN, goal below 140/90    Strange and inexplicable behavior    Onychomycosis    Constipation    Spinal stenosis       Past Medical History  Past Medical History:   Diagnosis Date    Arthritis     Hypertension     Sciatica        Past Surgical History  Past Surgical History:   Procedure Laterality Date    BACK SURGERY         Recent Imaging  US scrotum and testicles   Final Result by Madison Lennox, MD (02/14 1158)       1  No evidence of testicular torsion  2   Minimally complex hydroceles moderate on the right and small on the left  Workstation performed: [id number]         CT abdomen pelvis w contrast   Final Result by Jazmyn Rogers MD (01/28 1435)      Stool filled colon with possible impaction  Diverticulosis with no diverticulitis  Workstation performed: [id number]             Recent Vital Signs  Vitals:    02/20/22 0730 02/20/22 1424 02/20/22 2300 02/21/22 0703   BP: 154/92 114/73 148/78 142/72   BP Location: Left arm Left leg Left arm Right arm   Pulse: 61 82 82 78   Resp: 18 20 20 19   Temp: (!) 97 3 °F (36 3 °C) (!) 96 3 °F (35 7 °C) 97 8 °F (36 6 °C) 97 6 °F (36 4 °C)   TempSrc: Temporal Temporal Temporal Temporal   SpO2: 98% 97% 96% 100%   Weight:       Height:           PT Treatment Time: 40 minutes       02/20/22 1030   PT Last Visit   PT Visit Date 02/20/22   Note Type   Note Type Treatment   Pain Assessment   Pain Assessment Tool 0-10   Pain Score 6   Pain Location/Orientation Orientation: Lower; Location: Back   Restrictions/Precautions   Weight Bearing Precautions Per Order No   Other Precautions Fall Risk;Pain   General   Chart Reviewed Yes   Response to Previous Treatment Patient with no complaints from previous session  Family/Caregiver Present No   Transfers   Sit to Stand 4  Minimal assistance   Additional items Increased time required   Stand to Sit 4  Minimal assistance   Additional items Increased time required   Stand pivot 4  Minimal assistance   Additional items Increased time required   Sit pivot 4  Minimal assistance   Additional items Increased time required   Balance   Static Sitting Fair +   Dynamic Sitting Fair   Static Standing Fair -   Endurance Deficit   Endurance Deficit Yes   Endurance Deficit Description weakness and fatigue   Activity Tolerance   Activity Tolerance Patient limited by pain   Nurse Made Aware spoke to RN   Assessment   Prognosis Fair   Problem List Decreased strength;Decreased range of motion;Decreased endurance; Impaired balance;Decreased mobility; Decreased coordination; Impaired sensation;Pain   Assessment Pt with increased difficulty with transfers today due to increased pain and fatigue  He did participate in LE therex seated  Found to have profoundly weak hamstrings and hip ext/abd so focus on strengthening these groups at this time  Pt educated on HEP for same mm groups  Barriers to Discharge Inaccessible home environment;Decreased caregiver support   Goals   Patient Goals get to rehab   STG Expiration Date 02/22/22   Short Term Goal #1 see eval note   PT Treatment Day 13   Plan   Treatment/Interventions ADL retraining;Functional transfer training;LE strengthening/ROM; Elevations; Therapeutic exercise; Endurance training;Patient/family training;Equipment eval/education; Bed mobility;Gait training;Spoke to MD;Spoke to nursing;Spoke to case management;OT   Progress Progressing toward goals   PT Frequency 3-5x/wk   Recommendation   PT Discharge Recommendation Post acute rehabilitation services  (vs w/c accessable environment)   AM-PAC Basic Mobility Inpatient   Turning in Bed Without Bedrails 4   Lying on Back to Sitting on Edge of Flat Bed 4 Moving Bed to Chair 3   Standing Up From Chair 2   Walk in Room 2   Climb 3-5 Stairs 1   Basic Mobility Inpatient Raw Score 16   Basic Mobility Standardized Score 38 32   Highest Level Of Mobility   -[facility] Goal 5: Stand one or more mins   -[facility] Highest Level of Mobility 4: Move to chair/commode   -[facility] Goal Achieved No   Education   Education Provided Home exercise program;Mobility training;Assistive device   Patient Explanation/teachback used;Demonstrates verbal understanding   End of Consult   Patient Position at End of Consult Bedside chair; All needs within reach       [facility] PT, DPT

## 2022-02-21 NOTE — PROGRESS NOTES
[address] [facility]  Progress Note Johan Aguilar 1965, 60 y o  male MRN: [id number]  Unit/Bed#: 7T [facility] 711-01 Encounter: [id number]  Primary Care Provider: Rachael Georges MD   Date and time admitted to hospital: 12/29/2021  5:38 PM    * Ambulatory dysfunction  Assessment & Plan  · Continue gabapentin 400mg t i d, robaxin 500mg Q6H PRN, and oxycodone 5mg Q6H PRN  · PT/OT recommending post acute rehab  · Case management following to aide in discharge planning  · Patient remains medically stable for discharge     Onychomycosis  Assessment & Plan  · Present on almost all toes and fingers  · Podiatry performed nail debridement on 01/27  · Continue Terbinafine 250 mg PO daily for 12 weeks (Ordered through 4/21)  · Check CMP weekly (Next check 2/27/22)    Spinal stenosis  Assessment & Plan  · MRI  [facility]): Severe spinal stenosis L3-L4, and L5-S1 with more moderate stenosis L2-  · Continue pain medications as above and PT    Strange and inexplicable behavior  Assessment & Plan  · Patient with hyper fixation on fungal infection arms (which was treated)  · Psychiatry consulted, not appropriate for inpatient psychiatry unit at this time  · Continue Abilify 10 mg daily and Atarax 25 mg p o  Q 6 hours p r n  For anxiety      HTN, goal below 140/90  Assessment & Plan  · Continue amlodipine 10 mg daily and lisinopril 20 mg daily  Homeless  Assessment & Plan  · Patient was to be discharged to the [facility] rescue mission however they will not take him back as he does not have any photo ID  · PT recommending post acute rehab placement          VTE Pharmacologic Prophylaxis: VTE Score: 3 Moderate Risk (Score 3-4) - Pharmacological DVT Prophylaxis Ordered: enoxaparin (Lovenox)  Patient Centered Rounds: I performed bedside rounds with nursing staff today    Discussions with Specialists or Other Care Team Provider:  None    Education and Discussions with Family / Patient: Patient declined call to   Time Spent for Care: 30 minutes  More than 50% of total time spent on counseling and coordination of care as described above  Current Length of Stay: 53 day(s)  Current Patient Status: Inpatient   Certification Statement: The patient will continue to require additional inpatient hospital stay due to Pending placement to acute care rehab  Discharge Plan:  TBD    Code Status: Level 1 - Full Code    Subjective:   Patient seen and examined at bedside  Patient no acute events overnight, no complaints at this time  Objective:     Vitals:   Temp (24hrs), Av 2 °F (36 2 °C), Min:96 3 °F (35 7 °C), Max:97 8 °F (36 6 °C)    Temp:  [96 3 °F (35 7 °C)-97 8 °F (36 6 °C)] 97 6 °F (36 4 °C)  HR:  [78-82] 78  Resp:  [19-20] 19  BP: (114-148)/(72-78) 142/72  SpO2:  [96 %-100 %] 100 %  Body mass index is 23 82 kg/m²  Input and Output Summary (last 24 hours): Intake/Output Summary (Last 24 hours) at 2022 5730  Last data filed at 2022 0703  Gross per 24 hour   Intake 1240 ml   Output 1150 ml   Net 90 ml       Physical Exam:   Physical Exam  Constitutional:       General: He is not in acute distress  Appearance: He is not ill-appearing  Cardiovascular:      Rate and Rhythm: Normal rate and regular rhythm  Pulses: Normal pulses  Heart sounds: Normal heart sounds  Pulmonary:      Effort: Pulmonary effort is normal       Breath sounds: Normal breath sounds  Abdominal:      General: Abdomen is flat  Bowel sounds are normal       Palpations: Abdomen is soft  Musculoskeletal:         General: Normal range of motion  Cervical back: Normal range of motion  Skin:     General: Skin is warm and dry  Neurological:      General: No focal deficit present  Mental Status: He is alert and oriented to person, place, and time  Mental status is at baseline     Psychiatric:         Mood and Affect: Mood normal          Behavior: Behavior normal          Thought Content: Thought content normal          Judgment: Judgment normal           Additional Data:     Labs:  Results from last 7 days   Lab Units 02/20/22  0528   WBC Thousand/uL 6 00   HEMOGLOBIN g/dL 12 2*   HEMATOCRIT % 36 3*   PLATELETS Thousands/uL 346     Results from last 7 days   Lab Units 02/20/22  0528   SODIUM mmol/L 140   POTASSIUM mmol/L 4 0   CHLORIDE mmol/L 105   CO2 mmol/L 29   BUN mg/dL 23   CREATININE mg/dL 0 73   ANION GAP mmol/L 6   CALCIUM mg/dL 8 5   ALBUMIN g/dL 4 1   TOTAL BILIRUBIN mg/dL 0 39   ALK PHOS U/L 101   ALT U/L 11   AST U/L 92*   GLUCOSE RANDOM mg/dL 100*                       Lines/Drains:  Invasive Devices  Report    None                       Imaging: No pertinent imaging reviewed      Recent Cultures (last 7 days):         Last 24 Hours Medication List:   Current Facility-Administered Medications   Medication Dose Route Frequency Provider Last Rate    acetaminophen  650 mg Oral Q4H PRN Iwona Olivas PA-C      amLODIPine  10 mg Oral Daily Frederick Regulus, DO      ARIPiprazole  10 mg Oral Daily [facility], DO      carbamide peroxide  5 drop Both Ears BID PRN Patricia Starks MD      diphenhydrAMINE  25 mg Oral Q6H PRN Frederick Regulus, DO      enoxaparin  40 mg Subcutaneous Q24H [facility], DO      gabapentin  400 mg Oral TID [facility], DO      hydrocortisone   Topical 4x Daily PRN [facility], DO      hydrOXYzine HCL  25 mg Oral Q6H PRN Glenna Stage, CRNP      lisinopril  20 mg Oral Daily [place], [place]      methocarbamol  500 mg Oral Q6H PRN Landon Nielsen MD      nicotine  1 patch Transdermal Daily [place], [place]      nystatin   Topical BID Landon Nielsen MD      ondansetron  4 mg Oral Q6H PRN Frederick Regulus, DO      oxyCODONE  5 mg Oral Q6H PRN [facility], DO      polyethylene glycol  17 g Oral Daily Frederick Regulus, DO      senna-docusate sodium  1 tablet Oral BID Frederick Regulus, DO      terbinafine  250 mg Oral Daily Jeff Moore DO      triamcinolone   Topical BID Lawyer Luis MD      white petrolatum-mineral oil   Topical TID PRN John Perez DO          Today, Patient Was Seen By: Jeff Moore DO    **Please Note: This note may have been constructed using a voice recognition system  **

## 2022-02-21 NOTE — ASSESSMENT & PLAN NOTE
· Patient was to be discharged to the [facility] rescue mission however they will not take him back as he does not have any photo ID  · PT recommending post acute rehab placement

## 2022-02-21 NOTE — CASE MANAGEMENT
DISCHARGE DETAILS: CM was notified at morning rounds that pt is medically cleared to be discharged today    CM spoke with Latrice Méndez of  [facility] inpatient Rehab ph: [phone] option 2, Latrice stated pt is a denial as they have reviewed the chart and cannot accept this pt, pt's care exceed capacity at there personal care home    CM department will continue to follow through pt's D/C

## 2022-02-22 PROCEDURE — 99232 SBSQ HOSP IP/OBS MODERATE 35: CPT | Performed by: STUDENT IN AN ORGANIZED HEALTH CARE EDUCATION/TRAINING PROGRAM

## 2022-02-22 RX ADMIN — GABAPENTIN 400 MG: 400 CAPSULE ORAL at 20:45

## 2022-02-22 RX ADMIN — OXYCODONE HYDROCHLORIDE 5 MG: 5 TABLET ORAL at 11:54

## 2022-02-22 RX ADMIN — LISINOPRIL 20 MG: 20 TABLET ORAL at 08:00

## 2022-02-22 RX ADMIN — HYDROCORTISONE: 25 OINTMENT TOPICAL at 09:34

## 2022-02-22 RX ADMIN — OXYCODONE HYDROCHLORIDE 5 MG: 5 TABLET ORAL at 18:36

## 2022-02-22 RX ADMIN — GABAPENTIN 400 MG: 400 CAPSULE ORAL at 16:15

## 2022-02-22 RX ADMIN — METHOCARBAMOL 500 MG: 500 TABLET, FILM COATED ORAL at 16:15

## 2022-02-22 RX ADMIN — DIPHENHYDRAMINE HCL 25 MG: 25 TABLET ORAL at 16:15

## 2022-02-22 RX ADMIN — AMLODIPINE BESYLATE 10 MG: 10 TABLET ORAL at 08:00

## 2022-02-22 RX ADMIN — GABAPENTIN 400 MG: 400 CAPSULE ORAL at 09:29

## 2022-02-22 RX ADMIN — TRIAMCINOLONE ACETONIDE: 1 CREAM TOPICAL at 09:34

## 2022-02-22 RX ADMIN — NYSTATIN: 100000 POWDER TOPICAL at 17:50

## 2022-02-22 RX ADMIN — ARIPIPRAZOLE 10 MG: 10 TABLET ORAL at 09:29

## 2022-02-22 RX ADMIN — HYDROXYZINE HYDROCHLORIDE 25 MG: 25 TABLET ORAL at 09:41

## 2022-02-22 RX ADMIN — TERBINAFINE HYDROCHLORIDE 250 MG: 250 TABLET ORAL at 09:31

## 2022-02-22 RX ADMIN — ACETAMINOPHEN 650 MG: 325 TABLET ORAL at 05:50

## 2022-02-22 RX ADMIN — SENNOSIDES AND DOCUSATE SODIUM 1 TABLET: 50; 8.6 TABLET ORAL at 09:29

## 2022-02-22 RX ADMIN — TRIAMCINOLONE ACETONIDE: 1 CREAM TOPICAL at 17:49

## 2022-02-22 RX ADMIN — OXYCODONE HYDROCHLORIDE 5 MG: 5 TABLET ORAL at 05:50

## 2022-02-22 RX ADMIN — METHOCARBAMOL 500 MG: 500 TABLET, FILM COATED ORAL at 09:41

## 2022-02-22 RX ADMIN — NYSTATIN: 100000 POWDER TOPICAL at 09:34

## 2022-02-22 NOTE — PROGRESS NOTES
[facility]  Progress Note Helga Hernández 1965, 60 y o  male MRN: [id number]  Unit/Bed#: 7T [facility] 711-01 Encounter: [id number]  Primary Care Provider: Rowdy Lantigua MD   Date and time admitted to hospital: 12/29/2021  5:38 PM    * Ambulatory dysfunction  Assessment & Plan  · Continue gabapentin 400mg t i d, robaxin 500mg Q6H PRN, and oxycodone 5mg Q6H PRN  · PT/OT recommending post acute rehab  · Case management following to aide in discharge planning  · Patient remains medically stable for discharge     Onychomycosis  Assessment & Plan  · Present on almost all toes and fingers  · Podiatry performed nail debridement on 01/27  · Continue Terbinafine 250 mg PO daily for 12 weeks (Ordered through 4/21)  · Check CMP weekly (Next check 2/27/22)    Spinal stenosis  Assessment & Plan  · MRI  [facility]): Severe spinal stenosis L3-L4, and L5-S1 with more moderate stenosis L2-  · Continue pain medications as above and PT    Strange and inexplicable behavior  Assessment & Plan  · Patient with hyper fixation on fungal infection arms (which was treated)  · Psychiatry consulted, not appropriate for inpatient psychiatry unit at this time  · Continue Abilify 10 mg daily and Atarax 25 mg p o  Q 6 hours p r n  For anxiety      HTN, goal below 140/90  Assessment & Plan  · Continue amlodipine 10 mg daily and lisinopril 20 mg daily  Homeless  Assessment & Plan  · Patient was to be discharged to the [facility] rescue mission however they will not take him back as he does not have any photo ID  · PT recommending post acute rehab placement          VTE Pharmacologic Prophylaxis: VTE Score: 3 Moderate Risk (Score 3-4) - Pharmacological DVT Prophylaxis Ordered: enoxaparin (Lovenox)  Patient Centered Rounds: I performed bedside rounds with nursing staff today    Discussions with Specialists or Other Care Team Provider:  None    Education and Discussions with Family / Patient:  Discussed with patient    Time Spent for Care: 30 minutes  More than 50% of total time spent on counseling and coordination of care as described above  Current Length of Stay: 54 day(s)  Current Patient Status: Inpatient   Certification Statement: The patient will continue to require additional inpatient hospital stay due to Pending placement acute care rehab, medically stable for discharge  Discharge Plan: Pending facility acceptance and bed availability    Code Status: Level 1 - Full Code    Subjective:   Patient seen examined bedside  Patient resting comfortably bed no apparent distress  No acute events overnight  Objective:     Vitals:   Temp (24hrs), Av 3 °F (36 3 °C), Min:97 °F (36 1 °C), Max:97 6 °F (36 4 °C)    Temp:  [97 °F (36 1 °C)-97 6 °F (36 4 °C)] 97 6 °F (36 4 °C)  HR:  [64-72] 64  Resp:  [17-18] 17  BP: (130-146)/(70-76) 139/76  SpO2:  [97 %-98 %] 97 %  Body mass index is 23 82 kg/m²  Input and Output Summary (last 24 hours): Intake/Output Summary (Last 24 hours) at 2022 0754  Last data filed at 2022 1857  Gross per 24 hour   Intake 880 ml   Output 1800 ml   Net -920 ml       Physical Exam:   Physical Exam  Constitutional:       General: He is not in acute distress  HENT:      Head: Normocephalic  Cardiovascular:      Rate and Rhythm: Normal rate and regular rhythm  Pulses: Normal pulses  Heart sounds: Normal heart sounds  Pulmonary:      Effort: Pulmonary effort is normal       Breath sounds: Normal breath sounds  Abdominal:      General: Abdomen is flat  Bowel sounds are normal       Palpations: Abdomen is soft  Musculoskeletal:      Cervical back: Normal range of motion  Right lower leg: No edema  Left lower leg: No edema  Skin:     General: Skin is warm and dry  Neurological:      General: No focal deficit present  Mental Status: He is alert and oriented to person, place, and time  Mental status is at baseline     Psychiatric:         Mood and Affect: Mood normal          Behavior: Behavior normal          Thought Content: Thought content normal          Judgment: Judgment normal           Additional Data:     Labs:  Results from last 7 days   Lab Units 02/20/22  0528   WBC Thousand/uL 6 00   HEMOGLOBIN g/dL 12 2*   HEMATOCRIT % 36 3*   PLATELETS Thousands/uL 346     Results from last 7 days   Lab Units 02/20/22  0528   SODIUM mmol/L 140   POTASSIUM mmol/L 4 0   CHLORIDE mmol/L 105   CO2 mmol/L 29   BUN mg/dL 23   CREATININE mg/dL 0 73   ANION GAP mmol/L 6   CALCIUM mg/dL 8 5   ALBUMIN g/dL 4 1   TOTAL BILIRUBIN mg/dL 0 39   ALK PHOS U/L 101   ALT U/L 11   AST U/L 92*   GLUCOSE RANDOM mg/dL 100*                       Lines/Drains:  Invasive Devices  Report    None                       Imaging: No pertinent imaging reviewed      Recent Cultures (last 7 days):         Last 24 Hours Medication List:   Current Facility-Administered Medications   Medication Dose Route Frequency Provider Last Rate    acetaminophen  650 mg Oral Q4H PRN Shanita Londono PA-C      amLODIPine  10 mg Oral Daily Celena Pringle, DO      ARIPiprazole  10 mg Oral Daily [facility], DO      carbamide peroxide  5 drop Both Ears BID PRN Duglas Whittaker MD      diphenhydrAMINE  25 mg Oral Q6H PRN Celena Pringle,       enoxaparin  40 mg Subcutaneous Q24H [facility], DO      gabapentin  400 mg Oral TID [facility], DO      hydrocortisone   Topical 4x Daily PRN [facility], DO      hydrOXYzine HCL  25 mg Oral Q6H PRN DOV Garay      lisinopril  20 mg Oral Daily [place], [place]      methocarbamol  500 mg Oral Q6H PRN Marissa Peña MD      nicotine  1 patch Transdermal Daily [place], [place]      nystatin   Topical BID Marissa Peña MD      ondansetron  4 mg Oral Q6H PRN Celena Pringle, DO      oxyCODONE  5 mg Oral Q6H PRN [facility], DO      polyethylene glycol  17 g Oral Daily Celena Pringle, DO      senna-docusate sodium  1 tablet Oral BID Niranjan Kaur DO      terbinafine  250 mg Oral Daily Niranjan Kaur DO      triamcinolone   Topical BID Hai Beckford MD      white petrolatum-mineral oil   Topical TID PRN Maria M Wang DO          Today, Patient Was Seen By: Niranjan Kaur DO    **Please Note: This note may have been constructed using a voice recognition system  **

## 2022-02-22 NOTE — PLAN OF CARE
Problem: PAIN - ADULT  Goal: Verbalizes/displays adequate comfort level or baseline comfort level  Description: Interventions:  - Encourage patient to monitor pain and request assistance  - Assess pain using appropriate pain scale  - Administer analgesics based on type and severity of pain and evaluate response  - Implement non-pharmacological measures as appropriate and evaluate response  - Consider cultural and social influences on pain and pain management  - Notify physician/advanced practitioner if interventions unsuccessful or patient reports new pain  Outcome: Progressing     Problem: INFECTION - ADULT  Goal: Absence or prevention of progression during hospitalization  Description: INTERVENTIONS:  - Assess and monitor for signs and symptoms of infection  - Monitor lab/diagnostic results  - Monitor all insertion sites, i e  indwelling lines, tubes, and drains  - Monitor endotracheal if appropriate and nasal secretions for changes in amount and color  - [place] appropriate cooling/warming therapies per order  - Administer medications as ordered  - Instruct and encourage patient and family to use good hand hygiene technique  - Identify and instruct in appropriate isolation precautions for identified infection/condition  Outcome: Progressing     Problem: MOBILITY - ADULT  Goal: Maintain or return to baseline ADL function  Description: INTERVENTIONS:  -  Assess patient's ability to carry out ADLs; assess patient's baseline for ADL function and identify physical deficits which impact ability to perform ADLs (bathing, care of mouth/teeth, toileting, grooming, dressing, etc )  - Assess/evaluate cause of self-care deficits   - Assess range of motion  - Assess patient's mobility; develop plan if impaired  - Assess patient's need for assistive devices and provide as appropriate  - Encourage maximum independence but intervene and supervise when necessary  - Involve family in performance of ADLs  - Assess for home care needs following discharge   - Consider OT consult to assist with ADL evaluation and planning for discharge  - Provide patient education as appropriate  Outcome: Progressing     Problem: Prexisting or High Potential for Compromised Skin Integrity  Goal: Skin integrity is maintained or improved  Description: INTERVENTIONS:  - Identify patients at risk for skin breakdown  - Assess and monitor skin integrity  - Assess and monitor nutrition and hydration status  - Monitor labs   - Assess for incontinence   - Turn and reposition patient  - Assist with mobility/ambulation  - Relieve pressure over bony prominences  - Avoid friction and shearing  - Provide appropriate hygiene as needed including keeping skin clean and dry  - Evaluate need for skin moisturizer/barrier cream  - Collaborate with interdisciplinary team   - Patient/family teaching  - Consider wound care consult   Outcome: Progressing

## 2022-02-22 NOTE — ASSESSMENT & PLAN NOTE
· MRI  [facility]): Severe spinal stenosis L3-L4, and L5-S1 with more moderate stenosis L2-  · Continue pain medications as above and PT

## 2022-02-22 NOTE — ASSESSMENT & PLAN NOTE
· Patient was to be discharged to the [facility] rescue mission however they will not take him back as he does not have any photo ID  · PT recommending post acute rehab placement

## 2022-02-22 NOTE — PLAN OF CARE
Problem: PAIN - ADULT  Goal: Verbalizes/displays adequate comfort level or baseline comfort level  Description: Interventions:  - Encourage patient to monitor pain and request assistance  - Assess pain using appropriate pain scale  - Administer analgesics based on type and severity of pain and evaluate response  - Implement non-pharmacological measures as appropriate and evaluate response  - Consider cultural and social influences on pain and pain management  - Notify physician/advanced practitioner if interventions unsuccessful or patient reports new pain  Outcome: Progressing     Problem: INFECTION - ADULT  Goal: Absence or prevention of progression during hospitalization  Description: INTERVENTIONS:  - Assess and monitor for signs and symptoms of infection  - Monitor lab/diagnostic results  - Monitor all insertion sites, i e  indwelling lines, tubes, and drains  - Monitor endotracheal if appropriate and nasal secretions for changes in amount and color  - [place] appropriate cooling/warming therapies per order  - Administer medications as ordered  - Instruct and encourage patient and family to use good hand hygiene technique  - Identify and instruct in appropriate isolation precautions for identified infection/condition  Outcome: Progressing     Problem: SAFETY ADULT  Goal: Patient will remain free of falls  Description: INTERVENTIONS:  - Educate patient/family on patient safety including physical limitations  - Instruct patient to call for assistance with activity   - Consult OT/PT to assist with strengthening/mobility   - Keep Call bell within reach  - Keep bed low and locked with side rails adjusted as appropriate  - Keep care items and personal belongings within reach  - Initiate and maintain comfort rounds  - Make Fall Risk Sign visible to staff  - Apply yellow socks and bracelet for high fall risk patients  - Consider moving patient to room near nurses station  Outcome: Progressing  Goal: Maintain or return to baseline ADL function  Description: INTERVENTIONS:  -  Assess patient's ability to carry out ADLs; assess patient's baseline for ADL function and identify physical deficits which impact ability to perform ADLs (bathing, care of mouth/teeth, toileting, grooming, dressing, etc )  - Assess/evaluate cause of self-care deficits   - Assess range of motion  - Assess patient's mobility; develop plan if impaired  - Assess patient's need for assistive devices and provide as appropriate  - Encourage maximum independence but intervene and supervise when necessary  - Involve family in performance of ADLs  - Assess for home care needs following discharge   - Consider OT consult to assist with ADL evaluation and planning for discharge  - Provide patient education as appropriate  Outcome: Progressing  Goal: Maintains/Returns to pre admission functional level  Description: INTERVENTIONS:  - Perform BMAT or MOVE assessment daily    - Set and communicate daily mobility goal to care team and patient/family/caregiver     - Collaborate with rehabilitation services on mobility goals if consulted  - Out of bed for toileting  - Record patient progress and toleration of activity level   Outcome: Progressing     Problem: DISCHARGE PLANNING  Goal: Discharge to home or other facility with appropriate resources  Description: INTERVENTIONS:  - Identify barriers to discharge w/patient and caregiver  - Arrange for needed discharge resources and transportation as appropriate  - Identify discharge learning needs (meds, wound care, etc )  - Arrange for interpretive services to assist at discharge as needed  - Refer to Case Management Department for coordinating discharge planning if the patient needs post-hospital services based on physician/advanced practitioner order or complex needs related to functional status, cognitive ability, or social support system  Outcome: Progressing     Problem: Knowledge Deficit  Goal: Patient/family/caregiver demonstrates understanding of disease process, treatment plan, medications, and discharge instructions  Description: Complete learning assessment and assess knowledge base  Interventions:  - Provide teaching at level of understanding  - Provide teaching via preferred learning methods  Outcome: Progressing     Problem: MOBILITY - ADULT  Goal: Maintain or return to baseline ADL function  Description: INTERVENTIONS:  -  Assess patient's ability to carry out ADLs; assess patient's baseline for ADL function and identify physical deficits which impact ability to perform ADLs (bathing, care of mouth/teeth, toileting, grooming, dressing, etc )  - Assess/evaluate cause of self-care deficits   - Assess range of motion  - Assess patient's mobility; develop plan if impaired  - Assess patient's need for assistive devices and provide as appropriate  - Encourage maximum independence but intervene and supervise when necessary  - Involve family in performance of ADLs  - Assess for home care needs following discharge   - Consider OT consult to assist with ADL evaluation and planning for discharge  - Provide patient education as appropriate  Outcome: Progressing  Goal: Maintains/Returns to pre admission functional level  Description: INTERVENTIONS:  - Perform BMAT or MOVE assessment daily    - Set and communicate daily mobility goal to care team and patient/family/caregiver     - Collaborate with rehabilitation services on mobility goals if consulted  - Out of bed for toileting  - Record patient progress and toleration of activity level   Outcome: Progressing     Problem: Potential for Falls  Goal: Patient will remain free of falls  Description: INTERVENTIONS:  - Educate patient/family on patient safety including physical limitations  - Instruct patient to call for assistance with activity   - Consult OT/PT to assist with strengthening/mobility   - Keep Call bell within reach  - Keep bed low and locked with side rails adjusted as appropriate  - Keep care items and personal belongings within reach  - Initiate and maintain comfort rounds  - Make Fall Risk Sign visible to staff  - Apply yellow socks and bracelet for high fall risk patients  - Consider moving patient to room near nurses station  Outcome: Progressing     Problem: Prexisting or High Potential for Compromised Skin Integrity  Goal: Skin integrity is maintained or improved  Description: INTERVENTIONS:  - Identify patients at risk for skin breakdown  - Assess and monitor skin integrity  - Assess and monitor nutrition and hydration status  - Monitor labs   - Assess for incontinence   - Turn and reposition patient  - Assist with mobility/ambulation  - Relieve pressure over bony prominences  - Avoid friction and shearing  - Provide appropriate hygiene as needed including keeping skin clean and dry  - Evaluate need for skin moisturizer/barrier cream  - Collaborate with interdisciplinary team   - Patient/family teaching  - Consider wound care consult   Outcome: Progressing

## 2022-02-23 PROCEDURE — 97110 THERAPEUTIC EXERCISES: CPT

## 2022-02-23 PROCEDURE — 99232 SBSQ HOSP IP/OBS MODERATE 35: CPT | Performed by: STUDENT IN AN ORGANIZED HEALTH CARE EDUCATION/TRAINING PROGRAM

## 2022-02-23 PROCEDURE — 97530 THERAPEUTIC ACTIVITIES: CPT

## 2022-02-23 RX ADMIN — DIPHENHYDRAMINE HCL 25 MG: 25 TABLET ORAL at 09:26

## 2022-02-23 RX ADMIN — AMLODIPINE BESYLATE 10 MG: 10 TABLET ORAL at 09:09

## 2022-02-23 RX ADMIN — GABAPENTIN 400 MG: 400 CAPSULE ORAL at 16:17

## 2022-02-23 RX ADMIN — ARIPIPRAZOLE 10 MG: 10 TABLET ORAL at 09:09

## 2022-02-23 RX ADMIN — METHOCARBAMOL 500 MG: 500 TABLET, FILM COATED ORAL at 09:26

## 2022-02-23 RX ADMIN — OXYCODONE HYDROCHLORIDE 5 MG: 5 TABLET ORAL at 20:27

## 2022-02-23 RX ADMIN — GABAPENTIN 400 MG: 400 CAPSULE ORAL at 09:09

## 2022-02-23 RX ADMIN — ACETAMINOPHEN 650 MG: 325 TABLET ORAL at 05:43

## 2022-02-23 RX ADMIN — SENNOSIDES AND DOCUSATE SODIUM 1 TABLET: 50; 8.6 TABLET ORAL at 09:09

## 2022-02-23 RX ADMIN — HYDROCORTISONE: 25 OINTMENT TOPICAL at 09:10

## 2022-02-23 RX ADMIN — LISINOPRIL 20 MG: 20 TABLET ORAL at 09:09

## 2022-02-23 RX ADMIN — TRIAMCINOLONE ACETONIDE: 1 CREAM TOPICAL at 09:10

## 2022-02-23 RX ADMIN — NYSTATIN: 100000 POWDER TOPICAL at 17:49

## 2022-02-23 RX ADMIN — NYSTATIN: 100000 POWDER TOPICAL at 09:10

## 2022-02-23 RX ADMIN — METHOCARBAMOL 500 MG: 500 TABLET, FILM COATED ORAL at 20:27

## 2022-02-23 RX ADMIN — ENOXAPARIN SODIUM 40 MG: 100 INJECTION SUBCUTANEOUS at 09:10

## 2022-02-23 RX ADMIN — OXYCODONE HYDROCHLORIDE 5 MG: 5 TABLET ORAL at 05:43

## 2022-02-23 RX ADMIN — TRIAMCINOLONE ACETONIDE: 1 CREAM TOPICAL at 17:49

## 2022-02-23 RX ADMIN — TERBINAFINE HYDROCHLORIDE 250 MG: 250 TABLET ORAL at 09:10

## 2022-02-23 RX ADMIN — OXYCODONE HYDROCHLORIDE 5 MG: 5 TABLET ORAL at 12:47

## 2022-02-23 RX ADMIN — HYDROXYZINE HYDROCHLORIDE 25 MG: 25 TABLET ORAL at 09:26

## 2022-02-23 RX ADMIN — GABAPENTIN 400 MG: 400 CAPSULE ORAL at 20:27

## 2022-02-23 NOTE — PHYSICAL THERAPY NOTE
Physical Therapy Treatment Note    Patient's Name: Izabella Masters    Admitting Diagnosis  Cellulitis [L03 90]  Homeless [Z59 00]  Wound check, abscess [Z51 89]    Problem List  Patient Active Problem List   Diagnosis    Homeless    Psoriasis, unspecified    Ambulatory dysfunction    HTN, goal below 140/90    Strange and inexplicable behavior    Onychomycosis    Constipation    Spinal stenosis        02/23/22 1000   PT Last Visit   PT Visit Date 02/23/22   Note Type   Note Type Treatment   Pain Assessment   Pain Assessment Tool 0-10   Pain Score 8   Pain Location/Orientation Orientation: Right;Location: Groin   Restrictions/Precautions   Weight Bearing Precautions Per Order No   Other Precautions Fall Risk;Pain   General   Chart Reviewed Yes   Response to Previous Treatment Patient with no complaints from previous session  Family/Caregiver Present No   Cognition   Arousal/Participation Alert; Cooperative   Attention Attends with cues to redirect   Orientation Level Oriented X4   Memory Within functional limits   Following Commands Follows all commands and directions without difficulty   Comments Patient with poor insight into functional deficits stating: "as soon as this fungus clears up i'll be able to walk  Probably in a week I'll be able to walk "   Bed Mobility   Rolling R 6  Modified independent   Additional items Increased time required   Rolling L 6  Modified independent   Additional items Increased time required   Supine to Sit 6  Modified independent   Additional items Increased time required   Sit to Supine 6  Modified independent   Additional items Increased time required   Additional Comments Increased time and effort to perform bed mobility    Transfers   Sit to Stand 4  Minimal assistance   Additional items Assist x 1; Increased time required;Verbal cues   Stand to Sit 4  Minimal assistance   Additional items Assist x 1; Increased time required;Verbal cues   Sit pivot 4  Minimal assistance Additional items Increased time required   Additional Comments Pt able to perform sit pivot transfer from bed to w/c at SUP level; requires MIN A x 1 for sit pivot from w/c to bed  Ambulation/Elevation   Ambulation/Elevation Additional Comments Pt declining to ambulate with assist from this PT, requesting assistance from another person as well as patient reports "I will fall " Unable to have assist x 2 at this time  Patient continues to decline ambulation with assistance only from one person depsite education and support  Wheelchair Activities   Wheelchair Cushion None   Pressure Relief Type Push up;Lateral lean;Self adjusts   Level of Assistance for Pressure Relief Activities Independent   Wheelchair Parts Management Yes   Left Brakes Level of Assistance Independent   Right Brakes Level of Assistance Independent   Balance   Static Sitting Fair +   Dynamic Sitting Fair   Static Standing Fair -   Dynamic Standing Fair -   Endurance Deficit   Endurance Deficit Yes   Endurance Deficit Description weakness and fatigue    Activity Tolerance   Activity Tolerance Patient limited by pain   Medical Staff Made Aware Spoke to CM   Nurse Made Aware Spoke to RN   Exercises   Hamstring Sets Sitting;20 reps;AROM; Bilateral  (with manual resistance)   Glute Sets Sitting;20 reps;AROM; Bilateral   Assessment   Prognosis Fair   Problem List Decreased strength;Decreased range of motion;Decreased endurance; Impaired balance;Decreased mobility; Decreased coordination; Impaired judgement; Impaired sensation;Pain   Assessment Patient seen for treatment focusing on bed mobility, functional transfers, and BLE strengthening  Patient reporting increased pain in R groin, but agreeable to work with PT as tolerated  Patient performs 4 STS from EOB with MIN A x 1, only able to sustain stance at RW w/ MIN A for approximately 10 seconds before spontaneously sitting  Patient reports his pain is limiting his standing tolerance   Able to tolerate LE ther-ex in sitting  Patient performing sit pivot transfers to and from w/c well, requires MIN A for w/c to bed as bed height is elevated compared to w/c height  Patient then reports he does not feel well and needs to lay down, asking PT to return later to attempt ambulation with assistance from another person as well  Patient educated on HEP to promote BLE strength and ROM with patient reporting understanding  Patient remains limited by impaired balance, decreased BLE strength, and impaired sensation affecting functional mobility  Barriers to Discharge Inaccessible home environment;Decreased caregiver support   Goals   Patient Goals get to rehab   STG Expiration Date 03/05/22   Short Term Goal #1 see goals on evaluation note; goals remain appropriate, goals extended as patient continues to have functional deficits   Plan   Treatment/Interventions ADL retraining;Functional transfer training;LE strengthening/ROM; Elevations; Therapeutic exercise; Endurance training;Patient/family training;Equipment eval/education; Bed mobility;Gait training;Spoke to nursing;Spoke to case management;OT   Progress Slow progress, decreased activity tolerance   PT Frequency 3-5x/wk   Recommendation   PT Discharge Recommendation Post acute rehabilitation services  (versus w/c accessible environment )   Equipment Recommended Wheelchair   Wheelchair Package Recommended Standard   AM-PAC Basic Mobility Inpatient   Turning in Bed Without Bedrails 4   Lying on Back to Sitting on Edge of Flat Bed 4   Moving Bed to Chair 3   Standing Up From Chair 3   Walk in Room 2   Climb 3-5 Stairs 1   Basic Mobility Inpatient Raw Score 17   Basic Mobility Standardized Score 39 67   Highest Level Of Mobility   -[facility] Goal 5: Stand one or more mins   Education   Education Provided Mobility training;Home exercise program   Patient Explanation/teachback used;Demonstrates verbal understanding   End of Consult   Patient Position at End of Consult Supine; All needs within reach     Recommendations                                                                                                              Pt will benefit from continued skilled IP PT to address the above mentioned impairments in order to maximize recovery and increase functional independence when completing mobility and ADLs  See flow sheet for goals and POC         Sarika Hawthorne PT, DPT

## 2022-02-23 NOTE — ASSESSMENT & PLAN NOTE
· MRI  [facility]): Severe spinal stenosis L3-L4, and L5-S1 with more moderate stenosis L2-  · Continue pain medications as above and PT

## 2022-02-23 NOTE — ASSESSMENT & PLAN NOTE
· Patient was to be discharged to the [facility] rescue mission however they will not take him back as he does not have any photo ID  · PT recommending post acute rehab placement

## 2022-02-23 NOTE — PLAN OF CARE
Problem: PHYSICAL THERAPY ADULT  Goal: Performs mobility at highest level of function for planned discharge setting  See evaluation for individualized goals  Description: Treatment/Interventions: ADL retraining,Functional transfer training,LE strengthening/ROM,Elevations,Therapeutic exercise,Endurance training,Patient/family training,Equipment eval/education,Bed mobility,Gait training,Spoke to nursing,Spoke to case management,OT  Equipment Recommended: Wheelchair       See flowsheet documentation for full assessment, interventions and recommendations  2/23/2022 1056 by Vilma Hinton PT  Outcome: Progressing  Note: Prognosis: Fair  Problem List: Decreased strength,Decreased range of motion,Decreased endurance,Impaired balance,Decreased mobility,Decreased coordination,Impaired judgement,Impaired sensation,Pain  Assessment: Patient seen for treatment focusing on bed mobility, functional transfers, and BLE strengthening  Patient reporting increased pain in R groin, but agreeable to work with PT as tolerated  Patient performs 4 STS from EOB with MIN A x 1, only able to sustain stance at RW w/ MIN A for approximately 10 seconds before spontaneously sitting  Patient reports his pain is limiting his standing tolerance  Able to tolerate LE ther-ex in sitting  Patient then reports he does not feel well and needs to lay down, asking PT to return later to attempt ambulation with assistance from another person as well  Patient educated on HEP to promote BLE strength and ROM with patient reporting understanding  Patient remains limited by impaired balance, decreased BLE strength, and impaired sensation affecting functional mobility  Barriers to Discharge: Inaccessible home environment,Decreased caregiver support        PT Discharge Recommendation: Post acute rehabilitation services (versus w/c accessible environment )          See flowsheet documentation for full assessment

## 2022-02-23 NOTE — PLAN OF CARE
Problem: PAIN - ADULT  Goal: Verbalizes/displays adequate comfort level or baseline comfort level  Description: Interventions:  - Encourage patient to monitor pain and request assistance  - Assess pain using appropriate pain scale  - Administer analgesics based on type and severity of pain and evaluate response  - Implement non-pharmacological measures as appropriate and evaluate response  - Consider cultural and social influences on pain and pain management  - Notify physician/advanced practitioner if interventions unsuccessful or patient reports new pain  Outcome: Progressing     Problem: INFECTION - ADULT  Goal: Absence or prevention of progression during hospitalization  Description: INTERVENTIONS:  - Assess and monitor for signs and symptoms of infection  - Monitor lab/diagnostic results  - Monitor all insertion sites, i e  indwelling lines, tubes, and drains  - Monitor endotracheal if appropriate and nasal secretions for changes in amount and color  - [place] appropriate cooling/warming therapies per order  - Administer medications as ordered  - Instruct and encourage patient and family to use good hand hygiene technique  - Identify and instruct in appropriate isolation precautions for identified infection/condition  Outcome: Progressing     Problem: SAFETY ADULT  Goal: Patient will remain free of falls  Description: INTERVENTIONS:  - Educate patient/family on patient safety including physical limitations  - Instruct patient to call for assistance with activity   - Consult OT/PT to assist with strengthening/mobility   - Keep Call bell within reach  - Keep bed low and locked with side rails adjusted as appropriate  - Keep care items and personal belongings within reach  - Initiate and maintain comfort rounds  - Make Fall Risk Sign visible to staff  - Apply yellow socks and bracelet for high fall risk patients  - Consider moving patient to room near nurses station  Outcome: Progressing  Goal: Maintain or return to baseline ADL function  Description: INTERVENTIONS:  -  Assess patient's ability to carry out ADLs; assess patient's baseline for ADL function and identify physical deficits which impact ability to perform ADLs (bathing, care of mouth/teeth, toileting, grooming, dressing, etc )  - Assess/evaluate cause of self-care deficits   - Assess range of motion  - Assess patient's mobility; develop plan if impaired  - Assess patient's need for assistive devices and provide as appropriate  - Encourage maximum independence but intervene and supervise when necessary  - Involve family in performance of ADLs  - Assess for home care needs following discharge   - Consider OT consult to assist with ADL evaluation and planning for discharge  - Provide patient education as appropriate  Outcome: Progressing  Goal: Maintains/Returns to pre admission functional level  Description: INTERVENTIONS:  - Perform BMAT or MOVE assessment daily    - Set and communicate daily mobility goal to care team and patient/family/caregiver     - Collaborate with rehabilitation services on mobility goals if consulted  - Out of bed for toileting  - Record patient progress and toleration of activity level   Outcome: Progressing     Problem: DISCHARGE PLANNING  Goal: Discharge to home or other facility with appropriate resources  Description: INTERVENTIONS:  - Identify barriers to discharge w/patient and caregiver  - Arrange for needed discharge resources and transportation as appropriate  - Identify discharge learning needs (meds, wound care, etc )  - Arrange for interpretive services to assist at discharge as needed  - Refer to Case Management Department for coordinating discharge planning if the patient needs post-hospital services based on physician/advanced practitioner order or complex needs related to functional status, cognitive ability, or social support system  Outcome: Progressing     Problem: Knowledge Deficit  Goal: Patient/family/caregiver demonstrates understanding of disease process, treatment plan, medications, and discharge instructions  Description: Complete learning assessment and assess knowledge base  Interventions:  - Provide teaching at level of understanding  - Provide teaching via preferred learning methods  Outcome: Progressing     Problem: MOBILITY - ADULT  Goal: Maintain or return to baseline ADL function  Description: INTERVENTIONS:  -  Assess patient's ability to carry out ADLs; assess patient's baseline for ADL function and identify physical deficits which impact ability to perform ADLs (bathing, care of mouth/teeth, toileting, grooming, dressing, etc )  - Assess/evaluate cause of self-care deficits   - Assess range of motion  - Assess patient's mobility; develop plan if impaired  - Assess patient's need for assistive devices and provide as appropriate  - Encourage maximum independence but intervene and supervise when necessary  - Involve family in performance of ADLs  - Assess for home care needs following discharge   - Consider OT consult to assist with ADL evaluation and planning for discharge  - Provide patient education as appropriate  Outcome: Progressing  Goal: Maintains/Returns to pre admission functional level  Description: INTERVENTIONS:  - Perform BMAT or MOVE assessment daily    - Set and communicate daily mobility goal to care team and patient/family/caregiver     - Collaborate with rehabilitation services on mobility goals if consulted  - Out of bed for toileting  - Record patient progress and toleration of activity level   Outcome: Progressing     Problem: Potential for Falls  Goal: Patient will remain free of falls  Description: INTERVENTIONS:  - Educate patient/family on patient safety including physical limitations  - Instruct patient to call for assistance with activity   - Consult OT/PT to assist with strengthening/mobility   - Keep Call bell within reach  - Keep bed low and locked with side rails adjusted as appropriate  - Keep care items and personal belongings within reach  - Initiate and maintain comfort rounds  - Make Fall Risk Sign visible to staff  - Apply yellow socks and bracelet for high fall risk patients  - Consider moving patient to room near nurses station  Outcome: Progressing     Problem: Prexisting or High Potential for Compromised Skin Integrity  Goal: Skin integrity is maintained or improved  Description: INTERVENTIONS:  - Identify patients at risk for skin breakdown  - Assess and monitor skin integrity  - Assess and monitor nutrition and hydration status  - Monitor labs   - Assess for incontinence   - Turn and reposition patient  - Assist with mobility/ambulation  - Relieve pressure over bony prominences  - Avoid friction and shearing  - Provide appropriate hygiene as needed including keeping skin clean and dry  - Evaluate need for skin moisturizer/barrier cream  - Collaborate with interdisciplinary team   - Patient/family teaching  - Consider wound care consult   Outcome: Progressing

## 2022-02-23 NOTE — CASE MANAGEMENT
Case Management Discharge Planning Note    Patient name Na Eduardo  Location 7T /7T -28 MRN [id number]  : 1965 Date 2022       Current Admission Date: 2021  Current Admission Diagnosis:Ambulatory dysfunction   Patient Active Problem List    Diagnosis Date Noted    Spinal stenosis 2022    Constipation 2022    Strange and inexplicable behavior     Onychomycosis 2022    HTN, goal below 140/90 2022    Psoriasis, unspecified 2022    Ambulatory dysfunction 2022    Homeless 2021      LOS (days): 55  Geometric Mean LOS (GMLOS) (days): 3 20  Days to GMLOS:-51 7     OBJECTIVE:  Risk of Unplanned Readmission Score: 18         Current admission status: Inpatient   Preferred Pharmacy:   [address]  [address]  [address]  Phone: [phone] Fax: [phone]    [facility],[address], [address]  [address]  Phone: [phone] Fax: [phone]    Primary Care Provider: Myles Cm MD    Primary Insurance: [facility]  Secondary Insurance:     DISCHARGE DETAILS: CM spoke with pt at bedside and  Also spoke with pt's sister  Roman Samayoa   [phone] (H)  CM notified Rob Wray that we have expanded search to [place] radius  and we are not able to find any SNF placement for the pt due to no beds availability, care exceed capacity, no set disposition  Pt's Sister stated that [place] [facility] [place] and waiting is around 3-6 months above  Sister is not able to take care of the pt as she works full-time job     CM contacted personal care home but most personal care home is not able to accept this pt due to pt being wheel chair bounded and one person assist    CM  contacted Shelters but most shelters requires pt to have photo ID which pt does not have; another barrier is that  Pt is wheel chair bounded    CM department will continue to follow thorough pt's D/C Statement Selected

## 2022-02-23 NOTE — PLAN OF CARE
Problem: PAIN - ADULT  Goal: Verbalizes/displays adequate comfort level or baseline comfort level  Description: Interventions:  - Encourage patient to monitor pain and request assistance  - Assess pain using appropriate pain scale  - Administer analgesics based on type and severity of pain and evaluate response  - Implement non-pharmacological measures as appropriate and evaluate response  - Consider cultural and social influences on pain and pain management  - Notify physician/advanced practitioner if interventions unsuccessful or patient reports new pain  Outcome: Progressing     Problem: INFECTION - ADULT  Goal: Absence or prevention of progression during hospitalization  Description: INTERVENTIONS:  - Assess and monitor for signs and symptoms of infection  - Monitor lab/diagnostic results  - Monitor all insertion sites, i e  indwelling lines, tubes, and drains  - Monitor endotracheal if appropriate and nasal secretions for changes in amount and color  - [place] appropriate cooling/warming therapies per order  - Administer medications as ordered  - Instruct and encourage patient and family to use good hand hygiene technique  - Identify and instruct in appropriate isolation precautions for identified infection/condition  Outcome: Progressing     Problem: Prexisting or High Potential for Compromised Skin Integrity  Goal: Skin integrity is maintained or improved  Description: INTERVENTIONS:  - Identify patients at risk for skin breakdown  - Assess and monitor skin integrity  - Assess and monitor nutrition and hydration status  - Monitor labs   - Assess for incontinence   - Turn and reposition patient  - Assist with mobility/ambulation  - Relieve pressure over bony prominences  - Avoid friction and shearing  - Provide appropriate hygiene as needed including keeping skin clean and dry  - Evaluate need for skin moisturizer/barrier cream  - Collaborate with interdisciplinary team   - Patient/family teaching  - Consider wound care consult   Outcome: Progressing

## 2022-02-23 NOTE — PROGRESS NOTES
[facility]  Progress Note Janna Cardoso 1965, 60 y o  male MRN: [id number]  Unit/Bed#: 7T [facility] 711-01 Encounter: [id number]  Primary Care Provider: Bettie Howard MD   Date and time admitted to hospital: 12/29/2021  5:38 PM    * Ambulatory dysfunction  Assessment & Plan  · Continue gabapentin 400mg t i d, robaxin 500mg Q6H PRN, and oxycodone 5mg Q6H PRN  · PT/OT recommending post acute rehab  · Case management following to aide in discharge planning  · Patient remains medically stable for discharge     Onychomycosis  Assessment & Plan  · Present on almost all toes and fingers  · Podiatry performed nail debridement on 01/27  · Continue Terbinafine 250 mg PO daily for 12 weeks (Ordered through 4/21)  · Check CMP weekly (Next check 2/27/22)    Spinal stenosis  Assessment & Plan  · MRI  [facility]): Severe spinal stenosis L3-L4, and L5-S1 with more moderate stenosis L2-  · Continue pain medications as above and PT    Strange and inexplicable behavior  Assessment & Plan  · Patient with hyper fixation on fungal infection arms (which was treated)  · Psychiatry consulted, not appropriate for inpatient psychiatry unit at this time  · Continue Abilify 10 mg daily and Atarax 25 mg p o  Q 6 hours p r n  For anxiety      HTN, goal below 140/90  Assessment & Plan  · Continue amlodipine 10 mg daily and lisinopril 20 mg daily  Homeless  Assessment & Plan  · Patient was to be discharged to the [facility] rescue mission however they will not take him back as he does not have any photo ID  · PT recommending post acute rehab placement          VTE Pharmacologic Prophylaxis: VTE Score: 3 Lovenox    Patient Centered Rounds: I performed bedside rounds with nursing staff today  Discussions with Specialists or Other Care Team Provider:  None    Education and Discussions with Family / Patient:  Discussed with patient    Time Spent for Care: 30 minutes   More than 50% of total time spent on counseling and coordination of care as described above  Current Length of Stay: 55 day(s)  Current Patient Status: Inpatient   Certification Statement:  Medically stable for discharge, pending placement acute care rehab  Discharge Plan:  Pending placement    Code Status: Level 1 - Full Code    Subjective:   Patient seen examined at bedside  No acute events overnight  Objective:     Vitals:   Temp (24hrs), Av 7 °F (36 5 °C), Min:97 5 °F (36 4 °C), Max:97 9 °F (36 6 °C)    Temp:  [97 5 °F (36 4 °C)-97 9 °F (36 6 °C)] 97 9 °F (36 6 °C)  HR:  [64-75] 64  Resp:  [18-20] 18  BP: (120-157)/(65-86) 120/65  SpO2:  [97 %-98 %] 98 %  Body mass index is 23 82 kg/m²  Input and Output Summary (last 24 hours): Intake/Output Summary (Last 24 hours) at 2022 1008  Last data filed at 2022 0900  Gross per 24 hour   Intake 840 ml   Output 1600 ml   Net -760 ml       Physical Exam:   Physical Exam  Constitutional:       General: He is not in acute distress  HENT:      Head: Normocephalic  Cardiovascular:      Rate and Rhythm: Normal rate and regular rhythm  Pulses: Normal pulses  Heart sounds: Normal heart sounds  Pulmonary:      Effort: Pulmonary effort is normal       Breath sounds: Normal breath sounds  Abdominal:      General: Abdomen is flat  Bowel sounds are normal       Palpations: Abdomen is soft  Musculoskeletal:         General: Normal range of motion  Cervical back: Normal range of motion  Skin:     General: Skin is warm and dry  Neurological:      General: No focal deficit present  Mental Status: He is alert and oriented to person, place, and time  Mental status is at baseline  Psychiatric:         Mood and Affect: Mood normal          Behavior: Behavior normal          Thought Content:  Thought content normal          Judgment: Judgment normal           Additional Data:     Labs:  Results from last 7 days   Lab Units 22  0528   WBC Thousand/uL 6 00   HEMOGLOBIN g/dL 12 2*   HEMATOCRIT % 36 3*   PLATELETS Thousands/uL 346     Results from last 7 days   Lab Units 02/20/22  0528   SODIUM mmol/L 140   POTASSIUM mmol/L 4 0   CHLORIDE mmol/L 105   CO2 mmol/L 29   BUN mg/dL 23   CREATININE mg/dL 0 73   ANION GAP mmol/L 6   CALCIUM mg/dL 8 5   ALBUMIN g/dL 4 1   TOTAL BILIRUBIN mg/dL 0 39   ALK PHOS U/L 101   ALT U/L 11   AST U/L 92*   GLUCOSE RANDOM mg/dL 100*                       Lines/Drains:  Invasive Devices  Report    None                       Imaging: No pertinent imaging reviewed      Recent Cultures (last 7 days):         Last 24 Hours Medication List:   Current Facility-Administered Medications   Medication Dose Route Frequency Provider Last Rate    acetaminophen  650 mg Oral Q4H PRN Elie Reyes PA-C      amLODIPine  10 mg Oral Daily Hallowell Prows, DO      ARIPiprazole  10 mg Oral Daily [facility], DO      carbamide peroxide  5 drop Both Ears BID PRN Kaz York MD      diphenhydrAMINE  25 mg Oral Q6H PRN Hallowell Prows, DO      enoxaparin  40 mg Subcutaneous Q24H [facility], DO      gabapentin  400 mg Oral TID [facility], DO      hydrocortisone   Topical 4x Daily PRN [facility], DO      hydrOXYzine HCL  25 mg Oral Q6H PRN DOV Mazariegos      lisinopril  20 mg Oral Daily [facility], [facility]      methocarbamol  500 mg Oral Q6H PRN Laurita Rodas MD      nicotine  1 patch Transdermal Daily [place]      nystatin   Topical BID Laurita Rodas MD      ondansetron  4 mg Oral Q6H PRN Hallowell Prows, DO      oxyCODONE  5 mg Oral Q6H PRN [facility], DO      polyethylene glycol  17 g Oral Daily Hallowell Prows, DO      senna-docusate sodium  1 tablet Oral BID Hallowell Prows, DO      terbinafine  250 mg Oral Daily Hallowell Prows, DO      triamcinolone   Topical BID Laurita Rodas MD      white petrolatum-mineral oil   Topical TID PRN Solange Carr DO          Today, Patient Was Seen By: Td Nina, DO    **Please Note: This note may have been constructed using a voice recognition system  **

## 2022-02-24 PROCEDURE — 99232 SBSQ HOSP IP/OBS MODERATE 35: CPT | Performed by: STUDENT IN AN ORGANIZED HEALTH CARE EDUCATION/TRAINING PROGRAM

## 2022-02-24 RX ADMIN — HYDROXYZINE HYDROCHLORIDE 25 MG: 25 TABLET ORAL at 09:28

## 2022-02-24 RX ADMIN — TRIAMCINOLONE ACETONIDE: 1 CREAM TOPICAL at 09:28

## 2022-02-24 RX ADMIN — SENNOSIDES AND DOCUSATE SODIUM 1 TABLET: 50; 8.6 TABLET ORAL at 17:12

## 2022-02-24 RX ADMIN — ACETAMINOPHEN 650 MG: 325 TABLET ORAL at 20:42

## 2022-02-24 RX ADMIN — NYSTATIN: 100000 POWDER TOPICAL at 09:28

## 2022-02-24 RX ADMIN — METHOCARBAMOL 500 MG: 500 TABLET, FILM COATED ORAL at 20:42

## 2022-02-24 RX ADMIN — POLYETHYLENE GLYCOL 3350 17 G: 17 POWDER, FOR SOLUTION ORAL at 09:35

## 2022-02-24 RX ADMIN — OXYCODONE HYDROCHLORIDE 5 MG: 5 TABLET ORAL at 09:28

## 2022-02-24 RX ADMIN — GABAPENTIN 400 MG: 400 CAPSULE ORAL at 16:20

## 2022-02-24 RX ADMIN — NYSTATIN: 100000 POWDER TOPICAL at 17:12

## 2022-02-24 RX ADMIN — SENNOSIDES AND DOCUSATE SODIUM 1 TABLET: 50; 8.6 TABLET ORAL at 09:28

## 2022-02-24 RX ADMIN — GABAPENTIN 400 MG: 400 CAPSULE ORAL at 09:28

## 2022-02-24 RX ADMIN — GABAPENTIN 400 MG: 400 CAPSULE ORAL at 20:42

## 2022-02-24 RX ADMIN — LISINOPRIL 20 MG: 20 TABLET ORAL at 09:28

## 2022-02-24 RX ADMIN — TRIAMCINOLONE ACETONIDE: 1 CREAM TOPICAL at 17:12

## 2022-02-24 RX ADMIN — AMLODIPINE BESYLATE 10 MG: 10 TABLET ORAL at 09:28

## 2022-02-24 RX ADMIN — HYDROCORTISONE: 25 OINTMENT TOPICAL at 09:28

## 2022-02-24 RX ADMIN — OXYCODONE HYDROCHLORIDE 5 MG: 5 TABLET ORAL at 16:20

## 2022-02-24 RX ADMIN — HYDROCORTISONE: 25 OINTMENT TOPICAL at 20:46

## 2022-02-24 RX ADMIN — ARIPIPRAZOLE 10 MG: 10 TABLET ORAL at 09:28

## 2022-02-24 RX ADMIN — TERBINAFINE HYDROCHLORIDE 250 MG: 250 TABLET ORAL at 09:28

## 2022-02-24 NOTE — ASSESSMENT & PLAN NOTE
· Patient was to be discharged to the [facility] [address] rescue mission however they will not take him back as he does not have any photo ID  · PT recommending post acute rehab placement

## 2022-02-24 NOTE — PROGRESS NOTES
[facility]  Progress Note Raegan David 1965, 60 y o  male MRN: [id number]  Unit/Bed#: 7T [facility] 711-01 Encounter: [id number]  Primary Care Provider: Kristin Ward MD   Date and time admitted to hospital: 12/29/2021  5:38 PM    * Ambulatory dysfunction  Assessment & Plan  · Continue gabapentin 400mg t i d, robaxin 500mg Q6H PRN, and oxycodone 5mg Q6H PRN  · PT/OT recommending post acute rehab  · Case management following to aide in discharge planning  · Patient remains medically stable for discharge     Onychomycosis  Assessment & Plan  · Present on almost all toes and fingers  · Podiatry performed nail debridement on 01/27  · Continue Terbinafine 250 mg PO daily for 12 weeks (Ordered through 4/21)  · Check CMP weekly (Next check 2/27/22)    Spinal stenosis  Assessment & Plan  · MRI  [facility]): Severe spinal stenosis L3-L4, and L5-S1 with more moderate stenosis L2-  · Continue pain medications as above and PT    Strange and inexplicable behavior  Assessment & Plan  · Patient with hyper fixation on fungal infection arms (which was treated)  · Psychiatry consulted, not appropriate for inpatient psychiatry unit at this time  · Continue Abilify 10 mg daily and Atarax 25 mg p o  Q 6 hours p r n  For anxiety      HTN, goal below 140/90  Assessment & Plan  · Continue amlodipine 10 mg daily and lisinopril 20 mg daily  Homeless  Assessment & Plan  · Patient was to be discharged to [facility] rescue [facility] however they will not take him back as he does not have any photo ID  · PT recommending post acute rehab placement        VTE Pharmacologic Prophylaxis: VTE Score: 3 Moderate Risk (Score 3-4) - Pharmacological DVT Prophylaxis Ordered: enoxaparin (Lovenox)  Patient Centered Rounds: I performed bedside rounds with nursing staff today    Discussions with Specialists or Other Care Team Provider: None    Education and Discussions with Family / Patient:  Discussed with patient, all questions answered    Time Spent for Care: 30 minutes  More than 50% of total time spent on counseling and coordination of care as described above  Current Length of Stay: 56 day(s)  Current Patient Status: Inpatient   Certification Statement: The patient will continue to require additional inpatient hospital stay due to Medically stable for discharge, pending placement to acute care rehab  Discharge Plan: Medically stable for discharge, pending placement    Code Status: Level 1 - Full Code    Subjective:   Patient seen examined at bedside  Patient no acute events overnight  Objective:     Vitals:   Temp (24hrs), Av °F (36 7 °C), Min:97 2 °F (36 2 °C), Max:98 5 °F (36 9 °C)    Temp:  [97 2 °F (36 2 °C)-98 5 °F (36 9 °C)] 97 2 °F (36 2 °C)  HR:  [59-63] 60  Resp:  [18-20] 20  BP: (128-148)/(74-80) 143/74  SpO2:  [95 %-96 %] 96 %  Body mass index is 23 82 kg/m²  Input and Output Summary (last 24 hours): Intake/Output Summary (Last 24 hours) at 2022 1112  Last data filed at 2022 8971  Gross per 24 hour   Intake 480 ml   Output 1700 ml   Net -1220 ml       Physical Exam:   Physical Exam  Constitutional:       General: He is not in acute distress  Appearance: He is not ill-appearing  Cardiovascular:      Rate and Rhythm: Normal rate and regular rhythm  Pulses: Normal pulses  Heart sounds: Normal heart sounds  Pulmonary:      Effort: Pulmonary effort is normal       Breath sounds: Normal breath sounds  Abdominal:      General: Abdomen is flat  Bowel sounds are normal       Palpations: Abdomen is soft  Musculoskeletal:         General: Normal range of motion  Cervical back: Normal range of motion  Skin:     General: Skin is warm and dry  Neurological:      General: No focal deficit present  Mental Status: He is alert  Mental status is at baseline  Psychiatric:         Mood and Affect: Mood normal          Thought Content:  Thought content normal  Judgment: Judgment normal           Additional Data:     Labs:  Results from last 7 days   Lab Units 02/20/22  0528   WBC Thousand/uL 6 00   HEMOGLOBIN g/dL 12 2*   HEMATOCRIT % 36 3*   PLATELETS Thousands/uL 346     Results from last 7 days   Lab Units 02/20/22  0528   SODIUM mmol/L 140   POTASSIUM mmol/L 4 0   CHLORIDE mmol/L 105   CO2 mmol/L 29   BUN mg/dL 23   CREATININE mg/dL 0 73   ANION GAP mmol/L 6   CALCIUM mg/dL 8 5   ALBUMIN g/dL 4 1   TOTAL BILIRUBIN mg/dL 0 39   ALK PHOS U/L 101   ALT U/L 11   AST U/L 92*   GLUCOSE RANDOM mg/dL 100*                       Lines/Drains:  Invasive Devices  Report    None                       Imaging: No pertinent imaging reviewed      Recent Cultures (last 7 days):         Last 24 Hours Medication List:   Current Facility-Administered Medications   Medication Dose Route Frequency Provider Last Rate    acetaminophen  650 mg Oral Q4H PRN Yue Aceevdo PA-C      amLODIPine  10 mg Oral Daily Michael Graff, DO      ARIPiprazole  10 mg Oral Daily [facility], DO      carbamide peroxide  5 drop Both Ears BID PRN Rhett Baltazar MD      diphenhydrAMINE  25 mg Oral Q6H PRN Michael Graff, DO      enoxaparin  40 mg Subcutaneous Q24H [facility], DO      gabapentin  400 mg Oral TID [facility], DO      hydrocortisone   Topical 4x Daily PRN [facility], DO      hydrOXYzine HCL  25 mg Oral Q6H PRN DOV Last      lisinopril  20 mg Oral Daily [place], [place]      methocarbamol  500 mg Oral Q6H PRN Maty Bailey MD      nicotine  1 patch Transdermal Daily [place], [place]      nystatin   Topical BID Maty Bailey MD      ondansetron  4 mg Oral Q6H PRN Michael Graff, DO      oxyCODONE  5 mg Oral Q6H PRN [facility], DO      polyethylene glycol  17 g Oral Daily Michael Graff, DO      senna-docusate sodium  1 tablet Oral BID Michael Graff, DO      terbinafine  250 mg Oral Daily Michael Graff, DO  triamcinolone   Topical BID Lawyer Luis MD      white petrolatum-mineral oil   Topical TID PRN John Perez DO          Today, Patient Was Seen By: Jeff Moore DO    **Please Note: This note may have been constructed using a voice recognition system  **

## 2022-02-24 NOTE — ASSESSMENT & PLAN NOTE
· MRI  [facility]): Severe spinal stenosis L3-L4, and L5-S1 with more moderate stenosis L2-  · Continue pain medications as above and PT

## 2022-02-24 NOTE — PLAN OF CARE
Problem: PAIN - ADULT  Goal: Verbalizes/displays adequate comfort level or baseline comfort level  Description: Interventions:  - Encourage patient to monitor pain and request assistance  - Assess pain using appropriate pain scale  - Administer analgesics based on type and severity of pain and evaluate response  - Implement non-pharmacological measures as appropriate and evaluate response  - Consider cultural and social influences on pain and pain management  - Notify physician/advanced practitioner if interventions unsuccessful or patient reports new pain  Outcome: Progressing     Problem: INFECTION - ADULT  Goal: Absence or prevention of progression during hospitalization  Description: INTERVENTIONS:  - Assess and monitor for signs and symptoms of infection  - Monitor lab/diagnostic results  - Monitor all insertion sites, i e  indwelling lines, tubes, and drains  - Monitor endotracheal if appropriate and nasal secretions for changes in amount and color  - [place] appropriate cooling/warming therapies per order  - Administer medications as ordered  - Instruct and encourage patient and family to use good hand hygiene technique  - Identify and instruct in appropriate isolation precautions for identified infection/condition  Outcome: Progressing     Problem: SAFETY ADULT  Goal: Patient will remain free of falls  Description: INTERVENTIONS:  - Educate patient/family on patient safety including physical limitations  - Instruct patient to call for assistance with activity   - Consult OT/PT to assist with strengthening/mobility   - Keep Call bell within reach  - Keep bed low and locked with side rails adjusted as appropriate  - Keep care items and personal belongings within reach  - Initiate and maintain comfort rounds  - Make Fall Risk Sign visible to staff  - Apply yellow socks and bracelet for high fall risk patients  - Consider moving patient to room near nurses station  Outcome: Progressing  Goal: Maintain or return to baseline ADL function  Description: INTERVENTIONS:  -  Assess patient's ability to carry out ADLs; assess patient's baseline for ADL function and identify physical deficits which impact ability to perform ADLs (bathing, care of mouth/teeth, toileting, grooming, dressing, etc )  - Assess/evaluate cause of self-care deficits   - Assess range of motion  - Assess patient's mobility; develop plan if impaired  - Assess patient's need for assistive devices and provide as appropriate  - Encourage maximum independence but intervene and supervise when necessary  - Involve family in performance of ADLs  - Assess for home care needs following discharge   - Consider OT consult to assist with ADL evaluation and planning for discharge  - Provide patient education as appropriate  Outcome: Progressing  Goal: Maintains/Returns to pre admission functional level  Description: INTERVENTIONS:  - Perform BMAT or MOVE assessment daily    - Set and communicate daily mobility goal to care team and patient/family/caregiver     - Collaborate with rehabilitation services on mobility goals if consulted  - Out of bed for toileting  - Record patient progress and toleration of activity level   Outcome: Progressing     Problem: DISCHARGE PLANNING  Goal: Discharge to home or other facility with appropriate resources  Description: INTERVENTIONS:  - Identify barriers to discharge w/patient and caregiver  - Arrange for needed discharge resources and transportation as appropriate  - Identify discharge learning needs (meds, wound care, etc )  - Arrange for interpretive services to assist at discharge as needed  - Refer to Case Management Department for coordinating discharge planning if the patient needs post-hospital services based on physician/advanced practitioner order or complex needs related to functional status, cognitive ability, or social support system  Outcome: Progressing     Problem: Knowledge Deficit  Goal: Patient/family/caregiver demonstrates understanding of disease process, treatment plan, medications, and discharge instructions  Description: Complete learning assessment and assess knowledge base  Interventions:  - Provide teaching at level of understanding  - Provide teaching via preferred learning methods  Outcome: Progressing     Problem: MOBILITY - ADULT  Goal: Maintain or return to baseline ADL function  Description: INTERVENTIONS:  -  Assess patient's ability to carry out ADLs; assess patient's baseline for ADL function and identify physical deficits which impact ability to perform ADLs (bathing, care of mouth/teeth, toileting, grooming, dressing, etc )  - Assess/evaluate cause of self-care deficits   - Assess range of motion  - Assess patient's mobility; develop plan if impaired  - Assess patient's need for assistive devices and provide as appropriate  - Encourage maximum independence but intervene and supervise when necessary  - Involve family in performance of ADLs  - Assess for home care needs following discharge   - Consider OT consult to assist with ADL evaluation and planning for discharge  - Provide patient education as appropriate  Outcome: Progressing  Goal: Maintains/Returns to pre admission functional level  Description: INTERVENTIONS:  - Perform BMAT or MOVE assessment daily    - Set and communicate daily mobility goal to care team and patient/family/caregiver     - Collaborate with rehabilitation services on mobility goals if consulted  - Out of bed for toileting  - Record patient progress and toleration of activity level   Outcome: Progressing     Problem: Potential for Falls  Goal: Patient will remain free of falls  Description: INTERVENTIONS:  - Educate patient/family on patient safety including physical limitations  - Instruct patient to call for assistance with activity   - Consult OT/PT to assist with strengthening/mobility   - Keep Call bell within reach  - Keep bed low and locked with side rails adjusted as appropriate  - Keep care items and personal belongings within reach  - Initiate and maintain comfort rounds  - Make Fall Risk Sign visible to staff  - Apply yellow socks and bracelet for high fall risk patients  - Consider moving patient to room near nurses station  Outcome: Progressing     Problem: Prexisting or High Potential for Compromised Skin Integrity  Goal: Skin integrity is maintained or improved  Description: INTERVENTIONS:  - Identify patients at risk for skin breakdown  - Assess and monitor skin integrity  - Assess and monitor nutrition and hydration status  - Monitor labs   - Assess for incontinence   - Turn and reposition patient  - Assist with mobility/ambulation  - Relieve pressure over bony prominences  - Avoid friction and shearing  - Provide appropriate hygiene as needed including keeping skin clean and dry  - Evaluate need for skin moisturizer/barrier cream  - Collaborate with interdisciplinary team   - Patient/family teaching  - Consider wound care consult   Outcome: Progressing     Problem: PAIN - ADULT  Goal: Verbalizes/displays adequate comfort level or baseline comfort level  Description: Interventions:  - Encourage patient to monitor pain and request assistance  - Assess pain using appropriate pain scale  - Administer analgesics based on type and severity of pain and evaluate response  - Implement non-pharmacological measures as appropriate and evaluate response  - Consider cultural and social influences on pain and pain management  - Notify physician/advanced practitioner if interventions unsuccessful or patient reports new pain  2/24/2022 0045 by Maranda Back  Outcome: Progressing  2/24/2022 0044 by Maranda Back  Outcome: Progressing     Problem: INFECTION - ADULT  Goal: Absence or prevention of progression during hospitalization  Description: INTERVENTIONS:  - Assess and monitor for signs and symptoms of infection  - Monitor lab/diagnostic results  - Monitor all insertion sites, i e  indwelling lines, tubes, and drains  - Monitor endotracheal if appropriate and nasal secretions for changes in amount and color  - [place] appropriate cooling/warming therapies per order  - Administer medications as ordered  - Instruct and encourage patient and family to use good hand hygiene technique  - Identify and instruct in appropriate isolation precautions for identified infection/condition  2/24/2022 0045 by Deepa Guerra  Outcome: Progressing  2/24/2022 0044 by Deepa Guerra  Outcome: Progressing     Problem: SAFETY ADULT  Goal: Patient will remain free of falls  Description: INTERVENTIONS:  - Educate patient/family on patient safety including physical limitations  - Instruct patient to call for assistance with activity   - Consult OT/PT to assist with strengthening/mobility   - Keep Call bell within reach  - Keep bed low and locked with side rails adjusted as appropriate  - Keep care items and personal belongings within reach  - Initiate and maintain comfort rounds  - Make Fall Risk Sign visible to staff  - Apply yellow socks and bracelet for high fall risk patients  - Consider moving patient to room near nurses station  2/24/2022 0045 by Deepa Guerra  Outcome: Progressing  2/24/2022 0044 by Deepa Guerra  Outcome: Progressing  Goal: Maintain or return to baseline ADL function  Description: INTERVENTIONS:  -  Assess patient's ability to carry out ADLs; assess patient's baseline for ADL function and identify physical deficits which impact ability to perform ADLs (bathing, care of mouth/teeth, toileting, grooming, dressing, etc )  - Assess/evaluate cause of self-care deficits   - Assess range of motion  - Assess patient's mobility; develop plan if impaired  - Assess patient's need for assistive devices and provide as appropriate  - Encourage maximum independence but intervene and supervise when necessary  - Involve family in performance of ADLs  - Assess for home care needs following discharge   - Consider OT consult to assist with ADL evaluation and planning for discharge  - Provide patient education as appropriate  2/24/2022 0045 by Elenor Alissa  Outcome: Progressing  2/24/2022 0044 by Elenor Alissa  Outcome: Progressing  Goal: Maintains/Returns to pre admission functional level  Description: INTERVENTIONS:  - Perform BMAT or MOVE assessment daily    - Set and communicate daily mobility goal to care team and patient/family/caregiver  - Collaborate with rehabilitation services on mobility goals if consulted  - Out of bed for toileting  - Record patient progress and toleration of activity level   2/24/2022 0045 by Elenor Alissa  Outcome: Progressing  2/24/2022 0044 by Elenor Alissa  Outcome: Progressing     Problem: DISCHARGE PLANNING  Goal: Discharge to home or other facility with appropriate resources  Description: INTERVENTIONS:  - Identify barriers to discharge w/patient and caregiver  - Arrange for needed discharge resources and transportation as appropriate  - Identify discharge learning needs (meds, wound care, etc )  - Arrange for interpretive services to assist at discharge as needed  - Refer to Case Management Department for coordinating discharge planning if the patient needs post-hospital services based on physician/advanced practitioner order or complex needs related to functional status, cognitive ability, or social support system  2/24/2022 0045 by Elenor Alissa  Outcome: Progressing  2/24/2022 0044 by Elenor locr  Outcome: Progressing     Problem: Knowledge Deficit  Goal: Patient/family/caregiver demonstrates understanding of disease process, treatment plan, medications, and discharge instructions  Description: Complete learning assessment and assess knowledge base    Interventions:  - Provide teaching at level of understanding  - Provide teaching via preferred learning methods  2/24/2022 0045 by Paula Maxwell  Outcome: Progressing  2/24/2022 0044 by Elenor Alissa  Outcome: Progressing Problem: MOBILITY - ADULT  Goal: Maintain or return to baseline ADL function  Description: INTERVENTIONS:  -  Assess patient's ability to carry out ADLs; assess patient's baseline for ADL function and identify physical deficits which impact ability to perform ADLs (bathing, care of mouth/teeth, toileting, grooming, dressing, etc )  - Assess/evaluate cause of self-care deficits   - Assess range of motion  - Assess patient's mobility; develop plan if impaired  - Assess patient's need for assistive devices and provide as appropriate  - Encourage maximum independence but intervene and supervise when necessary  - Involve family in performance of ADLs  - Assess for home care needs following discharge   - Consider OT consult to assist with ADL evaluation and planning for discharge  - Provide patient education as appropriate  2/24/2022 0045 by Madelaine Drew  Outcome: Progressing  2/24/2022 0044 by Madelaine Drew  Outcome: Progressing  Goal: Maintains/Returns to pre admission functional level  Description: INTERVENTIONS:  - Perform BMAT or MOVE assessment daily    - Set and communicate daily mobility goal to care team and patient/family/caregiver     - Collaborate with rehabilitation services on mobility goals if consulted  - Out of bed for toileting  - Record patient progress and toleration of activity level   2/24/2022 0045 by Madelaine Drew  Outcome: Progressing  2/24/2022 0044 by Madelaine Drew  Outcome: Progressing     Problem: Potential for Falls  Goal: Patient will remain free of falls  Description: INTERVENTIONS:  - Educate patient/family on patient safety including physical limitations  - Instruct patient to call for assistance with activity   - Consult OT/PT to assist with strengthening/mobility   - Keep Call bell within reach  - Keep bed low and locked with side rails adjusted as appropriate  - Keep care items and personal belongings within reach  - Initiate and maintain comfort rounds  - Make Fall Risk Sign visible to staff  - Apply yellow socks and bracelet for high fall risk patients  - Consider moving patient to room near nurses station  2/24/2022 0045 by Angel Sharma  Outcome: Progressing  2/24/2022 0044 by Angel Sharma  Outcome: Progressing     Problem: Prexisting or High Potential for Compromised Skin Integrity  Goal: Skin integrity is maintained or improved  Description: INTERVENTIONS:  - Identify patients at risk for skin breakdown  - Assess and monitor skin integrity  - Assess and monitor nutrition and hydration status  - Monitor labs   - Assess for incontinence   - Turn and reposition patient  - Assist with mobility/ambulation  - Relieve pressure over bony prominences  - Avoid friction and shearing  - Provide appropriate hygiene as needed including keeping skin clean and dry  - Evaluate need for skin moisturizer/barrier cream  - Collaborate with interdisciplinary team   - Patient/family teaching  - Consider wound care consult   2/24/2022 0045 by Angel Sharma  Outcome: Progressing  2/24/2022 0044 by Angel Sharma  Outcome: Progressing

## 2022-02-24 NOTE — CASE MANAGEMENT
PROGRESS NOTE: CM contacted [facility] to see if [facility] can accept the pt next week and if there are any bed available as pt needs therapy  Pt might have a discharge plans once he receives therapy, currently pt requested to find a suitable SNF  CM has expanded the search but there are no beds available, or some SNFs does not accept pt's insurance, other SNfs stated that pt's care exceeds capacity  Pt has been waiting for a SNF bed availability since past 56 days     CM department will continue to follow through pt's D/C

## 2022-02-24 NOTE — PLAN OF CARE
Problem: PAIN - ADULT  Goal: Verbalizes/displays adequate comfort level or baseline comfort level  Description: Interventions:  - Encourage patient to monitor pain and request assistance  - Assess pain using appropriate pain scale  - Administer analgesics based on type and severity of pain and evaluate response  - Implement non-pharmacological measures as appropriate and evaluate response  - Consider cultural and social influences on pain and pain management  - Notify physician/advanced practitioner if interventions unsuccessful or patient reports new pain  Outcome: Progressing     Problem: INFECTION - ADULT  Goal: Absence or prevention of progression during hospitalization  Description: INTERVENTIONS:  - Assess and monitor for signs and symptoms of infection  - Monitor lab/diagnostic results  - Monitor all insertion sites, i e  indwelling lines, tubes, and drains  - Monitor endotracheal if appropriate and nasal secretions for changes in amount and color  - [place] appropriate cooling/warming therapies per order  - Administer medications as ordered  - Instruct and encourage patient and family to use good hand hygiene technique  - Identify and instruct in appropriate isolation precautions for identified infection/condition  Outcome: Progressing

## 2022-02-25 ENCOUNTER — PATIENT OUTREACH (OUTPATIENT)
Dept: OTHER | Facility: OTHER | Age: 57
End: 2022-02-25

## 2022-02-25 PROCEDURE — 97110 THERAPEUTIC EXERCISES: CPT

## 2022-02-25 PROCEDURE — 97116 GAIT TRAINING THERAPY: CPT

## 2022-02-25 PROCEDURE — 99232 SBSQ HOSP IP/OBS MODERATE 35: CPT | Performed by: STUDENT IN AN ORGANIZED HEALTH CARE EDUCATION/TRAINING PROGRAM

## 2022-02-25 PROCEDURE — 97530 THERAPEUTIC ACTIVITIES: CPT

## 2022-02-25 RX ADMIN — NYSTATIN: 100000 POWDER TOPICAL at 09:24

## 2022-02-25 RX ADMIN — METHOCARBAMOL 500 MG: 500 TABLET, FILM COATED ORAL at 09:25

## 2022-02-25 RX ADMIN — HYDROXYZINE HYDROCHLORIDE 25 MG: 25 TABLET ORAL at 16:49

## 2022-02-25 RX ADMIN — HYDROCORTISONE: 25 OINTMENT TOPICAL at 09:26

## 2022-02-25 RX ADMIN — OXYCODONE HYDROCHLORIDE 5 MG: 5 TABLET ORAL at 11:50

## 2022-02-25 RX ADMIN — METHOCARBAMOL 500 MG: 500 TABLET, FILM COATED ORAL at 21:57

## 2022-02-25 RX ADMIN — ACETAMINOPHEN 650 MG: 325 TABLET ORAL at 05:01

## 2022-02-25 RX ADMIN — TERBINAFINE HYDROCHLORIDE 250 MG: 250 TABLET ORAL at 09:25

## 2022-02-25 RX ADMIN — TRIAMCINOLONE ACETONIDE: 1 CREAM TOPICAL at 09:25

## 2022-02-25 RX ADMIN — POLYETHYLENE GLYCOL 3350 17 G: 17 POWDER, FOR SOLUTION ORAL at 09:24

## 2022-02-25 RX ADMIN — OXYCODONE HYDROCHLORIDE 5 MG: 5 TABLET ORAL at 22:57

## 2022-02-25 RX ADMIN — TRIAMCINOLONE ACETONIDE: 1 CREAM TOPICAL at 18:14

## 2022-02-25 RX ADMIN — GABAPENTIN 400 MG: 400 CAPSULE ORAL at 16:48

## 2022-02-25 RX ADMIN — AMLODIPINE BESYLATE 10 MG: 10 TABLET ORAL at 09:25

## 2022-02-25 RX ADMIN — SENNOSIDES AND DOCUSATE SODIUM 1 TABLET: 50; 8.6 TABLET ORAL at 16:49

## 2022-02-25 RX ADMIN — HYDROXYZINE HYDROCHLORIDE 25 MG: 25 TABLET ORAL at 09:25

## 2022-02-25 RX ADMIN — LISINOPRIL 20 MG: 20 TABLET ORAL at 09:25

## 2022-02-25 RX ADMIN — OXYCODONE HYDROCHLORIDE 5 MG: 5 TABLET ORAL at 05:01

## 2022-02-25 RX ADMIN — GABAPENTIN 400 MG: 400 CAPSULE ORAL at 09:30

## 2022-02-25 RX ADMIN — ARIPIPRAZOLE 10 MG: 10 TABLET ORAL at 09:24

## 2022-02-25 RX ADMIN — GABAPENTIN 400 MG: 400 CAPSULE ORAL at 21:58

## 2022-02-25 RX ADMIN — SENNOSIDES AND DOCUSATE SODIUM 1 TABLET: 50; 8.6 TABLET ORAL at 09:24

## 2022-02-25 RX ADMIN — NYSTATIN: 100000 POWDER TOPICAL at 18:14

## 2022-02-25 RX ADMIN — HYDROXYZINE HYDROCHLORIDE 25 MG: 25 TABLET ORAL at 21:58

## 2022-02-25 RX ADMIN — METHOCARBAMOL 500 MG: 500 TABLET, FILM COATED ORAL at 16:49

## 2022-02-25 RX ADMIN — OXYCODONE HYDROCHLORIDE 5 MG: 5 TABLET ORAL at 18:13

## 2022-02-25 NOTE — ASSESSMENT & PLAN NOTE
· MRI  [facility]): Severe spinal stenosis L3-L4, and L5-S1 with more moderate stenosis L2-  · Continue pain medications as above and PT

## 2022-02-25 NOTE — PROGRESS NOTES
51 [facility]  Progress Note Rommel Stewart 1965, 60 y o  male MRN: [id number]  Unit/Bed#: 7T [facility] 711-01 Encounter: [id number]  Primary Care Provider: Myles Cm MD   Date and time admitted to hospital: 12/29/2021  5:38 PM    * Ambulatory dysfunction  Assessment & Plan  · Continue gabapentin 400mg t i d, robaxin 500mg Q6H PRN, and oxycodone 5mg Q6H PRN  · PT/OT recommending post acute rehab  · Case management following to aide in discharge planning  · Patient remains medically stable for discharge     Onychomycosis  Assessment & Plan  · Present on almost all toes and fingers  · Podiatry performed nail debridement on 01/27  · Continue Terbinafine 250 mg PO daily for 12 weeks (Ordered through 4/21)  · Check CMP weekly (Next check 2/27/22)    Spinal stenosis  Assessment & Plan  · MRI  [facility]): Severe spinal stenosis L3-L4, and L5-S1 with more moderate stenosis L2-  · Continue pain medications as above and PT    Strange and inexplicable behavior  Assessment & Plan  · Patient with hyper fixation on fungal infection arms (which was treated)  · Psychiatry consulted, not appropriate for inpatient psychiatry unit at this time  · Continue Abilify 10 mg daily and Atarax 25 mg p o  Q 6 hours p r n  For anxiety      HTN, goal below 140/90  Assessment & Plan  · Continue amlodipine 10 mg daily and lisinopril 20 mg daily  Homeless  Assessment & Plan  · Patient was to be discharged to the [facility] rescue mission however they will not take him back as he does not have any photo ID  · PT recommending post acute rehab placement          VTE Pharmacologic Prophylaxis: VTE Score: 3 Lovenox    Patient Centered Rounds: I performed bedside rounds with nursing staff today  Discussions with Specialists or Other Care Team Provider: None    Education and Discussions with Family / Patient: Patient declined call to   Time Spent for Care: 45 minutes   More than 50% of total time spent on counseling and coordination of care as described above  Current Length of Stay: 62 day(s)  Current Patient Status: Inpatient   Certification Statement: The patient will continue to require additional inpatient hospital stay due to Pending placement acute care rehab, medically stable for discharge  Discharge Plan:  Medically stable for discharge    Code Status: Level 1 - Full Code    Subjective:   Patient seen examined bedside  Patient resting comfortably bed no apparent distress  Objective:     Vitals:   Temp (24hrs), Av 3 °F (36 3 °C), Min:97 °F (36 1 °C), Max:97 5 °F (36 4 °C)    Temp:  [97 °F (36 1 °C)-97 5 °F (36 4 °C)] 97 5 °F (36 4 °C)  HR:  [57-71] 57  Resp:  [16-18] 17  BP: (125-137)/(65-72) 126/65  SpO2:  [95 %-97 %] 97 %  Body mass index is 23 82 kg/m²  Input and Output Summary (last 24 hours): Intake/Output Summary (Last 24 hours) at 2022 0810  Last data filed at 2022 0512  Gross per 24 hour   Intake 1200 ml   Output 1900 ml   Net -700 ml       Physical Exam:   Physical Exam  HENT:      Head: Normocephalic  Cardiovascular:      Rate and Rhythm: Normal rate and regular rhythm  Pulses: Normal pulses  Heart sounds: Normal heart sounds  Pulmonary:      Effort: Pulmonary effort is normal       Breath sounds: Normal breath sounds  Abdominal:      General: Abdomen is flat  Bowel sounds are normal       Palpations: Abdomen is soft  Musculoskeletal:         General: Normal range of motion  Cervical back: Normal range of motion  Skin:     General: Skin is warm and dry  Neurological:      General: No focal deficit present  Mental Status: He is alert and oriented to person, place, and time  Mental status is at baseline  Psychiatric:         Mood and Affect: Mood normal          Behavior: Behavior normal          Thought Content:  Thought content normal          Judgment: Judgment normal           Additional Data:     Labs:  Results from last 7 days Lab Units 02/20/22  0528   WBC Thousand/uL 6 00   HEMOGLOBIN g/dL 12 2*   HEMATOCRIT % 36 3*   PLATELETS Thousands/uL 346     Results from last 7 days   Lab Units 02/20/22  0528   SODIUM mmol/L 140   POTASSIUM mmol/L 4 0   CHLORIDE mmol/L 105   CO2 mmol/L 29   BUN mg/dL 23   CREATININE mg/dL 0 73   ANION GAP mmol/L 6   CALCIUM mg/dL 8 5   ALBUMIN g/dL 4 1   TOTAL BILIRUBIN mg/dL 0 39   ALK PHOS U/L 101   ALT U/L 11   AST U/L 92*   GLUCOSE RANDOM mg/dL 100*                       Lines/Drains:  Invasive Devices  Report    None                       Imaging: No pertinent imaging reviewed      Recent Cultures (last 7 days):         Last 24 Hours Medication List:   Current Facility-Administered Medications   Medication Dose Route Frequency Provider Last Rate    acetaminophen  650 mg Oral Q4H PRN Yue Acevedo PA-C      amLODIPine  10 mg Oral Daily Michael Graff, DO      ARIPiprazole  10 mg Oral Daily [facility], DO      carbamide peroxide  5 drop Both Ears BID PRN Rhett Baltazar MD      diphenhydrAMINE  25 mg Oral Q6H PRN Michael Graff, DO      enoxaparin  40 mg Subcutaneous Q24H [address] [facility], DO      gabapentin  400 mg Oral TID [facility], DO      hydrocortisone   Topical 4x Daily PRN [facility], DO      hydrOXYzine HCL  25 mg Oral Q6H PRN DOV Last      lisinopril  20 mg Oral Daily [facility], [place]      methocarbamol  500 mg Oral Q6H PRN Maty Bailey MD      nicotine  1 patch Transdermal Daily [place], [place]      nystatin   Topical BID Maty Bailey MD      ondansetron  4 mg Oral Q6H PRN Michael Graff, DO      oxyCODONE  5 mg Oral Q6H PRN [facility], DO      polyethylene glycol  17 g Oral Daily Michael Graff, DO      senna-docusate sodium  1 tablet Oral BID Michael Graff, DO      terbinafine  250 mg Oral Daily Michael Graff, DO      triamcinolone   Topical BID Maty aBiley MD      white petrolatum-mineral oil Topical TID PRN Skyla Boucher DO          Today, Patient Was Seen By: Trudy Lopez DO    **Please Note: This note may have been constructed using a voice recognition system  **

## 2022-02-25 NOTE — PLAN OF CARE
Problem: PAIN - ADULT  Goal: Verbalizes/displays adequate comfort level or baseline comfort level  Description: Interventions:  - Encourage patient to monitor pain and request assistance  - Assess pain using appropriate pain scale  - Administer analgesics based on type and severity of pain and evaluate response  - Implement non-pharmacological measures as appropriate and evaluate response  - Consider cultural and social influences on pain and pain management  - Notify physician/advanced practitioner if interventions unsuccessful or patient reports new pain  Outcome: Progressing     Problem: INFECTION - ADULT  Goal: Absence or prevention of progression during hospitalization  Description: INTERVENTIONS:  - Assess and monitor for signs and symptoms of infection  - Monitor lab/diagnostic results  - Monitor all insertion sites, i e  indwelling lines, tubes, and drains  - Monitor endotracheal if appropriate and nasal secretions for changes in amount and color  - [place] appropriate cooling/warming therapies per order  - Administer medications as ordered  - Instruct and encourage patient and family to use good hand hygiene technique  - Identify and instruct in appropriate isolation precautions for identified infection/condition  Outcome: Progressing     Problem: SAFETY ADULT  Goal: Patient will remain free of falls  Description: INTERVENTIONS:  - Educate patient/family on patient safety including physical limitations  - Instruct patient to call for assistance with activity   - Consult OT/PT to assist with strengthening/mobility   - Keep Call bell within reach  - Keep bed low and locked with side rails adjusted as appropriate  - Keep care items and personal belongings within reach  - Initiate and maintain comfort rounds  - Make Fall Risk Sign visible to staff  - Apply yellow socks and bracelet for high fall risk patients  - Consider moving patient to room near nurses station  Outcome: Progressing  Goal: Maintain or return to baseline ADL function  Description: INTERVENTIONS:  -  Assess patient's ability to carry out ADLs; assess patient's baseline for ADL function and identify physical deficits which impact ability to perform ADLs (bathing, care of mouth/teeth, toileting, grooming, dressing, etc )  - Assess/evaluate cause of self-care deficits   - Assess range of motion  - Assess patient's mobility; develop plan if impaired  - Assess patient's need for assistive devices and provide as appropriate  - Encourage maximum independence but intervene and supervise when necessary  - Involve family in performance of ADLs  - Assess for home care needs following discharge   - Consider OT consult to assist with ADL evaluation and planning for discharge  - Provide patient education as appropriate  Outcome: Progressing  Goal: Maintains/Returns to pre admission functional level  Description: INTERVENTIONS:  - Perform BMAT or MOVE assessment daily    - Set and communicate daily mobility goal to care team and patient/family/caregiver     - Collaborate with rehabilitation services on mobility goals if consulted  - Out of bed for toileting  - Record patient progress and toleration of activity level   Outcome: Progressing     Problem: DISCHARGE PLANNING  Goal: Discharge to home or other facility with appropriate resources  Description: INTERVENTIONS:  - Identify barriers to discharge w/patient and caregiver  - Arrange for needed discharge resources and transportation as appropriate  - Identify discharge learning needs (meds, wound care, etc )  - Arrange for interpretive services to assist at discharge as needed  - Refer to Case Management Department for coordinating discharge planning if the patient needs post-hospital services based on physician/advanced practitioner order or complex needs related to functional status, cognitive ability, or social support system  Outcome: Progressing     Problem: Knowledge Deficit  Goal: Patient/family/caregiver demonstrates understanding of disease process, treatment plan, medications, and discharge instructions  Description: Complete learning assessment and assess knowledge base  Interventions:  - Provide teaching at level of understanding  - Provide teaching via preferred learning methods  Outcome: Progressing     Problem: MOBILITY - ADULT  Goal: Maintain or return to baseline ADL function  Description: INTERVENTIONS:  -  Assess patient's ability to carry out ADLs; assess patient's baseline for ADL function and identify physical deficits which impact ability to perform ADLs (bathing, care of mouth/teeth, toileting, grooming, dressing, etc )  - Assess/evaluate cause of self-care deficits   - Assess range of motion  - Assess patient's mobility; develop plan if impaired  - Assess patient's need for assistive devices and provide as appropriate  - Encourage maximum independence but intervene and supervise when necessary  - Involve family in performance of ADLs  - Assess for home care needs following discharge   - Consider OT consult to assist with ADL evaluation and planning for discharge  - Provide patient education as appropriate  Outcome: Progressing  Goal: Maintains/Returns to pre admission functional level  Description: INTERVENTIONS:  - Perform BMAT or MOVE assessment daily    - Set and communicate daily mobility goal to care team and patient/family/caregiver     - Collaborate with rehabilitation services on mobility goals if consulted  - Out of bed for toileting  - Record patient progress and toleration of activity level   Outcome: Progressing     Problem: Potential for Falls  Goal: Patient will remain free of falls  Description: INTERVENTIONS:  - Educate patient/family on patient safety including physical limitations  - Instruct patient to call for assistance with activity   - Consult OT/PT to assist with strengthening/mobility   - Keep Call bell within reach  - Keep bed low and locked with side rails adjusted as appropriate  - Keep care items and personal belongings within reach  - Initiate and maintain comfort rounds  - Make Fall Risk Sign visible to staff  - Apply yellow socks and bracelet for high fall risk patients  - Consider moving patient to room near nurses station  Outcome: Progressing     Problem: Prexisting or High Potential for Compromised Skin Integrity  Goal: Skin integrity is maintained or improved  Description: INTERVENTIONS:  - Identify patients at risk for skin breakdown  - Assess and monitor skin integrity  - Assess and monitor nutrition and hydration status  - Monitor labs   - Assess for incontinence   - Turn and reposition patient  - Assist with mobility/ambulation  - Relieve pressure over bony prominences  - Avoid friction and shearing  - Provide appropriate hygiene as needed including keeping skin clean and dry  - Evaluate need for skin moisturizer/barrier cream  - Collaborate with interdisciplinary team   - Patient/family teaching  - Consider wound care consult   Outcome: Progressing

## 2022-02-26 PROCEDURE — 99232 SBSQ HOSP IP/OBS MODERATE 35: CPT | Performed by: STUDENT IN AN ORGANIZED HEALTH CARE EDUCATION/TRAINING PROGRAM

## 2022-02-26 RX ORDER — MORPHINE SULFATE 15 MG/1
15 TABLET, FILM COATED, EXTENDED RELEASE ORAL ONCE AS NEEDED
Status: COMPLETED | OUTPATIENT
Start: 2022-02-26 | End: 2022-02-26

## 2022-02-26 RX ADMIN — HYDROXYZINE HYDROCHLORIDE 25 MG: 25 TABLET ORAL at 21:37

## 2022-02-26 RX ADMIN — ARIPIPRAZOLE 10 MG: 10 TABLET ORAL at 09:32

## 2022-02-26 RX ADMIN — TRIAMCINOLONE ACETONIDE: 1 CREAM TOPICAL at 09:31

## 2022-02-26 RX ADMIN — HYDROXYZINE HYDROCHLORIDE 25 MG: 25 TABLET ORAL at 14:33

## 2022-02-26 RX ADMIN — METHOCARBAMOL 500 MG: 500 TABLET, FILM COATED ORAL at 06:12

## 2022-02-26 RX ADMIN — OXYCODONE HYDROCHLORIDE 5 MG: 5 TABLET ORAL at 14:33

## 2022-02-26 RX ADMIN — OXYCODONE HYDROCHLORIDE 5 MG: 5 TABLET ORAL at 21:37

## 2022-02-26 RX ADMIN — TRIAMCINOLONE ACETONIDE: 1 CREAM TOPICAL at 17:42

## 2022-02-26 RX ADMIN — NYSTATIN: 100000 POWDER TOPICAL at 17:42

## 2022-02-26 RX ADMIN — GABAPENTIN 400 MG: 400 CAPSULE ORAL at 15:39

## 2022-02-26 RX ADMIN — NYSTATIN: 100000 POWDER TOPICAL at 09:31

## 2022-02-26 RX ADMIN — GABAPENTIN 400 MG: 400 CAPSULE ORAL at 21:37

## 2022-02-26 RX ADMIN — OXYCODONE HYDROCHLORIDE 5 MG: 5 TABLET ORAL at 07:02

## 2022-02-26 RX ADMIN — TERBINAFINE HYDROCHLORIDE 250 MG: 250 TABLET ORAL at 09:36

## 2022-02-26 RX ADMIN — GABAPENTIN 400 MG: 400 CAPSULE ORAL at 09:32

## 2022-02-26 RX ADMIN — ACETAMINOPHEN 650 MG: 325 TABLET ORAL at 17:40

## 2022-02-26 RX ADMIN — SENNOSIDES AND DOCUSATE SODIUM 1 TABLET: 50; 8.6 TABLET ORAL at 17:42

## 2022-02-26 RX ADMIN — MORPHINE SULFATE 15 MG: 15 TABLET, EXTENDED RELEASE ORAL at 09:32

## 2022-02-26 RX ADMIN — LISINOPRIL 20 MG: 20 TABLET ORAL at 09:32

## 2022-02-26 RX ADMIN — METHOCARBAMOL 500 MG: 500 TABLET, FILM COATED ORAL at 17:39

## 2022-02-26 RX ADMIN — SENNOSIDES AND DOCUSATE SODIUM 1 TABLET: 50; 8.6 TABLET ORAL at 09:32

## 2022-02-26 RX ADMIN — AMLODIPINE BESYLATE 10 MG: 10 TABLET ORAL at 09:32

## 2022-02-26 NOTE — PROGRESS NOTES
51 [facility]  Progress Note Madonna Moseley 1965, 60 y o  male MRN: [id number]  Unit/Bed#: 7T [facility] 711-01 Encounter: [id number]  Primary Care Provider: Hadley Renteria MD   Date and time admitted to hospital: 12/29/2021  5:38 PM    * Ambulatory dysfunction  Assessment & Plan  · Continue gabapentin 400mg t i d, robaxin 500mg Q6H PRN, and oxycodone 5mg Q6H PRN  · PT/OT recommending post acute rehab  · Case management following to aide in discharge planning  · Patient remains medically stable for discharge     Onychomycosis  Assessment & Plan  · Present on almost all toes and fingers  · Podiatry performed nail debridement on 01/27  · Continue Terbinafine 250 mg PO daily for 12 weeks (Ordered through 4/21)  · Check CMP weekly (Next check 2/27/22)    Spinal stenosis  Assessment & Plan  · MRI  [facility]): Severe spinal stenosis L3-L4, and L5-S1 with more moderate stenosis L2-  · Continue pain medications as above and PT    Strange and inexplicable behavior  Assessment & Plan  · Patient with hyper fixation on fungal infection arms (which was treated)  · Psychiatry consulted, not appropriate for inpatient psychiatry unit at this time  · Continue Abilify 10 mg daily and Atarax 25 mg p o  Q 6 hours p r n  For anxiety      Homeless  Assessment & Plan  · Patient was to be discharged to the [facility] rescue mission however they will not take him back as he does not have any photo ID  · PT recommending post acute rehab placement          VTE Pharmacologic Prophylaxis: VTE Score: 3 Moderate Risk (Score 3-4) - Pharmacological DVT Prophylaxis Ordered: enoxaparin (Lovenox)  Patient Centered Rounds: I performed bedside rounds with nursing staff today  Discussions with Specialists or Other Care Team Provider: none    Education and Discussions with Family / Patient:  Discussed with patient, all questions answered    Time Spent for Care: 30 minutes   More than 50% of total time spent on counseling and coordination of care as described above  Current Length of Stay: 62 day(s)  Current Patient Status: Inpatient   Certification Statement: The patient will continue to require additional inpatient hospital stay due to Pending placement acute care rehab  Discharge Plan:  Medically stable for discharge, pending placement    Code Status: Level 1 - Full Code    Subjective:   Patient seen examined bedside  No acute events overnight  Objective:     Vitals:   Temp (24hrs), Av 5 °F (36 4 °C), Min:96 6 °F (35 9 °C), Max:98 °F (36 7 °C)    Temp:  [96 6 °F (35 9 °C)-98 °F (36 7 °C)] 96 6 °F (35 9 °C)  HR:  [64-67] 64  Resp:  [18] 18  BP: (136-151)/(72-91) 136/72  SpO2:  [96 %-98 %] 96 %  Body mass index is 23 82 kg/m²  Input and Output Summary (last 24 hours): Intake/Output Summary (Last 24 hours) at 2022 6399  Last data filed at 2022 1719  Gross per 24 hour   Intake 1200 ml   Output 500 ml   Net 700 ml       Physical Exam:   Physical Exam  HENT:      Head: Normocephalic  Cardiovascular:      Rate and Rhythm: Normal rate and regular rhythm  Pulses: Normal pulses  Heart sounds: Normal heart sounds  Pulmonary:      Effort: Pulmonary effort is normal       Breath sounds: Normal breath sounds  Abdominal:      General: Abdomen is flat  Bowel sounds are normal       Palpations: Abdomen is soft  Musculoskeletal:         General: Normal range of motion  Cervical back: Normal range of motion  Skin:     General: Skin is warm and dry  Neurological:      General: No focal deficit present  Mental Status: He is alert and oriented to person, place, and time  Mental status is at baseline  Psychiatric:         Mood and Affect: Mood normal          Thought Content:  Thought content normal           Additional Data:     Labs:  Results from last 7 days   Lab Units 22  0528   WBC Thousand/uL 6 00   HEMOGLOBIN g/dL 12 2*   HEMATOCRIT % 36 3*   PLATELETS Thousands/uL 346     Results from last 7 days   Lab Units 02/20/22  0528   SODIUM mmol/L 140   POTASSIUM mmol/L 4 0   CHLORIDE mmol/L 105   CO2 mmol/L 29   BUN mg/dL 23   CREATININE mg/dL 0 73   ANION GAP mmol/L 6   CALCIUM mg/dL 8 5   ALBUMIN g/dL 4 1   TOTAL BILIRUBIN mg/dL 0 39   ALK PHOS U/L 101   ALT U/L 11   AST U/L 92*   GLUCOSE RANDOM mg/dL 100*                       Lines/Drains:  Invasive Devices  Report    None                       Imaging: No pertinent imaging reviewed      Recent Cultures (last 7 days):         Last 24 Hours Medication List:   Current Facility-Administered Medications   Medication Dose Route Frequency Provider Last Rate    acetaminophen  650 mg Oral Q4H PRN Yue Acevedo PA-C      amLODIPine  10 mg Oral Daily Michael Barry, DO      ARIPiprazole  10 mg Oral Daily [facility], DO      carbamide peroxide  5 drop Both Ears BID PRN Rhett Baltazar MD      diphenhydrAMINE  25 mg Oral Q6H PRN Michael Smithbon, DO      enoxaparin  40 mg Subcutaneous Q24H [address] [facility], DO      gabapentin  400 mg Oral TID [facility], DO      hydrocortisone   Topical 4x Daily PRN [facility], DO      hydrOXYzine HCL  25 mg Oral Q6H PRN DOV Last      lisinopril  20 mg Oral Daily [place], [place]      methocarbamol  500 mg Oral Q6H PRN Maty Bailey MD      morphine  15 mg Oral Once PRN Michael Graff, DO      nicotine  1 patch Transdermal Daily [place] PA-C      nystatin   Topical BID Maty Bailey MD      ondansetron  4 mg Oral Q6H PRN Michael Smithbon, DO      oxyCODONE  5 mg Oral Q6H PRN [facility], DO      polyethylene glycol  17 g Oral Daily Michael Barry, DO      senna-docusate sodium  1 tablet Oral BID Michael Barry, DO      terbinafine  250 mg Oral Daily Michael Barry, DO      triamcinolone   Topical BID Maty Bailey MD      white petrolatum-mineral oil   Topical TID PRN Iker Hobbs DO          Today, Patient Was Seen By: Aniceto Gould Gabby Leo, DO    **Please Note: This note may have been constructed using a voice recognition system  **

## 2022-02-26 NOTE — ASSESSMENT & PLAN NOTE
· MRI  [facility]): Severe spinal stenosis L3-L4, and L5-S1 with more moderate stenosis L2-  · Continue pain medications as above and PT

## 2022-02-26 NOTE — ASSESSMENT & PLAN NOTE
· Patient was to be discharged to the [facility] rescue mission however they will not take him back as he does not have any photo ID  · PT recommending post acute rehab placement

## 2022-02-27 LAB
ALBUMIN SERPL BCP-MCNC: 3.6 G/DL (ref 3–5.2)
ALP SERPL-CCNC: 84 U/L (ref 43–122)
ALT SERPL W P-5'-P-CCNC: 9 U/L
ANION GAP SERPL CALCULATED.3IONS-SCNC: 4 MMOL/L (ref 5–14)
AST SERPL W P-5'-P-CCNC: 15 U/L (ref 17–59)
BILIRUB SERPL-MCNC: 0.25 MG/DL
BUN SERPL-MCNC: 24 MG/DL (ref 5–25)
CALCIUM SERPL-MCNC: 8.3 MG/DL (ref 8.4–10.2)
CHLORIDE SERPL-SCNC: 104 MMOL/L (ref 97–108)
CO2 SERPL-SCNC: 30 MMOL/L (ref 22–30)
CREAT SERPL-MCNC: 0.72 MG/DL (ref 0.7–1.5)
GFR SERPL CREATININE-BSD FRML MDRD: 102 ML/MIN/1.73SQ M
GLUCOSE SERPL-MCNC: 145 MG/DL (ref 70–99)
POTASSIUM SERPL-SCNC: 4 MMOL/L (ref 3.6–5)
PROT SERPL-MCNC: 6.8 G/DL (ref 5.9–8.4)
SODIUM SERPL-SCNC: 138 MMOL/L (ref 137–147)

## 2022-02-27 PROCEDURE — 99232 SBSQ HOSP IP/OBS MODERATE 35: CPT | Performed by: STUDENT IN AN ORGANIZED HEALTH CARE EDUCATION/TRAINING PROGRAM

## 2022-02-27 PROCEDURE — 80053 COMPREHEN METABOLIC PANEL: CPT | Performed by: STUDENT IN AN ORGANIZED HEALTH CARE EDUCATION/TRAINING PROGRAM

## 2022-02-27 RX ORDER — LORAZEPAM 1 MG/1
2 TABLET ORAL ONCE
Status: DISCONTINUED | OUTPATIENT
Start: 2022-02-27 | End: 2022-03-11 | Stop reason: HOSPADM

## 2022-02-27 RX ADMIN — OXYCODONE HYDROCHLORIDE 5 MG: 5 TABLET ORAL at 04:48

## 2022-02-27 RX ADMIN — AMLODIPINE BESYLATE 10 MG: 10 TABLET ORAL at 09:24

## 2022-02-27 RX ADMIN — OXYCODONE HYDROCHLORIDE 5 MG: 5 TABLET ORAL at 22:55

## 2022-02-27 RX ADMIN — NYSTATIN: 100000 POWDER TOPICAL at 17:22

## 2022-02-27 RX ADMIN — GABAPENTIN 400 MG: 400 CAPSULE ORAL at 09:24

## 2022-02-27 RX ADMIN — NYSTATIN: 100000 POWDER TOPICAL at 09:26

## 2022-02-27 RX ADMIN — TRIAMCINOLONE ACETONIDE: 1 CREAM TOPICAL at 09:22

## 2022-02-27 RX ADMIN — GABAPENTIN 400 MG: 400 CAPSULE ORAL at 17:23

## 2022-02-27 RX ADMIN — LISINOPRIL 20 MG: 20 TABLET ORAL at 09:24

## 2022-02-27 RX ADMIN — TERBINAFINE HYDROCHLORIDE 250 MG: 250 TABLET ORAL at 09:23

## 2022-02-27 RX ADMIN — SENNOSIDES AND DOCUSATE SODIUM 1 TABLET: 50; 8.6 TABLET ORAL at 09:23

## 2022-02-27 RX ADMIN — ACETAMINOPHEN 650 MG: 325 TABLET ORAL at 09:25

## 2022-02-27 RX ADMIN — ACETAMINOPHEN 650 MG: 325 TABLET ORAL at 21:38

## 2022-02-27 RX ADMIN — GABAPENTIN 400 MG: 400 CAPSULE ORAL at 21:38

## 2022-02-27 RX ADMIN — OXYCODONE HYDROCHLORIDE 5 MG: 5 TABLET ORAL at 11:02

## 2022-02-27 RX ADMIN — SENNOSIDES AND DOCUSATE SODIUM 1 TABLET: 50; 8.6 TABLET ORAL at 17:21

## 2022-02-27 RX ADMIN — METHOCARBAMOL 500 MG: 500 TABLET, FILM COATED ORAL at 09:25

## 2022-02-27 RX ADMIN — METHOCARBAMOL 500 MG: 500 TABLET, FILM COATED ORAL at 18:28

## 2022-02-27 RX ADMIN — HYDROXYZINE HYDROCHLORIDE 25 MG: 25 TABLET ORAL at 21:38

## 2022-02-27 RX ADMIN — ACETAMINOPHEN 650 MG: 325 TABLET ORAL at 18:28

## 2022-02-27 RX ADMIN — OXYCODONE HYDROCHLORIDE 5 MG: 5 TABLET ORAL at 17:21

## 2022-02-27 RX ADMIN — ARIPIPRAZOLE 10 MG: 10 TABLET ORAL at 09:24

## 2022-02-27 RX ADMIN — HYDROXYZINE HYDROCHLORIDE 25 MG: 25 TABLET ORAL at 09:25

## 2022-02-27 RX ADMIN — TRIAMCINOLONE ACETONIDE: 1 CREAM TOPICAL at 17:22

## 2022-02-27 NOTE — PROGRESS NOTES
51 [facility]  Progress Note Codie Israel 1965, 60 y o  male MRN: [id number]  Unit/Bed#: 7T [facility] 711-01 Encounter: [id number]  Primary Care Provider: Odette Marquez MD   Date and time admitted to hospital: 12/29/2021  5:38 PM    * Ambulatory dysfunction  Assessment & Plan  · Continue gabapentin 400mg t i d, robaxin 500mg Q6H PRN, and oxycodone 5mg Q6H PRN  · PT/OT recommending post acute rehab  · Case management following to aide in discharge planning  · Patient remains medically stable for discharge     Onychomycosis  Assessment & Plan  · Present on almost all toes and fingers  · Podiatry performed nail debridement on 01/27  · Continue Terbinafine 250 mg PO daily for 12 weeks (Ordered through 4/21)  · Check CMP weekly (Next check on 3/6)    Spinal stenosis  Assessment & Plan  · MRI  [facility]): Severe spinal stenosis L3-L4, and L5-S1 with more moderate stenosis L2-  · Continue pain medications as above and PT    Strange and inexplicable behavior  Assessment & Plan  · Patient with hyper fixation on fungal infection arms (which was treated)  · Psychiatry consulted, not appropriate for inpatient psychiatry unit at this time  · Continue Abilify 10 mg daily and Atarax 25 mg p o  Q 6 hours p r n  For anxiety      HTN, goal below 140/90  Assessment & Plan  · Continue amlodipine 10 mg daily and lisinopril 20 mg daily  Homeless  Assessment & Plan  · Patient was to be discharged to the [facility] rescue [facility] however they will not take him back as he does not have any photo ID  · PT recommending post acute rehab placement          VTE Pharmacologic Prophylaxis: VTE Score: 3 Moderate Risk (Score 3-4) - Pharmacological DVT Prophylaxis Ordered: enoxaparin (Lovenox)  Patient Centered Rounds: I performed bedside rounds with nursing staff today    Discussions with Specialists or Other Care Team Provider:  None    Education and Discussions with Family / Patient:  Discussed with patient, all questions answered    Time Spent for Care: 45 minutes  More than 50% of total time spent on counseling and coordination of care as described above  Current Length of Stay: 61 day(s)  Current Patient Status: Inpatient   Certification Statement: The patient will continue to require additional inpatient hospital stay due to Pending placement to acute care rehab  Discharge Plan:  Medically stable for discharge, pending placement    Code Status: Level 1 - Full Code    Subjective:   Patient seen examined bedside  Patient no acute events overnight  Objective:     Vitals:   Temp (24hrs), Av 9 °F (36 6 °C), Min:97 5 °F (36 4 °C), Max:98 6 °F (37 °C)    Temp:  [97 5 °F (36 4 °C)-98 6 °F (37 °C)] 97 6 °F (36 4 °C)  HR:  [60-72] 61  Resp:  [16-18] 17  BP: (115-129)/(63-70) 129/70  SpO2:  [96 %-97 %] 97 %  Body mass index is 23 82 kg/m²  Input and Output Summary (last 24 hours): Intake/Output Summary (Last 24 hours) at 2022 0833  Last data filed at 2022 1700  Gross per 24 hour   Intake 1320 ml   Output --   Net 1320 ml       Physical Exam:   Physical Exam  HENT:      Head: Normocephalic  Cardiovascular:      Rate and Rhythm: Normal rate and regular rhythm  Pulses: Normal pulses  Heart sounds: Normal heart sounds  Pulmonary:      Effort: Pulmonary effort is normal       Breath sounds: Normal breath sounds  Abdominal:      General: Abdomen is flat  Bowel sounds are normal       Palpations: Abdomen is soft  Musculoskeletal:         General: Normal range of motion  Cervical back: Normal range of motion  Skin:     General: Skin is warm and dry  Neurological:      General: No focal deficit present  Mental Status: He is alert and oriented to person, place, and time  Mental status is at baseline  Psychiatric:         Mood and Affect: Mood normal          Thought Content:  Thought content normal          Judgment: Judgment normal           Additional Data:     Labs: Results from last 7 days   Lab Units 02/27/22  0441   SODIUM mmol/L 138   POTASSIUM mmol/L 4 0   CHLORIDE mmol/L 104   CO2 mmol/L 30   BUN mg/dL 24   CREATININE mg/dL 0 72   ANION GAP mmol/L 4*   CALCIUM mg/dL 8 3*   ALBUMIN g/dL 3 6   TOTAL BILIRUBIN mg/dL 0 25   ALK PHOS U/L 84   ALT U/L 9   AST U/L 15*   GLUCOSE RANDOM mg/dL 145*                       Lines/Drains:  Invasive Devices  Report    None                       Imaging: No pertinent imaging reviewed      Recent Cultures (last 7 days):         Last 24 Hours Medication List:   Current Facility-Administered Medications   Medication Dose Route Frequency Provider Last Rate    acetaminophen  650 mg Oral Q4H PRN Marry West PA-C      amLODIPine  10 mg Oral Daily Mariana Prather, DO      ARIPiprazole  10 mg Oral Daily [facility], DO      carbamide peroxide  5 drop Both Ears BID PRN Jen García MD      diphenhydrAMINE  25 mg Oral Q6H PRN Mariana Prather, DO      enoxaparin  40 mg Subcutaneous Q24H [address] [facility], DO      gabapentin  400 mg Oral TID [facility], DO      hydrocortisone   Topical 4x Daily PRN [facility], DO      hydrOXYzine HCL  25 mg Oral Q6H PRN DOV Bonds      lisinopril  20 mg Oral Daily [facility], [place]      methocarbamol  500 mg Oral Q6H PRN Araceli Rosen MD      nicotine  1 patch Transdermal Daily [place], [place]      nystatin   Topical BID Araceli Rosen MD      ondansetron  4 mg Oral Q6H PRN Mariana Prather, DO      oxyCODONE  5 mg Oral Q6H PRN [facility], DO      polyethylene glycol  17 g Oral Daily Mariana Prather, DO      senna-docusate sodium  1 tablet Oral BID Mariana Prather, DO      terbinafine  250 mg Oral Daily Mariana Prather, DO      triamcinolone   Topical BID Araceli Rosen MD      white petrolatum-mineral oil   Topical TID PRN Keila Parham DO          Today, Patient Was Seen By: Mariana Prather DO    **Please Note: This note may have been constructed using a voice recognition system  **

## 2022-02-27 NOTE — ASSESSMENT & PLAN NOTE
· Patient was to be discharged to the [facility] rescue mission however they will not take him back as he does not have any photo ID  · PT recommending post acute rehab placement

## 2022-02-27 NOTE — ASSESSMENT & PLAN NOTE
· MRI  [facility]): Severe spinal stenosis L3-L4, and L5-S1 with more moderate stenosis L2-  · Continue pain medications as above and PT

## 2022-02-27 NOTE — ASSESSMENT & PLAN NOTE
· Present on almost all toes and fingers  · Podiatry performed nail debridement on 01/27  · Continue Terbinafine 250 mg PO daily for 12 weeks (Ordered through 4/21)  · Check CMP weekly (Next check on 3/6)

## 2022-02-28 PROCEDURE — 97110 THERAPEUTIC EXERCISES: CPT

## 2022-02-28 PROCEDURE — 99232 SBSQ HOSP IP/OBS MODERATE 35: CPT | Performed by: INTERNAL MEDICINE

## 2022-02-28 PROCEDURE — 97530 THERAPEUTIC ACTIVITIES: CPT

## 2022-02-28 PROCEDURE — 97535 SELF CARE MNGMENT TRAINING: CPT

## 2022-02-28 RX ADMIN — SENNOSIDES AND DOCUSATE SODIUM 1 TABLET: 50; 8.6 TABLET ORAL at 09:04

## 2022-02-28 RX ADMIN — TRIAMCINOLONE ACETONIDE: 1 CREAM TOPICAL at 09:05

## 2022-02-28 RX ADMIN — HYDROXYZINE HYDROCHLORIDE 25 MG: 25 TABLET ORAL at 11:11

## 2022-02-28 RX ADMIN — SENNOSIDES AND DOCUSATE SODIUM 1 TABLET: 50; 8.6 TABLET ORAL at 17:25

## 2022-02-28 RX ADMIN — ACETAMINOPHEN 650 MG: 325 TABLET ORAL at 04:14

## 2022-02-28 RX ADMIN — OXYCODONE HYDROCHLORIDE 5 MG: 5 TABLET ORAL at 19:09

## 2022-02-28 RX ADMIN — GABAPENTIN 400 MG: 400 CAPSULE ORAL at 23:03

## 2022-02-28 RX ADMIN — METHOCARBAMOL 500 MG: 500 TABLET, FILM COATED ORAL at 11:11

## 2022-02-28 RX ADMIN — TERBINAFINE HYDROCHLORIDE 250 MG: 250 TABLET ORAL at 09:05

## 2022-02-28 RX ADMIN — POLYETHYLENE GLYCOL 3350 17 G: 17 POWDER, FOR SOLUTION ORAL at 09:04

## 2022-02-28 RX ADMIN — TRIAMCINOLONE ACETONIDE: 1 CREAM TOPICAL at 17:26

## 2022-02-28 RX ADMIN — OXYCODONE HYDROCHLORIDE 5 MG: 5 TABLET ORAL at 13:08

## 2022-02-28 RX ADMIN — METHOCARBAMOL 500 MG: 500 TABLET, FILM COATED ORAL at 17:28

## 2022-02-28 RX ADMIN — GABAPENTIN 400 MG: 400 CAPSULE ORAL at 09:04

## 2022-02-28 RX ADMIN — AMLODIPINE BESYLATE 10 MG: 10 TABLET ORAL at 09:04

## 2022-02-28 RX ADMIN — NYSTATIN: 100000 POWDER TOPICAL at 17:26

## 2022-02-28 RX ADMIN — LISINOPRIL 20 MG: 20 TABLET ORAL at 09:05

## 2022-02-28 RX ADMIN — ACETAMINOPHEN 650 MG: 325 TABLET ORAL at 17:25

## 2022-02-28 RX ADMIN — NYSTATIN: 100000 POWDER TOPICAL at 09:05

## 2022-02-28 RX ADMIN — METHOCARBAMOL 500 MG: 500 TABLET, FILM COATED ORAL at 04:14

## 2022-02-28 RX ADMIN — GABAPENTIN 400 MG: 400 CAPSULE ORAL at 17:25

## 2022-02-28 RX ADMIN — ARIPIPRAZOLE 10 MG: 10 TABLET ORAL at 09:04

## 2022-02-28 RX ADMIN — OXYCODONE HYDROCHLORIDE 5 MG: 5 TABLET ORAL at 06:42

## 2022-02-28 RX ADMIN — HYDROXYZINE HYDROCHLORIDE 25 MG: 25 TABLET ORAL at 17:25

## 2022-02-28 NOTE — PLAN OF CARE
Problem: OCCUPATIONAL THERAPY ADULT  Goal: Performs self-care activities at highest level of function for planned discharge setting  See evaluation for individualized goals  Description: Treatment Interventions: ADL retraining,Functional transfer training,UE strengthening/ROM,Endurance training,Cognitive reorientation,Patient/family training,Equipment evaluation/education,Compensatory technique education,Fine motor coordination activities,Continued evaluation,Energy conservation,Activityengagement          See flowsheet documentation for full assessment, interventions and recommendations     Outcome: Progressing  Note: Limitation: Decreased ADL status,Decreased high-level ADLs,Decreased Safe judgement during ADL  Prognosis: Fair  Assessment: see note      OT Discharge Recommendation: Post acute rehabilitation services

## 2022-02-28 NOTE — PLAN OF CARE
Problem: PHYSICAL THERAPY ADULT  Goal: Performs mobility at highest level of function for planned discharge setting  See evaluation for individualized goals  Description: Treatment/Interventions: ADL retraining,Functional transfer training,LE strengthening/ROM,Elevations,Therapeutic exercise,Endurance training,Patient/family training,Equipment eval/education,Bed mobility,Gait training,Spoke to nursing,Spoke to case management,OT  Equipment Recommended: Wheelchair       See flowsheet documentation for full assessment, interventions and recommendations  Outcome: Progressing  Note: Prognosis: Fair  Problem List: Decreased strength,Decreased range of motion,Decreased endurance,Impaired balance,Decreased mobility,Decreased coordination,Impaired judgement,Impaired sensation,Pain  Assessment: Pt continues to be limited by pain and weakness in the lower back and LE  He is sometimes able to complete sit pivot transfers ind however with increased pain does require min A  Ambulation has not been successful recently due to increased back and groin pain  LE strength does seem to be improving in the Hamstrings and hip ext with therex  Barriers to Discharge: Inaccessible home environment,Decreased caregiver support        PT Discharge Recommendation: Post acute rehabilitation services          See flowsheet documentation for full assessment

## 2022-02-28 NOTE — ASSESSMENT & PLAN NOTE
· MRI  [facility]): Severe spinal stenosis L3-L4, and L5-S1 with more moderate stenosis L2-  · Continue pain medications with oxycodone, gabapentin, Robaxin  · Continue PT

## 2022-02-28 NOTE — PROGRESS NOTES
2/25/22: Care management (CM) requested that [employer] funds support this client  Voucher sent to [facility]  Will contact CM when check is available

## 2022-02-28 NOTE — PLAN OF CARE
Problem: PHYSICAL THERAPY ADULT  Goal: Performs mobility at highest level of function for planned discharge setting  See evaluation for individualized goals  Description: Treatment/Interventions: ADL retraining,Functional transfer training,LE strengthening/ROM,Elevations,Therapeutic exercise,Endurance training,Patient/family training,Equipment eval/education,Bed mobility,Gait training,Spoke to nursing,Spoke to case management,OT  Equipment Recommended: Wheelchair       See flowsheet documentation for full assessment, interventions and recommendations  2/28/2022 1145 by Eli Vazquez PT  Outcome: Progressing  Note: Prognosis: Fair  Problem List: Decreased strength,Decreased range of motion,Decreased endurance,Impaired balance,Decreased mobility,Decreased coordination,Impaired judgement,Impaired sensation,Pain  Assessment: Pt is limited due to groin and low back pain today, unable to tolerate ambulation with multiple attempts  He was able to complete multiple transfers and exercise to the LEs  At end of treatment left in w/c which he is able to mobilize ind  Barriers to Discharge: Inaccessible home environment,Decreased caregiver support        PT Discharge Recommendation: Post acute rehabilitation services          See flowsheet documentation for full assessment  2/28/2022 1129 by Eli Vazquez, PT  Outcome: Progressing  Note: Prognosis: Fair  Problem List: Decreased strength,Decreased range of motion,Decreased endurance,Impaired balance,Decreased mobility,Decreased coordination,Impaired judgement,Impaired sensation,Pain  Assessment: Pt continues to be limited by pain and weakness in the lower back and LE  He is sometimes able to complete sit pivot transfers ind however with increased pain does require min A  Ambulation has not been successful recently due to increased back and groin pain  LE strength does seem to be improving in the Hamstrings and hip ext with therex    Barriers to Discharge: Inaccessible home environment,Decreased caregiver support        PT Discharge Recommendation: Post acute rehabilitation services          See flowsheet documentation for full assessment

## 2022-02-28 NOTE — POST FALL NOTE
Post Fall Note    No data recorded    Brief Description of Events  Pt sitting on floor between toilet and wheelchair, states he slipped, denies pain, no visible injuries, dr Dain Hoffmanor at bedside  Assisted back to wheelchair, and then to bed, pt moving all extremities, no visible injuries, will monitor    Last Recorded Vitals  Blood pressure 150/86, pulse 75, temperature 99 °F (37 2 °C), temperature source Temporal, resp  rate 20, height 5' 10" (1 778 m), weight 75 3 kg (166 lb 0 1 oz), SpO2 98 %        Principal Problem:    Ambulatory dysfunction  Active Problems:    Homeless    HTN, goal below 140/90    Strange and inexplicable behavior    Onychomycosis    Spinal stenosis

## 2022-02-28 NOTE — PLAN OF CARE
Problem: PAIN - ADULT  Goal: Verbalizes/displays adequate comfort level or baseline comfort level  Description: Interventions:  - Encourage patient to monitor pain and request assistance  - Assess pain using appropriate pain scale  - Administer analgesics based on type and severity of pain and evaluate response  - Implement non-pharmacological measures as appropriate and evaluate response  - Consider cultural and social influences on pain and pain management  - Notify physician/advanced practitioner if interventions unsuccessful or patient reports new pain  Outcome: Progressing     Problem: INFECTION - ADULT  Goal: Absence or prevention of progression during hospitalization  Description: INTERVENTIONS:  - Assess and monitor for signs and symptoms of infection  - Monitor lab/diagnostic results  - Monitor all insertion sites, i e  indwelling lines, tubes, and drains  - Monitor endotracheal if appropriate and nasal secretions for changes in amount and color  - [place] appropriate cooling/warming therapies per order  - Administer medications as ordered  - Instruct and encourage patient and family to use good hand hygiene technique  - Identify and instruct in appropriate isolation precautions for identified infection/condition  Outcome: Progressing     Problem: DISCHARGE PLANNING  Goal: Discharge to home or other facility with appropriate resources  Description: INTERVENTIONS:  - Identify barriers to discharge w/patient and caregiver  - Arrange for needed discharge resources and transportation as appropriate  - Identify discharge learning needs (meds, wound care, etc )  - Arrange for interpretive services to assist at discharge as needed  - Refer to Case Management Department for coordinating discharge planning if the patient needs post-hospital services based on physician/advanced practitioner order or complex needs related to functional status, cognitive ability, or social support system  Outcome: Progressing Problem: Knowledge Deficit  Goal: Patient/family/caregiver demonstrates understanding of disease process, treatment plan, medications, and discharge instructions  Description: Complete learning assessment and assess knowledge base    Interventions:  - Provide teaching at level of understanding  - Provide teaching via preferred learning methods  Outcome: Progressing

## 2022-02-28 NOTE — OCCUPATIONAL THERAPY NOTE
Occupational Therapy Treatment Note     Patient Name: Moon Sebastian  Today's Date: 2/28/2022  Problem List  Principal Problem:    Ambulatory dysfunction  Active Problems:    Homeless    HTN, goal below 140/90    Strange and inexplicable behavior    Onychomycosis    Spinal stenosis    Past Medical History  Past Medical History:   Diagnosis Date    Arthritis     Hypertension     Sciatica      Past Surgical History  Past Surgical History:   Procedure Laterality Date    BACK SURGERY             02/28/22 1120   OT Last Visit   OT Visit Date 02/28/22   Note Type   Note Type Treatment   Restrictions/Precautions   Weight Bearing Precautions Per Order No   Other Precautions Fall Risk;Pain   Pain Assessment   Pain Assessment Tool 0-10   Pain Score 10 - Worst Possible Pain   Pain Location/Orientation Location: Groin   ADL   Where Assessed Wheelchair  (shower )   Grooming Assistance 5  Supervision/Setup   Grooming Deficit Setup;Supervision/safety; Increased time to complete; Teeth care  (seated at sink )   UB Bathing Assistance 5  Supervision/Setup   UB Bathing Deficit Setup;Verbal cueing;Supervision/safety; Increased time to complete; Chest;Right arm;Left arm; Abdomen   LB Bathing Assistance 4  Minimal Assistance   LB Bathing Deficit Setup;Steadying;Verbal cueing;Supervision/safety; Increased time to complete;Perineal area; Buttocks;Right upper leg;Left upper leg;Right lower leg including foot; Left lower leg including foot   LB Bathing Comments difficulty maintaining standing position for buttocks hygiene    UB Dressing Assistance 5  Supervision/Setup   UB Dressing Comments gown only    LB Dressing Assistance 3  Moderate Assistance   LB Dressing Deficit Setup;Steadying; Requires assistive device for steadying;Verbal cueing;Supervision/safety; Increased time to complete; Don/doff R sock; Don/doff L sock; Thread RLE into pants; Thread LLE into pants;Pull up over hips   LB Dressing Comments requires mod A in standing + A for CM over hips Functional Standing Tolerance   Time <1 min    Activity during completion of ADl and mobility attempts    Bed Mobility   Supine to Sit 6  Modified independent   Additional items Bedrails; Increased time required   Sit to Supine 6  Modified independent   Additional items Increased time required   Transfers   Sit to Stand 4  Minimal assistance   Additional items Assist x 1; Armrests; Increased time required;Verbal cues   Stand to Sit 4  Minimal assistance   Additional items Assist x 1; Armrests; Increased time required;Verbal cues   Stand pivot 4  Minimal assistance   Additional items Assist x 1; Armrests; Increased time required;Verbal cues   Sit pivot 4  Minimal assistance   Additional items Assist x 1; Armrests; Increased time required;Verbal cues   Toilet transfer 4  Minimal assistance   Additional items Assist x 1; Armrests; Increased time required;Verbal cues;Standard toilet   Additional Comments STS completed with RW   Functional Mobility   Additional Comments attempted mobility today, unable to advance LE's 2* severe pain    Toilet Transfers   Toilet Transfer From Wheelchair   Toilet Transfer Type To and from   Toilet Transfer to Standard toilet   Toilet Transfer Technique Sit pivot   Toilet Transfers Minimal assistance   Shower Transfers   Shower Transfer From Other (Comment)  (toilet)   Shower Transfer Type To and From   Shower Transfer to [employer] without back   Shower Transfer Technique Stand pivot   Shower Transfers Minimal assistance   Shower Transfers Comments completed SPT from toilet with use of lateral GB's; upon exit, feet placed outside of shower prior to STS    Cognition   Arousal/Participation Cooperative   Attention Attends with cues to redirect   Orientation Level Oriented X4   Memory Within functional limits   Following Commands Follows one step commands without difficulty   Comments decreased health literacy; impaired judgement    Activity Tolerance   Activity Tolerance Patient limited by pain Medical Staff Made Aware spoke to RN    Assessment   Assessment Pt seen for OT Txt with skilled intervention targeting self care training and functional transfer training with focus on proper body mechanics and fall prevention to maximize independence and pt safety  Educated pt on energy conservation techniques and compensatory strategies to improve tolerance and reduce burden of care  ADL performance is progressing however continues to exhibit difficulty with any task in standing -  ie CM, hygiene  Pt requires Min A with STS + Assistance for activity d/t impaired standing balance and limited standing tolerance  Attempted to progress functional mobility, unable 2* severe pain  Overall pt making progress towards OT goals however continues to be limited by  Safety awareness, pain, ROM, weakness, fatigue, endurance, activity tolerance , standing tolerance and static/dynamic standing balance    Therefore, Patient would benefit from continued Occupational Therapy sessions while in the hospital to further address the deficits as defined above, maximize level of functional independence with ADLs and functional mobility, and reduce risk of falls/readmission  Plan   Treatment Interventions ADL retraining;Functional transfer training;UE strengthening/ROM; Endurance training;Patient/family training;Equipment evaluation/education; Compensatory technique education; Energy conservation; Activityengagement   Goal Expiration Date 03/04/22   OT Treatment Day 5   OT Frequency 2-3x/wk   Recommendation   OT Discharge Recommendation Post acute rehabilitation services   AM-PAC Daily Activity Inpatient   Lower Body Dressing 2   Bathing 2   Toileting 2   Upper Body Dressing 3   Grooming 3   Eating 3   Daily Activity Raw Score 15   Daily Activity Standardized Score (Calc for Raw Score >=11) 34 69                    Ernestina Tubbs MS, OTR/L

## 2022-02-28 NOTE — CASE MANAGEMENT
Case Management Progress Note    Patient name Anil Gilbert  Location 7T /7T -01 MRN [id number]  : 1965 Date 2022       LOS (days): 60  Geometric Mean LOS (GMLOS) (days): 3 20  Days to GMLOS:-56 7        OBJECTIVE:        Current admission status: Inpatient  Preferred Pharmacy:   [address]  [address]  [place] [address]  Phone: [phone] Fax: [phone] [address] [facility],[address], [address]  [facility] [address]  Phone: [phone] Fax: [phone]    Primary Care Provider: Kristin Ward MD    Primary Insurance: [facility] REP  Secondary Insurance:     PROGRESS NOTE: CM was notified at morning rounds that pt is medically cleared to be discharged  CM contacted [facility] Ph: [phone]  [facility] admissions stated that they can accept pt if Pt has a photo ID  Pt has lost his Photo ID  [facility] stated that pt's ID will be coming this week, once ID comes pt might discharge to [facility]  PT recommendation is Post Acute Rehab, pt is not able to get accepted to SNF, pending bed availability     CM department will continue to follow through pt's D/C

## 2022-02-28 NOTE — PHYSICAL THERAPY NOTE
Physical TherapyTreatment Note    Patient's Name: Kari Chaparro    Admitting Diagnosis  Cellulitis [L03 90]  Homeless [Z59 00]  Wound check, abscess [Z51 89]    Problem List  Patient Active Problem List   Diagnosis    Homeless    Psoriasis, unspecified    Ambulatory dysfunction    HTN, goal below 140/90    Strange and inexplicable behavior    Onychomycosis    Constipation    Spinal stenosis       Past Medical History  Past Medical History:   Diagnosis Date    Arthritis     Hypertension     Sciatica        Past Surgical History  Past Surgical History:   Procedure Laterality Date    BACK SURGERY         Recent Imaging  US scrotum and testicles   Final Result by Kamala Ozuna MD (02/14 1158)       1  No evidence of testicular torsion  2   Minimally complex hydroceles moderate on the right and small on the left  Workstation performed: [id number]         CT abdomen pelvis w contrast   Final Result by Everardo Singletary MD (01/28 1435)      Stool filled colon with possible impaction  Diverticulosis with no diverticulitis  Workstation performed: [id number]             Recent Vital Signs  Vitals:    02/27/22 0707 02/27/22 1459 02/27/22 2300 02/28/22 0731   BP: 129/70 132/64 121/65 143/84   BP Location: Right arm Left arm Left arm Left arm   Pulse: 61 76 60 60   Resp: 17 18 16 18   Temp: 97 6 °F (36 4 °C) (!) 97 3 °F (36 3 °C) 97 9 °F (36 6 °C) 97 5 °F (36 4 °C)   TempSrc: Temporal Temporal Temporal Temporal   SpO2: 97% 96% 96% 98%   Weight:       Height:           PT Treatment Time: 40 minutes       02/25/22 1450   PT Last Visit   PT Visit Date 02/25/22   Note Type   Note Type Treatment   Pain Assessment   Pain Assessment Tool 0-10   Pain Score 8   Pain Location/Orientation Location: Groin;Orientation: Lower; Location: Back   Restrictions/Precautions   Weight Bearing Precautions Per Order No   Other Precautions Fall Risk;Pain   General   Chart Reviewed Yes   Response to Previous Treatment Patient with no complaints from previous session  Family/Caregiver Present No   Cognition   Overall Cognitive Status Impaired   Arousal/Participation Alert; Cooperative   Attention Attends with cues to redirect   Orientation Level Oriented X4   Memory Within functional limits   Following Commands Follows all commands and directions without difficulty   Subjective   Subjective Pt continues to report groin pain and burning sensation to genitals   Bed Mobility   Rolling R 6  Modified independent   Additional items Increased time required   Rolling L 6  Modified independent   Additional items Increased time required   Supine to Sit 6  Modified independent   Additional items Increased time required   Sit to Supine 6  Modified independent   Additional items Increased time required   Transfers   Sit to Stand 4  Minimal assistance   Additional items Assist x 1; Armrests; Increased time required;Verbal cues   Stand to Sit 4  Minimal assistance   Additional items Assist x 1; Armrests; Increased time required;Verbal cues   Stand pivot 4  Minimal assistance   Additional items Assist x 1; Armrests; Increased time required;Verbal cues   Sit pivot 4  Minimal assistance   Additional items Assist x 1; Armrests; Increased time required;Verbal cues   Additional Comments with RW, sit and stand pivots with no AD   Ambulation/Elevation   Gait pattern Step through pattern;Decreased toe off;Decreased heel strike; Excessively slow; Short stride; Foward flexed;Decreased foot clearance; Wide HARJIT; Improper Weight shift   Gait Assistance 3  Moderate assist   Additional items Assist x 1;Verbal cues; Tactile cues   Assistive Device Rolling walker   Distance 10ft   Ambulation/Elevation Additional Comments pt attempts ambulating x2 unable to takes steps first time due to pain, able to move short distance as doc above 2nd trial   Balance   Static Sitting Fair +   Dynamic Sitting [address] [facility] -   Endurance Deficit   Endurance Deficit Yes   Endurance Deficit Description pain and fatigue   Activity Tolerance   Activity Tolerance Patient limited by pain; Patient limited by fatigue   Medical Staff Made Aware spoke to [facility]   Nurse Made Aware spoke to RN   Exercises   Heelslides Sitting;20 reps;Bilateral  (manual resist)   Hip Extension Sitting;20 reps;Bilateral  (manual resist)   Assessment   Prognosis Fair   Problem List Decreased strength;Decreased range of motion;Decreased endurance; Impaired balance;Decreased mobility; Decreased coordination; Impaired judgement; Impaired sensation;Pain   Assessment Pt continues to be limited by pain and weakness in the lower back and LE  He is sometimes able to complete sit pivot transfers ind however with increased pain does require min A  Ambulation has not been successful recently due to increased back and groin pain  LE strength does seem to be improving in the Hamstrings and hip ext with therex  Barriers to Discharge Inaccessible home environment;Decreased caregiver support   Goals   Patient Goals get to rehab   STG Expiration Date 03/05/22   PT Treatment Day 14   Plan   Treatment/Interventions ADL retraining;Functional transfer training;LE strengthening/ROM; Elevations; Therapeutic exercise; Endurance training;Patient/family training;Cognitive reorientation;Equipment eval/education; Bed mobility;Gait training;OT;Spoke to case management;Spoke to nursing   Progress Slow progress, decreased activity tolerance   PT Frequency 2-3x/wk   Recommendation   PT Discharge Recommendation Post acute rehabilitation services   AM-PAC Basic Mobility Inpatient   Turning in Bed Without Bedrails 4   Lying on Back to Sitting on Edge of Flat Bed 4   Moving Bed to Chair 3   Standing Up From Chair 2   Walk in Room 2   Climb 3-5 Stairs 1   Basic Mobility Inpatient Raw Score 16   Basic Mobility Standardized Score 38 32   Highest Level Of Mobility   -[facility] Goal 5: Stand one or more mins   -[facility] Highest Level of Mobility 5: Stand (1 or more minutes)   -HLM Goal Achieved Yes   Education   Education Provided Mobility training;Home exercise program;Assistive device   Patient Explanation/teachback used;Demonstrates verbal understanding   End of Consult   Patient Position at End of Consult Bedside chair; All needs within reach       [facility] PT, DPT

## 2022-02-28 NOTE — PHYSICAL THERAPY NOTE
Physical TherapyTreatment Note    Patient's Name: Evon Diallo    Admitting Diagnosis  Cellulitis [L03 90]  Homeless [Z59 00]  Wound check, abscess [Z51 89]    Problem List  Patient Active Problem List   Diagnosis    Homeless    Psoriasis, unspecified    Ambulatory dysfunction    HTN, goal below 140/90    Strange and inexplicable behavior    Onychomycosis    Constipation    Spinal stenosis       Past Medical History  Past Medical History:   Diagnosis Date    Arthritis     Hypertension     Sciatica        Past Surgical History  Past Surgical History:   Procedure Laterality Date    BACK SURGERY         Recent Imaging  US scrotum and testicles   Final Result by Beverly Marquez MD (02/14 1158)       1  No evidence of testicular torsion  2   Minimally complex hydroceles moderate on the right and small on the left  Workstation performed: [id number]         CT abdomen pelvis w contrast   Final Result by Leeanna Earl MD (01/28 1435)      Stool filled colon with possible impaction  Diverticulosis with no diverticulitis                 Workstation performed: [id number]             Recent Vital Signs  Vitals:    02/27/22 0707 02/27/22 1459 02/27/22 2300 02/28/22 0731   BP: 129/70 132/64 121/65 143/84   BP Location: Right arm Left arm Left arm Left arm   Pulse: 61 76 60 60   Resp: 17 18 16 18   Temp: 97 6 °F (36 4 °C) (!) 97 3 °F (36 3 °C) 97 9 °F (36 6 °C) 97 5 °F (36 4 °C)   TempSrc: Temporal Temporal Temporal Temporal   SpO2: 97% 96% 96% 98%   Weight:       Height:           PT Treatment Time: 60 minutes       02/28/22 1020   PT Last Visit   PT Visit Date 02/28/22   Note Type   Note Type Treatment   Pain Assessment   Pain Assessment Tool 0-10   Pain Score 10 - Worst Possible Pain   Pain Location/Orientation Location: Groin   Restrictions/Precautions   Weight Bearing Precautions Per Order No   Other Precautions Fall Risk;Pain   General   Chart Reviewed Yes   Response to Previous Treatment Patient with no complaints from previous session  Family/Caregiver Present No   Cognition   Overall Cognitive Status Impaired   Arousal/Participation Alert; Cooperative   Attention Attends with cues to redirect   Orientation Level Oriented X4   Memory Within functional limits   Following Commands Follows all commands and directions without difficulty   Bed Mobility   Rolling R 6  Modified independent   Additional items Increased time required   Rolling L 6  Modified independent   Additional items Increased time required   Supine to Sit 6  Modified independent   Additional items Increased time required   Sit to Supine 6  Modified independent   Additional items Increased time required   Transfers   Sit to Stand 4  Minimal assistance   Additional items Assist x 1; Armrests; Increased time required;Verbal cues   Stand to Sit 4  Minimal assistance   Additional items Assist x 1; Armrests; Increased time required;Verbal cues   Stand pivot 4  Minimal assistance   Additional items Assist x 1; Armrests; Increased time required;Verbal cues   Sit pivot 4  Minimal assistance   Additional items Assist x 1; Armrests; Increased time required;Verbal cues   Toilet transfer 4  Minimal assistance   Additional items Assist x 1; Armrests; Increased time required;Verbal cues;Standard toilet   Ambulation/Elevation   Ambulation/Elevation Additional Comments attempted ambulation x3, pt unable to take steps due to pain unable to ambulate any distance today   Balance   Static Sitting Fair +   Dynamic Sitting Fair   Static Standing Fair -   Dynamic Standing Poor   Ambulatory Poor   Endurance Deficit   Endurance Deficit Yes   Endurance Deficit Description pain and fatigue   Activity Tolerance   Activity Tolerance Patient limited by fatigue;Patient limited by pain   Medical Staff Made Aware spoke to CM   Nurse Made Aware spoke to RN   Exercises   Heelslides Sitting;20 reps;AROM  (manual resist)   Hip Flexion Sitting;20 reps;AROM; Bilateral   Hip Abduction Sitting;20 reps  (manual resist)   Hip Extension Sitting;20 reps;Bilateral  (manual resist)   Assessment   Prognosis Fair   Problem List Decreased strength;Decreased range of motion;Decreased endurance; Impaired balance;Decreased mobility; Decreased coordination; Impaired judgement; Impaired sensation;Pain   Assessment Pt is limited due to groin and low back pain today, unable to tolerate ambulation with multiple attempts  He was able to complete multiple transfers and exercise to the LEs  At end of treatment left in w/c which he is able to mobilize ind  Barriers to Discharge Inaccessible home environment;Decreased caregiver support   Goals   Patient Goals get into a rehab   STG Expiration Date 03/05/22   PT Treatment Day 15   Plan   Treatment/Interventions ADL retraining;Functional transfer training;LE strengthening/ROM; Endurance training; Therapeutic exercise;Patient/family training;Equipment eval/education; Bed mobility;Gait training;Spoke to nursing;Spoke to case management;OT   Progress Slow progress, decreased activity tolerance   PT Frequency 2-3x/wk   Recommendation   PT Discharge Recommendation Post acute rehabilitation services   Equipment Recommended Wheelchair   Wheelchair Package Recommended Standard   AM-PAC Basic Mobility Inpatient   Turning in Bed Without Bedrails 4   Lying on Back to Sitting on Edge of Flat Bed 4   Moving Bed to Chair 3   Standing Up From Chair 2   Walk in Room 2   Climb 3-5 Stairs 1   Basic Mobility Inpatient Raw Score 16   Basic Mobility Standardized Score 38 32   Highest Level Of Mobility   [facility]-HLM Goal 5: Stand one or more mins   -HLM Highest Level of Mobility 4: Move to chair/commode   -HLM Goal Achieved No   Education   Education Provided Mobility training;Home exercise program;Assistive device   Patient Explanation/teachback used;Demonstrates verbal understanding   End of Consult   Patient Position at End of Consult Bedside chair; All needs within reach       [facility] PT, DPT

## 2022-02-28 NOTE — PROGRESS NOTES
[facility]  Progress Note Heena Hall 1965, 60 y o  male MRN: [id number]  Unit/Bed#: 7T [facility] 711-01 Encounter: [id number]  Primary Care Provider: Cathy Correa MD   Date and time admitted to hospital: 12/29/2021  5:38 PM    * Ambulatory dysfunction  Assessment & Plan  · Continue gabapentin 400mg t i d, robaxin 500mg Q6H PRN, and oxycodone 5mg Q6H PRN  · PT/OT recommending post acute rehab  · Case management following to aide in discharge planning  · Patient remains medically stable for discharge     Spinal stenosis  Assessment & Plan  · MRI  [facility]): Severe spinal stenosis L3-L4, and L5-S1 with more moderate stenosis L2-  · Continue pain medications with oxycodone, gabapentin, Robaxin  · Continue PT    Onychomycosis  Assessment & Plan  · Present on almost all toes and fingers  · Podiatry performed nail debridement on 01/27  · Continue Terbinafine 250 mg PO daily for 12 weeks (Ordered through 4/21)  · Check CMP weekly (Next check on 3/6)    Strange and inexplicable behavior  Assessment & Plan  · Patient with hyper fixation on fungal infection arms (which was treated)  · Psychiatry consulted, not appropriate for inpatient psychiatry unit at this time  · Continue Abilify 10 mg daily and Atarax 25 mg p o  Q 6 hours p r n  For anxiety      HTN, goal below 140/90  Assessment & Plan  · Continue amlodipine 10 mg daily and lisinopril 20 mg daily  Homeless  Assessment & Plan  · Patient was to be discharged to the [facility] rescue mission however they will not take him back as he does not have any photo ID  · PT recommending post acute rehab placement  ·  on board to obtain photo ID      VTE Pharmacologic Prophylaxis:   Pharmacologic: Enoxaparin (Lovenox)  Mechanical VTE Prophylaxis in Place: Yes    Patient Centered Rounds: I have performed bedside rounds with nursing staff today      Discussions with Specialists or Other Care Team Provider:  Discussed with Case Management, nurse    Education and Discussions with Family / Patient:  Discussed with patient     Time Spent for Care: 45 minutes  More than 50% of total time spent on counseling and coordination of care as described above  Current Length of Stay: 60 day(s)    Current Patient Status: Inpatient   Certification Statement: The patient will continue to require additional inpatient hospital stay due to Pending placement    Discharge Plan / Estimated Discharge Date:  Medically stable for discharge, pending placement      Code Status: Level 1 - Full Code      Subjective:   Patient was seen and examined at bedside  The patient was sleeping soundly this morning  No acute overnight changes      Objective:     Vitals:   Temp (24hrs), Av 6 °F (36 4 °C), Min:97 3 °F (36 3 °C), Max:97 9 °F (36 6 °C)    Temp:  [97 3 °F (36 3 °C)-97 9 °F (36 6 °C)] 97 5 °F (36 4 °C)  HR:  [60-76] 60  Resp:  [16-18] 18  BP: (121-143)/(64-84) 143/84  SpO2:  [96 %-98 %] 98 %  Body mass index is 23 82 kg/m²  Input and Output Summary (last 24 hours):        Intake/Output Summary (Last 24 hours) at 2022 1035  Last data filed at 2022 5318  Gross per 24 hour   Intake 380 ml   Output 500 ml   Net -120 ml       Physical Exam:     Physical Exam  General: breathing well on room air, no acute distress  HEENT: NC/AT, PERRL, EOM - normal  Neck: Supple  Pulm/Chest: Normal chest wall expansion, clear breath sounds on both side, no wheezing/rhonchi or crackles appreciated  CVS: RRR, normal S1&S2, no murmur appreciated, capillary refill <2s  Abd: soft, non tender, non distended, bowel sounds +  MSK: move all 4 extremities spontaneously  Skin: warm  CNS: no acute focal neuro deficit  Additional Data:     Labs:        Results from last 7 days   Lab Units 22  0441   POTASSIUM mmol/L 4 0   CHLORIDE mmol/L 104   CO2 mmol/L 30   BUN mg/dL 24   CREATININE mg/dL 0 72   CALCIUM mg/dL 8 3*   ALK PHOS U/L 84   ALT U/L 9   AST U/L 15*           * I Have Reviewed All Lab Data Listed Above  * Additional Pertinent Lab Tests Reviewed: [address] Admission Reviewed    Imaging:    Imaging Reports Reviewed Today Include: None  Imaging Personally Reviewed by Myself Includes:  None      Recent Cultures (last 7 days):           Last 24 Hours Medication List:   Current Facility-Administered Medications   Medication Dose Route Frequency Provider Last Rate    acetaminophen  650 mg Oral Q4H PRN Marry West PA-C      amLODIPine  10 mg Oral Daily Mariana Overlie, DO      ARIPiprazole  10 mg Oral Daily [facility], DO      carbamide peroxide  5 drop Both Ears BID PRN Jne García MD      diphenhydrAMINE  25 mg Oral Q6H PRN Mariana [facility], DO      enoxaparin  40 mg Subcutaneous Q24H [address] [facility], DO      gabapentin  400 mg Oral TID [facility], DO      hydrocortisone   Topical 4x Daily PRN [facility], DO      hydrOXYzine HCL  25 mg Oral Q6H PRN DOV Bonds      lisinopril  20 mg Oral Daily [facility], DO      LORazepam  2 mg Oral Once Mariana Overlie, DO      methocarbamol  500 mg Oral Q6H PRN Araceli Rosen MD      nicotine  1 patch Transdermal Daily [place] PA-C      nystatin   Topical BID Araceli Rosen MD      ondansetron  4 mg Oral Q6H PRN [facility], DO      oxyCODONE  5 mg Oral Q6H PRN [facility], DO      polyethylene glycol  17 g Oral Daily Mariana Overlie, DO      senna-docusate sodium  1 tablet Oral BID Mariana Overlie, DO      terbinafine  250 mg Oral Daily Mariana Overlie, DO      triamcinolone   Topical BID Araceli Rosen MD      white petrolatum-mineral oil   Topical TID PRN Keila Parham DO          Today, Patient Was Seen By: Araceli Rosen MD    ** Please Note: Dragon 360 Dictation voice to text software may have been used in the creation of this document   **

## 2022-02-28 NOTE — ASSESSMENT & PLAN NOTE
· Patient was to be discharged to the [facility] rescue mission however they will not take him back as he does not have any photo ID  · PT recommending post acute rehab placement  ·  on board to obtain photo ID

## 2022-03-01 PROBLEM — N48.89 PENILE PAIN: Status: ACTIVE | Noted: 2022-03-01

## 2022-03-01 PROCEDURE — 99232 SBSQ HOSP IP/OBS MODERATE 35: CPT | Performed by: INTERNAL MEDICINE

## 2022-03-01 RX ADMIN — OXYCODONE HYDROCHLORIDE 5 MG: 5 TABLET ORAL at 16:37

## 2022-03-01 RX ADMIN — HYDROCORTISONE: 25 OINTMENT TOPICAL at 08:17

## 2022-03-01 RX ADMIN — TERBINAFINE HYDROCHLORIDE 250 MG: 250 TABLET ORAL at 08:17

## 2022-03-01 RX ADMIN — GABAPENTIN 400 MG: 400 CAPSULE ORAL at 21:03

## 2022-03-01 RX ADMIN — NYSTATIN: 100000 POWDER TOPICAL at 08:18

## 2022-03-01 RX ADMIN — OXYCODONE HYDROCHLORIDE 5 MG: 5 TABLET ORAL at 08:17

## 2022-03-01 RX ADMIN — NYSTATIN: 100000 POWDER TOPICAL at 18:21

## 2022-03-01 RX ADMIN — METHOCARBAMOL 500 MG: 500 TABLET, FILM COATED ORAL at 18:24

## 2022-03-01 RX ADMIN — LISINOPRIL 20 MG: 20 TABLET ORAL at 08:17

## 2022-03-01 RX ADMIN — HYDROCORTISONE: 25 OINTMENT TOPICAL at 18:20

## 2022-03-01 RX ADMIN — METHOCARBAMOL 500 MG: 500 TABLET, FILM COATED ORAL at 00:22

## 2022-03-01 RX ADMIN — POLYETHYLENE GLYCOL 3350 17 G: 17 POWDER, FOR SOLUTION ORAL at 08:17

## 2022-03-01 RX ADMIN — GABAPENTIN 400 MG: 400 CAPSULE ORAL at 16:37

## 2022-03-01 RX ADMIN — TRIAMCINOLONE ACETONIDE: 1 CREAM TOPICAL at 08:17

## 2022-03-01 RX ADMIN — TRIAMCINOLONE ACETONIDE: 1 CREAM TOPICAL at 18:20

## 2022-03-01 RX ADMIN — SENNOSIDES AND DOCUSATE SODIUM 1 TABLET: 50; 8.6 TABLET ORAL at 18:20

## 2022-03-01 RX ADMIN — AMLODIPINE BESYLATE 10 MG: 10 TABLET ORAL at 08:17

## 2022-03-01 RX ADMIN — SENNOSIDES AND DOCUSATE SODIUM 1 TABLET: 50; 8.6 TABLET ORAL at 08:17

## 2022-03-01 RX ADMIN — ARIPIPRAZOLE 10 MG: 10 TABLET ORAL at 08:17

## 2022-03-01 RX ADMIN — OXYCODONE HYDROCHLORIDE 5 MG: 5 TABLET ORAL at 01:34

## 2022-03-01 RX ADMIN — HYDROXYZINE HYDROCHLORIDE 25 MG: 25 TABLET ORAL at 08:18

## 2022-03-01 RX ADMIN — GABAPENTIN 400 MG: 400 CAPSULE ORAL at 08:17

## 2022-03-01 NOTE — ASSESSMENT & PLAN NOTE
· Patient was to be discharged to the [facility] rescue mission however they will not take him back as he does not have any photo ID  · PT recommending post acute rehab placement  ·  on board to obtain photo ID

## 2022-03-01 NOTE — PLAN OF CARE
Problem: PAIN - ADULT  Goal: Verbalizes/displays adequate comfort level or baseline comfort level  Description: Interventions:  - Encourage patient to monitor pain and request assistance  - Assess pain using appropriate pain scale  - Administer analgesics based on type and severity of pain and evaluate response  - Implement non-pharmacological measures as appropriate and evaluate response  - Consider cultural and social influences on pain and pain management  - Notify physician/advanced practitioner if interventions unsuccessful or patient reports new pain  Outcome: Progressing     Problem: INFECTION - ADULT  Goal: Absence or prevention of progression during hospitalization  Description: INTERVENTIONS:  - Assess and monitor for signs and symptoms of infection  - Monitor lab/diagnostic results  - Monitor all insertion sites, i e  indwelling lines, tubes, and drains  - Monitor endotracheal if appropriate and nasal secretions for changes in amount and color  - [place] appropriate cooling/warming therapies per order  - Administer medications as ordered  - Instruct and encourage patient and family to use good hand hygiene technique  - Identify and instruct in appropriate isolation precautions for identified infection/condition  Outcome: Progressing     Problem: SAFETY ADULT  Goal: Patient will remain free of falls  Description: INTERVENTIONS:  - Educate patient/family on patient safety including physical limitations  - Instruct patient to call for assistance with activity   - Consult OT/PT to assist with strengthening/mobility   - Keep Call bell within reach  - Keep bed low and locked with side rails adjusted as appropriate  - Keep care items and personal belongings within reach  - Initiate and maintain comfort rounds  - Make Fall Risk Sign visible to staff  - Offer Toileting   - Obtain necessary fall risk management equipment:   - Apply yellow socks and bracelet for high fall risk patients  - Consider moving patient to room near nurses station  Outcome: Progressing  Goal: Maintain or return to baseline ADL function  Description: INTERVENTIONS:  -  Assess patient's ability to carry out ADLs; assess patient's baseline for ADL function and identify physical deficits which impact ability to perform ADLs (bathing, care of mouth/teeth, toileting, grooming, dressing, etc )  - Assess/evaluate cause of self-care deficits   - Assess range of motion  - Assess patient's mobility; develop plan if impaired  - Assess patient's need for assistive devices and provide as appropriate  - Encourage maximum independence but intervene and supervise when necessary  - Involve family in performance of ADLs  - Assess for home care needs following discharge   - Consider OT consult to assist with ADL evaluation and planning for discharge  - Provide patient education as appropriate  Outcome: Progressing  Goal: Maintains/Returns to pre admission functional level  Description: INTERVENTIONS:  - Perform BMAT or MOVE assessment daily    - Set and communicate daily mobility goal to care team and patient/family/caregiver  - Collaborate with rehabilitation services on mobility goals if consulted  - Perform Range of Motion 2 times a day  - Reposition patient every 2 hours    - Dangle patient 2 times a day  - Stand patient 2 times a day  - Ambulate patient 2 times a day  - Out of bed to chair 2 times a day   - Out of bed for meals 2 times a day  - Out of bed for toileting  - Record patient progress and toleration of activity level   Outcome: Progressing     Problem: DISCHARGE PLANNING  Goal: Discharge to home or other facility with appropriate resources  Description: INTERVENTIONS:  - Identify barriers to discharge w/patient and caregiver  - Arrange for needed discharge resources and transportation as appropriate  - Identify discharge learning needs (meds, wound care, etc )  - Arrange for interpretive services to assist at discharge as needed  - Refer to Case Management Department for coordinating discharge planning if the patient needs post-hospital services based on physician/advanced practitioner order or complex needs related to functional status, cognitive ability, or social support system  Outcome: Progressing     Problem: Knowledge Deficit  Goal: Patient/family/caregiver demonstrates understanding of disease process, treatment plan, medications, and discharge instructions  Description: Complete learning assessment and assess knowledge base  Interventions:  - Provide teaching at level of understanding  - Provide teaching via preferred learning methods  Outcome: Progressing     Problem: MOBILITY - ADULT  Goal: Maintain or return to baseline ADL function  Description: INTERVENTIONS:  -  Assess patient's ability to carry out ADLs; assess patient's baseline for ADL function and identify physical deficits which impact ability to perform ADLs (bathing, care of mouth/teeth, toileting, grooming, dressing, etc )  - Assess/evaluate cause of self-care deficits   - Assess range of motion  - Assess patient's mobility; develop plan if impaired  - Assess patient's need for assistive devices and provide as appropriate  - Encourage maximum independence but intervene and supervise when necessary  - Involve family in performance of ADLs  - Assess for home care needs following discharge   - Consider OT consult to assist with ADL evaluation and planning for discharge  - Provide patient education as appropriate  Outcome: Progressing  Goal: Maintains/Returns to pre admission functional level  Description: INTERVENTIONS:  - Perform BMAT or MOVE assessment daily    - Set and communicate daily mobility goal to care team and patient/family/caregiver  - Collaborate with rehabilitation services on mobility goals if consulted  - Perform Range of Motion 2 times a day  - Reposition patient every 2 hours    - Dangle patient 2 times a day  - Stand patient 2 times a day  - Ambulate patient 2 times a day  - Out of bed to chair 2 times a day   - Out of bed for meals 2 times a day  - Out of bed for toileting  - Record patient progress and toleration of activity level   Outcome: Progressing     Problem: Potential for Falls  Goal: Patient will remain free of falls  Description: INTERVENTIONS:  - Educate patient/family on patient safety including physical limitations  - Instruct patient to call for assistance with activity   - Consult OT/PT to assist with strengthening/mobility   - Keep Call bell within reach  - Keep bed low and locked with side rails adjusted as appropriate  - Keep care items and personal belongings within reach  - Initiate and maintain comfort rounds  - Make Fall Risk Sign visible to staff  - Offer Toileting   - Obtain necessary fall risk management equipment:   - Apply yellow socks and bracelet for high fall risk patients  - Consider moving patient to room near nurses station  Outcome: Progressing     Problem: Prexisting or High Potential for Compromised Skin Integrity  Goal: Skin integrity is maintained or improved  Description: INTERVENTIONS:  - Identify patients at risk for skin breakdown  - Assess and monitor skin integrity  - Assess and monitor nutrition and hydration status  - Monitor labs   - Assess for incontinence   - Turn and reposition patient  - Assist with mobility/ambulation  - Relieve pressure over bony prominences  - Avoid friction and shearing  - Provide appropriate hygiene as needed including keeping skin clean and dry  - Evaluate need for skin moisturizer/barrier cream  - Collaborate with interdisciplinary team   - Patient/family teaching  - Consider wound care consult   Outcome: Progressing

## 2022-03-01 NOTE — ASSESSMENT & PLAN NOTE
· Patient complains of pain around his penis, scrotum and anal area  · No evidence of testicular torsion, patient does not seem to be in distress on physical exam  · No fever, vital signs stable  · Continue to monitor

## 2022-03-01 NOTE — PROGRESS NOTES
[facility]  Progress Note Johan Aguilar 1965, 60 y o  male MRN: [id number]  Unit/Bed#: 7T [facility] 711-01 Encounter: [id number]  Primary Care Provider: Rachael Georges MD   Date and time admitted to hospital: 12/29/2021  5:38 PM    * Ambulatory dysfunction  Assessment & Plan  · Continue gabapentin 400mg t i d, robaxin 500mg Q6H PRN, and oxycodone 5mg Q6H PRN  · PT/OT recommending post acute rehab  · Case management following to aide in discharge planning  · Patient remains medically stable for discharge     Penile pain  Assessment & Plan  · Patient complains of pain around his penis, scrotum and anal area  · No evidence of testicular torsion, patient does not seem to be in distress on physical exam  · No fever, vital signs stable  · Continue to monitor    Spinal stenosis  Assessment & Plan  · MRI  [facility]): Severe spinal stenosis L3-L4, and L5-S1 with more moderate stenosis L2-  · Continue pain medications with oxycodone, gabapentin, Robaxin  · Patient keeps asking to increase the dose and frequency of oxycodone  Patient does not seem to be in distress  I explained that we need to limit opioid use  Encourage the patient to continue with PT   · Continue PT    Onychomycosis  Assessment & Plan  · Present on almost all toes and fingers  · Podiatry performed nail debridement on 01/27  · Continue Terbinafine 250 mg PO daily for 12 weeks (Ordered through 4/21)  · Check CMP weekly (Next check on 3/6)    Strange and inexplicable behavior  Assessment & Plan  · Patient with hyper fixation on fungal infection arms (which was treated)  · Psychiatry consulted, not appropriate for inpatient psychiatry unit at this time  · Continue Abilify 10 mg daily and Atarax 25 mg p o  Q 6 hours p r n  For anxiety      HTN, goal below 140/90  Assessment & Plan  · Continue amlodipine 10 mg daily and lisinopril 20 mg daily        Homeless  Assessment & Plan  · Patient was to be discharged to [facility] however they will not take him back as he does not have any photo ID  · PT recommending post acute rehab placement  ·  on board to obtain photo ID      VTE Pharmacologic Prophylaxis:   Pharmacologic: Enoxaparin (Lovenox)  Mechanical VTE Prophylaxis in Place: Yes    Patient Centered Rounds: I have performed bedside rounds with nursing staff today  Discussions with Specialists or Other Care Team Provider:  Discussed with , nurse    Education and Discussions with Family / Patient: discussed with patient    Time Spent for Care: 45 minutes  More than 50% of total time spent on counseling and coordination of care as described above  Current Length of Stay: 64 day(s)    Current Patient Status: Inpatient   Certification Statement: The patient will continue to require additional inpatient hospital stay due to Pending placement    Discharge Plan / Estimated Discharge Date:  Pending placement      Code Status: Level 1 - Full Code      Subjective:   Patient was seen and examined at bedside  The patient stated he has pain around his penis and scrotal area  Does not seem to be in acute distress  No acute overnight changes  Objective:     Vitals:   Temp (24hrs), Av 9 °F (36 6 °C), Min:97 1 °F (36 2 °C), Max:99 °F (37 2 °C)    Temp:  [97 1 °F (36 2 °C)-99 °F (37 2 °C)] 97 6 °F (36 4 °C)  HR:  [63-75] 63  Resp:  [18-20] 18  BP: (120-160)/(75-93) 160/93  SpO2:  [98 %-99 %] 98 %  Body mass index is 23 82 kg/m²  Input and Output Summary (last 24 hours):        Intake/Output Summary (Last 24 hours) at 3/1/2022 1309  Last data filed at 3/1/2022 0900  Gross per 24 hour   Intake 840 ml   Output 2100 ml   Net -1260 ml       Physical Exam:     Physical Exam  General: breathing well on room air, no acute distress  HEENT: NC/AT, PERRL, EOM - normal  Neck: Supple  Pulm/Chest: Normal chest wall expansion, clear breath sounds on both side, no wheezing/rhonchi or crackles appreciated  CVS: RRR, normal S1&S2, no murmur appreciated, capillary refill <2s  Abd: soft, non tender, non distended, bowel sounds +  MSK: move all 4 extremities spontaneously, generalized skin rash noted  Genitourinary: no evidence of testicular torsion  Skin: warm  CNS: no acute focal neuro deficit      Additional Data:     Labs:        Results from last 7 days   Lab Units 02/27/22  0441   POTASSIUM mmol/L 4 0   CHLORIDE mmol/L 104   CO2 mmol/L 30   BUN mg/dL 24   CREATININE mg/dL 0 72   CALCIUM mg/dL 8 3*   ALK PHOS U/L 84   ALT U/L 9   AST U/L 15*           * I Have Reviewed All Lab Data Listed Above  * Additional Pertinent Lab Tests Reviewed:  [facility] Admission Reviewed    Imaging:    Imaging Reports Reviewed Today Include: None  Imaging Personally Reviewed by Myself Includes:  None      Recent Cultures (last 7 days):           Last 24 Hours Medication List:   Current Facility-Administered Medications   Medication Dose Route Frequency Provider Last Rate    acetaminophen  650 mg Oral Q4H PRN Elie Reyes PA-C      amLODIPine  10 mg Oral Daily [place] [facility], DO      ARIPiprazole  10 mg Oral Daily [facility], DO      carbamide peroxide  5 drop Both Ears BID PRN Kaz York MD      diphenhydrAMINE  25 mg Oral Q6H PRN [facility], DO      enoxaparin  40 mg Subcutaneous Q24H [facility], DO      gabapentin  400 mg Oral TID [facility], DO      hydrocortisone   Topical 4x Daily PRN [facility], DO      hydrOXYzine HCL  25 mg Oral Q6H PRN DOV Mazariegos      lisinopril  20 mg Oral Daily [facility], DO      LORazepam  2 mg Oral Once [place] Prows, DO      methocarbamol  500 mg Oral Q6H PRN Laurita Rodas MD      nicotine  1 patch Transdermal Daily [place] PA-C      nystatin   Topical BID Laurita Rodas MD      ondansetron  4 mg Oral Q6H PRN [place] Prows, DO      oxyCODONE  5 mg Oral Q6H PRN [address] [facility] [facility], DO     Jese Hawthorne polyethylene glycol  17 g Oral Daily [employer], DO      senna-docusate sodium  1 tablet Oral BID [employer], DO      terbinafine  250 mg Oral Daily [employer], DO      triamcinolone   Topical BID Melchor Montes MD      white petrolatum-mineral oil   Topical TID PRN Sara Gonzalez DO          Today, Patient Was Seen By: Melchor Montes MD    ** Please Note: Dragon 360 Dictation voice to text software may have been used in the creation of this document   **

## 2022-03-01 NOTE — ASSESSMENT & PLAN NOTE
· MRI  [facility]): Severe spinal stenosis L3-L4, and L5-S1 with more moderate stenosis L2-  · Continue pain medications with oxycodone, gabapentin, Robaxin  · Patient keeps asking to increase the dose and frequency of oxycodone  Patient does not seem to be in distress  I explained that we need to limit opioid use    Encourage the patient to continue with PT   · Continue PT

## 2022-03-01 NOTE — PLAN OF CARE
Problem: PAIN - ADULT  Goal: Verbalizes/displays adequate comfort level or baseline comfort level  Description: Interventions:  - Encourage patient to monitor pain and request assistance  - Assess pain using appropriate pain scale  - Administer analgesics based on type and severity of pain and evaluate response  - Implement non-pharmacological measures as appropriate and evaluate response  - Consider cultural and social influences on pain and pain management  - Notify physician/advanced practitioner if interventions unsuccessful or patient reports new pain  Outcome: Progressing     Problem: INFECTION - ADULT  Goal: Absence or prevention of progression during hospitalization  Description: INTERVENTIONS:  - Assess and monitor for signs and symptoms of infection  - Monitor lab/diagnostic results  - Monitor all insertion sites, i e  indwelling lines, tubes, and drains  - Monitor endotracheal if appropriate and nasal secretions for changes in amount and color  - [place] appropriate cooling/warming therapies per order  - Administer medications as ordered  - Instruct and encourage patient and family to use good hand hygiene technique  - Identify and instruct in appropriate isolation precautions for identified infection/condition  Outcome: Progressing     Problem: DISCHARGE PLANNING  Goal: Discharge to home or other facility with appropriate resources  Description: INTERVENTIONS:  - Identify barriers to discharge w/patient and caregiver  - Arrange for needed discharge resources and transportation as appropriate  - Identify discharge learning needs (meds, wound care, etc )  - Arrange for interpretive services to assist at discharge as needed  - Refer to Case Management Department for coordinating discharge planning if the patient needs post-hospital services based on physician/advanced practitioner order or complex needs related to functional status, cognitive ability, or social support system  Outcome: Progressing Problem: Knowledge Deficit  Goal: Patient/family/caregiver demonstrates understanding of disease process, treatment plan, medications, and discharge instructions  Description: Complete learning assessment and assess knowledge base    Interventions:  - Provide teaching at level of understanding  - Provide teaching via preferred learning methods  Outcome: Progressing

## 2022-03-02 PROCEDURE — 99232 SBSQ HOSP IP/OBS MODERATE 35: CPT | Performed by: INTERNAL MEDICINE

## 2022-03-02 RX ADMIN — OXYCODONE HYDROCHLORIDE 5 MG: 5 TABLET ORAL at 16:36

## 2022-03-02 RX ADMIN — GABAPENTIN 400 MG: 400 CAPSULE ORAL at 09:38

## 2022-03-02 RX ADMIN — LISINOPRIL 20 MG: 20 TABLET ORAL at 09:38

## 2022-03-02 RX ADMIN — TRIAMCINOLONE ACETONIDE: 1 CREAM TOPICAL at 09:44

## 2022-03-02 RX ADMIN — SENNOSIDES AND DOCUSATE SODIUM 1 TABLET: 50; 8.6 TABLET ORAL at 09:38

## 2022-03-02 RX ADMIN — HYDROXYZINE HYDROCHLORIDE 25 MG: 25 TABLET ORAL at 09:39

## 2022-03-02 RX ADMIN — METHOCARBAMOL 500 MG: 500 TABLET, FILM COATED ORAL at 13:48

## 2022-03-02 RX ADMIN — GABAPENTIN 400 MG: 400 CAPSULE ORAL at 20:00

## 2022-03-02 RX ADMIN — OXYCODONE HYDROCHLORIDE 5 MG: 5 TABLET ORAL at 09:39

## 2022-03-02 RX ADMIN — NYSTATIN: 100000 POWDER TOPICAL at 09:44

## 2022-03-02 RX ADMIN — GABAPENTIN 400 MG: 400 CAPSULE ORAL at 16:36

## 2022-03-02 RX ADMIN — TERBINAFINE HYDROCHLORIDE 250 MG: 250 TABLET ORAL at 09:40

## 2022-03-02 RX ADMIN — OXYCODONE HYDROCHLORIDE 5 MG: 5 TABLET ORAL at 00:15

## 2022-03-02 RX ADMIN — METHOCARBAMOL 500 MG: 500 TABLET, FILM COATED ORAL at 19:48

## 2022-03-02 RX ADMIN — ARIPIPRAZOLE 10 MG: 10 TABLET ORAL at 09:38

## 2022-03-02 RX ADMIN — METHOCARBAMOL 500 MG: 500 TABLET, FILM COATED ORAL at 03:39

## 2022-03-02 RX ADMIN — ACETAMINOPHEN 650 MG: 325 TABLET ORAL at 00:15

## 2022-03-02 RX ADMIN — TRIAMCINOLONE ACETONIDE: 1 CREAM TOPICAL at 18:10

## 2022-03-02 RX ADMIN — NYSTATIN: 100000 POWDER TOPICAL at 18:10

## 2022-03-02 RX ADMIN — AMLODIPINE BESYLATE 10 MG: 10 TABLET ORAL at 09:39

## 2022-03-02 NOTE — CASE MANAGEMENT
DISCHARGE DETAILS: CM was notified at morning rounds that pt is medically cleared to be discharge today  CM received phone call from [facility] Ph; [phone] stating that they have beds available at there facility  CM requested Deshaun to speak with Pt's Sister Sue to see if she is interested in the facility  [place] of choice was discussed:  Pt and pt's Sister is interested in [facility] rehab  CM was notified by Naomi Navas that he is waiting for insurance approval to see if he can accept the pt  Evonne Mcclendon stated he will get back to CM once pt gets approval from the insurance  CM called [facility] to see if there are any bed available for the pt this week  CM left a voicemail for admission Michele Valerio Ph: [phone], pending call back  CM reached out to [employer] from [employer] on [facility]; CM was notified that there are no beds available at the moment  Nicola have this pt on waiting list and will let us know soon as there is a bed available      CM department will continue to follow thorough pt's D/C

## 2022-03-02 NOTE — PLAN OF CARE
Problem: PAIN - ADULT  Goal: Verbalizes/displays adequate comfort level or baseline comfort level  Description: Interventions:  - Encourage patient to monitor pain and request assistance  - Assess pain using appropriate pain scale  - Administer analgesics based on type and severity of pain and evaluate response  - Implement non-pharmacological measures as appropriate and evaluate response  - Consider cultural and social influences on pain and pain management  - Notify physician/advanced practitioner if interventions unsuccessful or patient reports new pain  Outcome: Progressing     Problem: INFECTION - ADULT  Goal: Absence or prevention of progression during hospitalization  Description: INTERVENTIONS:  - Assess and monitor for signs and symptoms of infection  - Monitor lab/diagnostic results  - Monitor all insertion sites, i e  indwelling lines, tubes, and drains  - Monitor endotracheal if appropriate and nasal secretions for changes in amount and color  - [place] appropriate cooling/warming therapies per order  - Administer medications as ordered  - Instruct and encourage patient and family to use good hand hygiene technique  - Identify and instruct in appropriate isolation precautions for identified infection/condition  Outcome: Progressing     Problem: SAFETY ADULT  Goal: Patient will remain free of falls  Description: INTERVENTIONS:  - Educate patient/family on patient safety including physical limitations  - Instruct patient to call for assistance with activity   - Consult OT/PT to assist with strengthening/mobility   - Keep Call bell within reach  - Keep bed low and locked with side rails adjusted as appropriate  - Keep care items and personal belongings within reach  - Initiate and maintain comfort rounds  - Make Fall Risk Sign visible to staff  - Offer Toileting   - Obtain necessary fall risk management equipment:   - Apply yellow socks and bracelet for high fall risk patients  - Consider moving patient to room near nurses station  Outcome: Progressing  Goal: Maintain or return to baseline ADL function  Description: INTERVENTIONS:  -  Assess patient's ability to carry out ADLs; assess patient's baseline for ADL function and identify physical deficits which impact ability to perform ADLs (bathing, care of mouth/teeth, toileting, grooming, dressing, etc )  - Assess/evaluate cause of self-care deficits   - Assess range of motion  - Assess patient's mobility; develop plan if impaired  - Assess patient's need for assistive devices and provide as appropriate  - Encourage maximum independence but intervene and supervise when necessary  - Involve family in performance of ADLs  - Assess for home care needs following discharge   - Consider OT consult to assist with ADL evaluation and planning for discharge  - Provide patient education as appropriate  Outcome: Progressing  Goal: Maintains/Returns to pre admission functional level  Description: INTERVENTIONS:  - Perform BMAT or MOVE assessment daily    - Set and communicate daily mobility goal to care team and patient/family/caregiver  - Collaborate with rehabilitation services on mobility goals if consulted  - Perform Range of Motion 2 times a day  - Reposition patient every 2 hours    - Dangle patient 2 times a day  - Stand patient 2 times a day  - Ambulate patient 2 times a day  - Out of bed to chair 2 times a day   - Out of bed for meals 2 times a day  - Out of bed for toileting  - Record patient progress and toleration of activity level   Outcome: Progressing     Problem: DISCHARGE PLANNING  Goal: Discharge to home or other facility with appropriate resources  Description: INTERVENTIONS:  - Identify barriers to discharge w/patient and caregiver  - Arrange for needed discharge resources and transportation as appropriate  - Identify discharge learning needs (meds, wound care, etc )  - Arrange for interpretive services to assist at discharge as needed  - Refer to Case Management Department for coordinating discharge planning if the patient needs post-hospital services based on physician/advanced practitioner order or complex needs related to functional status, cognitive ability, or social support system  Outcome: Progressing     Problem: Knowledge Deficit  Goal: Patient/family/caregiver demonstrates understanding of disease process, treatment plan, medications, and discharge instructions  Description: Complete learning assessment and assess knowledge base  Interventions:  - Provide teaching at level of understanding  - Provide teaching via preferred learning methods  Outcome: Progressing     Problem: MOBILITY - ADULT  Goal: Maintain or return to baseline ADL function  Description: INTERVENTIONS:  -  Assess patient's ability to carry out ADLs; assess patient's baseline for ADL function and identify physical deficits which impact ability to perform ADLs (bathing, care of mouth/teeth, toileting, grooming, dressing, etc )  - Assess/evaluate cause of self-care deficits   - Assess range of motion  - Assess patient's mobility; develop plan if impaired  - Assess patient's need for assistive devices and provide as appropriate  - Encourage maximum independence but intervene and supervise when necessary  - Involve family in performance of ADLs  - Assess for home care needs following discharge   - Consider OT consult to assist with ADL evaluation and planning for discharge  - Provide patient education as appropriate  Outcome: Progressing  Goal: Maintains/Returns to pre admission functional level  Description: INTERVENTIONS:  - Perform BMAT or MOVE assessment daily    - Set and communicate daily mobility goal to care team and patient/family/caregiver  - Collaborate with rehabilitation services on mobility goals if consulted  - Perform Range of Motion 2 times a day  - Reposition patient every 2 hours    - Dangle patient 2 times a day  - Stand patient 2 times a day  - Ambulate patient 2 times a day  - Out of bed to chair 2 times a day   - Out of bed for meals 2 times a day  - Out of bed for toileting  - Record patient progress and toleration of activity level   Outcome: Progressing     Problem: Potential for Falls  Goal: Patient will remain free of falls  Description: INTERVENTIONS:  - Educate patient/family on patient safety including physical limitations  - Instruct patient to call for assistance with activity   - Consult OT/PT to assist with strengthening/mobility   - Keep Call bell within reach  - Keep bed low and locked with side rails adjusted as appropriate  - Keep care items and personal belongings within reach  - Initiate and maintain comfort rounds  - Make Fall Risk Sign visible to staff  - Offer Toileting   - Obtain necessary fall risk management equipment:   - Apply yellow socks and bracelet for high fall risk patients  - Consider moving patient to room near nurses station  Outcome: Progressing     Problem: Prexisting or High Potential for Compromised Skin Integrity  Goal: Skin integrity is maintained or improved  Description: INTERVENTIONS:  - Identify patients at risk for skin breakdown  - Assess and monitor skin integrity  - Assess and monitor nutrition and hydration status  - Monitor labs   - Assess for incontinence   - Turn and reposition patient  - Assist with mobility/ambulation  - Relieve pressure over bony prominences  - Avoid friction and shearing  - Provide appropriate hygiene as needed including keeping skin clean and dry  - Evaluate need for skin moisturizer/barrier cream  - Collaborate with interdisciplinary team   - Patient/family teaching  - Consider wound care consult   Outcome: Progressing

## 2022-03-02 NOTE — PLAN OF CARE
Problem: PAIN - ADULT  Goal: Verbalizes/displays adequate comfort level or baseline comfort level  Description: Interventions:  - Encourage patient to monitor pain and request assistance  - Assess pain using appropriate pain scale  - Administer analgesics based on type and severity of pain and evaluate response  - Implement non-pharmacological measures as appropriate and evaluate response  - Consider cultural and social influences on pain and pain management  - Notify physician/advanced practitioner if interventions unsuccessful or patient reports new pain  Outcome: Progressing     Problem: INFECTION - ADULT  Goal: Absence or prevention of progression during hospitalization  Description: INTERVENTIONS:  - Assess and monitor for signs and symptoms of infection  - Monitor lab/diagnostic results  - Monitor all insertion sites, i e  indwelling lines, tubes, and drains  - Monitor endotracheal if appropriate and nasal secretions for changes in amount and color  - [place] appropriate cooling/warming therapies per order  - Administer medications as ordered  - Instruct and encourage patient and family to use good hand hygiene technique  - Identify and instruct in appropriate isolation precautions for identified infection/condition  Outcome: Progressing     Problem: MOBILITY - ADULT  Goal: Maintain or return to baseline ADL function  Description: INTERVENTIONS:  -  Assess patient's ability to carry out ADLs; assess patient's baseline for ADL function and identify physical deficits which impact ability to perform ADLs (bathing, care of mouth/teeth, toileting, grooming, dressing, etc )  - Assess/evaluate cause of self-care deficits   - Assess range of motion  - Assess patient's mobility; develop plan if impaired  - Assess patient's need for assistive devices and provide as appropriate  - Encourage maximum independence but intervene and supervise when necessary  - Involve family in performance of ADLs  - Assess for home care needs following discharge   - Consider OT consult to assist with ADL evaluation and planning for discharge  - Provide patient education as appropriate  Outcome: Progressing     Problem: Prexisting or High Potential for Compromised Skin Integrity  Goal: Skin integrity is maintained or improved  Description: INTERVENTIONS:  - Identify patients at risk for skin breakdown  - Assess and monitor skin integrity  - Assess and monitor nutrition and hydration status  - Monitor labs   - Assess for incontinence   - Turn and reposition patient  - Assist with mobility/ambulation  - Relieve pressure over bony prominences  - Avoid friction and shearing  - Provide appropriate hygiene as needed including keeping skin clean and dry  - Evaluate need for skin moisturizer/barrier cream  - Collaborate with interdisciplinary team   - Patient/family teaching  - Consider wound care consult   Outcome: Progressing

## 2022-03-02 NOTE — PROGRESS NOTES
[facility]  Progress Note Ammy Loser 1965, 60 y o  male MRN: [id number]  Unit/Bed#: 7T [facility] 711-01 Encounter: [id number]  Primary Care Provider: Amish Talavera MD   Date and time admitted to hospital: 12/29/2021  5:38 PM    * Ambulatory dysfunction  Assessment & Plan  · Continue gabapentin 400mg t i d, robaxin 500mg Q6H PRN, and oxycodone 5mg Q6H PRN  · PT/OT recommending post acute rehab  · Case management following to aide in discharge planning  · Patient remains medically stable for discharge     Penile pain  Assessment & Plan  · Patient complains of pain around his penis, scrotum and anal area  · No evidence of testicular torsion, patient does not seem to be in distress on physical exam  · No fever, vital signs stable  · Continue to monitor    Spinal stenosis  Assessment & Plan  · MRI  [facility]): Severe spinal stenosis L3-L4, and L5-S1 with more moderate stenosis L2-  · Continue pain medications with oxycodone, gabapentin, Robaxin  · Patient keeps asking to increase the dose and frequency of oxycodone  Patient does not seem to be in distress  I explained that we need to limit opioid use  Encourage the patient to continue with PT   · Continue PT    Onychomycosis  Assessment & Plan  · Present on almost all toes and fingers  · Podiatry performed nail debridement on 01/27  · Continue Terbinafine 250 mg PO daily for 12 weeks (Ordered through 4/21)  · Check CMP weekly (Next check on 3/6)    Strange and inexplicable behavior  Assessment & Plan  · Patient with hyper fixation on fungal infection arms (which was treated)  · Psychiatry consulted, not appropriate for inpatient psychiatry unit at this time  · Continue Abilify 10 mg daily and Atarax 25 mg p o  q 6 hours p r n  for anxiety      HTN, goal below 140/90  Assessment & Plan  · Continue amlodipine 10 mg daily and lisinopril 20 mg daily        Homeless  Assessment & Plan  · Patient was to be discharged to the [facility] mission however they will not take him back as he does not have any photo ID  · PT recommending post acute rehab placement  ·  on board to obtain photo ID      VTE Pharmacologic Prophylaxis:   Pharmacologic: Enoxaparin (Lovenox)  Mechanical VTE Prophylaxis in Place: Yes    Patient Centered Rounds: I have performed bedside rounds with nursing staff today  Discussions with Specialists or Other Care Team Provider:  Discussed with Case Management, nurse    Education and Discussions with Family / Patient:  Discussed with patient    Time Spent for Care: 45 minutes  More than 50% of total time spent on counseling and coordination of care as described above  Current Length of Stay: 58 day(s)    Current Patient Status: Inpatient   Certification Statement: The patient will continue to require additional inpatient hospital stay due to Pending placement    Discharge Plan / Estimated Discharge Date:  Pending placement      Code Status: Level 1 - Full Code      Subjective:   Patient was seen and examined at bedside  The patient has chronic back pain  No acute overnight changes  Objective:     Vitals:   Temp (24hrs), Av 5 °F (36 4 °C), Min:97 2 °F (36 2 °C), Max:97 7 °F (36 5 °C)    Temp:  [97 2 °F (36 2 °C)-97 7 °F (36 5 °C)] 97 7 °F (36 5 °C)  HR:  [73-77] 73  Resp:  [18-20] 20  BP: (142-180)/(69-94) 180/94  SpO2:  [98 %-99 %] 98 %  Body mass index is 23 82 kg/m²  Input and Output Summary (last 24 hours):        Intake/Output Summary (Last 24 hours) at 3/2/2022 1011  Last data filed at 3/1/2022 2130  Gross per 24 hour   Intake 1000 ml   Output 950 ml   Net 50 ml       Physical Exam:     Physical Exam  General: breathing well on room air, no acute distress  HEENT: NC/AT, PERRL, EOM - normal  Neck: Supple  Pulm/Chest: Normal chest wall expansion, clear breath sounds on both side, no wheezing/rhonchi or crackles appreciated  CVS: RRR, normal S1&S2, no murmur appreciated, capillary refill <2s  Abd: soft, non tender, non distended, bowel sounds +  MSK: move all 4 extremities spontaneously  Skin: warm, diffuse skin rash noted  CNS: no acute focal neuro deficit      Additional Data:     Labs:        Results from last 7 days   Lab Units 02/27/22  0441   POTASSIUM mmol/L 4 0   CHLORIDE mmol/L 104   CO2 mmol/L 30   BUN mg/dL 24   CREATININE mg/dL 0 72   CALCIUM mg/dL 8 3*   ALK PHOS U/L 84   ALT U/L 9   AST U/L 15*           * I Have Reviewed All Lab Data Listed Above  * Additional Pertinent Lab Tests Reviewed:  Bola [address] Admission Reviewed    Imaging:    Imaging Reports Reviewed Today Include: None  Imaging Personally Reviewed by Myself Includes:  None      Recent Cultures (last 7 days):           Last 24 Hours Medication List:   Current Facility-Administered Medications   Medication Dose Route Frequency Provider Last Rate    acetaminophen  650 mg Oral Q4H PRN Jaci Sunshine PA-C      amLODIPine  10 mg Oral Daily Uvaldo Peck, DO      ARIPiprazole  10 mg Oral Daily [facility], DO      carbamide peroxide  5 drop Both Ears BID PRN Rachana Khan MD      diphenhydrAMINE  25 mg Oral Q6H PRN Uvaldo Peck, DO      enoxaparin  40 mg Subcutaneous Q24H [facility], DO      gabapentin  400 mg Oral TID [facility], DO      hydrocortisone   Topical 4x Daily PRN [facility], DO      hydrOXYzine HCL  25 mg Oral Q6H PRN DOV Barnes      lisinopril  20 mg Oral Daily [facility], DO      LORazepam  2 mg Oral Once Uvaldo Peck, DO      methocarbamol  500 mg Oral Q6H PRN Bere Cristina MD      nicotine  1 patch Transdermal Daily [place] PA-C      nystatin   Topical BID Bere Cristina MD      ondansetron  4 mg Oral Q6H PRN Uvaldo Peck, DO      oxyCODONE  5 mg Oral Q6H PRN [facility], DO      polyethylene glycol  17 g Oral Daily Uvaldo Peck, DO      senna-docusate sodium  1 tablet Oral BID Uvaldo Peck, DO      terbinafine  250 mg Oral Daily Niranjan Galaviz DO      triamcinolone   Topical BID Laurita Rodas MD      white petrolatum-mineral oil   Topical TID PRN Solange Carr DO          Today, Patient Was Seen By: Laurita Rodas MD    ** Please Note: Dragon 360 Dictation voice to text software may have been used in the creation of this document   **

## 2022-03-02 NOTE — ASSESSMENT & PLAN NOTE
· Patient was to be discharged to the [facility] rescue mission however they will not take him back as he does not have any photo ID  · PT recommending post acute rehab placement  ·  on board to obtain photo ID

## 2022-03-02 NOTE — CASE MANAGEMENT
DISCHARGE DETAILS:  CM was notified that there might be bed available at [facility]  CM called Nisa Guillen ph: [phone] ext [phone] left voicemail, pending call back    CM department will continue to follow through pt's D/C

## 2022-03-03 PROCEDURE — 97530 THERAPEUTIC ACTIVITIES: CPT

## 2022-03-03 PROCEDURE — 99232 SBSQ HOSP IP/OBS MODERATE 35: CPT | Performed by: INTERNAL MEDICINE

## 2022-03-03 PROCEDURE — 97535 SELF CARE MNGMENT TRAINING: CPT

## 2022-03-03 RX ADMIN — METHOCARBAMOL 500 MG: 500 TABLET, FILM COATED ORAL at 16:55

## 2022-03-03 RX ADMIN — TRIAMCINOLONE ACETONIDE: 1 CREAM TOPICAL at 10:37

## 2022-03-03 RX ADMIN — GABAPENTIN 400 MG: 400 CAPSULE ORAL at 16:00

## 2022-03-03 RX ADMIN — NYSTATIN: 100000 POWDER TOPICAL at 10:37

## 2022-03-03 RX ADMIN — METHOCARBAMOL 500 MG: 500 TABLET, FILM COATED ORAL at 23:13

## 2022-03-03 RX ADMIN — TERBINAFINE HYDROCHLORIDE 250 MG: 250 TABLET ORAL at 10:37

## 2022-03-03 RX ADMIN — SENNOSIDES AND DOCUSATE SODIUM 1 TABLET: 50; 8.6 TABLET ORAL at 10:37

## 2022-03-03 RX ADMIN — HYDROCORTISONE: 25 OINTMENT TOPICAL at 10:37

## 2022-03-03 RX ADMIN — METHOCARBAMOL 500 MG: 500 TABLET, FILM COATED ORAL at 10:44

## 2022-03-03 RX ADMIN — AMLODIPINE BESYLATE 10 MG: 10 TABLET ORAL at 10:37

## 2022-03-03 RX ADMIN — OXYCODONE HYDROCHLORIDE 5 MG: 5 TABLET ORAL at 03:54

## 2022-03-03 RX ADMIN — TRIAMCINOLONE ACETONIDE: 1 CREAM TOPICAL at 17:22

## 2022-03-03 RX ADMIN — OXYCODONE HYDROCHLORIDE 5 MG: 5 TABLET ORAL at 16:55

## 2022-03-03 RX ADMIN — GABAPENTIN 400 MG: 400 CAPSULE ORAL at 20:12

## 2022-03-03 RX ADMIN — NYSTATIN: 100000 POWDER TOPICAL at 17:22

## 2022-03-03 RX ADMIN — OXYCODONE HYDROCHLORIDE 5 MG: 5 TABLET ORAL at 23:13

## 2022-03-03 RX ADMIN — GABAPENTIN 400 MG: 400 CAPSULE ORAL at 10:37

## 2022-03-03 RX ADMIN — ARIPIPRAZOLE 10 MG: 10 TABLET ORAL at 10:37

## 2022-03-03 RX ADMIN — OXYCODONE HYDROCHLORIDE 5 MG: 5 TABLET ORAL at 10:44

## 2022-03-03 RX ADMIN — LISINOPRIL 20 MG: 20 TABLET ORAL at 10:37

## 2022-03-03 NOTE — ASSESSMENT & PLAN NOTE
· Patient was to be discharged to the [facility] however they will not take him back as he does not have any photo ID  · PT recommending post acute rehab placement  ·  on board to obtain photo ID

## 2022-03-03 NOTE — PROGRESS NOTES
[facility]  Progress Note Felipa Lea 1965, 60 y o  male MRN: [id number]  Unit/Bed#: 7T [facility] 711-01 Encounter: [id number]  Primary Care Provider: Suzy Colon MD   Date and time admitted to hospital: 12/29/2021  5:38 PM    * Ambulatory dysfunction  Assessment & Plan  · Continue gabapentin 400mg t i d, robaxin 500mg Q6H PRN, and oxycodone 5mg Q6H PRN  · PT/OT recommending post acute rehab  · Case management following to aide in discharge planning  · Patient remains medically stable for discharge     Penile pain  Assessment & Plan  · Patient complains of pain around his penis, scrotum and anal area  · No evidence of testicular torsion, patient does not seem to be in distress on physical exam  · No fever, vital signs stable  · Continue to monitor    Spinal stenosis  Assessment & Plan  · MRI  [facility]): Severe spinal stenosis L3-L4, and L5-S1 with more moderate stenosis L2-  · Continue pain medications with oxycodone, gabapentin, Robaxin  · Patient keeps asking to increase the dose and frequency of oxycodone  Patient does not seem to be in distress  I explained that we need to limit opioid use  Encourage the patient to continue with PT   · Continue PT    Onychomycosis  Assessment & Plan  · Present on almost all toes and fingers  · Podiatry performed nail debridement on 01/27  · Continue Terbinafine 250 mg PO daily for 12 weeks (Ordered through 4/21)  · Check CMP weekly (Next check on 3/6)    Strange and inexplicable behavior  Assessment & Plan  · Patient with hyper fixation on fungal infection arms (which was treated)  · Psychiatry consulted, not appropriate for inpatient psychiatry unit at this time  · Continue Abilify 10 mg daily and Atarax 25 mg p o  q 6 hours p r n  for anxiety      HTN, goal below 140/90  Assessment & Plan  · Continue amlodipine 10 mg daily and lisinopril 20 mg daily        Homeless  Assessment & Plan  · Patient was to be discharged to the [facility] rescue mission however they will not take him back as he does not have any photo ID  · PT recommending post acute rehab placement  ·  on board to obtain photo ID      VTE Pharmacologic Prophylaxis:   Pharmacologic: Enoxaparin (Lovenox)  Mechanical VTE Prophylaxis in Place: Yes    Patient Centered Rounds: I have performed bedside rounds with nursing staff today  Discussions with Specialists or Other Care Team Provider:  Discussed with Case Management, nurse    Education and Discussions with Family / Patient:  Discussed with patient    Time Spent for Care: 45 minutes  More than 50% of total time spent on counseling and coordination of care as described above  Current Length of Stay: 61 day(s)    Current Patient Status: Inpatient   Certification Statement: The patient will continue to require additional inpatient hospital stay due to Pending placement    Discharge Plan / Estimated Discharge Date:  Pending placement      Code Status: Level 1 - Full Code      Subjective:   Patient was seen and examined at bedside  No acute overnight changes  Objective:     Vitals:   Temp (24hrs), Av 3 °F (36 3 °C), Min:97 2 °F (36 2 °C), Max:97 4 °F (36 3 °C)    Temp:  [97 2 °F (36 2 °C)-97 4 °F (36 3 °C)] 97 2 °F (36 2 °C)  HR:  [62-70] 70  Resp:  [16-20] 20  BP: (127-141)/(78-94) 141/78  SpO2:  [98 %-100 %] 98 %  Body mass index is 23 82 kg/m²  Input and Output Summary (last 24 hours):        Intake/Output Summary (Last 24 hours) at 3/3/2022 1155  Last data filed at 3/3/2022 0711  Gross per 24 hour   Intake 600 ml   Output 550 ml   Net 50 ml       Physical Exam:     Physical Exam  General: breathing well on room air, no acute distress  HEENT: NC/AT, PERRL, EOM - normal  Neck: Supple  Pulm/Chest: Normal chest wall expansion, clear breath sounds on both side, no wheezing/rhonchi or crackles appreciated  CVS: RRR, normal S1&S2, no murmur appreciated, capillary refill <2s  Abd: soft, non tender, non distended, bowel sounds +  MSK: move all 4 extremities spontaneously, generalized skin rash noted  Skin: warm  CNS: no acute focal neuro deficit    Additional Data:     Labs:        Results from last 7 days   Lab Units 02/27/22  0441   POTASSIUM mmol/L 4 0   CHLORIDE mmol/L 104   CO2 mmol/L 30   BUN mg/dL 24   CREATININE mg/dL 0 72   CALCIUM mg/dL 8 3*   ALK PHOS U/L 84   ALT U/L 9   AST U/L 15*           * I Have Reviewed All Lab Data Listed Above  * Additional Pertinent Lab Tests Reviewed:  Bola [address] Admission Reviewed    Imaging:    Imaging Reports Reviewed Today Include: None  Imaging Personally Reviewed by Myself Includes:  None      Recent Cultures (last 7 days):           Last 24 Hours Medication List:   Current Facility-Administered Medications   Medication Dose Route Frequency Provider Last Rate    acetaminophen  650 mg Oral Q4H PRN Millie David PA-C      amLODIPine  10 mg Oral Daily Moon Aschoff, DO      ARIPiprazole  10 mg Oral Daily [facility] [place] [facility], DO      carbamide peroxide  5 drop Both Ears BID PRN Parth Esteves MD      diphenhydrAMINE  25 mg Oral Q6H PRN Moon Aschoff, DO      enoxaparin  40 mg Subcutaneous Q24H [address] [facility] [place], DO      gabapentin  400 mg Oral TID [facility], DO      hydrocortisone   Topical 4x Daily PRN [facility], DO      hydrOXYzine HCL  25 mg Oral Q6H PRN DOV Land      lisinopril  20 mg Oral Daily [facility] [place], DO      LORazepam  2 mg Oral Once Moon Aschoff, DO      methocarbamol  500 mg Oral Q6H PRN Matthew Lim MD      nicotine  1 patch Transdermal Daily [place] PA-C      nystatin   Topical BID Matthew Lim MD      ondansetron  4 mg Oral Q6H PRN Moon Aschoff, DO      oxyCODONE  5 mg Oral Q6H PRN [facility], DO      polyethylene glycol  17 g Oral Daily Moon Aschoff, DO      senna-docusate sodium  1 tablet Oral BID Moon Aschoff, DO      terbinafine  250 mg Oral Daily Caryle Shove, DO      triamcinolone   Topical BID Marty Nunez MD      white petrolatum-mineral oil   Topical TID PRN Senthil Moura DO          Today, Patient Was Seen By: Marty Nunez MD    ** Please Note: Dragon 360 Dictation voice to text software may have been used in the creation of this document   **

## 2022-03-03 NOTE — ASSESSMENT & PLAN NOTE
· MRI  [facility]): Severe spinal stenosis L3-L4, and L5-S1 with more moderate stenosis L2-  · Continue pain medications with oxycodone, gabapentin, Robaxin  · Patient keeps asking to increase the dose and frequency of oxycodone  Patient does not seem to be in distress  I explained that we need to limit opioid use    Encourage the patient to continue with PT   · Continue PT

## 2022-03-04 PROCEDURE — 99232 SBSQ HOSP IP/OBS MODERATE 35: CPT | Performed by: INTERNAL MEDICINE

## 2022-03-04 RX ADMIN — HYDROXYZINE HYDROCHLORIDE 25 MG: 25 TABLET ORAL at 19:48

## 2022-03-04 RX ADMIN — TRIAMCINOLONE ACETONIDE: 1 CREAM TOPICAL at 10:09

## 2022-03-04 RX ADMIN — METHOCARBAMOL 500 MG: 500 TABLET, FILM COATED ORAL at 12:32

## 2022-03-04 RX ADMIN — AMLODIPINE BESYLATE 10 MG: 10 TABLET ORAL at 10:09

## 2022-03-04 RX ADMIN — SENNOSIDES AND DOCUSATE SODIUM 1 TABLET: 50; 8.6 TABLET ORAL at 10:09

## 2022-03-04 RX ADMIN — TRIAMCINOLONE ACETONIDE: 1 CREAM TOPICAL at 17:21

## 2022-03-04 RX ADMIN — ARIPIPRAZOLE 10 MG: 10 TABLET ORAL at 10:09

## 2022-03-04 RX ADMIN — GABAPENTIN 400 MG: 400 CAPSULE ORAL at 20:52

## 2022-03-04 RX ADMIN — HYDROCORTISONE: 25 OINTMENT TOPICAL at 06:32

## 2022-03-04 RX ADMIN — OXYCODONE HYDROCHLORIDE 5 MG: 5 TABLET ORAL at 06:26

## 2022-03-04 RX ADMIN — METHOCARBAMOL 500 MG: 500 TABLET, FILM COATED ORAL at 19:48

## 2022-03-04 RX ADMIN — SENNOSIDES AND DOCUSATE SODIUM 1 TABLET: 50; 8.6 TABLET ORAL at 17:16

## 2022-03-04 RX ADMIN — NYSTATIN: 100000 POWDER TOPICAL at 17:17

## 2022-03-04 RX ADMIN — GABAPENTIN 400 MG: 400 CAPSULE ORAL at 10:09

## 2022-03-04 RX ADMIN — OXYCODONE HYDROCHLORIDE 5 MG: 5 TABLET ORAL at 12:32

## 2022-03-04 RX ADMIN — OXYCODONE HYDROCHLORIDE 5 MG: 5 TABLET ORAL at 18:34

## 2022-03-04 RX ADMIN — TERBINAFINE HYDROCHLORIDE 250 MG: 250 TABLET ORAL at 10:09

## 2022-03-04 RX ADMIN — METHOCARBAMOL 500 MG: 500 TABLET, FILM COATED ORAL at 06:26

## 2022-03-04 RX ADMIN — ENOXAPARIN SODIUM 40 MG: 100 INJECTION SUBCUTANEOUS at 10:09

## 2022-03-04 RX ADMIN — HYDROCORTISONE: 25 OINTMENT TOPICAL at 18:33

## 2022-03-04 RX ADMIN — LISINOPRIL 20 MG: 20 TABLET ORAL at 10:09

## 2022-03-04 RX ADMIN — GABAPENTIN 400 MG: 400 CAPSULE ORAL at 17:16

## 2022-03-04 RX ADMIN — NYSTATIN: 100000 POWDER TOPICAL at 10:09

## 2022-03-04 NOTE — OCCUPATIONAL THERAPY NOTE
Occupational Therapy Treatment Note     Patient Name: Constance Cardona  Today's Date: 3/4/2022  Problem List  Principal Problem:    Ambulatory dysfunction  Active Problems:    Homeless    HTN, goal below 140/90    Strange and inexplicable behavior    Onychomycosis    Spinal stenosis    Penile pain            03/03/22 1533   OT Last Visit   OT Visit Date 03/04/22   Note Type   Note Type Treatment   Restrictions/Precautions   Weight Bearing Precautions Per Order No   Other Precautions Fall Risk;Pain   Pain Assessment   Pain Assessment Tool 0-10   Pain Score 4   Pain Location/Orientation Location: [facility] Pain Intervention(s) Medication (See MAR)   ADL   LB Dressing Assistance 3  Moderate Assistance   LB Dressing Deficit Thread RLE into pants; Thread LLE into pants; Thread RLE into underwear; Thread LLE into underwear;Pull up over hips   Toileting Assistance  4  Minimal Assistance   Toileting Deficit Clothing management up;Clothing management down;Perineal hygiene   Transfers   Sit to Stand 4  Minimal assistance   Additional items Assist x 1; Increased time required   Stand to Sit 4  Minimal assistance   Additional items Assist x 1; Increased time required   Toilet transfer 4  Minimal assistance   Additional items Assist x 1; Increased time required   Functional Mobility   Functional Mobility 4  Minimal assistance   Additional Comments x 1   Additional items Rolling walker   Cognition   Arousal/Participation Alert; Cooperative   Attention Difficulty attending to directions   Orientation Level Oriented X4   Memory Within functional limits   Following Commands Follows one step commands without difficulty   Activity Tolerance   Activity Tolerance Patient limited by pain   Assessment   Assessment  53' Pt seen for OT txt  Pt continues with limitations in ADLs and mobility, requiring Humza for transfers   Pt continues to have limited insight into safety with toilet transfers, noted to have had fall on Monday with attempt to complete toilet transfer  Completed sit-->stand x 3 reps, Pt limited by pain and thus self-limiting with continued attempts as functional ambulation  Trialed strategies for modified LB dressing techniques with minimal success, required modA  Pt would benefit from continued OT services to further address deficits, continue to recommend rehab placement  Plan   Treatment Interventions ADL retraining;Functional transfer training;UE strengthening/ROM; Endurance training;Continued evaluation; Energy conservation; Activityengagement   Goal Expiration Date 03/18/22  (extended two weeks from 3/4/22)   OT Treatment Day 6   OT Frequency 2-3x/wk   Recommendation   OT Discharge Recommendation Post acute rehabilitation services

## 2022-03-04 NOTE — PROGRESS NOTES
[facility]  Progress Note Madonna Moseley 1965, 60 y o  male MRN: [id number]  Unit/Bed#: 7T [facility] 711-01 Encounter: [id number]  Primary Care Provider: Hadley Renteria MD   Date and time admitted to hospital: 12/29/2021  5:38 PM    * Ambulatory dysfunction  Assessment & Plan  · Continue gabapentin 400mg t i d, robaxin 500mg Q6H PRN, and oxycodone 5mg Q6H PRN  · PT/OT recommending post acute rehab  · Case management following to aide in discharge planning  · Patient remains medically stable for discharge     Penile pain  Assessment & Plan  · Patient complains of pain around his penis, scrotum and anal area  · No evidence of testicular torsion, patient does not seem to be in distress on physical exam  · No fever, vital signs stable  · Continue to monitor    Spinal stenosis  Assessment & Plan  · MRI  [facility]): Severe spinal stenosis L3-L4, and L5-S1 with more moderate stenosis L2-  · Continue pain medications with oxycodone, gabapentin, Robaxin  · Patient keeps asking to increase the dose and frequency of oxycodone  Patient does not seem to be in distress  I explained that we need to limit opioid use  Encourage the patient to continue with PT   · Continue PT    Onychomycosis  Assessment & Plan  · Present on almost all toes and fingers  · Podiatry performed nail debridement on 01/27  · Continue Terbinafine 250 mg PO daily for 12 weeks (Ordered through 4/21)  · Check CMP weekly (Next check on 3/6)    Strange and inexplicable behavior  Assessment & Plan  · Patient with hyper fixation on fungal infection arms (which was treated)  · Psychiatry consulted, not appropriate for inpatient psychiatry unit at this time  · Continue Abilify 10 mg daily and Atarax 25 mg p o  q 6 hours p r n  for anxiety      Essential hypertension  Assessment & Plan  · Continue amlodipine 10 mg daily and lisinopril 20 mg daily        Homeless  Assessment & Plan  · Patient was to be discharged to the [facility] mission however they will not take him back as he does not have any photo ID  · PT recommending post acute rehab placement  ·  on board to obtain photo ID      VTE Pharmacologic Prophylaxis:   Pharmacologic: Enoxaparin (Lovenox)  Mechanical VTE Prophylaxis in Place: Yes    Patient Centered Rounds: I have performed bedside rounds with nursing staff today  Discussions with Specialists or Other Care Team Provider:  Discussed with Case Management, nurse    Education and Discussions with Family / Patient:  Discussed with patient    Time Spent for Care: 45 minutes  More than 50% of total time spent on counseling and coordination of care as described above  Current Length of Stay: 59 day(s)    Current Patient Status: Inpatient   Certification Statement: The patient will continue to require additional inpatient hospital stay due to Pending placement    Discharge Plan / Estimated Discharge Date:  Pending placement      Code Status: Level 1 - Full Code      Subjective:   No acute overnight changes      Objective:     Vitals:   Temp (24hrs), Av °F (36 7 °C), Min:97 6 °F (36 4 °C), Max:98 7 °F (37 1 °C)    Temp:  [97 6 °F (36 4 °C)-98 7 °F (37 1 °C)] 97 6 °F (36 4 °C)  HR:  [73-89] 73  Resp:  [18] 18  BP: (146-160)/(78-89) 160/86  SpO2:  [98 %-99 %] 98 %  Body mass index is 23 82 kg/m²  Input and Output Summary (last 24 hours):        Intake/Output Summary (Last 24 hours) at 3/4/2022 1340  Last data filed at 3/4/2022 1324  Gross per 24 hour   Intake 960 ml   Output 1200 ml   Net -240 ml       Physical Exam:     Physical Exam  General: breathing well on room air, no acute distress  HEENT: NC/AT, PERRL, EOM - normal  Neck: Supple  Pulm/Chest: Normal chest wall expansion, clear breath sounds on both side, no wheezing/rhonchi or crackles appreciated  CVS: RRR, normal S1&S2, no murmur appreciated, capillary refill <2s  Abd: soft, non tender, non distended, bowel sounds +  MSK: move all 4 extremities spontaneously, diffuse skin rash noted  Skin: warm  CNS: no acute focal neuro deficit      Additional Data:     Labs:        Results from last 7 days   Lab Units 02/27/22  0441   POTASSIUM mmol/L 4 0   CHLORIDE mmol/L 104   CO2 mmol/L 30   BUN mg/dL 24   CREATININE mg/dL 0 72   CALCIUM mg/dL 8 3*   ALK PHOS U/L 84   ALT U/L 9   AST U/L 15*           * I Have Reviewed All Lab Data Listed Above  * Additional Pertinent Lab Tests Reviewed:  Bola 66 Admission Reviewed    Imaging:    Imaging Reports Reviewed Today Include: None  Imaging Personally Reviewed by Myself Includes:  None      Recent Cultures (last 7 days):           Last 24 Hours Medication List:   Current Facility-Administered Medications   Medication Dose Route Frequency Provider Last Rate    acetaminophen  650 mg Oral Q4H PRN Tejinder Manzanares PA-C      amLODIPine  10 mg Oral Daily Yuliana Huddle, DO      ARIPiprazole  10 mg Oral Daily [facility], DO      carbamide peroxide  5 drop Both Ears BID PRN Eileen Franklin MD      diphenhydrAMINE  25 mg Oral Q6H PRN Yuliana Huddle, DO      enoxaparin  40 mg Subcutaneous Q24H [address] [facility], DO      gabapentin  400 mg Oral TID [facility], DO      hydrocortisone   Topical 4x Daily PRN [facility], DO      hydrOXYzine HCL  25 mg Oral Q6H PRN DOV Fischer      lisinopril  20 mg Oral Daily [facility], DO      LORazepam  2 mg Oral Once Yuliana Huddle, DO      methocarbamol  500 mg Oral Q6H PRN Samara Rose MD      nicotine  1 patch Transdermal Daily [place] PA-C      nystatin   Topical BID Samara Rose MD      ondansetron  4 mg Oral Q6H PRN Yuliana Huddle, DO      oxyCODONE  5 mg Oral Q6H PRN [facility], DO      polyethylene glycol  17 g Oral Daily Yuliana Huddle, DO      senna-docusate sodium  1 tablet Oral BID Yuliana Huddle, DO      terbinafine  250 mg Oral Daily Yuliana Huddle, DO      triamcinolone Topical BID Osorio Pacheco MD      white petrolatum-mineral oil   Topical TID PRN Kolton Au,           Today, Patient Was Seen By: Osorio Pacheco MD    ** Please Note: Dragon 360 Dictation voice to text software may have been used in the creation of this document   **

## 2022-03-04 NOTE — ASSESSMENT & PLAN NOTE
· MRI  [facility]): Severe spinal stenosis L3-L4, and L5-S1 with more moderate stenosis L2-  · Continue pain medications with oxycodone, gabapentin, Robaxin  · Patient keeps asking to increase the dose and frequency of oxycodone  Patient does not seem to be in distress  I explained that we need to limit opioid use    Encourage the patient to continue with PT   · Continue PT

## 2022-03-04 NOTE — PLAN OF CARE
Problem: OCCUPATIONAL THERAPY ADULT  Goal: Performs self-care activities at highest level of function for planned discharge setting  See evaluation for individualized goals  Description: Treatment Interventions: ADL retraining,Functional transfer training,UE strengthening/ROM,Endurance training,Cognitive reorientation,Patient/family training,Equipment evaluation/education,Compensatory technique education,Fine motor coordination activities,Continued evaluation,Energy conservation,Activityengagement          See flowsheet documentation for full assessment, interventions and recommendations  Outcome: Progressing  Note: Limitation: Decreased ADL status,Decreased high-level ADLs,Decreased Safe judgement during ADL  Prognosis: Fair  Assessment: Pt seen for OT txt  Pt continues with limitations in ADLs and mobility, requiring Humza for transfers  Pt continues to have limited insight into safety with toilet transfers, noted to have had fall on Monday with attempt to complete toilet transfer  Completed sit-->stand x 3 reps, Pt limited by pain and thus self-limiting with continued attempts as functional ambulation  Trialed strategies for modified LB dressing techniques with minimal success, required modA  Pt would benefit from continued OT services to further address deficits, continue to recommend rehab placement        OT Discharge Recommendation: Post acute rehabilitation services

## 2022-03-04 NOTE — CASE MANAGEMENT
Case Management Discharge Planning Note    Patient name Raphael Witt  Location 7T U 711/7T U 902-71 MRN [id number]  : 1965 Date 3/4/2022       Current Admission Date: 2021  Current Admission Diagnosis:Ambulatory dysfunction   Patient Active Problem List    Diagnosis Date Noted    Penile pain 2022    Spinal stenosis 2022    Constipation 2022    Strange and inexplicable behavior 33/10/3618    Onychomycosis 2022    HTN, goal below 140/90 2022    Psoriasis, unspecified 2022    Ambulatory dysfunction 2022    Homeless 2021      LOS (days): 64  Geometric Mean LOS (GMLOS) (days): 3 20  Days to GMLOS:-60 7     OBJECTIVE:  Risk of Unplanned Readmission Score: 18         Current admission status: Inpatient   Preferred Pharmacy:   [address] [facility],[address]  [address] [place] [address] Drive  Phone: [phone] Fax: [phone]    [facility],[address], [address]  [facility] [address]  Phone: [phone] Fax: [phone]    Primary Care Provider: Trey Medina MD    Primary Insurance: [facility]  Secondary Insurance:     DISCHARGE DETAILS: CM was notified at morning rounds that pt is medically cleared to be discharge today  CM received phone call from [facility] [address]; [phone] stating that they have beds available at there facility and [address] [place] [address] has spoken to pt's and pt's sister Paulo Carlos and they are agreeable for [facility]  Deshaun stated that he can accept pt during the weekend if pts insurance is approved     CM informed weekend CM about discharge for weekend or Monday  CM department will continue to follow through pt's D/C

## 2022-03-04 NOTE — ASSESSMENT & PLAN NOTE
· Patient was to be discharged to the [facility] rescue mission however they will not take him back as he does not have any photo ID  · PT recommending post acute rehab placement  ·  on board to obtain photo ID

## 2022-03-05 PROCEDURE — 99232 SBSQ HOSP IP/OBS MODERATE 35: CPT | Performed by: INTERNAL MEDICINE

## 2022-03-05 RX ADMIN — GABAPENTIN 400 MG: 400 CAPSULE ORAL at 15:40

## 2022-03-05 RX ADMIN — TERBINAFINE HYDROCHLORIDE 250 MG: 250 TABLET ORAL at 08:44

## 2022-03-05 RX ADMIN — NYSTATIN: 100000 POWDER TOPICAL at 17:07

## 2022-03-05 RX ADMIN — HYDROXYZINE HYDROCHLORIDE 25 MG: 25 TABLET ORAL at 08:43

## 2022-03-05 RX ADMIN — ARIPIPRAZOLE 10 MG: 10 TABLET ORAL at 08:43

## 2022-03-05 RX ADMIN — OXYCODONE HYDROCHLORIDE 5 MG: 5 TABLET ORAL at 14:17

## 2022-03-05 RX ADMIN — GABAPENTIN 400 MG: 400 CAPSULE ORAL at 08:42

## 2022-03-05 RX ADMIN — AMLODIPINE BESYLATE 10 MG: 10 TABLET ORAL at 08:43

## 2022-03-05 RX ADMIN — SENNOSIDES AND DOCUSATE SODIUM 1 TABLET: 50; 8.6 TABLET ORAL at 08:43

## 2022-03-05 RX ADMIN — ENOXAPARIN SODIUM 40 MG: 100 INJECTION SUBCUTANEOUS at 08:42

## 2022-03-05 RX ADMIN — METHOCARBAMOL 500 MG: 500 TABLET, FILM COATED ORAL at 16:08

## 2022-03-05 RX ADMIN — METHOCARBAMOL 500 MG: 500 TABLET, FILM COATED ORAL at 08:43

## 2022-03-05 RX ADMIN — OXYCODONE HYDROCHLORIDE 5 MG: 5 TABLET ORAL at 02:56

## 2022-03-05 RX ADMIN — TRIAMCINOLONE ACETONIDE: 1 CREAM TOPICAL at 08:52

## 2022-03-05 RX ADMIN — SENNOSIDES AND DOCUSATE SODIUM 1 TABLET: 50; 8.6 TABLET ORAL at 17:06

## 2022-03-05 RX ADMIN — TRIAMCINOLONE ACETONIDE: 1 CREAM TOPICAL at 17:07

## 2022-03-05 RX ADMIN — NYSTATIN: 100000 POWDER TOPICAL at 08:42

## 2022-03-05 RX ADMIN — LISINOPRIL 20 MG: 20 TABLET ORAL at 08:43

## 2022-03-05 NOTE — ASSESSMENT & PLAN NOTE
· MRI  [facility]): Severe spinal stenosis L3-L4, and L5-S1 with more moderate stenosis L2-  · Continue pain medications with oxycodone, gabapentin, Robaxin  · Patient keeps asking to increase the dose and frequency of oxycodone  Patient does not seem to be in distress  I explained that we need to limit opioid use    Encourage the patient to continue with PT   · Continue PT

## 2022-03-05 NOTE — ASSESSMENT & PLAN NOTE
· Patient was to be discharged to the [facility] rescue mission however they will not take him back as he does not have any photo ID  · PT recommending post acute rehab placement  ·  on board - awaiting STR auth

## 2022-03-06 LAB
ALBUMIN SERPL BCP-MCNC: 3.7 G/DL (ref 3–5.2)
ALP SERPL-CCNC: 97 U/L (ref 43–122)
ALT SERPL W P-5'-P-CCNC: 12 U/L
ANION GAP SERPL CALCULATED.3IONS-SCNC: 3 MMOL/L (ref 5–14)
AST SERPL W P-5'-P-CCNC: 17 U/L (ref 17–59)
BILIRUB SERPL-MCNC: 0.32 MG/DL
BUN SERPL-MCNC: 19 MG/DL (ref 5–25)
CALCIUM SERPL-MCNC: 8.2 MG/DL (ref 8.4–10.2)
CHLORIDE SERPL-SCNC: 104 MMOL/L (ref 97–108)
CO2 SERPL-SCNC: 30 MMOL/L (ref 22–30)
CREAT SERPL-MCNC: 0.69 MG/DL (ref 0.7–1.5)
GFR SERPL CREATININE-BSD FRML MDRD: 104 ML/MIN/1.73SQ M
GLUCOSE SERPL-MCNC: 100 MG/DL (ref 70–99)
POTASSIUM SERPL-SCNC: 4 MMOL/L (ref 3.6–5)
PROT SERPL-MCNC: 7.2 G/DL (ref 5.9–8.4)
SODIUM SERPL-SCNC: 137 MMOL/L (ref 137–147)

## 2022-03-06 PROCEDURE — 99232 SBSQ HOSP IP/OBS MODERATE 35: CPT | Performed by: INTERNAL MEDICINE

## 2022-03-06 PROCEDURE — 80053 COMPREHEN METABOLIC PANEL: CPT | Performed by: INTERNAL MEDICINE

## 2022-03-06 RX ADMIN — POLYETHYLENE GLYCOL 3350 17 G: 17 POWDER, FOR SOLUTION ORAL at 08:40

## 2022-03-06 RX ADMIN — TRIAMCINOLONE ACETONIDE: 1 CREAM TOPICAL at 17:13

## 2022-03-06 RX ADMIN — OXYCODONE HYDROCHLORIDE 5 MG: 5 TABLET ORAL at 06:34

## 2022-03-06 RX ADMIN — ENOXAPARIN SODIUM 40 MG: 100 INJECTION SUBCUTANEOUS at 08:37

## 2022-03-06 RX ADMIN — NYSTATIN: 100000 POWDER TOPICAL at 17:12

## 2022-03-06 RX ADMIN — TRIAMCINOLONE ACETONIDE: 1 CREAM TOPICAL at 08:38

## 2022-03-06 RX ADMIN — GABAPENTIN 400 MG: 400 CAPSULE ORAL at 21:16

## 2022-03-06 RX ADMIN — TERBINAFINE HYDROCHLORIDE 250 MG: 250 TABLET ORAL at 08:38

## 2022-03-06 RX ADMIN — OXYCODONE HYDROCHLORIDE 5 MG: 5 TABLET ORAL at 22:13

## 2022-03-06 RX ADMIN — NYSTATIN: 100000 POWDER TOPICAL at 08:37

## 2022-03-06 RX ADMIN — SENNOSIDES AND DOCUSATE SODIUM 1 TABLET: 50; 8.6 TABLET ORAL at 08:38

## 2022-03-06 RX ADMIN — ARIPIPRAZOLE 10 MG: 10 TABLET ORAL at 08:38

## 2022-03-06 RX ADMIN — LISINOPRIL 20 MG: 20 TABLET ORAL at 08:38

## 2022-03-06 RX ADMIN — OXYCODONE HYDROCHLORIDE 5 MG: 5 TABLET ORAL at 14:59

## 2022-03-06 RX ADMIN — SENNOSIDES AND DOCUSATE SODIUM 1 TABLET: 50; 8.6 TABLET ORAL at 17:13

## 2022-03-06 RX ADMIN — AMLODIPINE BESYLATE 10 MG: 10 TABLET ORAL at 08:38

## 2022-03-06 RX ADMIN — HYDROXYZINE HYDROCHLORIDE 25 MG: 25 TABLET ORAL at 08:37

## 2022-03-06 RX ADMIN — GABAPENTIN 400 MG: 400 CAPSULE ORAL at 08:38

## 2022-03-06 RX ADMIN — GABAPENTIN 400 MG: 400 CAPSULE ORAL at 15:02

## 2022-03-06 NOTE — ASSESSMENT & PLAN NOTE
· MRI  [facility]): Severe spinal stenosis L3-L4, and L5-S1 with more moderate stenosis L2-  · Continue pain medications with oxycodone, gabapentin, Robaxin  · Patient keeps asking to increase the dose and frequency of oxycodone  Patient does not seem to be in distress  I explained that we need to limit opioid use    Encourage the patient to continue with PT   · Continue PT

## 2022-03-06 NOTE — ASSESSMENT & PLAN NOTE
· Patient was to be discharged to the [facility] rescue mission however they will not take him back as he does not have any photo ID  · PT recommending post acute rehab placement  ·  on board - awaiting STR auth

## 2022-03-06 NOTE — PROGRESS NOTES
51 [facility]  Progress Note Michael Toth 1965, 60 y o  male MRN: [id number]  Unit/Bed#: 7T [facility] 711-01 Encounter: [id number]  Primary Care Provider: Nabil Jett MD   Date and time admitted to hospital: 12/29/2021  5:38 PM    * Ambulatory dysfunction  Assessment & Plan  · Continue gabapentin 400mg t i d, robaxin 500mg Q6H PRN, and oxycodone 5mg Q6H PRN  · PT/OT recommending post acute rehab  · Case management following to aide in discharge planning  · Patient remains medically stable for discharge     Penile pain  Assessment & Plan  · Patient complains of pain around his penis, scrotum and anal area  · No evidence of testicular torsion, patient does not seem to be in distress on physical exam  · No fever, vital signs stable  · Continue to monitor    Spinal stenosis  Assessment & Plan  · MRI  [facility]): Severe spinal stenosis L3-L4, and L5-S1 with more moderate stenosis L2-  · Continue pain medications with oxycodone, gabapentin, Robaxin  · Patient keeps asking to increase the dose and frequency of oxycodone  Patient does not seem to be in distress  I explained that we need to limit opioid use  Encourage the patient to continue with PT   · Continue PT    Onychomycosis  Assessment & Plan  · Present on almost all toes and fingers  · Podiatry performed nail debridement on 01/27  · Continue Terbinafine 250 mg PO daily for 12 weeks (Ordered through 4/21)  · Check CMP weekly (Next check on 3/6)    Strange and inexplicable behavior  Assessment & Plan  · Patient with hyper fixation on fungal infection arms (which was treated)  · Psychiatry consulted, not appropriate for inpatient psychiatry unit at this time  · Continue Abilify 10 mg daily and Atarax 25 mg p o  q 6 hours p r n  for anxiety      Essential hypertension  Assessment & Plan  · Continue amlodipine 10 mg daily and lisinopril 20 mg daily        Homeless  Assessment & Plan  · Patient was to be discharged to the [facility] rescue mission however they will not take him back as he does not have any photo ID  · PT recommending post acute rehab placement  ·  on board - awaiting STR auth      VTE Pharmacologic Prophylaxis:   Pharmacologic: Enoxaparin (Lovenox)  Mechanical VTE Prophylaxis in Place: Yes    Patient Centered Rounds: I have performed bedside rounds with nursing staff today  Discussions with Specialists or Other Care Team Provider:  Discussed with nurse    Education and Discussions with Family / Patient:  Discussed with patient    Time Spent for Care: 45 minutes  More than 50% of total time spent on counseling and coordination of care as described above  Current Length of Stay: 77 day(s)    Current Patient Status: Inpatient   Certification Statement: The patient will continue to require additional inpatient hospital stay due to Pending placement to rehab    Discharge Plan / Estimated Discharge Date:  Pending placement      Code Status: Level 1 - Full Code      Subjective:   Patient was seen and examined at bedside  No acute overnight changes  Objective:     Vitals:   Temp (24hrs), Av 9 °F (36 1 °C), Min:96 7 °F (35 9 °C), Max:97 4 °F (36 3 °C)    Temp:  [96 7 °F (35 9 °C)-97 4 °F (36 3 °C)] 96 7 °F (35 9 °C)  HR:  [62-81] 62  Resp:  [16-20] 20  BP: (136-147)/(70-87) 136/70  SpO2:  [96 %-98 %] 96 %  Body mass index is 23 82 kg/m²  Input and Output Summary (last 24 hours):        Intake/Output Summary (Last 24 hours) at 3/6/2022 0932  Last data filed at 3/5/2022 1601  Gross per 24 hour   Intake 1190 ml   Output 850 ml   Net 340 ml       Physical Exam:     Physical Exam  General: breathing well on room air, no acute distress  HEENT: NC/AT, PERRL, EOM - normal  Neck: Supple  Pulm/Chest: Normal chest wall expansion, clear breath sounds on both side, no wheezing/rhonchi or crackles appreciated  CVS: RRR, normal S1&S2, no murmur appreciated, capillary refill <2s  Abd: soft, non tender, non distended, bowel sounds +  MSK: move all 4 extremities spontaneously, generalized skin rash noted  Skin: warm  CNS: no acute focal neuro deficit      Additional Data:     Labs:        Results from last 7 days   Lab Units 03/06/22  0513   POTASSIUM mmol/L 4 0   CHLORIDE mmol/L 104   CO2 mmol/L 30   BUN mg/dL 19   CREATININE mg/dL 0 69*   CALCIUM mg/dL 8 2*   ALK PHOS U/L 97   ALT U/L 12   AST U/L 17           * I Have Reviewed All Lab Data Listed Above  * Additional Pertinent Lab Tests Reviewed:  Bola 66 Admission Reviewed    Imaging:    Imaging Reports Reviewed Today Include: None  Imaging Personally Reviewed by Myself Includes:  None      Recent Cultures (last 7 days):           Last 24 Hours Medication List:   Current Facility-Administered Medications   Medication Dose Route Frequency Provider Last Rate    acetaminophen  650 mg Oral Q4H PRN Millie David PA-C      amLODIPine  10 mg Oral Daily Moon Aschoff, DO      ARIPiprazole  10 mg Oral Daily [facility], DO      carbamide peroxide  5 drop Both Ears BID PRN Parth Esteves MD      diphenhydrAMINE  25 mg Oral Q6H PRN Moon Aschoff, DO      enoxaparin  40 mg Subcutaneous Q24H [address] [facility], DO      gabapentin  400 mg Oral TID [facility], DO      hydrocortisone   Topical 4x Daily PRN [facility], DO      hydrOXYzine HCL  25 mg Oral Q6H PRN DOV Land      lisinopril  20 mg Oral Daily [facility], DO      LORazepam  2 mg Oral Once Moon Aschoff, DO      methocarbamol  500 mg Oral Q6H PRN Matthew Lim MD      nicotine  1 patch Transdermal Daily [place] PA-C      nystatin   Topical BID Matthew Lim MD      ondansetron  4 mg Oral Q6H PRN Moon Aschoff, DO      oxyCODONE  5 mg Oral Q6H PRN [facility], DO      polyethylene glycol  17 g Oral Daily Moon Aschoff, DO      senna-docusate sodium  1 tablet Oral BID Moon Aschoff, DO      terbinafine  250 mg Oral Daily Harden Flair Rupert Rhodes DO      triamcinolone   Topical BID Altagracia Razo MD      white petrolatum-mineral oil   Topical TID PRN She Gay DO          Today, Patient Was Seen By: Altagracia Razo MD    ** Please Note: Dragon 360 Dictation voice to text software may have been used in the creation of this document   **

## 2022-03-07 PROCEDURE — 97116 GAIT TRAINING THERAPY: CPT

## 2022-03-07 PROCEDURE — 99232 SBSQ HOSP IP/OBS MODERATE 35: CPT | Performed by: INTERNAL MEDICINE

## 2022-03-07 PROCEDURE — 97110 THERAPEUTIC EXERCISES: CPT

## 2022-03-07 PROCEDURE — 97530 THERAPEUTIC ACTIVITIES: CPT

## 2022-03-07 RX ADMIN — LISINOPRIL 20 MG: 20 TABLET ORAL at 09:18

## 2022-03-07 RX ADMIN — OXYCODONE HYDROCHLORIDE 5 MG: 5 TABLET ORAL at 06:10

## 2022-03-07 RX ADMIN — NYSTATIN: 100000 POWDER TOPICAL at 09:20

## 2022-03-07 RX ADMIN — GABAPENTIN 400 MG: 400 CAPSULE ORAL at 21:53

## 2022-03-07 RX ADMIN — NYSTATIN: 100000 POWDER TOPICAL at 17:53

## 2022-03-07 RX ADMIN — METHOCARBAMOL 500 MG: 500 TABLET, FILM COATED ORAL at 09:19

## 2022-03-07 RX ADMIN — HYDROCORTISONE: 25 OINTMENT TOPICAL at 09:20

## 2022-03-07 RX ADMIN — GABAPENTIN 400 MG: 400 CAPSULE ORAL at 17:53

## 2022-03-07 RX ADMIN — TERBINAFINE HYDROCHLORIDE 250 MG: 250 TABLET ORAL at 09:19

## 2022-03-07 RX ADMIN — AMLODIPINE BESYLATE 10 MG: 10 TABLET ORAL at 09:18

## 2022-03-07 RX ADMIN — OXYCODONE HYDROCHLORIDE 5 MG: 5 TABLET ORAL at 13:19

## 2022-03-07 RX ADMIN — TRIAMCINOLONE ACETONIDE: 1 CREAM TOPICAL at 09:20

## 2022-03-07 RX ADMIN — SENNOSIDES AND DOCUSATE SODIUM 1 TABLET: 50; 8.6 TABLET ORAL at 09:19

## 2022-03-07 RX ADMIN — METHOCARBAMOL 500 MG: 500 TABLET, FILM COATED ORAL at 17:53

## 2022-03-07 RX ADMIN — TRIAMCINOLONE ACETONIDE: 1 CREAM TOPICAL at 17:53

## 2022-03-07 RX ADMIN — OXYCODONE HYDROCHLORIDE 5 MG: 5 TABLET ORAL at 21:53

## 2022-03-07 RX ADMIN — ARIPIPRAZOLE 10 MG: 10 TABLET ORAL at 09:18

## 2022-03-07 RX ADMIN — HYDROXYZINE HYDROCHLORIDE 25 MG: 25 TABLET ORAL at 21:53

## 2022-03-07 RX ADMIN — SENNOSIDES AND DOCUSATE SODIUM 1 TABLET: 50; 8.6 TABLET ORAL at 17:53

## 2022-03-07 RX ADMIN — GABAPENTIN 400 MG: 400 CAPSULE ORAL at 09:18

## 2022-03-07 NOTE — PLAN OF CARE
Problem: PAIN - ADULT  Goal: Verbalizes/displays adequate comfort level or baseline comfort level  Description: Interventions:  - Encourage patient to monitor pain and request assistance  - Assess pain using appropriate pain scale  - Administer analgesics based on type and severity of pain and evaluate response  - Implement non-pharmacological measures as appropriate and evaluate response  - Consider cultural and social influences on pain and pain management  - Notify physician/advanced practitioner if interventions unsuccessful or patient reports new pain  Outcome: Progressing     Problem: INFECTION - ADULT  Goal: Absence or prevention of progression during hospitalization  Description: INTERVENTIONS:  - Assess and monitor for signs and symptoms of infection  - Monitor lab/diagnostic results  - Monitor all insertion sites, i e  indwelling lines, tubes, and drains  - Monitor endotracheal if appropriate and nasal secretions for changes in amount and color  - [place] appropriate cooling/warming therapies per order  - Administer medications as ordered  - Instruct and encourage patient and family to use good hand hygiene technique  - Identify and instruct in appropriate isolation precautions for identified infection/condition  Outcome: Progressing     Problem: DISCHARGE PLANNING  Goal: Discharge to home or other facility with appropriate resources  Description: INTERVENTIONS:  - Identify barriers to discharge w/patient and caregiver  - Arrange for needed discharge resources and transportation as appropriate  - Identify discharge learning needs (meds, wound care, etc )  - Arrange for interpretive services to assist at discharge as needed  - Refer to Case Management Department for coordinating discharge planning if the patient needs post-hospital services based on physician/advanced practitioner order or complex needs related to functional status, cognitive ability, or social support system  Outcome: Progressing Problem: Knowledge Deficit  Goal: Patient/family/caregiver demonstrates understanding of disease process, treatment plan, medications, and discharge instructions  Description: Complete learning assessment and assess knowledge base    Interventions:  - Provide teaching at level of understanding  - Provide teaching via preferred learning methods  Outcome: Progressing

## 2022-03-07 NOTE — PLAN OF CARE
Problem: PAIN - ADULT  Goal: Verbalizes/displays adequate comfort level or baseline comfort level  Description: Interventions:  - Encourage patient to monitor pain and request assistance  - Assess pain using appropriate pain scale  - Administer analgesics based on type and severity of pain and evaluate response  - Implement non-pharmacological measures as appropriate and evaluate response  - Consider cultural and social influences on pain and pain management  - Notify physician/advanced practitioner if interventions unsuccessful or patient reports new pain  Outcome: Progressing     Problem: SAFETY ADULT  Goal: Patient will remain free of falls  Description: INTERVENTIONS:  - Educate patient/family on patient safety including physical limitations  - Instruct patient to call for assistance with activity   - Consult OT/PT to assist with strengthening/mobility   - Keep Call bell within reach  - Keep bed low and locked with side rails adjusted as appropriate  - Keep care items and personal belongings within reach  - Initiate and maintain comfort rounds  - Make Fall Risk Sign visible to staff  - Apply yellow socks and bracelet for high fall risk patients  - Consider moving patient to room near nurses station  Outcome: Progressing     Problem: DISCHARGE PLANNING  Goal: Discharge to home or other facility with appropriate resources  Description: INTERVENTIONS:  - Identify barriers to discharge w/patient and caregiver  - Arrange for needed discharge resources and transportation as appropriate  - Identify discharge learning needs (meds, wound care, etc )  - Arrange for interpretive services to assist at discharge as needed  - Refer to Case Management Department for coordinating discharge planning if the patient needs post-hospital services based on physician/advanced practitioner order or complex needs related to functional status, cognitive ability, or social support system  Outcome: Progressing     Problem: Knowledge Deficit  Goal: Patient/family/caregiver demonstrates understanding of disease process, treatment plan, medications, and discharge instructions  Description: Complete learning assessment and assess knowledge base  Interventions:  - Provide teaching at level of understanding  - Provide teaching via preferred learning methods  Outcome: Progressing     Problem: MOBILITY - ADULT  Goal: Maintains/Returns to pre admission functional level  Description: INTERVENTIONS:  - Perform BMAT or MOVE assessment daily    - Set and communicate daily mobility goal to care team and patient/family/caregiver     - Collaborate with rehabilitation services on mobility goals if consulted  - Record patient progress and toleration of activity level   Outcome: Progressing

## 2022-03-07 NOTE — ASSESSMENT & PLAN NOTE
· Patient was to be discharged to the [facility] rescue mission however they will not take him back as he does not have any photo ID  · PT recommending post acute rehab placement  ·  on board - awaiting STR auth

## 2022-03-07 NOTE — ASSESSMENT & PLAN NOTE
· MRI  [facility]): Severe spinal stenosis L3-L4, and L5-S1 with more moderate stenosis L2-  · Continue pain medications with oxycodone, gabapentin, Robaxin  · Patient keeps asking to increase the dose and frequency of oxycodone  Patient does not seem to be in distress  I explained that we need to limit opioid use    Encourage the patient to continue with PT   · Continue PT

## 2022-03-07 NOTE — PROGRESS NOTES
[facility]  Progress Note - Eveline Sidney 1965, 60 y o  male MRN: [id number]  Unit/Bed#: 7T [facility] 711-01 Encounter: [id number]  Primary Care Provider: Amish Talavera MD   Date and time admitted to hospital: 12/29/2021  5:38 PM    * Ambulatory dysfunction  Assessment & Plan  · Continue gabapentin 400mg t i d, robaxin 500mg Q6H PRN, and oxycodone 5mg Q6H PRN  · PT/OT recommending post acute rehab  · Case management following to aide in discharge planning  · Patient remains medically stable for discharge     [facility]  · Patient was to be discharged to the [employer] rescue mission however they will not take him back as he does not have any photo ID  · PT recommending post acute rehab placement  ·  on board - awaiting STR auth    Essential hypertension  Assessment & Plan  · Continue amlodipine 10 mg daily and lisinopril 20 mg daily  Strange and inexplicable behavior  Assessment & Plan  · Patient with hyper fixation on fungal infection arms (which was treated)  · Psychiatry consulted, not appropriate for inpatient psychiatry unit at this time  · Continue Abilify 10 mg daily and Atarax 25 mg p o  q 6 hours p r n  for anxiety      Onychomycosis  Assessment & Plan  · Present on almost all toes and fingers  · Podiatry performed nail debridement on 01/27  · Continue Terbinafine 250 mg PO daily for 12 weeks (Ordered through 4/21)  · Check CMP weekly (Next check on 3/6)    Spinal stenosis  Assessment & Plan  · MRI  [facility]): Severe spinal stenosis L3-L4, and L5-S1 with more moderate stenosis L2-  · Continue pain medications with oxycodone, gabapentin, Robaxin  · Patient keeps asking to increase the dose and frequency of oxycodone  Patient does not seem to be in distress  I explained that we need to limit opioid use    Encourage the patient to continue with PT   · Continue PT    Penile pain  Assessment & Plan  · Patient complains of pain around his penis, scrotum and anal area  · No evidence of testicular torsion, patient does not seem to be in distress on physical exam  · No fever, vital signs stable  · Continue to monitor      VTE Pharmacologic Prophylaxis:  Lovenox    Patient Centered Rounds:  Patient care rounds were performed with nursing    Discussions with Specialists or Other Care Team Provider:  Case Management, nursing, PT/OT    Education and Discussions with Family / Patient:  Spoke with patient    Time Spent for Care: 30  More than 50% of total time spent on counseling and coordination of care as described above  Current Length of Stay: 79 day(s)    Current Patient Status: Inpatient     Certification Statement: The patient will continue to require additional inpatient hospital stay due to short-term rehab    Discharge Plan:  Medically stable for short-term rehab    Code Status: Level 1 - Full Code      Subjective:   Patient seen and examined at bedside  No acute events overnight  Denies chest pain, SOB, diaphoresis, nausea/vomiting/diarrhea, fevers/chills  Objective:     Vitals:   Temp (24hrs), Av 5 °F (36 4 °C), Min:96 9 °F (36 1 °C), Max:97 9 °F (36 6 °C)    Temp:  [96 9 °F (36 1 °C)-97 9 °F (36 6 °C)] 96 9 °F (36 1 °C)  HR:  [58-80] 58  Resp:  [18-20] 18  BP: (133-159)/(74-81) 133/74  SpO2:  [97 %-98 %] 98 %  Body mass index is 23 82 kg/m²  Input and Output Summary (last 24 hours): Intake/Output Summary (Last 24 hours) at 3/7/2022 0825  Last data filed at 3/6/2022 1730  Gross per 24 hour   Intake 840 ml   Output --   Net 840 ml       Physical Exam:     Physical Exam  Vitals and nursing note reviewed  Constitutional:       Appearance: He is well-developed  HENT:      Head: Normocephalic and atraumatic  Eyes:      Conjunctiva/sclera: Conjunctivae normal    Cardiovascular:      Rate and Rhythm: Normal rate and regular rhythm  Heart sounds: No murmur heard        Pulmonary:      Effort: Pulmonary effort is normal  No respiratory distress  Breath sounds: Normal breath sounds  Abdominal:      Palpations: Abdomen is soft  Tenderness: There is no abdominal tenderness  Musculoskeletal:      Cervical back: Neck supple  Skin:     General: Skin is warm and dry  Neurological:      Mental Status: He is alert  Additional Data:     Labs:  I have reviewed pertinent results         Results from last 7 days   Lab Units 03/06/22  0513   SODIUM mmol/L 137   POTASSIUM mmol/L 4 0   CHLORIDE mmol/L 104   CO2 mmol/L 30   BUN mg/dL 19   CREATININE mg/dL 0 69*   ANION GAP mmol/L 3*   CALCIUM mg/dL 8 2*   ALBUMIN g/dL 3 7   TOTAL BILIRUBIN mg/dL 0 32   ALK PHOS U/L 97   ALT U/L 12   AST U/L 17   GLUCOSE RANDOM mg/dL 100*                         Imaging: I have reviewed pertinent imaging       Recent Cultures (last 7 days):           Last 24 Hours Medication List:   Current Facility-Administered Medications   Medication Dose Route Frequency Provider Last Rate    acetaminophen  650 mg Oral Q4H PRN Gennaro Chan PA-C      amLODIPine  10 mg Oral Daily Mosie Olive, DO      ARIPiprazole  10 mg Oral Daily [facility], DO      carbamide peroxide  5 drop Both Ears BID PRN Amparo Martinez MD      diphenhydrAMINE  25 mg Oral Q6H PRN Levi Sandhu, DO      enoxaparin  40 mg Subcutaneous Q24H [facility], DO      gabapentin  400 mg Oral TID [facility], DO      hydrocortisone   Topical 4x Daily PRN [facility], DO      hydrOXYzine HCL  25 mg Oral Q6H PRN DOV Mustafa      lisinopril  20 mg Oral Daily [facility], DO      LORazepam  2 mg Oral Once Levi Sandhu, DO      methocarbamol  500 mg Oral Q6H PRN Arjun Ortiz MD      nicotine  1 patch Transdermal Daily [place] PA-C      nystatin   Topical BID Arjun Ortiz MD      ondansetron  4 mg Oral Q6H PRN Mosie Olive, DO      oxyCODONE  5 mg Oral Q6H PRN [facility], DO      polyethylene glycol 17 g Oral Daily Mariana Yamilka, DO      senna-docusate sodium  1 tablet Oral BID Mariana Yamilka, DO      terbinafine  250 mg Oral Daily Mariana Yamilka, DO      triamcinolone   Topical BID Araceli Rosen MD      white petrolatum-mineral oil   Topical TID PRN Keila Parham DO          Today, Patient Was Seen By: Keila Parham DO    ** Please Note: Dictation voice to text software may have been used in the creation of this document   **

## 2022-03-08 LAB
ANION GAP SERPL CALCULATED.3IONS-SCNC: 6 MMOL/L (ref 5–14)
BASOPHILS # BLD AUTO: 0.1 THOUSANDS/ΜL (ref 0–0.1)
BASOPHILS NFR BLD AUTO: 1 % (ref 0–1)
BUN SERPL-MCNC: 19 MG/DL (ref 5–25)
CALCIUM SERPL-MCNC: 8.2 MG/DL (ref 8.4–10.2)
CHLORIDE SERPL-SCNC: 103 MMOL/L (ref 97–108)
CO2 SERPL-SCNC: 30 MMOL/L (ref 22–30)
CREAT SERPL-MCNC: 0.74 MG/DL (ref 0.7–1.5)
EOSINOPHIL # BLD AUTO: 0.3 THOUSAND/ΜL (ref 0–0.4)
EOSINOPHIL NFR BLD AUTO: 4 % (ref 0–6)
ERYTHROCYTE [DISTWIDTH] IN BLOOD BY AUTOMATED COUNT: 15.9 %
GFR SERPL CREATININE-BSD FRML MDRD: 101 ML/MIN/1.73SQ M
GLUCOSE SERPL-MCNC: 92 MG/DL (ref 70–99)
HCT VFR BLD AUTO: 35 % (ref 41–53)
HGB BLD-MCNC: 11.9 G/DL (ref 13.5–17.5)
LYMPHOCYTES # BLD AUTO: 1.8 THOUSANDS/ΜL (ref 0.5–4)
LYMPHOCYTES NFR BLD AUTO: 29 % (ref 25–45)
MAGNESIUM SERPL-MCNC: 1.9 MG/DL (ref 1.6–2.3)
MCH RBC QN AUTO: 29.3 PG (ref 26–34)
MCHC RBC AUTO-ENTMCNC: 34 G/DL (ref 31–36)
MCV RBC AUTO: 86 FL (ref 80–100)
MONOCYTES # BLD AUTO: 0.7 THOUSAND/ΜL (ref 0.2–0.9)
MONOCYTES NFR BLD AUTO: 11 % (ref 1–10)
NEUTROPHILS # BLD AUTO: 3.4 THOUSANDS/ΜL (ref 1.8–7.8)
NEUTS SEG NFR BLD AUTO: 54 % (ref 45–65)
PHOSPHATE SERPL-MCNC: 4 MG/DL (ref 2.5–4.8)
PLATELET # BLD AUTO: 312 THOUSANDS/UL (ref 150–450)
PMV BLD AUTO: 6.9 FL (ref 8.9–12.7)
POTASSIUM SERPL-SCNC: 3.9 MMOL/L (ref 3.6–5)
RBC # BLD AUTO: 4.06 MILLION/UL (ref 4.5–5.9)
SODIUM SERPL-SCNC: 139 MMOL/L (ref 137–147)
WBC # BLD AUTO: 6.2 THOUSAND/UL (ref 4.5–11)

## 2022-03-08 PROCEDURE — 85025 COMPLETE CBC W/AUTO DIFF WBC: CPT | Performed by: INTERNAL MEDICINE

## 2022-03-08 PROCEDURE — 99232 SBSQ HOSP IP/OBS MODERATE 35: CPT | Performed by: INTERNAL MEDICINE

## 2022-03-08 PROCEDURE — 83735 ASSAY OF MAGNESIUM: CPT | Performed by: INTERNAL MEDICINE

## 2022-03-08 PROCEDURE — 97530 THERAPEUTIC ACTIVITIES: CPT

## 2022-03-08 PROCEDURE — 80048 BASIC METABOLIC PNL TOTAL CA: CPT | Performed by: INTERNAL MEDICINE

## 2022-03-08 PROCEDURE — 84100 ASSAY OF PHOSPHORUS: CPT | Performed by: INTERNAL MEDICINE

## 2022-03-08 RX ADMIN — TRIAMCINOLONE ACETONIDE: 1 CREAM TOPICAL at 08:59

## 2022-03-08 RX ADMIN — LISINOPRIL 20 MG: 20 TABLET ORAL at 08:56

## 2022-03-08 RX ADMIN — NYSTATIN: 100000 POWDER TOPICAL at 08:59

## 2022-03-08 RX ADMIN — NYSTATIN: 100000 POWDER TOPICAL at 17:01

## 2022-03-08 RX ADMIN — GABAPENTIN 400 MG: 400 CAPSULE ORAL at 08:56

## 2022-03-08 RX ADMIN — DIPHENHYDRAMINE HCL 25 MG: 25 TABLET ORAL at 08:55

## 2022-03-08 RX ADMIN — GABAPENTIN 400 MG: 400 CAPSULE ORAL at 20:38

## 2022-03-08 RX ADMIN — SENNOSIDES AND DOCUSATE SODIUM 1 TABLET: 50; 8.6 TABLET ORAL at 08:56

## 2022-03-08 RX ADMIN — ARIPIPRAZOLE 10 MG: 10 TABLET ORAL at 08:55

## 2022-03-08 RX ADMIN — SENNOSIDES AND DOCUSATE SODIUM 1 TABLET: 50; 8.6 TABLET ORAL at 17:00

## 2022-03-08 RX ADMIN — OXYCODONE HYDROCHLORIDE 5 MG: 5 TABLET ORAL at 20:38

## 2022-03-08 RX ADMIN — METHOCARBAMOL 500 MG: 500 TABLET, FILM COATED ORAL at 17:00

## 2022-03-08 RX ADMIN — OXYCODONE HYDROCHLORIDE 5 MG: 5 TABLET ORAL at 14:20

## 2022-03-08 RX ADMIN — OXYCODONE HYDROCHLORIDE 5 MG: 5 TABLET ORAL at 06:30

## 2022-03-08 RX ADMIN — TRIAMCINOLONE ACETONIDE: 1 CREAM TOPICAL at 17:00

## 2022-03-08 RX ADMIN — ACETAMINOPHEN 650 MG: 325 TABLET ORAL at 20:38

## 2022-03-08 RX ADMIN — GABAPENTIN 400 MG: 400 CAPSULE ORAL at 17:00

## 2022-03-08 RX ADMIN — TERBINAFINE HYDROCHLORIDE 250 MG: 250 TABLET ORAL at 08:57

## 2022-03-08 RX ADMIN — AMLODIPINE BESYLATE 10 MG: 10 TABLET ORAL at 08:56

## 2022-03-08 RX ADMIN — HYDROXYZINE HYDROCHLORIDE 25 MG: 25 TABLET ORAL at 08:55

## 2022-03-08 NOTE — PLAN OF CARE
Problem: PAIN - ADULT  Goal: Verbalizes/displays adequate comfort level or baseline comfort level  Description: Interventions:  - Encourage patient to monitor pain and request assistance  - Assess pain using appropriate pain scale  - Administer analgesics based on type and severity of pain and evaluate response  - Implement non-pharmacological measures as appropriate and evaluate response  - Consider cultural and social influences on pain and pain management  - Notify physician/advanced practitioner if interventions unsuccessful or patient reports new pain  Outcome: Progressing     Problem: INFECTION - ADULT  Goal: Absence or prevention of progression during hospitalization  Description: INTERVENTIONS:  - Assess and monitor for signs and symptoms of infection  - Monitor lab/diagnostic results  - Monitor all insertion sites, i e  indwelling lines, tubes, and drains  - Monitor endotracheal if appropriate and nasal secretions for changes in amount and color  - [place] appropriate cooling/warming therapies per order  - Administer medications as ordered  - Instruct and encourage patient and family to use good hand hygiene technique  - Identify and instruct in appropriate isolation precautions for identified infection/condition  Outcome: Progressing     Problem: Prexisting or High Potential for Compromised Skin Integrity  Goal: Skin integrity is maintained or improved  Description: INTERVENTIONS:  - Identify patients at risk for skin breakdown  - Assess and monitor skin integrity  - Assess and monitor nutrition and hydration status  - Monitor labs   - Assess for incontinence   - Turn and reposition patient  - Assist with mobility/ambulation  - Relieve pressure over bony prominences  - Avoid friction and shearing  - Provide appropriate hygiene as needed including keeping skin clean and dry  - Evaluate need for skin moisturizer/barrier cream  - Collaborate with interdisciplinary team   - Patient/family teaching  - Consider wound care consult   Outcome: Progressing

## 2022-03-08 NOTE — PLAN OF CARE
Problem: PHYSICAL THERAPY ADULT  Goal: Performs mobility at highest level of function for planned discharge setting  See evaluation for individualized goals  Description: Treatment/Interventions: ADL retraining,Functional transfer training,LE strengthening/ROM,Elevations,Therapeutic exercise,Endurance training,Patient/family training,Equipment eval/education,Bed mobility,Gait training,Spoke to nursing,Spoke to case management,OT  Equipment Recommended: Wheelchair       See flowsheet documentation for full assessment, interventions and recommendations  Outcome: Progressing  Note: Prognosis: Fair  Problem List: Decreased strength,Decreased range of motion,Decreased endurance,Impaired balance,Decreased mobility,Decreased coordination,Impaired judgement,Impaired sensation,Pain  Assessment: Pt participating well with therapy today  Tolerated LE exercise well and also able to complete some sit pivot transfers with supervision  Any standing transfers require at least min A to complete  Ambulation and standing tolerance very limited today due to pain in the lower back and fatigue in LEs  Pt is excited and motivated to get to rehab and progress mobility status  He continues to be appropriate for inpt PT treatment and POC will be ext 10 days from todays date  Barriers to Discharge: Inaccessible home environment,Decreased caregiver support        PT Discharge Recommendation: Post acute rehabilitation services          See flowsheet documentation for full assessment

## 2022-03-08 NOTE — CASE MANAGEMENT
Case Management Progress Note    Patient name Marissa Nunes  Location 7T /7T -01 MRN [id number]  : 1965 Date 3/8/2022       LOS (days): 68  Geometric Mean LOS (GMLOS) (days): 3 20  Days to GMLOS:-64 6        OBJECTIVE:    Current admission status: Inpatient  Preferred Pharmacy:   [address]  [address]  [address]  Phone: [phone] Fax: [phone] [facility],[address], [address]  [address]  Phone: [phone] Fax: [phone]    Primary Care Provider: Yolanda Haywood MD    Primary Insurance: [address] [place] [address]  Secondary Insurance:     PROGRESS NOTE: CM was notified at morning rounds that pt is medically cleared to be discharged today  CM received phone call from [facility] Ph; [phone] stating that they have beds available at there facility  [facility] stated he will get back to CM once pt gets approval from the insurance       CM called [facility] to see if there are any bed available for the pt this week  CM left a voicemail for admission Jbrowan Mayda Ph: [phone], pending call back      CM reached out to Liashirley Petersen from [employer] on careport; CM was notified that there are no beds available at the moment  Destiney Estes has kept pt on waiting list and will let us know soon as there is a bed available      CM updated and refreshed careport to see if there are beds available at any facilities, pending acceptance and bed availability       CM department will continue to follow thorough pt's D/C

## 2022-03-08 NOTE — PLAN OF CARE
Problem: PAIN - ADULT  Goal: Verbalizes/displays adequate comfort level or baseline comfort level  Description: Interventions:  - Encourage patient to monitor pain and request assistance  - Assess pain using appropriate pain scale  - Administer analgesics based on type and severity of pain and evaluate response  - Implement non-pharmacological measures as appropriate and evaluate response  - Consider cultural and social influences on pain and pain management  - Notify physician/advanced practitioner if interventions unsuccessful or patient reports new pain  Outcome: Progressing     Problem: INFECTION - ADULT  Goal: Absence or prevention of progression during hospitalization  Description: INTERVENTIONS:  - Assess and monitor for signs and symptoms of infection  - Monitor lab/diagnostic results  - Monitor all insertion sites, i e  indwelling lines, tubes, and drains  - Monitor endotracheal if appropriate and nasal secretions for changes in amount and color  - [place] appropriate cooling/warming therapies per order  - Administer medications as ordered  - Instruct and encourage patient and family to use good hand hygiene technique  - Identify and instruct in appropriate isolation precautions for identified infection/condition  Outcome: Progressing     Problem: SAFETY ADULT  Goal: Patient will remain free of falls  Description: INTERVENTIONS:  - Educate patient/family on patient safety including physical limitations  - Instruct patient to call for assistance with activity   - Consult OT/PT to assist with strengthening/mobility   - Keep Call bell within reach  - Keep bed low and locked with side rails adjusted as appropriate  - Keep care items and personal belongings within reach  - Initiate and maintain comfort rounds  - Make Fall Risk Sign visible to staff  - Apply yellow socks and bracelet for high fall risk patients  - Consider moving patient to room near nurses station  Outcome: Progressing  Goal: Maintain or return to baseline ADL function  Description: INTERVENTIONS:  -  Assess patient's ability to carry out ADLs; assess patient's baseline for ADL function and identify physical deficits which impact ability to perform ADLs (bathing, care of mouth/teeth, toileting, grooming, dressing, etc )  - Assess/evaluate cause of self-care deficits   - Assess range of motion  - Assess patient's mobility; develop plan if impaired  - Assess patient's need for assistive devices and provide as appropriate  - Encourage maximum independence but intervene and supervise when necessary  - Involve family in performance of ADLs  - Assess for home care needs following discharge   - Consider OT consult to assist with ADL evaluation and planning for discharge  - Provide patient education as appropriate  Outcome: Progressing  Goal: Maintains/Returns to pre admission functional level  Description: INTERVENTIONS:  - Perform BMAT or MOVE assessment daily    - Set and communicate daily mobility goal to care team and patient/family/caregiver     - Collaborate with rehabilitation services on mobility goals if consulted  - Out of bed for toileting  - Record patient progress and toleration of activity level   Outcome: Progressing     Problem: DISCHARGE PLANNING  Goal: Discharge to home or other facility with appropriate resources  Description: INTERVENTIONS:  - Identify barriers to discharge w/patient and caregiver  - Arrange for needed discharge resources and transportation as appropriate  - Identify discharge learning needs (meds, wound care, etc )  - Arrange for interpretive services to assist at discharge as needed  - Refer to Case Management Department for coordinating discharge planning if the patient needs post-hospital services based on physician/advanced practitioner order or complex needs related to functional status, cognitive ability, or social support system  Outcome: Progressing     Problem: Knowledge Deficit  Goal: Patient/family/caregiver demonstrates understanding of disease process, treatment plan, medications, and discharge instructions  Description: Complete learning assessment and assess knowledge base  Interventions:  - Provide teaching at level of understanding  - Provide teaching via preferred learning methods  Outcome: Progressing     Problem: MOBILITY - ADULT  Goal: Maintain or return to baseline ADL function  Description: INTERVENTIONS:  -  Assess patient's ability to carry out ADLs; assess patient's baseline for ADL function and identify physical deficits which impact ability to perform ADLs (bathing, care of mouth/teeth, toileting, grooming, dressing, etc )  - Assess/evaluate cause of self-care deficits   - Assess range of motion  - Assess patient's mobility; develop plan if impaired  - Assess patient's need for assistive devices and provide as appropriate  - Encourage maximum independence but intervene and supervise when necessary  - Involve family in performance of ADLs  - Assess for home care needs following discharge   - Consider OT consult to assist with ADL evaluation and planning for discharge  - Provide patient education as appropriate  Outcome: Progressing  Goal: Maintains/Returns to pre admission functional level  Description: INTERVENTIONS:  - Perform BMAT or MOVE assessment daily    - Set and communicate daily mobility goal to care team and patient/family/caregiver     - Collaborate with rehabilitation services on mobility goals if consulted  - Out of bed for toileting  - Record patient progress and toleration of activity level   Outcome: Progressing     Problem: Potential for Falls  Goal: Patient will remain free of falls  Description: INTERVENTIONS:  - Educate patient/family on patient safety including physical limitations  - Instruct patient to call for assistance with activity   - Consult OT/PT to assist with strengthening/mobility   - Keep Call bell within reach  - Keep bed low and locked with side rails adjusted as appropriate  - Keep care items and personal belongings within reach  - Initiate and maintain comfort rounds  - Make Fall Risk Sign visible to staff  - Apply yellow socks and bracelet for high fall risk patients  - Consider moving patient to room near nurses station  Outcome: Progressing     Problem: Prexisting or High Potential for Compromised Skin Integrity  Goal: Skin integrity is maintained or improved  Description: INTERVENTIONS:  - Identify patients at risk for skin breakdown  - Assess and monitor skin integrity  - Assess and monitor nutrition and hydration status  - Monitor labs   - Assess for incontinence   - Turn and reposition patient  - Assist with mobility/ambulation  - Relieve pressure over bony prominences  - Avoid friction and shearing  - Provide appropriate hygiene as needed including keeping skin clean and dry  - Evaluate need for skin moisturizer/barrier cream  - Collaborate with interdisciplinary team   - Patient/family teaching  - Consider wound care consult   Outcome: Progressing

## 2022-03-08 NOTE — PROGRESS NOTES
[facility]  Progress Note - Constance Cardona 1965, 60 y o  male MRN: [id number]  Unit/Bed#: 7T [facility] 711-01 Encounter: [id number]  Primary Care Provider: Nathan Storey MD   Date and time admitted to hospital: 12/29/2021  5:38 PM    * Ambulatory dysfunction  Assessment & Plan  · Continue gabapentin 400mg t i d, robaxin 500mg Q6H PRN, and oxycodone 5mg Q6H PRN  · PT/OT recommending post acute rehab  · Case management following to aide in discharge planning  · Patient remains medically stable for discharge     [address] [facility]  · Patient was to be discharged to the [facility] however they will not take him back as he does not have any photo ID  · PT recommending post acute rehab placement  ·  on board - awaiting STR auth    Essential hypertension  Assessment & Plan  · Continue amlodipine 10 mg daily and lisinopril 20 mg daily  Strange and inexplicable behavior  Assessment & Plan  · Patient with hyper fixation on fungal infection arms (which was treated)  · Psychiatry consulted, not appropriate for inpatient psychiatry unit at this time  · Continue Abilify 10 mg daily and Atarax 25 mg p o  q 6 hours p r n  for anxiety      Onychomycosis  Assessment & Plan  · Present on almost all toes and fingers  · Podiatry performed nail debridement on 01/27  · Continue Terbinafine 250 mg PO daily for 12 weeks (Ordered through 4/21)  · Check CMP weekly (Next check on 3/6)    Spinal stenosis  Assessment & Plan  · MRI  [facility]): Severe spinal stenosis L3-L4, and L5-S1 with more moderate stenosis L2-  · Continue pain medications with oxycodone, gabapentin, Robaxin  · Patient keeps asking to increase the dose and frequency of oxycodone  Patient does not seem to be in distress  I explained that we need to limit opioid use    Encourage the patient to continue with PT   · Continue PT    Penile pain  Assessment & Plan  · Patient complains of pain around his penis, scrotum and anal area  · No evidence of testicular torsion, patient does not seem to be in distress on physical exam  · No fever, vital signs stable  · Continue to monitor  · Likely phimosis which does not appear to be strangulated      VTE Pharmacologic Prophylaxis:  Lovenox    Patient Centered Rounds:  Patient care rounds were performed with nursing    Discussions with Specialists or Other Care Team Provider:  Case Management, Nursing, PT/OT    Education and Discussions with Family / Patient:  Spoke with patient    Time Spent for Care: 30  More than 50% of total time spent on counseling and coordination of care as described above  Current Length of Stay: 76 day(s)    Current Patient Status: Inpatient     Certification Statement: The patient will continue to require additional inpatient hospital stay due to medically stable for discharge to short-term rehab pending authorization    Discharge Plan: Short-term rehab pending authorization    Code Status: Level 1 - Full Code      Subjective:   Patient seen and examined at bedside  No acute events overnight  Denies chest pain, SOB, diaphoresis, nausea/vomiting/diarrhea, fevers/chills  Objective:     Vitals:   Temp (24hrs), Av 3 °F (36 3 °C), Min:96 6 °F (35 9 °C), Max:98 °F (36 7 °C)    Temp:  [96 6 °F (35 9 °C)-98 °F (36 7 °C)] 96 6 °F (35 9 °C)  HR:  [63-77] 63  Resp:  [18] 18  BP: (118-151)/(61-83) 151/83  SpO2:  [95 %-99 %] 95 %  Body mass index is 23 82 kg/m²  Input and Output Summary (last 24 hours): Intake/Output Summary (Last 24 hours) at 3/8/2022 0815  Last data filed at 3/8/2022 0804  Gross per 24 hour   Intake 720 ml   Output 700 ml   Net 20 ml       Physical Exam:     Physical Exam  Vitals and nursing note reviewed  Constitutional:       Appearance: He is well-developed  HENT:      Head: Normocephalic and atraumatic     Eyes:      Conjunctiva/sclera: Conjunctivae normal    Cardiovascular:      Rate and Rhythm: Normal rate and regular rhythm  Heart sounds: No murmur heard  Pulmonary:      Effort: Pulmonary effort is normal  No respiratory distress  Breath sounds: Normal breath sounds  Abdominal:      Palpations: Abdomen is soft  Tenderness: There is no abdominal tenderness  Musculoskeletal:      Cervical back: Neck supple  Skin:     General: Skin is warm and dry  Neurological:      Mental Status: He is alert  Additional Data:     Labs: I have reviewed pertinent results     Results from last 7 days   Lab Units 03/08/22  0451   WBC Thousand/uL 6 20   HEMOGLOBIN g/dL 11 9*   HEMATOCRIT % 35 0*   PLATELETS Thousands/uL 312   NEUTROS PCT % 54   LYMPHS PCT % 29   MONOS PCT % 11*   EOS PCT % 4     Results from last 7 days   Lab Units 03/08/22  0451 03/06/22  0513 03/06/22  0513   SODIUM mmol/L 139   < > 137   POTASSIUM mmol/L 3 9   < > 4 0   CHLORIDE mmol/L 103   < > 104   CO2 mmol/L 30   < > 30   BUN mg/dL 19   < > 19   CREATININE mg/dL 0 74   < > 0 69*   ANION GAP mmol/L 6   < > 3*   CALCIUM mg/dL 8 2*   < > 8 2*   ALBUMIN g/dL  --   --  3 7   TOTAL BILIRUBIN mg/dL  --   --  0 32   ALK PHOS U/L  --   --  97   ALT U/L  --   --  12   AST U/L  --   --  17   GLUCOSE RANDOM mg/dL 92   < > 100*    < > = values in this interval not displayed                           Imaging: I have reviewed pertinent imaging       Recent Cultures (last 7 days):           Last 24 Hours Medication List:   Current Facility-Administered Medications   Medication Dose Route Frequency Provider Last Rate    acetaminophen  650 mg Oral Q4H PRN Iwona Olivas PA-C      amLODIPine  10 mg Oral Daily Frederick Regulus, DO      ARIPiprazole  10 mg Oral Daily [facility], DO      carbamide peroxide  5 drop Both Ears BID PRN Patricia Starks MD      diphenhydrAMINE  25 mg Oral Q6H PRN Frederick Regulus, DO      enoxaparin  40 mg Subcutaneous Q24H [address] [facility], DO      gabapentin  400 mg Oral TID [address] [facility], DO     [facility]   Topical 4x Daily PRN [facility] Malone, DO      hydrOXYzine HCL  25 mg Oral Q6H PRN DOV Barnes      lisinopril  20 mg Oral Daily [facility] Malone, DO      LORazepam  2 mg Oral Once Uvaldo Peck, DO      methocarbamol  500 mg Oral Q6H PRN Bere Cristina MD      nicotine  1 patch Transdermal Daily [place], [place]      nystatin   Topical BID Bere Cristina MD      ondansetron  4 mg Oral Q6H PRN Uvaldo Peck, DO      oxyCODONE  5 mg Oral Q6H PRN [facility], DO      polyethylene glycol  17 g Oral Daily Uvaldo Peck,       senna-docusate sodium  1 tablet Oral BID Uvaldo Peck, DO      terbinafine  250 mg Oral Daily Uvaldo Peck,       triamcinolone   Topical BID Bere Cristina MD      white petrolatum-mineral oil   Topical TID PRN Kathy Quinones DO          Today, Patient Was Seen By: Kathy Quinones DO    ** Please Note: Dictation voice to text software may have been used in the creation of this document   **

## 2022-03-08 NOTE — ASSESSMENT & PLAN NOTE
· Patient was to be discharged to the [facility] rescue mission however they will not take him back as he does not have any photo ID  · PT recommending post acute rehab placement  ·  on board - awaiting STR auth

## 2022-03-08 NOTE — PHYSICAL THERAPY NOTE
Physical TherapyTreatment Note    Patient's Name: Sonido Wilson    Admitting Diagnosis  Cellulitis [L03 90]  Homeless [Z59 00]  Wound check, abscess [Z51 89]    Problem List  Patient Active Problem List   Diagnosis    Homeless    Psoriasis, unspecified    Ambulatory dysfunction    Essential hypertension    Strange and inexplicable behavior    Onychomycosis    Constipation    Spinal stenosis    Penile pain       Past Medical History  Past Medical History:   Diagnosis Date    Arthritis     Hypertension     Sciatica        Past Surgical History  Past Surgical History:   Procedure Laterality Date    BACK SURGERY         Recent Imaging  US scrotum and testicles   Final Result by Yana William MD (02/14 1158)       1  No evidence of testicular torsion  2   Minimally complex hydroceles moderate on the right and small on the left  Workstation performed: [id number]         CT abdomen pelvis w contrast   Final Result by Bridget Gunn MD (01/28 0293)      Stool filled colon with possible impaction  Diverticulosis with no diverticulitis                 Workstation performed: [id number]             Recent Vital Signs  Vitals:    03/07/22 0658 03/07/22 1509 03/07/22 2229 03/08/22 0724   BP: 133/74 118/61 121/67 151/83   BP Location: Left arm Left arm Left arm Left arm   Pulse: 58 72 77 63   Resp: 18 18 18 18   Temp: (!) 96 9 °F (36 1 °C) (!) 97 3 °F (36 3 °C) 98 °F (36 7 °C) (!) 96 6 °F (35 9 °C)   TempSrc: Temporal Temporal Temporal Temporal   SpO2: 98% 97% 99% 95%   Weight:       Height:           PT Treatment Time: 40 minutes       03/07/22 1515   PT Last Visit   PT Visit Date 03/07/22   Note Type   Note Type Treatment   Pain Assessment   Pain Assessment Tool 0-10   Pain Score 8   Pain Location/Orientation Location: Groin   Restrictions/Precautions   Weight Bearing Precautions Per Order No   Other Precautions Fall Risk;Pain   General   Chart Reviewed Yes   Response to Previous Treatment Patient with no complaints from previous session  Family/Caregiver Present No   Cognition   Arousal/Participation Alert; Cooperative   Subjective   Subjective Looking forward to getting to rehab and motivated to progress ambulation there   Bed Mobility   Supine to Sit 6  Modified independent   Additional items Bedrails; Increased time required   Sit to Supine 6  Modified independent   Additional items Bedrails; Increased time required   Transfers   Sit to Stand 4  Minimal assistance   Additional items Assist x 1; Armrests; Increased time required;Verbal cues   Stand to Sit 4  Minimal assistance   Additional items Assist x 1; Armrests; Increased time required;Verbal cues   Stand pivot 4  Minimal assistance   Additional items Assist x 1; Armrests; Increased time required;Verbal cues   Sit pivot 5  Supervision   Additional items Assist x 1; Armrests; Increased time required;Verbal cues   Additional Comments with RW, pt able to stand for up to 30s today, limited by pain in lower back and LE weakness   Ambulation/Elevation   Gait pattern Step through pattern;Decreased toe off;Decreased heel strike; Ataxia; Short stride; Foward flexed;Decreased foot clearance; Wide HARJIT; Improper Weight shift   Gait Assistance 3  Moderate assist   Additional items Assist x 1;Verbal cues; Tactile cues   Assistive Device Rolling walker   Distance 5ft   Ambulation/Elevation Additional Comments attempted x4 able to takes steps on 2 trials 5ft longest attempt today due to pain and weakness   Balance   Static Sitting Fair +   Dynamic Sitting Fair   Static Standing Poor +   Dynamic Standing Poor   Ambulatory Poor   Endurance Deficit   Endurance Deficit Yes   Endurance Deficit Description pain and fatigue   Activity Tolerance   Activity Tolerance Patient limited by fatigue;Patient limited by pain   Medical Staff Made Aware spoke to [facility] spoke to RN   Exercises   Heelslides Sitting;20 reps;AROM; Bilateral  (manual resist)   Hip Flexion Sitting;20 reps;AROM; Bilateral   Hip Abduction Sitting;20 reps;AROM; Bilateral   Hip Extension Sitting;20 reps;AROM; Bilateral  (manual resistance)   Hip Adduction Sitting;20 reps;AROM; Bilateral   Knee AROM Long Arc Quad Sitting;20 reps;AROM; Bilateral   Assessment   Prognosis Fair   Problem List Decreased strength;Decreased range of motion;Decreased endurance; Impaired balance;Decreased mobility; Decreased coordination; Impaired judgement; Impaired sensation;Pain   Assessment Pt participating well with therapy today  Tolerated LE exercise well and also able to complete some sit pivot transfers with supervision  Any standing transfers require at least min A to complete  Ambulation and standing tolerance very limited today due to pain in the lower back and fatigue in LEs  Pt is excited and motivated to get to rehab and progress mobility status  He continues to be appropriate for inpt PT treatment and POC will be ext 10 days from todays date  Barriers to Discharge Inaccessible home environment;Decreased caregiver support   Goals   Patient Goals get to rehab   STG Expiration Date 03/17/22   Short Term Goal #1 see eval note   PT Treatment Day 16   Plan   Treatment/Interventions ADL retraining;Functional transfer training;Elevations;LE strengthening/ROM; Therapeutic exercise; Endurance training;Patient/family training;Equipment eval/education; Bed mobility;Gait training;Spoke to nursing;Spoke to case management;OT   Progress Progressing toward goals   PT Frequency 2-3x/wk   Recommendation   PT Discharge Recommendation Post acute rehabilitation services   AM-PAC Basic Mobility Inpatient   Turning in Bed Without Bedrails 4   Lying on Back to Sitting on Edge of Flat Bed 4   Moving Bed to Chair 3   Standing Up From Chair 2   Walk in Room 2   Climb 3-5 Stairs 1   Basic Mobility Inpatient Raw Score 16   Basic Mobility Standardized Score 38 32   Highest Level Of Mobility   -[facility] Goal 5: Stand one or more mins   -[facility] Highest Level of Mobility 4: Move to chair/commode   -[facility] Goal Achieved No   Education   Education Provided Mobility training;Home exercise program;Assistive device   Patient Demonstrates verbal understanding;Reinforcement needed; Explanation/teachback used   End of Consult   Patient Position at End of Consult Bedside chair; All needs within reach       [facility] PT, DPT

## 2022-03-08 NOTE — ASSESSMENT & PLAN NOTE
· Patient complains of pain around his penis, scrotum and anal area  · No evidence of testicular torsion, patient does not seem to be in distress on physical exam  · No fever, vital signs stable  · Continue to monitor  · Likely phimosis which does not appear to be strangulated

## 2022-03-09 LAB
ALBUMIN SERPL BCP-MCNC: 3.9 G/DL (ref 3–5.2)
ALP SERPL-CCNC: 95 U/L (ref 43–122)
ALT SERPL W P-5'-P-CCNC: 10 U/L
ANION GAP SERPL CALCULATED.3IONS-SCNC: 8 MMOL/L (ref 5–14)
AST SERPL W P-5'-P-CCNC: 16 U/L (ref 17–59)
BASOPHILS # BLD AUTO: 0.1 THOUSANDS/ΜL (ref 0–0.1)
BASOPHILS NFR BLD AUTO: 1 % (ref 0–1)
BILIRUB SERPL-MCNC: 0.45 MG/DL
BUN SERPL-MCNC: 18 MG/DL (ref 5–25)
CALCIUM SERPL-MCNC: 8.4 MG/DL (ref 8.4–10.2)
CHLORIDE SERPL-SCNC: 102 MMOL/L (ref 97–108)
CO2 SERPL-SCNC: 29 MMOL/L (ref 22–30)
CREAT SERPL-MCNC: 0.7 MG/DL (ref 0.7–1.5)
EOSINOPHIL # BLD AUTO: 0.3 THOUSAND/ΜL (ref 0–0.4)
EOSINOPHIL NFR BLD AUTO: 4 % (ref 0–6)
ERYTHROCYTE [DISTWIDTH] IN BLOOD BY AUTOMATED COUNT: 15.9 %
GFR SERPL CREATININE-BSD FRML MDRD: 104 ML/MIN/1.73SQ M
GLUCOSE SERPL-MCNC: 98 MG/DL (ref 70–99)
HCT VFR BLD AUTO: 37 % (ref 41–53)
HGB BLD-MCNC: 12.7 G/DL (ref 13.5–17.5)
LYMPHOCYTES # BLD AUTO: 1.7 THOUSANDS/ΜL (ref 0.5–4)
LYMPHOCYTES NFR BLD AUTO: 26 % (ref 25–45)
MAGNESIUM SERPL-MCNC: 2 MG/DL (ref 1.6–2.3)
MCH RBC QN AUTO: 29.2 PG (ref 26–34)
MCHC RBC AUTO-ENTMCNC: 34.2 G/DL (ref 31–36)
MCV RBC AUTO: 86 FL (ref 80–100)
MONOCYTES # BLD AUTO: 0.7 THOUSAND/ΜL (ref 0.2–0.9)
MONOCYTES NFR BLD AUTO: 11 % (ref 1–10)
NEUTROPHILS # BLD AUTO: 3.9 THOUSANDS/ΜL (ref 1.8–7.8)
NEUTS SEG NFR BLD AUTO: 59 % (ref 45–65)
PHOSPHATE SERPL-MCNC: 3.9 MG/DL (ref 2.5–4.8)
PLATELET # BLD AUTO: 317 THOUSANDS/UL (ref 150–450)
PMV BLD AUTO: 6.9 FL (ref 8.9–12.7)
POTASSIUM SERPL-SCNC: 4 MMOL/L (ref 3.6–5)
PROT SERPL-MCNC: 7.4 G/DL (ref 5.9–8.4)
RBC # BLD AUTO: 4.33 MILLION/UL (ref 4.5–5.9)
SODIUM SERPL-SCNC: 139 MMOL/L (ref 137–147)
WBC # BLD AUTO: 6.6 THOUSAND/UL (ref 4.5–11)

## 2022-03-09 PROCEDURE — 97110 THERAPEUTIC EXERCISES: CPT

## 2022-03-09 PROCEDURE — 80053 COMPREHEN METABOLIC PANEL: CPT | Performed by: INTERNAL MEDICINE

## 2022-03-09 PROCEDURE — 83735 ASSAY OF MAGNESIUM: CPT | Performed by: INTERNAL MEDICINE

## 2022-03-09 PROCEDURE — 99232 SBSQ HOSP IP/OBS MODERATE 35: CPT | Performed by: INTERNAL MEDICINE

## 2022-03-09 PROCEDURE — 84100 ASSAY OF PHOSPHORUS: CPT | Performed by: INTERNAL MEDICINE

## 2022-03-09 PROCEDURE — 97530 THERAPEUTIC ACTIVITIES: CPT

## 2022-03-09 PROCEDURE — 85025 COMPLETE CBC W/AUTO DIFF WBC: CPT | Performed by: INTERNAL MEDICINE

## 2022-03-09 RX ADMIN — ACETAMINOPHEN 650 MG: 325 TABLET ORAL at 20:37

## 2022-03-09 RX ADMIN — OXYCODONE HYDROCHLORIDE 5 MG: 5 TABLET ORAL at 22:41

## 2022-03-09 RX ADMIN — NYSTATIN: 100000 POWDER TOPICAL at 08:26

## 2022-03-09 RX ADMIN — GABAPENTIN 400 MG: 400 CAPSULE ORAL at 20:37

## 2022-03-09 RX ADMIN — AMLODIPINE BESYLATE 10 MG: 10 TABLET ORAL at 08:26

## 2022-03-09 RX ADMIN — METHOCARBAMOL 500 MG: 500 TABLET, FILM COATED ORAL at 18:13

## 2022-03-09 RX ADMIN — OXYCODONE HYDROCHLORIDE 5 MG: 5 TABLET ORAL at 08:26

## 2022-03-09 RX ADMIN — LISINOPRIL 20 MG: 20 TABLET ORAL at 08:26

## 2022-03-09 RX ADMIN — TRIAMCINOLONE ACETONIDE: 1 CREAM TOPICAL at 08:26

## 2022-03-09 RX ADMIN — SENNOSIDES AND DOCUSATE SODIUM 1 TABLET: 50; 8.6 TABLET ORAL at 08:26

## 2022-03-09 RX ADMIN — ARIPIPRAZOLE 10 MG: 10 TABLET ORAL at 08:26

## 2022-03-09 RX ADMIN — NYSTATIN: 100000 POWDER TOPICAL at 18:14

## 2022-03-09 RX ADMIN — GABAPENTIN 400 MG: 400 CAPSULE ORAL at 16:37

## 2022-03-09 RX ADMIN — OXYCODONE HYDROCHLORIDE 5 MG: 5 TABLET ORAL at 16:40

## 2022-03-09 RX ADMIN — GABAPENTIN 400 MG: 400 CAPSULE ORAL at 08:26

## 2022-03-09 RX ADMIN — SENNOSIDES AND DOCUSATE SODIUM 1 TABLET: 50; 8.6 TABLET ORAL at 18:14

## 2022-03-09 RX ADMIN — TERBINAFINE HYDROCHLORIDE 250 MG: 250 TABLET ORAL at 08:26

## 2022-03-09 RX ADMIN — TRIAMCINOLONE ACETONIDE: 1 CREAM TOPICAL at 18:13

## 2022-03-09 NOTE — PLAN OF CARE
Problem: PAIN - ADULT  Goal: Verbalizes/displays adequate comfort level or baseline comfort level  Description: Interventions:  - Encourage patient to monitor pain and request assistance  - Assess pain using appropriate pain scale  - Administer analgesics based on type and severity of pain and evaluate response  - Implement non-pharmacological measures as appropriate and evaluate response  - Consider cultural and social influences on pain and pain management  - Notify physician/advanced practitioner if interventions unsuccessful or patient reports new pain  Outcome: Progressing     Problem: INFECTION - ADULT  Goal: Absence or prevention of progression during hospitalization  Description: INTERVENTIONS:  - Assess and monitor for signs and symptoms of infection  - Monitor lab/diagnostic results  - Monitor all insertion sites, i e  indwelling lines, tubes, and drains  - Monitor endotracheal if appropriate and nasal secretions for changes in amount and color  - Stuart appropriate cooling/warming therapies per order  - Administer medications as ordered  - Instruct and encourage patient and family to use good hand hygiene technique  - Identify and instruct in appropriate isolation precautions for identified infection/condition  Outcome: Progressing     Problem: SAFETY ADULT  Goal: Patient will remain free of falls  Description: INTERVENTIONS:  - Educate patient/family on patient safety including physical limitations  - Instruct patient to call for assistance with activity   - Consult OT/PT to assist with strengthening/mobility   - Keep Call bell within reach  - Keep bed low and locked with side rails adjusted as appropriate  - Keep care items and personal belongings within reach  - Initiate and maintain comfort rounds  - Make Fall Risk Sign visible to staff  - Apply yellow socks and bracelet for high fall risk patients  - Consider moving patient to room near nurses station  Outcome: Progressing  Goal: Maintain or return to baseline ADL function  Description: INTERVENTIONS:  -  Assess patient's ability to carry out ADLs; assess patient's baseline for ADL function and identify physical deficits which impact ability to perform ADLs (bathing, care of mouth/teeth, toileting, grooming, dressing, etc )  - Assess/evaluate cause of self-care deficits   - Assess range of motion  - Assess patient's mobility; develop plan if impaired  - Assess patient's need for assistive devices and provide as appropriate  - Encourage maximum independence but intervene and supervise when necessary  - Involve family in performance of ADLs  - Assess for home care needs following discharge   - Consider OT consult to assist with ADL evaluation and planning for discharge  - Provide patient education as appropriate  Outcome: Progressing  Goal: Maintains/Returns to pre admission functional level  Description: INTERVENTIONS:  - Perform BMAT or MOVE assessment daily    - Set and communicate daily mobility goal to care team and patient/family/caregiver     - Collaborate with rehabilitation services on mobility goals if consulted  - Out of bed for toileting  - Record patient progress and toleration of activity level   Outcome: Progressing     Problem: DISCHARGE PLANNING  Goal: Discharge to home or other facility with appropriate resources  Description: INTERVENTIONS:  - Identify barriers to discharge w/patient and caregiver  - Arrange for needed discharge resources and transportation as appropriate  - Identify discharge learning needs (meds, wound care, etc )  - Arrange for interpretive services to assist at discharge as needed  - Refer to Case Management Department for coordinating discharge planning if the patient needs post-hospital services based on physician/advanced practitioner order or complex needs related to functional status, cognitive ability, or social support system  Outcome: Progressing     Problem: Knowledge Deficit  Goal: Patient/family/caregiver demonstrates understanding of disease process, treatment plan, medications, and discharge instructions  Description: Complete learning assessment and assess knowledge base  Interventions:  - Provide teaching at level of understanding  - Provide teaching via preferred learning methods  Outcome: Progressing     Problem: MOBILITY - ADULT  Goal: Maintain or return to baseline ADL function  Description: INTERVENTIONS:  -  Assess patient's ability to carry out ADLs; assess patient's baseline for ADL function and identify physical deficits which impact ability to perform ADLs (bathing, care of mouth/teeth, toileting, grooming, dressing, etc )  - Assess/evaluate cause of self-care deficits   - Assess range of motion  - Assess patient's mobility; develop plan if impaired  - Assess patient's need for assistive devices and provide as appropriate  - Encourage maximum independence but intervene and supervise when necessary  - Involve family in performance of ADLs  - Assess for home care needs following discharge   - Consider OT consult to assist with ADL evaluation and planning for discharge  - Provide patient education as appropriate  Outcome: Progressing  Goal: Maintains/Returns to pre admission functional level  Description: INTERVENTIONS:  - Perform BMAT or MOVE assessment daily    - Set and communicate daily mobility goal to care team and patient/family/caregiver     - Collaborate with rehabilitation services on mobility goals if consulted  - Out of bed for toileting  - Record patient progress and toleration of activity level   Outcome: Progressing     Problem: Potential for Falls  Goal: Patient will remain free of falls  Description: INTERVENTIONS:  - Educate patient/family on patient safety including physical limitations  - Instruct patient to call for assistance with activity   - Consult OT/PT to assist with strengthening/mobility   - Keep Call bell within reach  - Keep bed low and locked with side rails adjusted as appropriate  - Keep care items and personal belongings within reach  - Initiate and maintain comfort rounds  - Make Fall Risk Sign visible to staff  - Apply yellow socks and bracelet for high fall risk patients  - Consider moving patient to room near nurses station  Outcome: Progressing     Problem: Prexisting or High Potential for Compromised Skin Integrity  Goal: Skin integrity is maintained or improved  Description: INTERVENTIONS:  - Identify patients at risk for skin breakdown  - Assess and monitor skin integrity  - Assess and monitor nutrition and hydration status  - Monitor labs   - Assess for incontinence   - Turn and reposition patient  - Assist with mobility/ambulation  - Relieve pressure over bony prominences  - Avoid friction and shearing  - Provide appropriate hygiene as needed including keeping skin clean and dry  - Evaluate need for skin moisturizer/barrier cream  - Collaborate with interdisciplinary team   - Patient/family teaching  - Consider wound care consult   Outcome: Progressing

## 2022-03-09 NOTE — ASSESSMENT & PLAN NOTE
· MRI [facility]): Severe spinal stenosis L3-L4, and L5-S1 with more moderate stenosis L2-  · Continue pain medications with oxycodone, gabapentin, Robaxin  · Patient keeps asking to increase the dose and frequency of oxycodone  Patient does not seem to be in distress  I explained that we need to limit opioid use    Encourage the patient to continue with PT   · Continue PT

## 2022-03-09 NOTE — PROGRESS NOTES
[facility]  Progress Note Viola Conway 1965, 60 y o  male MRN: [id number]  Unit/Bed#: 7T [facility] 711-01 Encounter: [id number]  Primary Care Provider: Mary Kowalski MD   Date and time admitted to hospital: 12/29/2021  5:38 PM    * Ambulatory dysfunction  Assessment & Plan  · Continue gabapentin 400mg t i d, robaxin 500mg Q6H PRN, and oxycodone 5mg Q6H PRN  · PT/OT recommending post acute rehab  · Case management following to aide in discharge planning  · Patient remains medically stable for discharge     [facility]  · [facility] however they will not take him back as he does not have any photo ID  · PT recommending post acute rehab placement  ·  on board - awaiting STR auth    Essential hypertension  Assessment & Plan  · Continue amlodipine 10 mg daily and lisinopril 20 mg daily    Strange and inexplicable behavior  Assessment & Plan  · Patient with hyper fixation on fungal infection arms (which was treated)  · Psychiatry consulted, not appropriate for inpatient psychiatry unit at this time  · Continue Abilify 10 mg daily and Atarax 25 mg p o  q 6 hours p r n  for anxiety      Onychomycosis  Assessment & Plan  · Present on almost all toes and fingers  · Podiatry performed nail debridement on 01/27  · Continue Terbinafine 250 mg PO daily for 12 weeks (Ordered through 4/21)  · Check CMP weekly    Spinal stenosis  Assessment & Plan  · MRI [facility]): Severe spinal stenosis L3-L4, and L5-S1 with more moderate stenosis L2-  · Continue pain medications with oxycodone, gabapentin, Robaxin  · Patient keeps asking to increase the dose and frequency of oxycodone  Patient does not seem to be in distress  I explained that we need to limit opioid use    Encourage the patient to continue with PT   · Continue PT    Penile pain  Assessment & Plan  · Patient complains of pain around his penis, scrotum and anal area  · No evidence of testicular torsion, patient does not seem to be in distress on physical exam  · No fever, vital signs stable  · Continue to monitor  · Likely phimosis which does not appear to be strangulated    VTE Pharmacologic Prophylaxis:  Lovenox    Patient Centered Rounds:  Patient care rounds were performed with nursing    Discussions with Specialists or Other Care Team Provider:  Case Management, Nursing, PT/OT    Education and Discussions with Family / Patient:  Spoke with patient    Time Spent for Care: 30  More than 50% of total time spent on counseling and coordination of care as described above  Current Length of Stay: 71 day(s)    Current Patient Status: Inpatient     Certification Statement: The patient will continue to require additional inpatient hospital stay due to medically stable for discharge to short-term rehab pending authorization    Discharge Plan: Short-term rehab pending authorization    Code Status: Level 1 - Full Code      Subjective:   Patient seen and examined at bedside  No acute events overnight  Denies chest pain, SOB, diaphoresis, nausea/vomiting/diarrhea, fevers/chills  Objective:     Vitals:   Temp (24hrs), Av 2 °F (36 2 °C), Min:96 6 °F (35 9 °C), Max:98 1 °F (36 7 °C)    Temp:  [96 6 °F (35 9 °C)-98 1 °F (36 7 °C)] 98 1 °F (36 7 °C)  HR:  [62-68] 68  Resp:  [18] 18  BP: (114-154)/(60-83) 154/79  SpO2:  [95 %-98 %] 97 %  Body mass index is 23 82 kg/m²  Input and Output Summary (last 24 hours): Intake/Output Summary (Last 24 hours) at 3/9/2022 0715  Last data filed at 3/9/2022 0444  Gross per 24 hour   Intake 1080 ml   Output 1400 ml   Net -320 ml       Physical Exam:     Physical Exam  Vitals and nursing note reviewed  Constitutional:       Appearance: He is well-developed  HENT:      Head: Normocephalic and atraumatic  Eyes:      Conjunctiva/sclera: Conjunctivae normal    Cardiovascular:      Rate and Rhythm: Normal rate and regular rhythm        Heart sounds: No murmur heard  Pulmonary:      Effort: Pulmonary effort is normal  No respiratory distress  Breath sounds: Normal breath sounds  Abdominal:      Palpations: Abdomen is soft  Tenderness: There is no abdominal tenderness  Musculoskeletal:      Cervical back: Neck supple  Skin:     General: Skin is warm and dry  Neurological:      Mental Status: He is alert  Additional Data:     Labs:  I have reviewed pertinent results     Results from last 7 days   Lab Units 03/09/22  0514   WBC Thousand/uL 6 60   HEMOGLOBIN g/dL 12 7*   HEMATOCRIT % 37 0*   PLATELETS Thousands/uL 317   NEUTROS PCT % 59   LYMPHS PCT % 26   MONOS PCT % 11*   EOS PCT % 4     Results from last 7 days   Lab Units 03/09/22  0514   SODIUM mmol/L 139   POTASSIUM mmol/L 4 0   CHLORIDE mmol/L 102   CO2 mmol/L 29   BUN mg/dL 18   CREATININE mg/dL 0 70   ANION GAP mmol/L 8   CALCIUM mg/dL 8 4   ALBUMIN g/dL 3 9   TOTAL BILIRUBIN mg/dL 0 45   ALK PHOS U/L 95   ALT U/L 10   AST U/L 16*   GLUCOSE RANDOM mg/dL 98                         Imaging: I have reviewed pertinent imaging       Recent Cultures (last 7 days):           Last 24 Hours Medication List:   Current Facility-Administered Medications   Medication Dose Route Frequency Provider Last Rate    acetaminophen  650 mg Oral Q4H PRN Rita Sánchez PA-C      amLODIPine  10 mg Oral Daily [facility]      ARIPiprazole  10 mg Oral Daily [facility], DO      carbamide peroxide  5 drop Both Ears BID PRN King Rachna MD      diphenhydrAMINE  25 mg Oral Q6H PRN [facility], DO      enoxaparin  40 mg Subcutaneous Q24H [address] [facility], DO      gabapentin  400 mg Oral TID [facility], DO      hydrocortisone   Topical 4x Daily PRN [facility], DO      hydrOXYzine HCL  25 mg Oral Q6H PRN DOV Samuel      lisinopril  20 mg Oral Daily [facility], DO      LORazepam  2 mg Oral Once Mauri Deforest, DO      methocarbamol  500 mg Oral Q6H PRN Marquise Clark MD      nicotine  1 patch Transdermal Daily [place] [place]      nystatin   Topical BID Marquise Clark MD      ondansetron  4 mg Oral Q6H PRN Raisa Hancock, DO      oxyCODONE  5 mg Oral Q6H PRN Govind Anderson [place], DO      polyethylene glycol  17 g Oral Daily Raisa Hancock, DO      senna-docusate sodium  1 tablet Oral BID Raisa Hancock, DO      terbinafine  250 mg Oral Daily Raisa Hancock, DO      triamcinolone   Topical BID Marqusie Clark MD      white petrolatum-mineral oil   Topical TID PRN Haris Zaldivar DO          Today, Patient Was Seen By: Haris Zaldivar DO    ** Please Note: Dictation voice to text software may have been used in the creation of this document   **

## 2022-03-09 NOTE — ASSESSMENT & PLAN NOTE
· Patient was to be discharged to the [facility] rescue mission however they will not take him back as he does not have any photo ID  · PT recommending post acute rehab placement  ·  on board - awaiting STR auth

## 2022-03-09 NOTE — PLAN OF CARE
Problem: PAIN - ADULT  Goal: Verbalizes/displays adequate comfort level or baseline comfort level  Description: Interventions:  - Encourage patient to monitor pain and request assistance  - Assess pain using appropriate pain scale  - Administer analgesics based on type and severity of pain and evaluate response  - Implement non-pharmacological measures as appropriate and evaluate response  - Consider cultural and social influences on pain and pain management  - Notify physician/advanced practitioner if interventions unsuccessful or patient reports new pain  Outcome: Progressing     Problem: INFECTION - ADULT  Goal: Absence or prevention of progression during hospitalization  Description: INTERVENTIONS:  - Assess and monitor for signs and symptoms of infection  - Monitor lab/diagnostic results  - Monitor all insertion sites, i e  indwelling lines, tubes, and drains  - Monitor endotracheal if appropriate and nasal secretions for changes in amount and color  - [place] appropriate cooling/warming therapies per order  - Administer medications as ordered  - Instruct and encourage patient and family to use good hand hygiene technique  - Identify and instruct in appropriate isolation precautions for identified infection/condition  Outcome: Progressing     Problem: MOBILITY - ADULT  Goal: Maintain or return to baseline ADL function  Description: INTERVENTIONS:  -  Assess patient's ability to carry out ADLs; assess patient's baseline for ADL function and identify physical deficits which impact ability to perform ADLs (bathing, care of mouth/teeth, toileting, grooming, dressing, etc )  - Assess/evaluate cause of self-care deficits   - Assess range of motion  - Assess patient's mobility; develop plan if impaired  - Assess patient's need for assistive devices and provide as appropriate  - Encourage maximum independence but intervene and supervise when necessary  - Involve family in performance of ADLs  - Assess for home care needs following discharge   - Consider OT consult to assist with ADL evaluation and planning for discharge  - Provide patient education as appropriate  Outcome: Progressing

## 2022-03-09 NOTE — ASSESSMENT & PLAN NOTE
· Present on almost all toes and fingers  · Podiatry performed nail debridement on 01/27  · Continue Terbinafine 250 mg PO daily for 12 weeks (Ordered through 4/21)  · Check CMP weekly

## 2022-03-10 LAB
ANION GAP SERPL CALCULATED.3IONS-SCNC: 4 MMOL/L (ref 5–14)
BASOPHILS # BLD AUTO: 0.1 THOUSANDS/ΜL (ref 0–0.1)
BASOPHILS NFR BLD AUTO: 1 % (ref 0–1)
BUN SERPL-MCNC: 18 MG/DL (ref 5–25)
CALCIUM SERPL-MCNC: 8.4 MG/DL (ref 8.4–10.2)
CHLORIDE SERPL-SCNC: 104 MMOL/L (ref 97–108)
CO2 SERPL-SCNC: 30 MMOL/L (ref 22–30)
CREAT SERPL-MCNC: 0.66 MG/DL (ref 0.7–1.5)
EOSINOPHIL # BLD AUTO: 0.2 THOUSAND/ΜL (ref 0–0.4)
EOSINOPHIL NFR BLD AUTO: 3 % (ref 0–6)
ERYTHROCYTE [DISTWIDTH] IN BLOOD BY AUTOMATED COUNT: 15.7 %
GFR SERPL CREATININE-BSD FRML MDRD: 106 ML/MIN/1.73SQ M
GLUCOSE SERPL-MCNC: 102 MG/DL (ref 70–99)
HCT VFR BLD AUTO: 37.9 % (ref 41–53)
HGB BLD-MCNC: 13 G/DL (ref 13.5–17.5)
LYMPHOCYTES # BLD AUTO: 1.6 THOUSANDS/ΜL (ref 0.5–4)
LYMPHOCYTES NFR BLD AUTO: 20 % (ref 25–45)
MAGNESIUM SERPL-MCNC: 1.9 MG/DL (ref 1.6–2.3)
MCH RBC QN AUTO: 29.4 PG (ref 26–34)
MCHC RBC AUTO-ENTMCNC: 34.3 G/DL (ref 31–36)
MCV RBC AUTO: 86 FL (ref 80–100)
MONOCYTES # BLD AUTO: 0.8 THOUSAND/ΜL (ref 0.2–0.9)
MONOCYTES NFR BLD AUTO: 10 % (ref 1–10)
NEUTROPHILS # BLD AUTO: 5.3 THOUSANDS/ΜL (ref 1.8–7.8)
NEUTS SEG NFR BLD AUTO: 66 % (ref 45–65)
PHOSPHATE SERPL-MCNC: 3.5 MG/DL (ref 2.5–4.8)
PLATELET # BLD AUTO: 314 THOUSANDS/UL (ref 150–450)
PMV BLD AUTO: 6.8 FL (ref 8.9–12.7)
POTASSIUM SERPL-SCNC: 4 MMOL/L (ref 3.6–5)
RBC # BLD AUTO: 4.43 MILLION/UL (ref 4.5–5.9)
SODIUM SERPL-SCNC: 138 MMOL/L (ref 137–147)
WBC # BLD AUTO: 8.1 THOUSAND/UL (ref 4.5–11)

## 2022-03-10 PROCEDURE — 80048 BASIC METABOLIC PNL TOTAL CA: CPT | Performed by: INTERNAL MEDICINE

## 2022-03-10 PROCEDURE — 83735 ASSAY OF MAGNESIUM: CPT | Performed by: INTERNAL MEDICINE

## 2022-03-10 PROCEDURE — 99232 SBSQ HOSP IP/OBS MODERATE 35: CPT | Performed by: INTERNAL MEDICINE

## 2022-03-10 PROCEDURE — 84100 ASSAY OF PHOSPHORUS: CPT | Performed by: INTERNAL MEDICINE

## 2022-03-10 PROCEDURE — 85025 COMPLETE CBC W/AUTO DIFF WBC: CPT | Performed by: INTERNAL MEDICINE

## 2022-03-10 RX ADMIN — AMLODIPINE BESYLATE 10 MG: 10 TABLET ORAL at 10:05

## 2022-03-10 RX ADMIN — LISINOPRIL 20 MG: 20 TABLET ORAL at 10:06

## 2022-03-10 RX ADMIN — NYSTATIN: 100000 POWDER TOPICAL at 17:22

## 2022-03-10 RX ADMIN — OXYCODONE HYDROCHLORIDE 5 MG: 5 TABLET ORAL at 16:41

## 2022-03-10 RX ADMIN — GABAPENTIN 400 MG: 400 CAPSULE ORAL at 16:41

## 2022-03-10 RX ADMIN — NYSTATIN: 100000 POWDER TOPICAL at 10:06

## 2022-03-10 RX ADMIN — METHOCARBAMOL 500 MG: 500 TABLET, FILM COATED ORAL at 10:11

## 2022-03-10 RX ADMIN — HYDROCORTISONE: 25 OINTMENT TOPICAL at 10:06

## 2022-03-10 RX ADMIN — GABAPENTIN 400 MG: 400 CAPSULE ORAL at 22:05

## 2022-03-10 RX ADMIN — OXYCODONE HYDROCHLORIDE 5 MG: 5 TABLET ORAL at 05:59

## 2022-03-10 RX ADMIN — ARIPIPRAZOLE 10 MG: 10 TABLET ORAL at 10:05

## 2022-03-10 RX ADMIN — TERBINAFINE HYDROCHLORIDE 250 MG: 250 TABLET ORAL at 10:05

## 2022-03-10 RX ADMIN — GABAPENTIN 400 MG: 400 CAPSULE ORAL at 10:06

## 2022-03-10 RX ADMIN — SENNOSIDES AND DOCUSATE SODIUM 1 TABLET: 50; 8.6 TABLET ORAL at 17:22

## 2022-03-10 RX ADMIN — METHOCARBAMOL 500 MG: 500 TABLET, FILM COATED ORAL at 00:31

## 2022-03-10 RX ADMIN — ENOXAPARIN SODIUM 40 MG: 100 INJECTION SUBCUTANEOUS at 10:06

## 2022-03-10 RX ADMIN — TRIAMCINOLONE ACETONIDE: 1 CREAM TOPICAL at 17:22

## 2022-03-10 RX ADMIN — SENNOSIDES AND DOCUSATE SODIUM 1 TABLET: 50; 8.6 TABLET ORAL at 10:06

## 2022-03-10 NOTE — CASE MANAGEMENT
Case Management Discharge Planning Note    Patient name Wilmer Sullivan  Location 7T /7T -15 MRN [id number]  : 1965 Date 3/10/2022       Current Admission Date: 2021  Current Admission Diagnosis:Ambulatory dysfunction   Patient Active Problem List    Diagnosis Date Noted    Penile pain 2022    Spinal stenosis 2022    Constipation 2022    Strange and inexplicable behavior     Onychomycosis 2022    Essential hypertension 2022    Psoriasis, unspecified 2022    Ambulatory dysfunction 2022    Homeless 2021      LOS (days): 70  Geometric Mean LOS (GMLOS) (days): 3 20  Days to GMLOS:-66 8     OBJECTIVE:  Risk of Unplanned Readmission Score: 22         Current admission status: Inpatient   Preferred Pharmacy:   [address], [address] [facility],[address]  [address]  Phone: [phone] Fax: [phone]    [facility],[address], [address]  [facility] [address]  Phone: [phone] Fax: [phone]    Primary Care Provider: Suzy Colon MD    Primary Insurance: [address] [place] [address]  Secondary Insurance:     DISCHARGE DETAILS: CM was notified at morning rounds that pt is medically cleared to be discharged today  [facility]; [phone]  CM discussed Freedom of choice: [facility] pt[facility] to submit for auth to [employer]  NPI# [id number]  Dr Jose Alfredo Chahal, his NPI is [id number]  His address is [address] [place] [address]  Bin [address]s [address]     CM sent referral through [address] [place] [address] to [employer] discharge support for auth summation, pending approval for pt to discharge to [facility]  Address: [address] [place] [address]        department will continue to follow through pt's D/C

## 2022-03-10 NOTE — CASE MANAGEMENT
Case Management Discharge Planning Note    Patient name Na Eduardo  Location 7T /7T -11 MRN [id number]  : 1965 Date 3/10/2022       Current Admission Date: 2021  Current Admission Diagnosis:Ambulatory dysfunction   Patient Active Problem List    Diagnosis Date Noted    Penile pain 2022    Spinal stenosis 2022    Constipation 2022    Strange and inexplicable behavior     Onychomycosis 2022    Essential hypertension 2022    Psoriasis, unspecified 2022    Ambulatory dysfunction 2022    Homeless 2021      LOS (days): 70  Geometric Mean LOS (GMLOS) (days): 3 20  Days to GMLOS:-66 8     OBJECTIVE:  Risk of Unplanned Readmission Score: 22         Current admission status: Inpatient   Preferred Pharmacy:   [address], [address] [facility] [address] [facility],[address]  [address] [place] [address]  Phone: [phone] Fax: [phone] [address] [facility],[address], [address]  [address]  Phone: [phone] Fax: [phone]    Primary Care Provider: Myles Cm MD    Primary Insurance: [address] [place] [address] 108:     [facility] Number: Approved [facility] auth # [id number] [facility] days [place]: 3/11/2022 NRD: 3/24/2022 Reviewer: Brad Osborne Fax clinical reviews to [phone]

## 2022-03-10 NOTE — CASE MANAGEMENT
Case Management Discharge Planning Note    Patient name Sylvia Bray  Location 7T /7T -07 MRN [id number]  : 1965 Date 3/10/2022       Current Admission Date: 2021  Current Admission Diagnosis:Ambulatory dysfunction   Patient Active Problem List    Diagnosis Date Noted    Penile pain 2022    Spinal stenosis 2022    Constipation 2022    Strange and inexplicable behavior     Onychomycosis 2022    Essential hypertension 2022    Psoriasis, unspecified 2022    Ambulatory dysfunction 2022    Homeless 2021      LOS (days): 70  Geometric Mean LOS (GMLOS) (days): 3 20  Days to GMLOS:-66 8     OBJECTIVE:  Risk of Unplanned Readmission Score: 22         Current admission status: Inpatient   Preferred Pharmacy:   [address], [address] [facility] [address] [facility],[address]  [address] [facility],[address]  [address] [facility] [address]  Phone: [phone] Fax: [phone] [address] [facility],[address], [address]  [facility] [address]  Phone: [phone] Fax: [phone]    Primary Care Provider: Odette Marquez MD    Primary Insurance: [address] [place] [address] REP  Secondary Insurance:     DISCHARGE DETAILS:     [address] [place] [address] Number: Submitted SNF auth to [address] [place] [address], pending ref# [id number] for [facility]: 3/10/2022 for [facility] NPI: [id number]  Dr Ousmane Zapien NPI: [id number]   Clinicals faxed to [phone]

## 2022-03-10 NOTE — PLAN OF CARE
Problem: PAIN - ADULT  Goal: Verbalizes/displays adequate comfort level or baseline comfort level  Description: Interventions:  - Encourage patient to monitor pain and request assistance  - Assess pain using appropriate pain scale  - Administer analgesics based on type and severity of pain and evaluate response  - Implement non-pharmacological measures as appropriate and evaluate response  - Consider cultural and social influences on pain and pain management  - Notify physician/advanced practitioner if interventions unsuccessful or patient reports new pain  Outcome: Progressing     Problem: INFECTION - ADULT  Goal: Absence or prevention of progression during hospitalization  Description: INTERVENTIONS:  - Assess and monitor for signs and symptoms of infection  - Monitor lab/diagnostic results  - Monitor all insertion sites, i e  indwelling lines, tubes, and drains  - Monitor endotracheal if appropriate and nasal secretions for changes in amount and color  - [place] appropriate cooling/warming therapies per order  - Administer medications as ordered  - Instruct and encourage patient and family to use good hand hygiene technique  - Identify and instruct in appropriate isolation precautions for identified infection/condition  Outcome: Progressing     Problem: SAFETY ADULT  Goal: Patient will remain free of falls  Description: INTERVENTIONS:  - Educate patient/family on patient safety including physical limitations  - Instruct patient to call for assistance with activity   - Consult OT/PT to assist with strengthening/mobility   - Keep Call bell within reach  - Keep bed low and locked with side rails adjusted as appropriate  - Keep care items and personal belongings within reach  - Initiate and maintain comfort rounds  - Make Fall Risk Sign visible to staff  - Offer Toileting   - Obtain necessary fall risk management equipment:   - Apply yellow socks and bracelet for high fall risk patients  - Consider moving patient to room near nurses station  Outcome: Progressing  Goal: Maintain or return to baseline ADL function  Description: INTERVENTIONS:  -  Assess patient's ability to carry out ADLs; assess patient's baseline for ADL function and identify physical deficits which impact ability to perform ADLs (bathing, care of mouth/teeth, toileting, grooming, dressing, etc )  - Assess/evaluate cause of self-care deficits   - Assess range of motion  - Assess patient's mobility; develop plan if impaired  - Assess patient's need for assistive devices and provide as appropriate  - Encourage maximum independence but intervene and supervise when necessary  - Involve family in performance of ADLs  - Assess for home care needs following discharge   - Consider OT consult to assist with ADL evaluation and planning for discharge  - Provide patient education as appropriate  Outcome: Progressing  Goal: Maintains/Returns to pre admission functional level  Description: INTERVENTIONS:  - Perform BMAT or MOVE assessment daily    - Set and communicate daily mobility goal to care team and patient/family/caregiver  - Collaborate with rehabilitation services on mobility goals if consulted  - Perform Range of Motion 2 times a day  - Reposition patient every 2 hours    - Dangle patient 2 times a day  - Stand patient 2 times a day  - Ambulate patient 2 times a day  - Out of bed to chair 2 times a day   - Out of bed for meals 2 times a day  - Out of bed for toileting  - Record patient progress and toleration of activity level   Outcome: Progressing     Problem: DISCHARGE PLANNING  Goal: Discharge to home or other facility with appropriate resources  Description: INTERVENTIONS:  - Identify barriers to discharge w/patient and caregiver  - Arrange for needed discharge resources and transportation as appropriate  - Identify discharge learning needs (meds, wound care, etc )  - Arrange for interpretive services to assist at discharge as needed  - Refer to Case Management Department for coordinating discharge planning if the patient needs post-hospital services based on physician/advanced practitioner order or complex needs related to functional status, cognitive ability, or social support system  Outcome: Progressing     Problem: Knowledge Deficit  Goal: Patient/family/caregiver demonstrates understanding of disease process, treatment plan, medications, and discharge instructions  Description: Complete learning assessment and assess knowledge base  Interventions:  - Provide teaching at level of understanding  - Provide teaching via preferred learning methods  Outcome: Progressing     Problem: MOBILITY - ADULT  Goal: Maintain or return to baseline ADL function  Description: INTERVENTIONS:  -  Assess patient's ability to carry out ADLs; assess patient's baseline for ADL function and identify physical deficits which impact ability to perform ADLs (bathing, care of mouth/teeth, toileting, grooming, dressing, etc )  - Assess/evaluate cause of self-care deficits   - Assess range of motion  - Assess patient's mobility; develop plan if impaired  - Assess patient's need for assistive devices and provide as appropriate  - Encourage maximum independence but intervene and supervise when necessary  - Involve family in performance of ADLs  - Assess for home care needs following discharge   - Consider OT consult to assist with ADL evaluation and planning for discharge  - Provide patient education as appropriate  Outcome: Progressing  Goal: Maintains/Returns to pre admission functional level  Description: INTERVENTIONS:  - Perform BMAT or MOVE assessment daily    - Set and communicate daily mobility goal to care team and patient/family/caregiver  - Collaborate with rehabilitation services on mobility goals if consulted  - Perform Range of Motion 2 times a day  - Reposition patient every 2 hours    - Dangle patient 2 times a day  - Stand patient 2 times a day  - Ambulate patient 2 times a day  - Out of bed to chair 2 times a day   - Out of bed for meals 2 times a day  - Out of bed for toileting  - Record patient progress and toleration of activity level   Outcome: Progressing     Problem: Potential for Falls  Goal: Patient will remain free of falls  Description: INTERVENTIONS:  - Educate patient/family on patient safety including physical limitations  - Instruct patient to call for assistance with activity   - Consult OT/PT to assist with strengthening/mobility   - Keep Call bell within reach  - Keep bed low and locked with side rails adjusted as appropriate  - Keep care items and personal belongings within reach  - Initiate and maintain comfort rounds  - Make Fall Risk Sign visible to staff  - Offer Toileting  - Obtain necessary fall risk management equipment:   - Apply yellow socks and bracelet for high fall risk patients  - Consider moving patient to room near nurses station  Outcome: Progressing     Problem: Prexisting or High Potential for Compromised Skin Integrity  Goal: Skin integrity is maintained or improved  Description: INTERVENTIONS:  - Identify patients at risk for skin breakdown  - Assess and monitor skin integrity  - Assess and monitor nutrition and hydration status  - Monitor labs   - Assess for incontinence   - Turn and reposition patient  - Assist with mobility/ambulation  - Relieve pressure over bony prominences  - Avoid friction and shearing  - Provide appropriate hygiene as needed including keeping skin clean and dry  - Evaluate need for skin moisturizer/barrier cream  - Collaborate with interdisciplinary team   - Patient/family teaching  - Consider wound care consult   Outcome: Progressing

## 2022-03-10 NOTE — PHYSICAL THERAPY NOTE
Physical TherapyTreatment Note    Patient's Name: Denny     Admitting Diagnosis  Cellulitis [L03 90]  Homeless [Z59 00]  Wound check, abscess [Z51 89]    Problem List  Patient Active Problem List   Diagnosis    Homeless    Psoriasis, unspecified    Ambulatory dysfunction    Essential hypertension    Strange and inexplicable behavior    Onychomycosis    Constipation    Spinal stenosis    Penile pain       Past Medical History  Past Medical History:   Diagnosis Date    Arthritis     Hypertension     Sciatica        Past Surgical History  Past Surgical History:   Procedure Laterality Date    BACK SURGERY         Recent Imaging  US scrotum and testicles   Final Result by Sil Gomez MD (02/14 1158)       1  No evidence of testicular torsion  2   Minimally complex hydroceles moderate on the right and small on the left  Workstation performed: [id number]         CT abdomen pelvis w contrast   Final Result by Winston Jordan MD (01/28 4740)      Stool filled colon with possible impaction  Diverticulosis with no diverticulitis                 Workstation performed: [id number]             Recent Vital Signs  Vitals:    03/09/22 0707 03/09/22 1529 03/09/22 2258 03/10/22 0753   BP: 154/79 127/72 155/88 148/79   BP Location: Right arm Left arm Left arm Left arm   Pulse: 68 60 62 60   Resp: 18 18 18 20   Temp: 98 1 °F (36 7 °C) (!) 96 6 °F (35 9 °C) (!) 97 °F (36 1 °C) (!) 97 1 °F (36 2 °C)   TempSrc: Temporal Temporal Temporal Temporal   SpO2: 97% 98% 97% 98%   Weight:       Height:           PT Treatment Time: 40 minutes       03/09/22 1530   PT Last Visit   PT Visit Date 03/09/22   Note Type   Note Type Treatment   Pain Assessment   Pain Assessment Tool 0-10   Pain Score 10 - Worst Possible Pain   Pain Location/Orientation Location: Groin   Restrictions/Precautions   Other Precautions Fall Risk;Pain   General   Chart Reviewed Yes   Response to Previous Treatment Patient with no complaints from previous session  Family/Caregiver Present No   Cognition   Arousal/Participation Alert; Cooperative   Attention Within functional limits   Orientation Level Oriented X4   Memory Within functional limits   Following Commands Follows one step commands without difficulty   Bed Mobility   Supine to Sit 6  Modified independent   Additional items Increased time required   Sit to Supine 6  Modified independent   Additional items Increased time required   Transfers   Sit to Stand 4  Minimal assistance   Additional items Assist x 1; Armrests; Increased time required;Verbal cues   Stand to Sit 4  Minimal assistance   Additional items Assist x 1; Armrests; Increased time required;Verbal cues   Sit pivot 5  Supervision   Additional items Increased time required;Verbal cues   Additional Comments sit to stand with RW; sit pivot bed to w/c no AD   Balance   Static Sitting Fair +   Dynamic Sitting Fair   Static Standing Poor +   Dynamic Standing Poor   Ambulatory Poor   Endurance Deficit   Endurance Deficit Yes   Endurance Deficit Description pain and fatigue   Activity Tolerance   Activity Tolerance Patient limited by fatigue   Medical Staff Made Aware spoke to [address] [place] [address] spoke to RN   Exercises   Heelslides Sitting;20 reps;AROM; Bilateral  (manual resistance)   Hip Flexion Sitting;20 reps;AROM; Bilateral   Hip Abduction Sitting;20 reps;AROM; Bilateral   Hip Extension Sitting;20 reps;AROM; Bilateral  (manual resistance)   Hip Adduction Sitting;20 reps;AROM; Bilateral   Knee AROM Long Arc Quad Sitting;20 reps;AROM; Bilateral   Assessment   Prognosis Good   Problem List Decreased strength;Decreased range of motion;Decreased endurance; Impaired balance;Decreased mobility; Decreased coordination; Impaired judgement; Impaired sensation;Pain   Assessment Pt seen today for PT treatment, found supine in bed  Reporting increased pain in the lower back and groin today although willing to participate in therapy    Sit pivot transfers continue to be at supervision level with assist for set-up of w/c in appropriate position  He struggles with full stand due to increased pain with this positioning  He is able to tolerate LE exercises well and R hamstrings and hip ext are demonstrating notable improvement in strength over last 3-5 treatments  At end of treatment pt left seated in W/C will all needs available  Barriers to Discharge Inaccessible home environment;Decreased caregiver support   Goals   Patient Goals get to rehab   STG Expiration Date 03/17/22   Short Term Goal #1 see eval note   PT Treatment Day 17   Plan   Treatment/Interventions ADL retraining;Functional transfer training;LE strengthening/ROM; Elevations; Therapeutic exercise; Endurance training;Patient/family training;Equipment eval/education; Bed mobility;Gait training;Spoke to nursing;Spoke to case management;OT   Progress Progressing toward goals   PT Frequency 2-3x/wk   Recommendation   PT Discharge Recommendation Post acute rehabilitation services   AM-PAC Basic Mobility Inpatient   Turning in Bed Without Bedrails 4   Lying on Back to Sitting on Edge of Flat Bed 4   Moving Bed to Chair 3   Standing Up From Chair 2   Walk in Room 2   Climb 3-5 Stairs 1   Basic Mobility Inpatient Raw Score 16   Basic Mobility Standardized Score 38 32   Highest Level Of Mobility   JH-HLM Goal 5: Stand one or more mins   JH-HLM Highest Level of Mobility 4: Move to chair/commode   JH-HLM Goal Achieved No   Education   Education Provided Mobility training;Home exercise program;Assistive device   Patient Demonstrates verbal understanding;Explanation/teachback used; Reinforcement needed   End of Consult   End of Consult Comments in w/c in room with all needs available       Emliy Gay PT, DPT

## 2022-03-10 NOTE — PROGRESS NOTES
[facility]  Progress Note Juan M Pickering 1965, 60 y o  male MRN: [id number]  Unit/Bed#: 7T [facility] 711-01 Encounter: [id number]  Primary Care Provider: Phyllis Pastor MD   Date and time admitted to hospital: 12/29/2021  5:38 PM    * Ambulatory dysfunction  Assessment & Plan  · Continue gabapentin 400mg t i d, robaxin 500mg Q6H PRN, and oxycodone 5mg Q6H PRN  · PT/OT recommending post acute rehab  · Case management following to aide in discharge planning  · Patient remains medically stable for discharge     [address] [facility]  · Patient was to be discharged to the [employer] rescue [facility] however they will not take him back as he does not have any photo ID  · PT recommending post acute rehab placement  ·  on board - awaiting STR auth    Essential hypertension  Assessment & Plan  · Continue amlodipine 10 mg daily and lisinopril 20 mg daily    Strange and inexplicable behavior  Assessment & Plan  · Patient with hyper fixation on fungal infection arms (which was treated)  · Psychiatry consulted, not appropriate for inpatient psychiatry unit at this time  · Continue Abilify 10 mg daily and Atarax 25 mg p o  q 6 hours p r n  for anxiety      Onychomycosis  Assessment & Plan  · Present on almost all toes and fingers  · Podiatry performed nail debridement on 01/27  · Continue Terbinafine 250 mg PO daily for 12 weeks (Ordered through 4/21)  · Check CMP weekly    Spinal stenosis  Assessment & Plan  · MRI [facility]): Severe spinal stenosis L3-L4, and L5-S1 with more moderate stenosis L2-  · Continue pain medications with oxycodone, gabapentin, Robaxin  · Patient keeps asking to increase the dose and frequency of oxycodone  Patient does not seem to be in distress  I explained that we need to limit opioid use    Encourage the patient to continue with PT   · Continue PT    Penile pain  Assessment & Plan  · Patient complains of pain around his penis, scrotum and anal area  · No evidence of testicular torsion, patient does not seem to be in distress on physical exam  · No fever, vital signs stable  · Continue to monitor  · Likely phimosis which does not appear to be strangulated    VTE Pharmacologic Prophylaxis: Lovenox    Patient Centered Rounds:  Patient care rounds were performed with nursing    Discussions with Specialists or Other Care Team Provider:  Case Management, Nursing, PT/OT    Education and Discussions with Family / Patient:  Spoke with patient    Time Spent for Care: 30  More than 50% of total time spent on counseling and coordination of care as described above  Current Length of Stay: 70 day(s)    Current Patient Status: Inpatient     Certification Statement: The patient will continue to require additional inpatient hospital stay due to medically stable for discharge to short-term rehab pending authorization    Discharge Plan: Short-term rehab pending authorization    Code Status: Level 1 - Full Code      Subjective:   Patient seen and examined at bedside  No acute events overnight  Denies chest pain, SOB, diaphoresis, nausea/vomiting/diarrhea, fevers/chills  Objective:     Vitals:   Temp (24hrs), Av 8 °F (36 °C), Min:96 6 °F (35 9 °C), Max:97 °F (36 1 °C)    Temp:  [96 6 °F (35 9 °C)-97 °F (36 1 °C)] 97 °F (36 1 °C)  HR:  [60-62] 62  Resp:  [18] 18  BP: (127-155)/(72-88) 155/88  SpO2:  [97 %-98 %] 97 %  Body mass index is 23 82 kg/m²  Input and Output Summary (last 24 hours): Intake/Output Summary (Last 24 hours) at 3/10/2022 0708  Last data filed at 3/10/2022 0548  Gross per 24 hour   Intake 960 ml   Output 1360 ml   Net -400 ml       Physical Exam:     Physical Exam  Vitals and nursing note reviewed  Constitutional:       Appearance: He is well-developed  HENT:      Head: Normocephalic and atraumatic  Eyes:      Conjunctiva/sclera: Conjunctivae normal    Cardiovascular:      Rate and Rhythm: Normal rate and regular rhythm  Heart sounds:  No murmur heard  Pulmonary:      Effort: Pulmonary effort is normal  No respiratory distress  Breath sounds: Normal breath sounds  Abdominal:      Palpations: Abdomen is soft  Tenderness: There is no abdominal tenderness  Musculoskeletal:      Cervical back: Neck supple  Skin:     General: Skin is warm and dry  Neurological:      Mental Status: He is alert  Additional Data:     Labs: I have reviewed pertinent results     Results from last 7 days   Lab Units 03/10/22  0429   WBC Thousand/uL 8 10   HEMOGLOBIN g/dL 13 0*   HEMATOCRIT % 37 9*   PLATELETS Thousands/uL 314   NEUTROS PCT % 66*   LYMPHS PCT % 20*   MONOS PCT % 10   EOS PCT % 3     Results from last 7 days   Lab Units 03/10/22  0429 03/09/22  0514 03/09/22  0514   SODIUM mmol/L 138   < > 139   POTASSIUM mmol/L 4 0   < > 4 0   CHLORIDE mmol/L 104   < > 102   CO2 mmol/L 30   < > 29   BUN mg/dL 18   < > 18   CREATININE mg/dL 0 66*   < > 0 70   ANION GAP mmol/L 4*   < > 8   CALCIUM mg/dL 8 4   < > 8 4   ALBUMIN g/dL  --   --  3 9   TOTAL BILIRUBIN mg/dL  --   --  0 45   ALK PHOS U/L  --   --  95   ALT U/L  --   --  10   AST U/L  --   --  16*   GLUCOSE RANDOM mg/dL 102*   < > 98    < > = values in this interval not displayed                           Imaging: I have reviewed pertinent imaging       Recent Cultures (last 7 days):           Last 24 Hours Medication List:   Current Facility-Administered Medications   Medication Dose Route Frequency Provider Last Rate    acetaminophen  650 mg Oral Q4H PRN Iwona Olivas PA-C      amLODIPine  10 mg Oral Daily Frederick Regulus, DO      ARIPiprazole  10 mg Oral Daily [facility], DO      carbamide peroxide  5 drop Both Ears BID PRN Patricia Starks MD      diphenhydrAMINE  25 mg Oral Q6H PRN Frederick Regulus, DO      enoxaparin  40 mg Subcutaneous Q24H [facility], DO      gabapentin  400 mg Oral TID [facility], DO      hydrocortisone   Topical 4x Daily PRN [facility] Malone, DO      hydrOXYzine HCL  25 mg Oral Q6H PRN DOV Land      lisinopril  20 mg Oral Daily [facility]per[facility] Malone, DO      LORazepam  2 mg Oral Once Moon Aschoff, DO      methocarbamol  500 mg Oral Q6H PRN Matthew Lim MD      nicotine  1 patch Transdermal Daily [place], [place]      nystatin   Topical BID Matthew Lim MD      ondansetron  4 mg Oral Q6H PRN Moon Aschoff, DO      oxyCODONE  5 mg Oral Q6H PRN [facility] Malone, DO      polyethylene glycol  17 g Oral Daily Moon Aschoff, DO      senna-docusate sodium  1 tablet Oral BID Moon Aschoff, DO      terbinafine  250 mg Oral Daily Moon Aschoff, DO      triamcinolone   Topical BID Matthew Lim MD      white petrolatum-mineral oil   Topical TID PRN Herb Hutton DO          Today, Patient Was Seen By: Herb Hutton DO    ** Please Note: Dictation voice to text software may have been used in the creation of this document   **

## 2022-03-10 NOTE — PLAN OF CARE
Problem: PHYSICAL THERAPY ADULT  Goal: Performs mobility at highest level of function for planned discharge setting  See evaluation for individualized goals  Description: Treatment/Interventions: ADL retraining,Functional transfer training,LE strengthening/ROM,Elevations,Therapeutic exercise,Endurance training,Patient/family training,Equipment eval/education,Bed mobility,Gait training,Spoke to nursing,Spoke to case management,OT  Equipment Recommended: Wheelchair       See flowsheet documentation for full assessment, interventions and recommendations  Outcome: Progressing  Note: Prognosis: Good  Problem List: Decreased strength,Decreased range of motion,Decreased endurance,Impaired balance,Decreased mobility,Decreased coordination,Impaired judgement,Impaired sensation,Pain  Assessment: Pt seen today for PT treatment, found supine in bed  Reporting increased pain in the lower back and groin today although willing to participate in therapy  Sit pivot transfers continue to be at supervision level with assist for set-up of w/c in appropriate position  He struggles with full stand due to increased pain with this positioning  He is able to tolerate LE exercises well and R hamstrings and hip ext are demonstrating notable improvement in strength over last 3-5 treatments  At end of treatment pt left seated in W/C will all needs available  Barriers to Discharge: Inaccessible home environment,Decreased caregiver support        PT Discharge Recommendation: Post acute rehabilitation services          See flowsheet documentation for full assessment

## 2022-03-11 VITALS
TEMPERATURE: 96.6 F | BODY MASS INDEX: 23.77 KG/M2 | SYSTOLIC BLOOD PRESSURE: 140 MMHG | HEART RATE: 61 BPM | DIASTOLIC BLOOD PRESSURE: 86 MMHG | RESPIRATION RATE: 18 BRPM | HEIGHT: 70 IN | WEIGHT: 166.01 LBS | OXYGEN SATURATION: 97 %

## 2022-03-11 LAB
ANION GAP SERPL CALCULATED.3IONS-SCNC: 4 MMOL/L (ref 5–14)
BASOPHILS # BLD AUTO: 0.1 THOUSANDS/ΜL (ref 0–0.1)
BASOPHILS NFR BLD AUTO: 1 % (ref 0–1)
BUN SERPL-MCNC: 17 MG/DL (ref 5–25)
CALCIUM SERPL-MCNC: 8.5 MG/DL (ref 8.4–10.2)
CHLORIDE SERPL-SCNC: 103 MMOL/L (ref 97–108)
CO2 SERPL-SCNC: 31 MMOL/L (ref 22–30)
CREAT SERPL-MCNC: 0.73 MG/DL (ref 0.7–1.5)
EOSINOPHIL # BLD AUTO: 0.2 THOUSAND/ΜL (ref 0–0.4)
EOSINOPHIL NFR BLD AUTO: 3 % (ref 0–6)
ERYTHROCYTE [DISTWIDTH] IN BLOOD BY AUTOMATED COUNT: 15.5 %
GFR SERPL CREATININE-BSD FRML MDRD: 102 ML/MIN/1.73SQ M
GLUCOSE SERPL-MCNC: 100 MG/DL (ref 70–99)
HCT VFR BLD AUTO: 35.8 % (ref 41–53)
HGB BLD-MCNC: 12.4 G/DL (ref 13.5–17.5)
LYMPHOCYTES # BLD AUTO: 1.5 THOUSANDS/ΜL (ref 0.5–4)
LYMPHOCYTES NFR BLD AUTO: 24 % (ref 25–45)
MAGNESIUM SERPL-MCNC: 1.9 MG/DL (ref 1.6–2.3)
MCH RBC QN AUTO: 29.5 PG (ref 26–34)
MCHC RBC AUTO-ENTMCNC: 34.7 G/DL (ref 31–36)
MCV RBC AUTO: 85 FL (ref 80–100)
MONOCYTES # BLD AUTO: 0.7 THOUSAND/ΜL (ref 0.2–0.9)
MONOCYTES NFR BLD AUTO: 11 % (ref 1–10)
NEUTROPHILS # BLD AUTO: 4 THOUSANDS/ΜL (ref 1.8–7.8)
NEUTS SEG NFR BLD AUTO: 62 % (ref 45–65)
PHOSPHATE SERPL-MCNC: 3.3 MG/DL (ref 2.5–4.8)
PLATELET # BLD AUTO: 326 THOUSANDS/UL (ref 150–450)
PMV BLD AUTO: 6.8 FL (ref 8.9–12.7)
POTASSIUM SERPL-SCNC: 4.1 MMOL/L (ref 3.6–5)
RBC # BLD AUTO: 4.21 MILLION/UL (ref 4.5–5.9)
SODIUM SERPL-SCNC: 138 MMOL/L (ref 137–147)
WBC # BLD AUTO: 6.6 THOUSAND/UL (ref 4.5–11)

## 2022-03-11 PROCEDURE — 99239 HOSP IP/OBS DSCHRG MGMT >30: CPT | Performed by: INTERNAL MEDICINE

## 2022-03-11 PROCEDURE — 83735 ASSAY OF MAGNESIUM: CPT | Performed by: INTERNAL MEDICINE

## 2022-03-11 PROCEDURE — 85025 COMPLETE CBC W/AUTO DIFF WBC: CPT | Performed by: INTERNAL MEDICINE

## 2022-03-11 PROCEDURE — 84100 ASSAY OF PHOSPHORUS: CPT | Performed by: INTERNAL MEDICINE

## 2022-03-11 PROCEDURE — 97530 THERAPEUTIC ACTIVITIES: CPT

## 2022-03-11 PROCEDURE — 80048 BASIC METABOLIC PNL TOTAL CA: CPT | Performed by: INTERNAL MEDICINE

## 2022-03-11 RX ORDER — AMLODIPINE BESYLATE 10 MG/1
10 TABLET ORAL DAILY
Refills: 0
Start: 2022-03-12

## 2022-03-11 RX ORDER — GABAPENTIN 400 MG/1
400 CAPSULE ORAL 3 TIMES DAILY
Refills: 0
Start: 2022-03-11

## 2022-03-11 RX ORDER — POLYETHYLENE GLYCOL 3350 17 G/17G
17 POWDER, FOR SOLUTION ORAL DAILY
Refills: 0
Start: 2022-03-12

## 2022-03-11 RX ORDER — OXYCODONE HYDROCHLORIDE 5 MG/1
5 TABLET ORAL EVERY 6 HOURS PRN
Refills: 0 | Status: SHIPPED | OUTPATIENT
Start: 2022-03-11 | End: 2022-03-21

## 2022-03-11 RX ORDER — DIPHENHYDRAMINE HCL 25 MG
25 TABLET ORAL EVERY 6 HOURS PRN
Qty: 30 TABLET | Refills: 0
Start: 2022-03-11

## 2022-03-11 RX ORDER — TERBINAFINE HYDROCHLORIDE 250 MG/1
250 TABLET ORAL DAILY
Qty: 30 TABLET | Refills: 0
Start: 2022-03-12 | End: 2022-04-21

## 2022-03-11 RX ORDER — LISINOPRIL 20 MG/1
20 TABLET ORAL DAILY
Refills: 0
Start: 2022-03-12

## 2022-03-11 RX ORDER — ARIPIPRAZOLE 10 MG/1
10 TABLET ORAL DAILY
Refills: 0
Start: 2022-03-12

## 2022-03-11 RX ORDER — NYSTATIN 100000 [USP'U]/G
POWDER TOPICAL 2 TIMES DAILY
Qty: 15 G | Refills: 0
Start: 2022-03-11

## 2022-03-11 RX ORDER — LANOLIN ALCOHOL/MO/W.PET/CERES
CREAM (GRAM) TOPICAL 3 TIMES DAILY PRN
Refills: 0
Start: 2022-03-11

## 2022-03-11 RX ORDER — ONDANSETRON 4 MG/1
4 TABLET, ORALLY DISINTEGRATING ORAL EVERY 6 HOURS PRN
Qty: 20 TABLET | Refills: 0
Start: 2022-03-11

## 2022-03-11 RX ORDER — AMOXICILLIN 250 MG
1 CAPSULE ORAL 2 TIMES DAILY
Refills: 0
Start: 2022-03-11

## 2022-03-11 RX ORDER — NICOTINE 21 MG/24HR
1 PATCH, TRANSDERMAL 24 HOURS TRANSDERMAL DAILY
Qty: 28 PATCH | Refills: 0
Start: 2022-03-12

## 2022-03-11 RX ORDER — TRIAMCINOLONE ACETONIDE 1 MG/G
CREAM TOPICAL 2 TIMES DAILY
Qty: 30 G | Refills: 0
Start: 2022-03-11

## 2022-03-11 RX ORDER — HYDROXYZINE HYDROCHLORIDE 25 MG/1
25 TABLET, FILM COATED ORAL EVERY 6 HOURS PRN
Refills: 0
Start: 2022-03-11

## 2022-03-11 RX ORDER — METHOCARBAMOL 500 MG/1
500 TABLET, FILM COATED ORAL EVERY 6 HOURS PRN
Refills: 0
Start: 2022-03-11

## 2022-03-11 RX ORDER — ACETAMINOPHEN 325 MG/1
650 TABLET ORAL EVERY 4 HOURS PRN
Refills: 0
Start: 2022-03-11

## 2022-03-11 RX ADMIN — SENNOSIDES AND DOCUSATE SODIUM 1 TABLET: 50; 8.6 TABLET ORAL at 08:42

## 2022-03-11 RX ADMIN — METHOCARBAMOL 500 MG: 500 TABLET, FILM COATED ORAL at 08:42

## 2022-03-11 RX ADMIN — TRIAMCINOLONE ACETONIDE: 1 CREAM TOPICAL at 08:42

## 2022-03-11 RX ADMIN — GABAPENTIN 400 MG: 400 CAPSULE ORAL at 08:42

## 2022-03-11 RX ADMIN — AMLODIPINE BESYLATE 10 MG: 10 TABLET ORAL at 08:42

## 2022-03-11 RX ADMIN — HYDROCORTISONE: 25 OINTMENT TOPICAL at 08:42

## 2022-03-11 RX ADMIN — LISINOPRIL 20 MG: 20 TABLET ORAL at 08:42

## 2022-03-11 RX ADMIN — OXYCODONE HYDROCHLORIDE 5 MG: 5 TABLET ORAL at 05:37

## 2022-03-11 RX ADMIN — ARIPIPRAZOLE 10 MG: 10 TABLET ORAL at 08:42

## 2022-03-11 RX ADMIN — ENOXAPARIN SODIUM 40 MG: 100 INJECTION SUBCUTANEOUS at 08:41

## 2022-03-11 RX ADMIN — POLYETHYLENE GLYCOL 3350 17 G: 17 POWDER, FOR SOLUTION ORAL at 08:42

## 2022-03-11 RX ADMIN — TERBINAFINE HYDROCHLORIDE 250 MG: 250 TABLET ORAL at 08:42

## 2022-03-11 RX ADMIN — HYDROXYZINE HYDROCHLORIDE 25 MG: 25 TABLET ORAL at 08:42

## 2022-03-11 RX ADMIN — NYSTATIN: 100000 POWDER TOPICAL at 08:42

## 2022-03-11 NOTE — PROGRESS NOTES
[facility]  Progress Note Mara Baig 1965, 60 y o  male MRN: [id number]  Unit/Bed#: 7T [facility] 711-01 Encounter: [id number]  Primary Care Provider: Trey Medina MD   Date and time admitted to hospital: 12/29/2021  5:38 PM    * Ambulatory dysfunction  Assessment & Plan  · Continue gabapentin 400mg t i d, robaxin 500mg Q6H PRN, and oxycodone 5mg Q6H PRN  · PT/OT recommending post acute rehab  · Case management following to aide in discharge planning  · Patient remains medically stable for discharge     [address] [facility]  · Patient was to be discharged to the [employer] rescue [facility] however they will not take him back as he does not have any photo ID  · PT recommending post acute rehab placement  ·  on board - awaiting STR auth    Essential hypertension  Assessment & Plan  · Continue amlodipine 10 mg daily and lisinopril 20 mg daily    Strange and inexplicable behavior  Assessment & Plan  · Patient with hyper fixation on fungal infection arms (which was treated)  · Psychiatry consulted, not appropriate for inpatient psychiatry unit at this time  · Continue Abilify 10 mg daily and Atarax 25 mg p o  q 6 hours p r n  for anxiety      Onychomycosis  Assessment & Plan  · Present on almost all toes and fingers  · Podiatry performed nail debridement on 01/27  · Continue Terbinafine 250 mg PO daily for 12 weeks (Ordered through 4/21)  · Check CMP weekly    Spinal stenosis  Assessment & Plan  · MRI [facility]): Severe spinal stenosis L3-L4, and L5-S1 with more moderate stenosis L2-  · Continue pain medications with oxycodone, gabapentin, Robaxin  · Patient keeps asking to increase the dose and frequency of oxycodone  Patient does not seem to be in distress  I explained that we need to limit opioid use    Encourage the patient to continue with PT   · Continue PT    Penile pain  Assessment & Plan  · Patient complains of pain around his penis, scrotum and anal area  · No evidence of testicular torsion, patient does not seem to be in distress on physical exam  · No fever, vital signs stable  · Continue to monitor  · Likely phimosis which does not appear to be strangulated    VTE Pharmacologic Prophylaxis: Lovenox    Patient Centered Rounds:  Patient care rounds were performed with nursing    Discussions with Specialists or Other Care Team Provider:  Case Management, Nursing, PT/OT    Education and Discussions with Family / Patient:  Spoke with patient    Time Spent for Care: 30  More than 50% of total time spent on counseling and coordination of care as described above  Current Length of Stay: 70 day(s)    Current Patient Status: Inpatient     Certification Statement: The patient will continue to require additional inpatient hospital stay due to medically stable for discharge to short-term rehab pending authorization    Discharge Plan: Short-term rehab pending authorization    Code Status: Level 1 - Full Code      Subjective:   Patient seen and examined at bedside  No acute events overnight  Denies chest pain, SOB, diaphoresis, nausea/vomiting/diarrhea, fevers/chills  Objective:     Vitals:   Temp (24hrs), Av 3 °F (36 3 °C), Min:97 1 °F (36 2 °C), Max:97 5 °F (36 4 °C)    Temp:  [97 1 °F (36 2 °C)-97 5 °F (36 4 °C)] 97 5 °F (36 4 °C)  HR:  [60-65] 65  Resp:  [18-20] 18  BP: (140-148)/(76-79) 140/76  SpO2:  [98 %] 98 %  Body mass index is 23 82 kg/m²  Input and Output Summary (last 24 hours): Intake/Output Summary (Last 24 hours) at 3/11/2022 0720  Last data filed at 3/10/2022 2123  Gross per 24 hour   Intake 240 ml   Output 700 ml   Net -460 ml       Physical Exam:     Physical Exam  Vitals and nursing note reviewed  Constitutional:       Appearance: He is well-developed  HENT:      Head: Normocephalic and atraumatic  Eyes:      Conjunctiva/sclera: Conjunctivae normal    Cardiovascular:      Rate and Rhythm: Normal rate and regular rhythm        Heart sounds: No murmur heard  Pulmonary:      Effort: Pulmonary effort is normal  No respiratory distress  Breath sounds: Normal breath sounds  Abdominal:      Palpations: Abdomen is soft  Tenderness: There is no abdominal tenderness  Musculoskeletal:      Cervical back: Neck supple  Skin:     General: Skin is warm and dry  Neurological:      Mental Status: He is alert  Additional Data:     Labs: I have reviewed pertinent results     Results from last 7 days   Lab Units 03/11/22  0558   WBC Thousand/uL 6 60   HEMOGLOBIN g/dL 12 4*   HEMATOCRIT % 35 8*   PLATELETS Thousands/uL 326   NEUTROS PCT % 62   LYMPHS PCT % 24*   MONOS PCT % 11*   EOS PCT % 3     Results from last 7 days   Lab Units 03/11/22  0558 03/10/22  0429 03/09/22  0514   SODIUM mmol/L 138   < > 139   POTASSIUM mmol/L 4 1   < > 4 0   CHLORIDE mmol/L 103   < > 102   CO2 mmol/L 31*   < > 29   BUN mg/dL 17   < > 18   CREATININE mg/dL 0 73   < > 0 70   ANION GAP mmol/L 4*   < > 8   CALCIUM mg/dL 8 5   < > 8 4   ALBUMIN g/dL  --   --  3 9   TOTAL BILIRUBIN mg/dL  --   --  0 45   ALK PHOS U/L  --   --  95   ALT U/L  --   --  10   AST U/L  --   --  16*   GLUCOSE RANDOM mg/dL 100*   < > 98    < > = values in this interval not displayed                           Imaging: I have reviewed pertinent imaging       Recent Cultures (last 7 days):           Last 24 Hours Medication List:   Current Facility-Administered Medications   Medication Dose Route Frequency Provider Last Rate    acetaminophen  650 mg Oral Q4H PRN Marjorie Hankins PA-C      amLODIPine  10 mg Oral Daily May Peat, DO      ARIPiprazole  10 mg Oral Daily [facility], DO      carbamide peroxide  5 drop Both Ears BID PRN Margarita Quesada MD      diphenhydrAMINE  25 mg Oral Q6H PRN May Peat, DO      enoxaparin  40 mg Subcutaneous Q24H [address] [facility], DO      gabapentin  400 mg Oral TID [facility], DO      hydrocortisone   Topical 4x Daily PRN English Sou Malone, DO      hydrOXYzine HCL  25 mg Oral Q6H PRN DOV Bonds      lisinopril  20 mg Oral Daily [facility] Malone, DO      LORazepam  2 mg Oral Once Mariana Overlie, DO      methocarbamol  500 mg Oral Q6H PRN Araceli Rosen MD      nicotine  1 patch Transdermal Daily [place], [place]      nystatin   Topical BID Araceli Rosen MD      ondansetron  4 mg Oral Q6H PRN Mariana Overlie, DO      oxyCODONE  5 mg Oral Q6H PRN [facility] Malone, DO      polyethylene glycol  17 g Oral Daily Mariana Overlie, DO      senna-docusate sodium  1 tablet Oral BID Mariana Overlie, DO      terbinafine  250 mg Oral Daily Mariana Overlie, DO      triamcinolone   Topical BID Araceli Rosen MD      white petrolatum-mineral oil   Topical TID PRN Keila Parham DO          Today, Patient Was Seen By: Keila Parham DO    ** Please Note: Dictation voice to text software may have been used in the creation of this document   **

## 2022-03-11 NOTE — DISCHARGE SUMMARY
[facility]  Discharge- Na Ram 1965, 60 y o  male MRN: [id number]  Unit/Bed#: 7T [facility] 711-01 Encounter: [id number]  Primary Care Provider: Myles Cm MD   Date and time admitted to hospital: 12/29/2021  5:38 PM    * Ambulatory dysfunction  Assessment & Plan  · Continue gabapentin 400mg t i d, robaxin 500mg Q6H PRN, and oxycodone 5mg Q6H PRN  · PT/OT recommending post acute rehab  · Case management following to aide in discharge planning  · Patient remains medically stable for discharge     [address] [facility]  · Patient was to be discharged to the [facility] rescue [facility] however they will not take him back as he does not have any photo ID  · PT recommending post acute rehab placement  ·  on board - awaiting STR auth    Essential hypertension  Assessment & Plan  · Continue amlodipine 10 mg daily and lisinopril 20 mg daily    Strange and inexplicable behavior  Assessment & Plan  · Patient with hyper fixation on fungal infection arms (which was treated)  · Psychiatry consulted, not appropriate for inpatient psychiatry unit at this time  · Continue Abilify 10 mg daily and Atarax 25 mg p o  q 6 hours p r n  for anxiety      Onychomycosis  Assessment & Plan  · Present on almost all toes and fingers  · Podiatry performed nail debridement on 01/27  · Continue Terbinafine 250 mg PO daily for 12 weeks (Ordered through 4/21)  · Check CMP weekly    Spinal stenosis  Assessment & Plan  · MRI [facility]): Severe spinal stenosis L3-L4, and L5-S1 with more moderate stenosis L2-  · Continue pain medications with oxycodone, gabapentin, Robaxin  · Patient keeps asking to increase the dose and frequency of oxycodone  Patient does not seem to be in distress  I explained that we need to limit opioid use    Encourage the patient to continue with PT   · Continue PT    Penile pain  Assessment & Plan  · Patient complains of pain around his penis, scrotum and anal area  · No evidence of testicular torsion, patient does not seem to be in distress on physical exam  · No fever, vital signs stable  · Continue to monitor  · Likely phimosis which does not appear to be strangulated      Discharging Physician / Practitioner: Haris Zaldivar DO  PCP: Rachael Georges MD  Admission Date:   Admission Orders (From admission, onward)     Ordered        12/30/21 1517  Inpatient Admission  Once            12/29/21 2124  Place in Observation  Once                      Discharge Date: 03/11/22    Medical Problems             Resolved Problems  Date Reviewed: 3/11/2022    None                Consultations During Hospital Stay:  PT/OT    Procedures Performed:  Not applicable    Significant Findings / Test Results:     No results found  Incidental Findings:  Not applicable    Test Results Pending at Discharge (will require follow up): Not applicable     Outpatient Tests Requested:  Not applicable    Reason for Admission:  Wound check    Hospital Course:     Jose Carlos Villareal is a 60 y o  male who has past medical history of hypertension, drug abuse, homeless presents with wound check  Patient was seen at [facility] ED 12/28  And d/c 12/29  and was prescribed antibiotics for arm cellulitis  He was homeless and had case management arrange for discharge to the [facility]  When he arrived at the facility, was reported they would not take the patient he then returned to [facility] ER  There he was given IV antibiotics  Blood cultures from 12/28 pending  PT/OT recommended short-term rehab  The patient had a significantly elevated length of stay due to difficult disposition in the setting of homelessness as many facilities were unable to accept the patient  He remained hemodynamically stable and saturating well on room air throughout his hospital course  He was treated for onychomycosis with terbinafine  The patient tolerated physical therapy well and participated on a daily basis  Case management worked on disposition planning and on the morning of 03/11/2022, the patient was accepted at [facility]  He was discharged in stable condition  He was encouraged to resume terbinafine for onychomycosis  He was also initiated on 2 antihypertensive medications which have been effective during the patient's hospital stay  He was strongly encouraged to obtain a primary care physician following his stay at acute rehab  Condition at Discharge: stable     Discharge Day Visit / Exam:     Subjective:  Patient seen and examined at bedside  No acute events overnight  Denies chest pain, SOB, diaphoresis, nausea/vomiting/diarrhea, fevers/chills  Vitals: Blood Pressure: 140/86 (03/11/22 0721)  Pulse: 61 (03/11/22 0721)  Temperature: (!) 96 6 °F (35 9 °C) (03/11/22 0721)  Temp Source: Temporal (03/11/22 0721)  Respirations: 18 (03/11/22 0721)  Height: 5' 10" (177 8 cm) (12/30/21 0939)  Weight - Scale: 75 3 kg (166 lb 0 1 oz) (12/30/21 0939)  SpO2: 97 % (03/11/22 0721)     Exam:   Physical Exam  Vitals and nursing note reviewed  Constitutional:       Appearance: He is well-developed  HENT:      Head: Normocephalic and atraumatic  Eyes:      Conjunctiva/sclera: Conjunctivae normal    Cardiovascular:      Rate and Rhythm: Normal rate and regular rhythm  Heart sounds: No murmur heard  Pulmonary:      Effort: Pulmonary effort is normal  No respiratory distress  Breath sounds: Normal breath sounds  Abdominal:      Palpations: Abdomen is soft  Tenderness: There is no abdominal tenderness  Musculoskeletal:      Cervical back: Neck supple  Skin:     General: Skin is warm and dry  Neurological:      Mental Status: He is alert  Discharge instructions/Information to patient and family:   See after visit summary for information provided to patient and family        Provisions for Follow-Up Care:  See after visit summary for information related to follow-up care and any pertinent home health orders  Disposition:     [facility]     Discharge Statement:  I spent 60 minutes discharging the patient  This time was spent on the day of discharge  I had direct contact with the patient on the day of discharge  Greater than 50% of the total time was spent examining patient, answering all patient questions, arranging and discussing plan of care with patient as well as directly providing post-discharge instructions  Additional time then spent on discharge activities  Discharge Medications:  See after visit summary for reconciled discharge medications provided to patient and family        ** Please Note: This note has been constructed using a voice recognition system **

## 2022-03-11 NOTE — CASE MANAGEMENT
DISCHARGE DETAILS: CM was notified at morning rounds that pt is medically cleared to be discharged today  CM sent referral through [address] [place] [address] to [facility] discharge support for auth summation  Pt is approved for  discharge to [address] [phone]  Address: [address] [place] [address]  Transportation: Wheel chair transport; [address] today  CM updated the facility contacts for nurse to report and Fax AVS number    CM informed Dr, Nurse, Pt and family about pt's discharge for today    [facility] department will continue to follow through pt's D/C      Accepting Facility Name, [address] : [facility]  Receiving Facility/Agency Phone Number: Phone: [phone]  Facility/Agency Fax Number: [phone]

## 2022-03-11 NOTE — NURSING NOTE
PT was D/C to [facility], [facility]  All personal belonging was given to PT  PT was transported to faculty

## 2022-03-11 NOTE — PLAN OF CARE
Problem: PAIN - ADULT  Goal: Verbalizes/displays adequate comfort level or baseline comfort level  Description: Interventions:  - Encourage patient to monitor pain and request assistance  - Assess pain using appropriate pain scale  - Administer analgesics based on type and severity of pain and evaluate response  - Implement non-pharmacological measures as appropriate and evaluate response  - Consider cultural and social influences on pain and pain management  - Notify physician/advanced practitioner if interventions unsuccessful or patient reports new pain  Outcome: Progressing     Problem: SAFETY ADULT  Goal: Patient will remain free of falls  Description: INTERVENTIONS:  - Educate patient/family on patient safety including physical limitations  - Instruct patient to call for assistance with activity   - Consult OT/PT to assist with strengthening/mobility   - Keep Call bell within reach  - Keep bed low and locked with side rails adjusted as appropriate  - Keep care items and personal belongings within reach  - Initiate and maintain comfort rounds  - Make Fall Risk Sign visible to staff  - Offer Toileting   - Obtain necessary fall risk management equipment:   - Apply yellow socks and bracelet for high fall risk patients  - Consider moving patient to room near nurses station  Outcome: Progressing     Problem: DISCHARGE PLANNING  Goal: Discharge to home or other facility with appropriate resources  Description: INTERVENTIONS:  - Identify barriers to discharge w/patient and caregiver  - Arrange for needed discharge resources and transportation as appropriate  - Identify discharge learning needs (meds, wound care, etc )  - Arrange for interpretive services to assist at discharge as needed  - Refer to Case Management Department for coordinating discharge planning if the patient needs post-hospital services based on physician/advanced practitioner order or complex needs related to functional status, cognitive ability, or social support system  Outcome: Progressing     Problem: Knowledge Deficit  Goal: Patient/family/caregiver demonstrates understanding of disease process, treatment plan, medications, and discharge instructions  Description: Complete learning assessment and assess knowledge base    Interventions:  - Provide teaching at level of understanding  - Provide teaching via preferred learning methods  Outcome: Progressing     Problem: Potential for Falls  Goal: Patient will remain free of falls  Description: INTERVENTIONS:  - Educate patient/family on patient safety including physical limitations  - Instruct patient to call for assistance with activity   - Consult OT/PT to assist with strengthening/mobility   - Keep Call bell within reach  - Keep bed low and locked with side rails adjusted as appropriate  - Keep care items and personal belongings within reach  - Initiate and maintain comfort rounds  - Make Fall Risk Sign visible to staff  - Offer Toileting  - Obtain necessary fall risk management equipment:   - Apply yellow socks and bracelet for high fall risk patients  - Consider moving patient to room near nurses station  Outcome: Progressing

## 2022-03-14 NOTE — UTILIZATION REVIEW
Notification of Discharge   This is a Notification of Discharge from our facility [address]  Please be advised that this patient has been discharge from our facility  Below you will find the admission and discharge date and time including the patients disposition  UTILIZATION REVIEW CONTACT:  Taco Kwan MA  Utilization   Network Utilization Review Department  Phone: [phone] x carefully listen to the prompts  All voicemails are confidential   Email: [email or web address]     PHYSICIAN ADVISORY SERVICES:  FOR [facility] REVIEW - MEDICAL NECESSITY DENIAL  Phone: [phone]  Fax: [phone]  Email: [email or web address]     PRESENTATION DATE: 12/29/2021  5:38 PM  OBERVATION ADMISSION DATE:   INPATIENT ADMISSION DATE: 12/30/21  3:17 PM   DISCHARGE DATE: 3/11/2022 11:28 AM  DISPOSITION: Non [facility]/S[facility] Non [facility] SN[facility]/SNU      IMPORTANT INFORMATION:  Send all requests for admission clinical reviews, approved or denied determinations and any other requests to dedicated fax number below belonging to the campus where the patient is receiving treatment   List of dedicated fax numbers:  [address] DENIALS (Administrative/Medical Necessity) [phone]   [address] (Maternity/NICU/Pediatrics) [phone]   Martin Dew [phone]   130 [facility] [phone]   [address] [place] [address] [phone]   [address] [place] [address] [address] [place] [address],[address] [phone]   [facility]  [phone]   220[address] [facility], [facility]  [address] [facility] 1000 [facility] [phone]

## 2022-03-15 NOTE — PHYSICAL THERAPY NOTE
Physical TherapyTreatment Note    Patient's Name: Elijah Woody    Admitting Diagnosis  Cellulitis [L03 90]  Homeless [Z59 00]  Wound check, abscess [Z51 89]    Problem List  Patient Active Problem List   Diagnosis    Homeless    Psoriasis, unspecified    Ambulatory dysfunction    Essential hypertension    Strange and inexplicable behavior    Onychomycosis    Constipation    Spinal stenosis    Penile pain       Past Medical History  Past Medical History:   Diagnosis Date    Arthritis     Hypertension     Sciatica        Past Surgical History  Past Surgical History:   Procedure Laterality Date    BACK SURGERY         Recent Imaging  US scrotum and testicles   Final Result by Marisol Lewis MD (02/14 1158)       1  No evidence of testicular torsion  2   Minimally complex hydroceles moderate on the right and small on the left  Workstation performed: [id number]         CT abdomen pelvis w contrast   Final Result by Rachel Huggins MD (01/28 2555)      Stool filled colon with possible impaction  Diverticulosis with no diverticulitis                 Workstation performed: [id number]             Recent Vital Signs  Vitals:    03/09/22 2258 03/10/22 0753 03/10/22 2327 03/11/22 0721   BP: 155/88 148/79 140/76 140/86   BP Location: Left arm Left arm Left arm Left arm   Pulse: 62 60 65 61   Resp: 18 20 18 18   Temp: (!) 97 °F (36 1 °C) (!) 97 1 °F (36 2 °C) 97 5 °F (36 4 °C) (!) 96 6 °F (35 9 °C)   TempSrc: Temporal Temporal Temporal Temporal   SpO2: 97% 98% 98% 97%   Weight:       Height:           PT Treatment Time: 15 minutes       03/11/22 0945   PT Last Visit   PT Visit Date 03/11/22   Note Type   Note Type Treatment   Bed Mobility   Supine to Sit 6  Modified independent   Sit to Supine 6  Modified independent   Transfers   Sit pivot 5  Supervision   Additional items Increased time required   Balance   Static Sitting Fair +   Dynamic Sitting Fair   Static Standing Poor +   Dynamic Standing Poor   Ambulatory Poor   Endurance Deficit   Endurance Deficit Yes   Endurance Deficit Description fatigue   Activity Tolerance   Activity Tolerance Patient limited by fatigue   Medical Staff Made Aware spoke to CM   Nurse Made Aware spoke to RN   Assessment   Prognosis Good   Problem List Decreased strength;Decreased range of motion;Decreased endurance; Impaired balance;Decreased mobility; Decreased coordination; Impaired judgement; Impaired sensation;Pain   Assessment Pt seen for transfers to w/c and bed mobility today in preparation for D/C he continues to do well with mobility in bed  Requires supervision for sit pivot due to decreased safety awareness, weakness and decreased coordination and balance  Barriers to Discharge Inaccessible home environment;Decreased caregiver support   Goals   Patient Goals get to rehab   STG Expiration Date 03/17/22   Short Term Goal #1 see eval note   PT Treatment Day 18   Plan   Treatment/Interventions ADL retraining;Functional transfer training;LE strengthening/ROM; Elevations; Therapeutic exercise; Endurance training;Patient/family training;Equipment eval/education; Bed mobility;Gait training;Spoke to nursing;Spoke to case management;OT   Progress Progressing toward goals   PT Frequency 2-3x/wk   Recommendation   PT Discharge Recommendation Post acute rehabilitation services   AM-PAC Basic Mobility Inpatient   Turning in Bed Without Bedrails 4   Lying on Back to Sitting on Edge of Flat Bed 4   Moving Bed to Chair 3   Standing Up From Chair 2   Walk in Room 2   Climb 3-5 Stairs 1   Basic Mobility Inpatient Raw Score 16   Basic Mobility Standardized Score 38 32   Highest Level Of Mobility   JH-HLM Goal 5: Stand one or more mins   JH-HLM Highest Level of Mobility 4: Move to chair/commode   JH-HLM Goal Achieved Yes   Education   Education Provided Mobility training;Home exercise program;Assistive device   Patient Explanation/teachback used;Demonstrates verbal understanding   End of Consult   Patient Position at End of Consult Bedside chair       Emily Gay PT, DPT

## 2022-03-15 NOTE — PLAN OF CARE
Problem: PHYSICAL THERAPY ADULT  Goal: Performs mobility at highest level of function for planned discharge setting  See evaluation for individualized goals  Description: Treatment/Interventions: ADL retraining,Functional transfer training,LE strengthening/ROM,Elevations,Therapeutic exercise,Endurance training,Patient/family training,Equipment eval/education,Bed mobility,Gait training,Spoke to nursing,Spoke to case management,OT  Equipment Recommended: Wheelchair       See flowsheet documentation for full assessment, interventions and recommendations  Outcome: Progressing  Note: Prognosis: Good  Problem List: Decreased strength,Decreased range of motion,Decreased endurance,Impaired balance,Decreased mobility,Decreased coordination,Impaired judgement,Impaired sensation,Pain  Assessment: Pt seen for transfers to w/c and bed mobility today in preparation for D/C he continues to do well with mobility in bed  Requires supervision for sit pivot due to decreased safety awareness, weakness and decreased coordination and balance  Barriers to Discharge: Inaccessible home environment,Decreased caregiver support        PT Discharge Recommendation: Post acute rehabilitation services          See flowsheet documentation for full assessment

## 2022-06-19 NOTE — CASE MANAGEMENT
CM Note: Discharge Plan Cont  CM spoke with pt  regarding freedom of choice - continuing to look for personal care facilities  Pt agreed in which CM [facility] ([phone]) where a male bed is currently available but admission advisor was unavailable  Viv Cherry requested a call-back number, in which CM gave info for [facility] ([phone])  Cm also requested a fax number to send over clinical ppw ([phone]) - fax was sent successfully  CM department will continue to follow through with pt discharge  present

## 2022-09-29 ENCOUNTER — CONSULT (OUTPATIENT)
Dept: PAIN MEDICINE | Facility: CLINIC | Age: 57
End: 2022-09-29
Payer: MEDICARE

## 2022-09-29 VITALS — SYSTOLIC BLOOD PRESSURE: 146 MMHG | DIASTOLIC BLOOD PRESSURE: 88 MMHG | TEMPERATURE: 97.9 F | HEART RATE: 66 BPM

## 2022-09-29 DIAGNOSIS — M54.50 CHRONIC BILATERAL LOW BACK PAIN WITHOUT SCIATICA: Primary | ICD-10-CM

## 2022-09-29 DIAGNOSIS — F19.10 PSYCHOACTIVE SUBSTANCE ABUSE (HCC): ICD-10-CM

## 2022-09-29 DIAGNOSIS — R26.2 AMBULATORY DYSFUNCTION: ICD-10-CM

## 2022-09-29 DIAGNOSIS — G89.29 CHRONIC BILATERAL LOW BACK PAIN WITHOUT SCIATICA: Primary | ICD-10-CM

## 2022-09-29 DIAGNOSIS — R41.841 COGNITIVE COMMUNICATION DEFICIT: ICD-10-CM

## 2022-09-29 DIAGNOSIS — G89.4 CHRONIC PAIN SYNDROME: ICD-10-CM

## 2022-09-29 DIAGNOSIS — Z98.890 STATUS POST LUMBAR LAMINECTOMY: ICD-10-CM

## 2022-09-29 PROCEDURE — 99204 OFFICE O/P NEW MOD 45 MIN: CPT | Performed by: ANESTHESIOLOGY
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
## 2022-10-25 NOTE — ASSESSMENT & PLAN NOTE
· Present on almost all toes and fingers  · Podiatry performed nail debridement on 01/27  · Terbinafine 250 mg PO daily for 12 weeks (Ordered through 4/21) [Normal Venous Pressure] : normal venous pressure [Normal S1, S2] : normal S1, S2 [No Murmur] : no murmur [No Rub] : no rub [No Gallop] : no gallop [Normal] : alert and oriented, normal memory [de-identified] : tachycardic [de-identified] : warm peripherally

## 2022-12-28 ENCOUNTER — HOSPITAL ENCOUNTER (EMERGENCY)
Facility: HOSPITAL | Age: 57
Discharge: HOME | End: 2022-12-29
Attending: EMERGENCY MEDICINE

## 2022-12-28 VITALS
TEMPERATURE: 98.2 F | BODY MASS INDEX: 24.34 KG/M2 | DIASTOLIC BLOOD PRESSURE: 63 MMHG | RESPIRATION RATE: 20 BRPM | HEART RATE: 65 BPM | OXYGEN SATURATION: 95 % | SYSTOLIC BLOOD PRESSURE: 130 MMHG | WEIGHT: 170 LBS | HEIGHT: 70 IN

## 2022-12-28 DIAGNOSIS — F15.10 METHAMPHETAMINE ABUSE (HCC): Primary | ICD-10-CM

## 2022-12-28 DIAGNOSIS — F12.10 MARIJUANA ABUSE: ICD-10-CM

## 2022-12-28 LAB
AMPHETAMINES SERPL QL SCN: POSITIVE
BARBITURATES UR QL: NEGATIVE
BENZODIAZ UR QL: NEGATIVE
COCAINE UR QL: NEGATIVE
ETHANOL EXG-MCNC: 0 MG/DL
METHADONE UR QL: NEGATIVE
OPIATES UR QL SCN: NEGATIVE
OXYCODONE+OXYMORPHONE UR QL SCN: POSITIVE
PCP UR QL: NEGATIVE
THC UR QL: POSITIVE

## 2022-12-28 NOTE — ED PROVIDER NOTES
History  Chief Complaint   Patient presents with   • Drug Problem     [facility]  Reports they found a bag of meth in his pocket  Patient last used meth last night  Alert and oriented x4, pupils pearrl  No chest pain or SOB reported  Pt who lives at [facility] b/c he cannot walk, says he used meth today which he purchased  Says only used it today, not previously  No coingestion  Denies si/hi/ah/vh  Says he wanted to have more energy and feel good  [facility] with [facility] NM and said they wanted drug testing and a psych consult  Pt denies any injury/illness acutely  No cp/sob/abdo pain/urinary/bowel symp  No trauma  Prior to Admission Medications   Prescriptions Last Dose Informant Patient Reported? Taking?    ARIPiprazole (ABILIFY) 10 mg tablet   No No   Sig: Take 1 tablet (10 mg total) by mouth daily   acetaminophen (TYLENOL) 325 mg tablet   No No   Sig: Take 2 tablets (650 mg total) by mouth every 4 (four) hours as needed for mild pain, headaches or fever   amLODIPine (NORVASC) 10 mg tablet   No No   Sig: Take 1 tablet (10 mg total) by mouth daily   carbamide peroxide (DEBROX) 6 5 % otic solution   No No   Sig: Administer 5 drops into both ears 2 (two) times a day as needed for ear pain   diphenhydrAMINE (BENADRYL) 25 mg tablet   No No   Sig: Take 1 tablet (25 mg total) by mouth every 6 (six) hours as needed for itching   gabapentin (NEURONTIN) 400 mg capsule   No No   Sig: Take 1 capsule (400 mg total) by mouth 3 (three) times a day   hydrOXYzine HCL (ATARAX) 25 mg tablet   No No   Sig: Take 1 tablet (25 mg total) by mouth every 6 (six) hours as needed for anxiety   hydrocortisone 2 5 % ointment   No No   Sig: Apply topically 2 (two) times a day   lisinopril (ZESTRIL) 20 mg tablet   No No   Sig: Take 1 tablet (20 mg total) by mouth daily   methocarbamol (ROBAXIN) 500 mg tablet   No No   Sig: Take 1 tablet (500 mg total) by mouth every 6 (six) hours as needed for muscle spasms   nicotine (NICODERM CQ) 21 mg/24 hr TD 24 hr patch   No No   Sig: Place 1 patch on the skin daily   nystatin (MYCOSTATIN) powder   No No   Sig: Apply topically 2 (two) times a day   ondansetron (ZOFRAN-ODT) 4 mg disintegrating tablet   No No   Sig: Take 1 tablet (4 mg total) by mouth every 6 (six) hours as needed for nausea or vomiting   polyethylene glycol (MIRALAX) 17 g packet   No No   Sig: Take 17 g by mouth daily   senna-docusate sodium (SENOKOT S) 8 6-50 mg per tablet   No No   Sig: Take 1 tablet by mouth 2 (two) times a day   triamcinolone (KENALOG) 0 1 % cream   No No   Sig: Apply topically 2 (two) times a day   white petrolatum-mineral oil (EUCERIN,HYDROCERIN) cream   No No   Sig: Apply topically 3 (three) times a day as needed (dry skin)      Facility-Administered Medications: None       Past Medical History:   Diagnosis Date   • Anxiety    • Arthritis    • Hypertension    • Sciatica        Past Surgical History:   Procedure Laterality Date   • BACK SURGERY         History reviewed  No pertinent family history  I have reviewed and agree with the history as documented  E-Cigarette/Vaping     E-Cigarette/Vaping Substances   • Nicotine No    • THC No    • CBD No    • Flavoring No    • Other No    • Unknown No      Social History     Tobacco Use   • Smoking status: Every Day   • Smokeless tobacco: Never   Substance Use Topics   • Alcohol use: Yes   • Drug use: Yes     Types: Heroin, Methamphetamines, Cocaine, Fentanyl       Review of Systems   Constitutional: Negative for fever  HENT: Negative for rhinorrhea  Eyes: Negative for visual disturbance  Respiratory: Negative for shortness of breath  Cardiovascular: Negative for chest pain  Gastrointestinal: Negative for abdominal pain, diarrhea and vomiting  Endocrine: Negative for polydipsia  Genitourinary: Negative for dysuria, frequency and hematuria  Musculoskeletal: Negative for neck stiffness  Skin: Negative for rash  Allergic/Immunologic: Negative for immunocompromised state  Neurological: Negative for speech difficulty, weakness and numbness  Psychiatric/Behavioral: Negative for dysphoric mood, hallucinations, self-injury and suicidal ideas  Physical Exam  Physical Exam  Constitutional:       General: He is not in acute distress  HENT:      Head: Normocephalic and atraumatic  Right Ear: External ear normal       Left Ear: External ear normal       Nose: Nose normal       Mouth/Throat:      Pharynx: Oropharynx is clear  Eyes:      Conjunctiva/sclera: Conjunctivae normal    Cardiovascular:      Rate and Rhythm: Normal rate and regular rhythm  Heart sounds: Normal heart sounds  No murmur heard  Pulmonary:      Effort: Pulmonary effort is normal       Breath sounds: Normal breath sounds  Abdominal:      General: Bowel sounds are normal       Palpations: Abdomen is soft  There is no mass  Tenderness: There is no abdominal tenderness  There is no guarding  Musculoskeletal:      Cervical back: Normal range of motion and neck supple  Skin:     General: Skin is warm and dry  Capillary Refill: Capillary refill takes less than 2 seconds  Neurological:      General: No focal deficit present  Mental Status: He is alert and oriented to person, place, and time     Psychiatric:         Mood and Affect: Mood normal          Vital Signs  ED Triage Vitals [12/28/22 1740]   Temperature Pulse Respirations Blood Pressure SpO2   98 2 °F (36 8 °C) 65 20 130/63 95 %      Temp Source Heart Rate Source Patient Position - Orthostatic VS BP Location FiO2 (%)   Temporal Monitor Lying Left arm --      Pain Score       8           Vitals:    12/28/22 1740   BP: 130/63   Pulse: 65   Patient Position - Orthostatic VS: Lying         Visual Acuity  Visual Acuity    Flowsheet Row Most Recent Value   L Pupil Size (mm) 3   R Pupil Size (mm) 3          ED Medications  Medications - No data to display    Diagnostic Studies  Results Reviewed     Procedure Component Value Units Date/Time    Rapid drug screen, urine [[id number]]  (Abnormal) Collected: 12/28/22 1858    Lab Status: Final result Specimen: Urine, Clean Catch Updated: 12/28/22 1919     Amph/Meth UR Positive     Barbiturate Ur Negative     Benzodiazepine Urine Negative     Cocaine Urine Negative     Methadone Urine Negative     Opiate Urine Negative     PCP Ur Negative     THC Urine Positive     Oxycodone Urine Positive    Narrative:      Presumptive report  If requested, specimen will be sent to reference lab for confirmation  FOR MEDICAL PURPOSES ONLY  IF CONFIRMATION NEEDED PLEASE CONTACT THE LAB WITHIN 5 DAYS  Drug Screen Cutoff Levels:  AMPHETAMINE/METHAMPHETAMINES  1000 ng/mL  BARBITURATES     200 ng/mL  BENZODIAZEPINES     200 ng/mL  COCAINE      300 ng/mL  METHADONE      300 ng/mL  OPIATES      300 ng/mL  PHENCYCLIDINE     25 ng/mL  THC       50 ng/mL  OXYCODONE      100 ng/mL    POCT alcohol breath test [[id number]]  (Normal) Resulted: 12/28/22 1838    Lab Status: Final result Updated: 12/28/22 1838     EXTBreath Alcohol 0 00                 No orders to display              Procedures  Procedures         ED Course                                             MDM  Number of Diagnoses or Management Options  Marijuana abuse  Methamphetamine abuse ([facility] )  Diagnosis management comments: Patient who used meth and has also according to his talk screen use marijuana, patient does not have any emergent medical condition and is stable for discharge  I discussed with the manager at his facility who is excepting him back    Discharge patient home      Disposition  Final diagnoses:   [facility]   Marijuana abuse     Time reflects when diagnosis was documented in both MDM as applicable and the Disposition within this note     Time User Action Codes Description Comment    12/28/2022  7:50 PM Mary Rea Add [F15 10] [facility] ([facility] )     12/28/2022  7:50 PM Brandan Edmondson Add [F12 10] Marijuana abuse       ED Disposition     ED Disposition   Discharge    Condition   Stable    Date/Time   Wed Dec 28, 2022  7:50 PM    Comment   Vivi Lee discharge to home/self care  Follow-up Information     Follow up With Specialties Details Why Contact Info    Dillon Guardado DO Internal Medicine Schedule an appointment as soon as possible for a visit in 2 days  [address]  [phone]            Patient's Medications   Discharge Prescriptions    No medications on file       No discharge procedures on file      PDMP Review     None          ED Provider  Electronically Signed by           Lucia Chin MD  12/28/22 5285

## 2022-12-29 LAB
ATRIAL RATE: 59 BPM
P AXIS: 53 DEGREES
PR INTERVAL: 170 MS
QRS AXIS: 33 DEGREES
QRSD INTERVAL: 98 MS
QT INTERVAL: 434 MS
QTC INTERVAL: 429 MS
T WAVE AXIS: 43 DEGREES
VENTRICULAR RATE: 59 BPM

## 2022-12-29 NOTE — ED NOTES
Report given to [facility], awaiting [facility] for ambulance       India Humphreys RN  12/28/22 2051

## 2023-01-11 ENCOUNTER — HOSPITAL ENCOUNTER (EMERGENCY)
Facility: HOSPITAL | Age: 58
Discharge: HOME | End: 2023-01-11
Attending: EMERGENCY MEDICINE

## 2023-01-11 VITALS
HEART RATE: 57 BPM | TEMPERATURE: 98.9 F | OXYGEN SATURATION: 95 % | WEIGHT: 170 LBS | RESPIRATION RATE: 20 BRPM | DIASTOLIC BLOOD PRESSURE: 62 MMHG | BODY MASS INDEX: 24.34 KG/M2 | SYSTOLIC BLOOD PRESSURE: 123 MMHG | HEIGHT: 70 IN

## 2023-01-11 DIAGNOSIS — L40.9 PSORIASIS, UNSPECIFIED: ICD-10-CM

## 2023-01-11 DIAGNOSIS — H60.90 OTITIS EXTERNA: Primary | ICD-10-CM

## 2023-01-11 RX ORDER — IBUPROFEN 400 MG/1
800 TABLET ORAL ONCE
Status: COMPLETED | OUTPATIENT
Start: 2023-01-11 | End: 2023-01-11

## 2023-01-11 RX ORDER — AMOXICILLIN AND CLAVULANATE POTASSIUM 875; 125 MG/1; MG/1
1 TABLET, FILM COATED ORAL EVERY 12 HOURS
Qty: 14 TABLET | Refills: 0 | Status: SHIPPED | OUTPATIENT
Start: 2023-01-11 | End: 2023-01-18

## 2023-01-11 RX ORDER — AMOXICILLIN AND CLAVULANATE POTASSIUM 875; 125 MG/1; MG/1
1 TABLET, FILM COATED ORAL ONCE
Status: COMPLETED | OUTPATIENT
Start: 2023-01-11 | End: 2023-01-11

## 2023-01-11 RX ORDER — CIPROFLOXACIN AND DEXAMETHASONE 3; 1 MG/ML; MG/ML
3 SUSPENSION/ DROPS AURICULAR (OTIC) ONCE
Status: COMPLETED | OUTPATIENT
Start: 2023-01-11 | End: 2023-01-11

## 2023-01-11 RX ORDER — METHYLPREDNISOLONE 4 MG/1
4 TABLET ORAL 2 TIMES DAILY
Status: DISCONTINUED | OUTPATIENT
Start: 2023-01-11 | End: 2023-01-11 | Stop reason: HOSPADM

## 2023-01-11 RX ORDER — METHYLPREDNISOLONE 4 MG/1
TABLET ORAL
Qty: 21 TABLET | Refills: 0 | Status: SHIPPED | OUTPATIENT
Start: 2023-01-11

## 2023-01-11 RX ORDER — CIPROFLOXACIN AND DEXAMETHASONE 3; 1 MG/ML; MG/ML
3 SUSPENSION/ DROPS AURICULAR (OTIC) 2 TIMES DAILY
Qty: 3 ML | Refills: 0 | Status: SHIPPED | OUTPATIENT
Start: 2023-01-11 | End: 2023-01-18

## 2023-01-11 RX ADMIN — CIPROFLOXACIN AND DEXAMETHASONE 3 DROP: 3; 1 SUSPENSION/ DROPS AURICULAR (OTIC) at 15:28

## 2023-01-11 RX ADMIN — IBUPROFEN 800 MG: 400 TABLET, FILM COATED ORAL at 15:30

## 2023-01-11 RX ADMIN — AMOXICILLIN AND CLAVULANATE POTASSIUM 1 TABLET: 875; 125 TABLET, FILM COATED ORAL at 15:29

## 2023-01-11 RX ADMIN — METHYLPREDNISOLONE 4 MG: 4 TABLET ORAL at 19:26

## 2023-01-11 NOTE — DISCHARGE INSTRUCTIONS
Take ibuprofen or tylenol every 4-6 hours for pain   Take medrol dose eddy as prescribed  Use 3 drops of ciprodex in both ears two times a day for 7 days   Take augmentin two times a day for 7 days   Continue using OTC decongestants  After showers/bathing, dry out your ears with a dry wash cloths   Do not use cotton swabs/foreign objects in your ears  Schedule an appointment with a PCP for follow up  Return to the ER if you develop tenderness or swelling behind your ears or other parts of your face, lose hearing, blood from ears, fevers, intense headache, visual changes

## 2023-01-11 NOTE — ED PROVIDER NOTES
History  Chief Complaint   Patient presents with   • Ear Problem     Patient presents to the ER via EMS from [facility] with a worsening skin rash and copious amounts of yellow drainage coming out of his ears  The sheet from [facility] was very soiled with the drainage (more on the right side)  This is a 60 y/o male with PMH psoriasis, anxiety, HTN, sciatica/spinal stenosis who presents to the ER today with bilateral ear pain and drainage from both ears as well as worsening psoriasis  Patient states his ear pain has been going on for a few days and just this morning he noticed purulent discharge coming from both ears  He does admit to decreased hearing  He states his psoriasis has been fine but recently it has been spreading/getting more painful  He denies any fevers, chills, chest pain, shortness, tinnitus, headaches, visual changes, pain/swelling behind ears, abdominal pain, nausea, vomiting  He states he has been taking cough/cold medications at home for congestion which seem to be helping  He denies any allergies to medications  History provided by:  Patient   used: No        Prior to Admission Medications   Prescriptions Last Dose Informant Patient Reported? Taking?    ARIPiprazole (ABILIFY) 10 mg tablet   No No   Sig: Take 1 tablet (10 mg total) by mouth daily   acetaminophen (TYLENOL) 325 mg tablet   No No   Sig: Take 2 tablets (650 mg total) by mouth every 4 (four) hours as needed for mild pain, headaches or fever   amLODIPine (NORVASC) 10 mg tablet   No No   Sig: Take 1 tablet (10 mg total) by mouth daily   carbamide peroxide (DEBROX) 6 5 % otic solution   No No   Sig: Administer 5 drops into both ears 2 (two) times a day as needed for ear pain   diphenhydrAMINE (BENADRYL) 25 mg tablet   No No   Sig: Take 1 tablet (25 mg total) by mouth every 6 (six) hours as needed for itching   gabapentin (NEURONTIN) 400 mg capsule   No No   Sig: Take 1 capsule (400 mg total) by mouth 3 (three) times a day   hydrOXYzine HCL (ATARAX) 25 mg tablet   No No   Sig: Take 1 tablet (25 mg total) by mouth every 6 (six) hours as needed for anxiety   hydrocortisone 2 5 % ointment   No No   Sig: Apply topically 2 (two) times a day   lisinopril (ZESTRIL) 20 mg tablet   No No   Sig: Take 1 tablet (20 mg total) by mouth daily   methocarbamol (ROBAXIN) 500 mg tablet   No No   Sig: Take 1 tablet (500 mg total) by mouth every 6 (six) hours as needed for muscle spasms   nicotine (NICODERM CQ) 21 mg/24 hr TD 24 hr patch   No No   Sig: Place 1 patch on the skin daily   nystatin (MYCOSTATIN) powder   No No   Sig: Apply topically 2 (two) times a day   ondansetron (ZOFRAN-ODT) 4 mg disintegrating tablet   No No   Sig: Take 1 tablet (4 mg total) by mouth every 6 (six) hours as needed for nausea or vomiting   polyethylene glycol (MIRALAX) 17 g packet   No No   Sig: Take 17 g by mouth daily   senna-docusate sodium (SENOKOT S) 8 6-50 mg per tablet   No No   Sig: Take 1 tablet by mouth 2 (two) times a day   triamcinolone (KENALOG) 0 1 % cream   No No   Sig: Apply topically 2 (two) times a day   white petrolatum-mineral oil (EUCERIN,HYDROCERIN) cream   No No   Sig: Apply topically 3 (three) times a day as needed (dry skin)      Facility-Administered Medications: None       Past Medical History:   Diagnosis Date   • Anxiety    • Arthritis    • Cognitive communication deficit    • Difficulty in walking    • Hypertension    • Low back pain, unspecified    • Muscle wasting and atrophy, not elsewhere classified, left upper arm    • Muscle wasting and atrophy, not elsewhere classified, right upper arm    • Muscle weakness    • Other psychoactive substance abuse with intoxication, uncomplicated (HCC)    • Psoriasis    • Sciatica    • Spinal stenosis        Past Surgical History:   Procedure Laterality Date   • BACK SURGERY         History reviewed  No pertinent family history    I have reviewed and agree with the history as documented  E-Cigarette/Vaping   • E-Cigarette Use Never User      E-Cigarette/Vaping Substances   • Nicotine No    • THC No    • CBD No    • Flavoring No    • Other No    • Unknown No      Social History     Tobacco Use   • Smoking status: Every Day   • Smokeless tobacco: Never   Vaping Use   • Vaping Use: Never used   Substance Use Topics   • Alcohol use: Not Currently   • Drug use: Not Currently     Types: Heroin, Methamphetamines, Cocaine, Fentanyl       Review of Systems   Constitutional: Negative for chills and fever  HENT: Positive for ear discharge and ear pain  Negative for congestion, hearing loss, rhinorrhea, sore throat, tinnitus and trouble swallowing  Eyes: Negative for pain, redness, itching and visual disturbance  Respiratory: Negative for chest tightness and shortness of breath  Cardiovascular: Negative for chest pain  Gastrointestinal: Negative for nausea and vomiting  Skin: Positive for rash  Negative for color change  Neurological: Negative for dizziness, light-headedness and headaches  Psychiatric/Behavioral: Negative for behavioral problems and sleep disturbance  All other systems reviewed and are negative  Physical Exam  Physical Exam  Vitals and nursing note reviewed  Constitutional:       General: He is awake  Appearance: Normal appearance  He is well-developed  HENT:      Head: Normocephalic and atraumatic  Right Ear: External ear normal  Drainage and tenderness present  No mastoid tenderness  Tympanic membrane is not perforated or erythematous  Left Ear: External ear normal  Drainage and tenderness present  No mastoid tenderness  Tympanic membrane is not perforated or erythematous  Ears:      Comments: Bilateral pinnas erythematous, tender to palpation, swollen  No mastoid swelling/pain     Nose: Nose normal       Mouth/Throat:      Mouth: Mucous membranes are moist       Pharynx: Oropharynx is clear   No oropharyngeal exudate or posterior oropharyngeal erythema  Eyes:      General: No scleral icterus  Extraocular Movements: Extraocular movements intact  Conjunctiva/sclera: Conjunctivae normal       Pupils: Pupils are equal, round, and reactive to light  Cardiovascular:      Rate and Rhythm: Normal rate and regular rhythm  Heart sounds: Normal heart sounds, S1 normal and S2 normal  No murmur heard  No gallop  Pulmonary:      Effort: Pulmonary effort is normal       Breath sounds: Normal breath sounds  No wheezing, rhonchi or rales  Musculoskeletal:         General: Normal range of motion  Cervical back: Normal range of motion  Skin:     General: Skin is warm and dry  Findings: Rash present  Comments: Dispersed erythematous rash all over patients body consistent with psoriasis  Neurological:      General: No focal deficit present  Mental Status: He is alert  Psychiatric:         Attention and Perception: Attention and perception normal          Mood and Affect: Mood normal          Behavior: Behavior normal  Behavior is cooperative           Vital Signs  ED Triage Vitals [01/11/23 1411]   Temperature Pulse Respirations Blood Pressure SpO2   98 9 °F (37 2 °C) 57 20 123/62 95 %      Temp Source Heart Rate Source Patient Position - Orthostatic VS BP Location FiO2 (%)   Oral -- Lying Left arm --      Pain Score       5           Vitals:    01/11/23 1411   BP: 123/62   Pulse: 57   Patient Position - Orthostatic VS: Lying         Visual Acuity      ED Medications  Medications   methylprednisolone (MEDROL) tablet 4 mg (4 mg Oral Given 1/11/23 1926)   ciprofloxacin-dexamethasone (CIPRODEX) 0 3-0 1 % otic suspension 3 drop (3 drops Both Ears Given 1/11/23 1528)   amoxicillin-clavulanate (AUGMENTIN) 875-125 mg per tablet 1 tablet (1 tablet Oral Given 1/11/23 1529)   ibuprofen (MOTRIN) tablet 800 mg (800 mg Oral Given 1/11/23 1530)       Diagnostic Studies  Results Reviewed     None                 No orders to display              Procedures  Procedures         ED Course                 Medical Decision Making  This is a 58 y/o male here for bilateral ear pain and discharge as well as worsening psoriasis rash  Clinical diagnosis of severe otitis externa, psoriasis flare up  No further workup indicated  Plan: patient started on both oral augmentin and ciprodex drops for severe otitis externa, no wick required  Patient also started on medrol dose pack for psoriasis flare up  It was recommended to the patient that he follow up with his primary care doctor for continued evaluation and care of his diagnoses (psoriasis) as this is a chronic syndrome that must be managed  Patient was given care instructions for both psoriasis and otitis externa  He  was given strict return to ER precautions both verbally and in discharge papers  Patient verbalized understanding and agrees with plan  Otitis externa: acute illness or injury  Psoriasis, unspecified: self-limited or minor problem  Risk  Prescription drug management  Disposition  Final diagnoses:   Otitis externa   Psoriasis, unspecified     Time reflects when diagnosis was documented in both MDM as applicable and the Disposition within this note     Time User Action Codes Description Comment    1/11/2023  3:41 PM Elijah Solid Add [H60 90] Otitis externa     1/11/2023  3:41 PM Elijah Solid Add [L40 9] Psoriasis, unspecified       ED Disposition     ED Disposition   Discharge    Condition   Stable    Date/Time   Wed Jan 11, 2023  3:41 PM    Comment   Marie Reyes discharge to home/self care                 Follow-up Information     Follow up With Specialties Details Why Contact Info Additional Information    Padmini Chen, DO Internal Medicine Schedule an appointment as soon as possible for a visit   [address] [facility] Emergency Medicine Go to  if you develop tenderness or swelling behind your ears or other parts of your face, lose hearing, blood from ears, fevers, intense headache, visual changes [address] [place] Emergency Department, [address] [place] [address] [address]          Patient's Medications   Discharge Prescriptions    AMOXICILLIN-CLAVULANATE (AUGMENTIN) 875-125 MG PER TABLET    Take 1 tablet by mouth every 12 (twelve) hours for 7 days       Start Date: 1/11/2023 End Date: 1/18/2023       Order Dose: 1 tablet       Quantity: 14 tablet    Refills: 0    CIPROFLOXACIN-DEXAMETHASONE (CIPRODEX) OTIC SUSPENSION    Administer 3 drops into both ears 2 (two) times a day for 7 days       Start Date: 1/11/2023 End Date: 1/18/2023       Order Dose: 3 drops       Quantity: 3 mL    Refills: 0    METHYLPREDNISOLONE 4 MG TABLET THERAPY PACK    Use as directed on package       Start Date: 1/11/2023 End Date: --       Order Dose: --       Quantity: 21 tablet    Refills: 0       No discharge procedures on file      PDMP Review     None          ED Provider  Electronically Signed by           Dario Murillo PA-C  01/11/23 1936

## 2023-01-12 NOTE — ED NOTES
time @ 2030 via [facility]       [address], RN  01/11/23 1940

## 2023-03-30 NOTE — ASSESSMENT & PLAN NOTE
No · Patient with hyper fixation on fungal infection arms (which was treated)  · Sometimes with strange tangential thoughts  · Psychiatry consulted, not appropriate for inpatient psychiatry unit at this time    -continue Abilify 5 mg p o  daily  -continue Atarax 25 mg p o  Q 6 hours p r n   For anxiety

## 2023-04-02 NOTE — CASE MANAGEMENT
DISCHARGE DETAILS: CM spoke with Oscar Ballesteros from [facility] at [facility] personal care home:  ([phone])  Oscar Ballesteros stated CM was misinformed about the bed available by there co-worker  Oscar Ballesteros requested CM to fax clinicals [phone]  CM faxed clinicals to Oscar Ballesteros stated one of the barriers for pt to get accepted in there personal care home is that pt uses walker and a wheel chair  She stated she will review clinicals and will get back to CM, she stated there is a possibility they might not accept due to pt's ambulation: but they will review    CM department will continue to follow through pt's D/C [phone]

## 2023-04-05 ENCOUNTER — CONSULT (OUTPATIENT)
Dept: DERMATOLOGY | Facility: CLINIC | Age: 58
End: 2023-04-05

## 2023-04-05 DIAGNOSIS — L40.9 PSORIASIS: Primary | ICD-10-CM

## 2023-04-05 RX ORDER — TACROLIMUS 1 MG/G
OINTMENT TOPICAL 2 TIMES DAILY
Qty: 100 G | Refills: 0 | Status: SHIPPED | OUTPATIENT
Start: 2023-04-05

## 2023-04-05 NOTE — PATIENT INSTRUCTIONS
PSORIASIS WITH SUSPECTED PSORIATIC ARTHRITIS     Assessment and Plan:  Based on a thorough discussion of this condition and the management approach to it (including a comprehensive discussion of the known risks, side effects and potential benefits of treatment), the patient (family) agrees to implement the following specific plan:  Discussed getting Humira approved   Please start applying Tacrolimus ointment 2 times daily to the affected areas   Continue to apply the Triamcinolone ointment 2 times daily to the affected areas   Monitor for changes     Psoriasis is a chronic inflammatory condition that causes the body to make new skin cells in days rather than weeks  As these cells pile up on the surface of the skin, you may see thick, scaly patches of thickened skin  Psoriasis affects 2-4% of males and females  It can start at any age including childhood, with peaks of onset at 15-25 years and 50-60 years  It tends to persist lifelong, fluctuating in extent and severity  It is particularly common in Caucasians but may affect people of any race  About one-third of patients with psoriasis have family members with psoriasis  Psoriasis is multifactorial  It is classified as an immune-mediated inflammatory disease (IMID)  Genetic factors are important and influence the type of psoriasis and response to treatment  What are the signs and symptoms of psoriasis? There are many different types of psoriasis that each have present uniquely  The types of psoriasis include:    Plaque psoriasis: About 80% to 90% of people who have psoriasis develop this type  When plaque psoriasis appears, you may see:  Plaque psoriasis usually presents with symmetrically distributed, red, scaly plaques with well-defined edges  The scale is typically silvery white, except in skin folds where the plaques often appear shiny and they may have a moist peeling surface   The most common sites are scalp, elbows and knees, but any part of the skin can be involved  The plaques are usually very persistent without treatment  Itch is mostly mild but may be severe in some patients, leading to scratching and lichenification (thickened leathery skin with increased skin markings)  Painful skin cracks or fissures may occur  When psoriatic plaques clear up, they may leave brown or pale marks that can be expected to fade over several months  Guttate psoriasis: When someone gets this type of psoriasis, you often see tiny bumps appear on the skin quite suddenly  The bumps tend to cover much of the torso, legs, and arms  Sometimes, the bumps also develop on the face, scalp, and ears  No matter where they appear, the bumps tend to be:   Small and scaly  Florence-colored to pink  Temporary, clearing in a few weeks or months without treatment  When guttate psoriasis clears, it may never return  Why this happens is still a bit of a mystery  Guttate psoriasis tends to develop in children and young adults who've had an infection, such as strep throat  It's possible that when the infection clears so does guttate psoriasis  It's also possible to have:  Guttate psoriasis for life  See the guttate psoriasis clear and plaque psoriasis develop later in life  Plaque psoriasis when you develop guttate psoriasis  There's no way to predict what will happen after the first flare-up of guttate psoriasis clears  Inverse psoriasis: This type of psoriasis develops in areas where skin touches skin, such as the armpits, genitals, and crease of the buttocks  Where the inverse psoriasis appears, you're likely to notice:  Smooth, red patches of skin that look raw  Little, if any, silvery-white coating  Sore or painful skin  Other names for this type of psoriasis are intertriginous psoriasis or flexural psoriasis  Pustular psoriasis: This type of psoriasis causes pus-filled bumps that usually appear only on the feet and hands   While the pus-filled bumps may look like an infection, the skin is not infected  The bumps don't contain bacteria or anything else that could cause an infection  Where pustular psoriasis appears, you tend to notice:  Red, swollen skin that is dotted with pus-filled bumps  Extremely sore or painful skin  Brown dots (and sometimes scale) appear as the pus-filled bumps dry  Pustular psoriasis can make just about any activity that requires your hands or feet, such as typing or walking, unbearably painful  Pustular psoriasis (generalized): Serious and life-threatening, this rare type of psoriasis causes pus-filled bumps to develop on much of the skin  Also called von Zumbusch psoriasis, a flare-up causes this sequence of events:  Skin on most of the body suddenly turns dry, red, and tender  Within hours, pus-filled bumps cover most of the skin  Often within a day, the pus-filled bumps break open and pools of pus leak onto the skin  As the pus dries (usually within 24 to 48 hours), the skin dries out and peels (as shown in this picture)  When the dried skin peels off, you see a smooth, glazed surface  In a few days or weeks, you may see a new crop of pus-filled bumps covering most of the skin, as the cycle repeats itself  Anyone with pustular psoriasis also feels very sick, and may develop a fever, headache, muscle weakness, and other symptoms  Medical care is often necessary to save the person's life  Erythrodermic psoriasis: Serious and life-threatening, this type of psoriasis requires immediate medical care  When someone develops erythrodermic psoriasis, you may notice:  Skin on most of the body looks burnt  Chills, fever, and the person looks extremely ill  Muscle weakness, a rapid pulse, and severe itch  The person may also be unable to keep warm, so hypothermia can set in quickly  Most people who develop this type of psoriasis already have another type of psoriasis   Before developing erythrodermic psoriasis, they often notice that their psoriasis is worsening or not improving with treatment  If you notice either of these happening, see a board-certified dermatologist     Nails    Nail psoriasis: With any type of psoriasis, you may see changes to your fingernails or toenails  About half of the people who have plaque psoriasis see signs of psoriasis on their fingernails at some point2  When psoriasis affects the nails, you may notice:  Tiny dents in your nails (called “nail pits”)  White, yellow, or brown discoloration under one or more nails  Crumbling, rough nails  A nail lifting up so that it's no longer attached  Buildup of skin cells beneath one or more nails, which lifts up the nail  Treatment and proper nail care can help you control nail psoriasis  Psoriatic arthritis: If you have psoriasis, it's important to pay attention to your joints  Some people who have psoriasis develop a type of arthritis called psoriatic arthritis  This is more likely to occur if you have severe psoriasis  Most people notice psoriasis on their skin years before they develop psoriatic arthritis  It's also possible to get psoriatic arthritis before psoriasis, but this is less common  When psoriatic arthritis develops, the signs can be subtle  At first, you may notice:  A swollen and tender joint, especially in a finger or toe  Heel pain  Swelling on the back of your leg, just above your heel  Stiffness in the morning that fades during the day  Like psoriasis, psoriatic arthritis cannot be cured  Treatment can prevent psoriatic arthritis from worsening, which is important  Allowed to progress, psoriatic arthritis can become disabling  Diagnosis and treatment of psoriasis   Psoriasis is usually diagnosed by clinical features, and skin biopsy if necessary  It is important to decrease factors that aggravate psoriasis   These include treating streptococcal infections, minimizing skin injuries, avoiding sun exposure if it exacerbates psoriasis, smoking, alcohol usage, decreasing stress, and maintaining an optimal body weight  Certain medications such as lithium, beta blockers, antimalarials, and NSAIDs have also been implicated  Suddenly stopping oral steroids or strong topical steroids can cause rebound disease  There are many categories of psoriasis treatments available  Topical therapy  Mild psoriasis is generally treated with topical agents alone  Which treatment is selected may depend on body site, extent and severity of psoriasis  Emollients  Coal tar preparations  Dithranol  Salicylic acid  Vitamin D analogue (calcipotriol)  Topical corticosteroids  Calcineurin inhibitor (tacrolimus, pimecrolimus)  Phototherapy  Most psoriasis centres offer phototherapy with ultraviolet (UV) radiation, often in combination with topical or systemic agents  Types of phototherapy include  Narrowband UVB  Broadband UVB  Photochemotherapy (PUVA)  Targeted phototherapy  Systemic therapy  Moderate to severe psoriasis warrants treatment with a systemic agent and/or phototherapy  The most common treatments are:  Methotrexate  Ciclosporin  Acitretin  Other medicines occasionally used for psoriasis include:  Mycophenolate  Apremilast  Hydroxyurea  Azathioprine  6-mercaptopurine  Systemic corticosteroids are best avoided due to a risk of severe withdrawal flare of psoriasis and adverse effects  Biologics or targeted therapies are reserved for conventional treatment-resistant severe psoriasis, mainly because of expense, as side effects compare favorably with other systemic agents  These include:  Anti-tumour necrosis factor-alpha antagonists (anti-TNF?) infliximab, adalimumab and etanercept  The interleukin (IL)-12/23 antagonist ustekinumab  IL-17 antagonists such as secukinumab  Many other monoclonal antibodies are under investigation in the treatment of psoriasis

## 2023-04-05 NOTE — PROGRESS NOTES
"[facility] Note     Patient Name: Bri Robles  Encounter Date: 04/05/2023     Have you been cared for by a [facility] Dermatologist in the last 3 years and, if so, which description applies to you? NO  I am considered a \"new\" patient and must complete all patient intake questions  I am MALE/not capable of bearing children  REVIEW OF SYSTEMS:  Have you recently had or currently have any of the following? · Recent fever or chills? No  · Any non-healing wound? No   PAST MEDICAL HISTORY:  Have you personally ever had or currently have any of the following? If \"YES,\" then please provide more detail  · Skin cancer (such as Melanoma, Basal Cell Carcinoma, Squamous Cell Carcinoma? No  · Tuberculosis, HIV/AIDS, Hepatitis B or C: No  · Systemic Immunosuppression such as Diabetes, Biologic or Immunotherapy, Chemotherapy, Organ Transplantation, Bone Marrow Transplantation No  · Radiation Treatment No   FAMILY HISTORY:  Any \"first degree relatives\" (parent, brother, sister, or child) with the following? • Skin Cancer, Pancreatic or Other Cancer? No   PATIENT EXPERIENCE:    • Do you want the Dermatologist to perform a COMPLETE skin exam today including a clinical examination under the \"bra and underwear\" areas? NO  • If necessary, do we have your permission to call and leave a detailed message on your Preferred Phone number that includes your specific medical information?   Yes      No Known Allergies   Current Outpatient Medications:   •  acetaminophen (TYLENOL) 325 mg tablet, Take 2 tablets (650 mg total) by mouth every 4 (four) hours as needed for mild pain, headaches or fever, Disp: , Rfl: 0  •  amLODIPine (NORVASC) 10 mg tablet, Take 1 tablet (10 mg total) by mouth daily, Disp: , Rfl: 0  •  ARIPiprazole (ABILIFY) 10 mg tablet, Take 1 tablet (10 mg total) by mouth daily, Disp: , Rfl: 0  •  carbamide peroxide (DEBROX) 6 5 % otic solution, Administer 5 drops into both ears 2 (two) times a day as " needed for ear pain, Disp: 15 mL, Rfl: 0  •  diphenhydrAMINE (BENADRYL) 25 mg tablet, Take 1 tablet (25 mg total) by mouth every 6 (six) hours as needed for itching, Disp: 30 tablet, Rfl: 0  •  gabapentin (NEURONTIN) 400 mg capsule, Take 1 capsule (400 mg total) by mouth 3 (three) times a day, Disp: , Rfl: 0  •  hydrocortisone 2 5 % ointment, Apply topically 2 (two) times a day, Disp: 30 g, Rfl: 0  •  hydrOXYzine HCL (ATARAX) 25 mg tablet, Take 1 tablet (25 mg total) by mouth every 6 (six) hours as needed for anxiety, Disp: , Rfl: 0  •  lisinopril (ZESTRIL) 20 mg tablet, Take 1 tablet (20 mg total) by mouth daily, Disp: , Rfl: 0  •  methocarbamol (ROBAXIN) 500 mg tablet, Take 1 tablet (500 mg total) by mouth every 6 (six) hours as needed for muscle spasms, Disp: , Rfl: 0  •  methylPREDNISolone 4 MG tablet therapy pack, Use as directed on package, Disp: 21 tablet, Rfl: 0  •  nicotine (NICODERM CQ) 21 mg/24 hr TD 24 hr patch, Place 1 patch on the skin daily, Disp: 28 patch, Rfl: 0  •  nystatin (MYCOSTATIN) powder, Apply topically 2 (two) times a day, Disp: 15 g, Rfl: 0  •  ondansetron (ZOFRAN-ODT) 4 mg disintegrating tablet, Take 1 tablet (4 mg total) by mouth every 6 (six) hours as needed for nausea or vomiting, Disp: 20 tablet, Rfl: 0  •  polyethylene glycol (MIRALAX) 17 g packet, Take 17 g by mouth daily, Disp: , Rfl: 0  •  senna-docusate sodium (SENOKOT S) 8 6-50 mg per tablet, Take 1 tablet by mouth 2 (two) times a day, Disp: , Rfl: 0  •  triamcinolone (KENALOG) 0 1 % cream, Apply topically 2 (two) times a day, Disp: 30 g, Rfl: 0  •  white petrolatum-mineral oil (EUCERIN,HYDROCERIN) cream, Apply topically 3 (three) times a day as needed (dry skin), Disp: , Rfl: 0  •  ciprofloxacin-dexamethasone (CIPRODEX) otic suspension, Administer 3 drops into both ears 2 (two) times a day for 7 days, Disp: 3 mL, Rfl: 0          • Whom besides the patient is providing clinical information about today's encounter?   o NO ADDITIONAL HISTORIAN (patient alone provided history)    Physical Exam and Assessment/Plan by Diagnosis:    PSORIASIS WITH SUSPECTED PSORIATIC ARTHRITIS     Physical Exam:  • Anatomic Location Affected:  Scalp, trunk and extremities   • Morphological Description: Thick plaques with scalp, beard and hand involvement borderline erythrodermic    • Patient does have significant swelling of his metacarpophalangeal joints of the bilateral hands   • Severity: severe  • Body Percent Affected: 50%   • Pertinent Positives:  • Pertinent Negatives: Additional History of Present Condition:  Pt states he has had this for most of his life  Pt has tried Triamcinolone 0 1% cream, Betamethasone, calcipotriene, Embril and prednisone  He describes AM stiffness in hands and is confined to wheel chair for back disease  Assessment and Plan:  Based on a thorough discussion of this condition and the management approach to it (including a comprehensive discussion of the known risks, side effects and potential benefits of treatment), the patient (family) agrees to implement the following specific plan:  • Discussed getting Humira approved   • Please start applying Tacrolimus ointment 2 times daily to the affected areas   • Continue to apply the Triamcinolone ointment 2 times daily to the affected areas   • Monitor for changes  • Note patient was on prior biologic therapy with embrel for athritis and psoriasis  He apparently was diasgnosed as RA in past        Psoriasis is a chronic inflammatory condition that causes the body to make new skin cells in days rather than weeks  As these cells pile up on the surface of the skin, you may see thick, scaly patches of thickened skin  Psoriasis affects 2-4% of males and females  It can start at any age including childhood, with peaks of onset at 15-25 years and 50-60 years  It tends to persist lifelong, fluctuating in extent and severity   It is particularly common in Caucasians but may affect people of any race  About one-third of patients with psoriasis have family members with psoriasis  Psoriasis is multifactorial  It is classified as an immune-mediated inflammatory disease (IMID)  Genetic factors are important and influence the type of psoriasis and response to treatment  What are the signs and symptoms of psoriasis? There are many different types of psoriasis that each have present uniquely  The types of psoriasis include:    Plaque psoriasis: About 80% to 90% of people who have psoriasis develop this type  When plaque psoriasis appears, you may see:  Plaque psoriasis usually presents with symmetrically distributed, red, scaly plaques with well-defined edges  The scale is typically silvery white, except in skin folds where the plaques often appear shiny and they may have a moist peeling surface  The most common sites are scalp, elbows and knees, but any part of the skin can be involved  The plaques are usually very persistent without treatment  Itch is mostly mild but may be severe in some patients, leading to scratching and lichenification (thickened leathery skin with increased skin markings)  Painful skin cracks or fissures may occur  When psoriatic plaques clear up, they may leave brown or pale marks that can be expected to fade over several months  Guttate psoriasis: When someone gets this type of psoriasis, you often see tiny bumps appear on the skin quite suddenly  The bumps tend to cover much of the torso, legs, and arms  Sometimes, the bumps also develop on the face, scalp, and ears  No matter where they appear, the bumps tend to be:   • Small and scaly  • [place]-colored to pink  • Temporary, clearing in a few weeks or months without treatment  When guttate psoriasis clears, it may never return  Why this happens is still a bit of a mystery  Guttate psoriasis tends to develop in children and young adults who've had an infection, such as strep throat   It's possible that when the infection clears so does guttate psoriasis  It's also possible to have:  • Guttate psoriasis for life  • See the guttate psoriasis clear and plaque psoriasis develop later in life  • Plaque psoriasis when you develop guttate psoriasis  There's no way to predict what will happen after the first flare-up of guttate psoriasis clears  Inverse psoriasis: This type of psoriasis develops in areas where skin touches skin, such as the armpits, genitals, and crease of the buttocks  Where the inverse psoriasis appears, you're likely to notice:  • Smooth, red patches of skin that look raw  • Little, if any, silvery-white coating  • Sore or painful skin  Other names for this type of psoriasis are intertriginous psoriasis or flexural psoriasis  Pustular psoriasis: This type of psoriasis causes pus-filled bumps that usually appear only on the feet and hands  While the pus-filled bumps may look like an infection, the skin is not infected  The bumps don't contain bacteria or anything else that could cause an infection  Where pustular psoriasis appears, you tend to notice:  • Red, swollen skin that is dotted with pus-filled bumps  • Extremely sore or painful skin  • Brown dots (and sometimes scale) appear as the pus-filled bumps dry  Pustular psoriasis can make just about any activity that requires your hands or feet, such as typing or walking, unbearably painful  Pustular psoriasis (generalized): Serious and life-threatening, this rare type of psoriasis causes pus-filled bumps to develop on much of the skin  Also called von Zumbusch psoriasis, a flare-up causes this sequence of events:  1  Skin on most of the body suddenly turns dry, red, and tender  2  Within hours, pus-filled bumps cover most of the skin  3  Often within a day, the pus-filled bumps break open and pools of pus leak onto the skin    4  As the pus dries (usually within 24 to 48 hours), the skin dries out and peels (as shown in this picture)  5  When the dried skin peels off, you see a smooth, glazed surface  6  In a few days or weeks, you may see a new crop of pus-filled bumps covering most of the skin, as the cycle repeats itself  Anyone with pustular psoriasis also feels very sick, and may develop a fever, headache, muscle weakness, and other symptoms  Medical care is often necessary to save the person's life  Erythrodermic psoriasis: Serious and life-threatening, this type of psoriasis requires immediate medical care  When someone develops erythrodermic psoriasis, you may notice:  • Skin on most of the body looks burnt  • Chills, fever, and the person looks extremely ill  • Muscle weakness, a rapid pulse, and severe itch  The person may also be unable to keep warm, so hypothermia can set in quickly  Most people who develop this type of psoriasis already have another type of psoriasis  Before developing erythrodermic psoriasis, they often notice that their psoriasis is worsening or not improving with treatment  If you notice either of these happening, see a board-certified dermatologist     Nails    Nail psoriasis: With any type of psoriasis, you may see changes to your fingernails or toenails  About half of the people who have plaque psoriasis see signs of psoriasis on their fingernails at some point2  When psoriasis affects the nails, you may notice:  • Tiny dents in your nails (called “nail pits”)  • White, yellow, or brown discoloration under one or more nails  • Crumbling, rough nails  • A nail lifting up so that it's no longer attached  • Buildup of skin cells beneath one or more nails, which lifts up the nail  Treatment and proper nail care can help you control nail psoriasis  Psoriatic arthritis: If you have psoriasis, it's important to pay attention to your joints  Some people who have psoriasis develop a type of arthritis called psoriatic arthritis  This is more likely to occur if you have severe psoriasis    Most people notice psoriasis on their skin years before they develop psoriatic arthritis  It's also possible to get psoriatic arthritis before psoriasis, but this is less common  When psoriatic arthritis develops, the signs can be subtle  At first, you may notice:  • A swollen and tender joint, especially in a finger or toe  • Heel pain  • Swelling on the back of your leg, just above your heel  • Stiffness in the morning that fades during the day  Like psoriasis, psoriatic arthritis cannot be cured  Treatment can prevent psoriatic arthritis from worsening, which is important  Allowed to progress, psoriatic arthritis can become disabling  Diagnosis and treatment of psoriasis   Psoriasis is usually diagnosed by clinical features, and skin biopsy if necessary  It is important to decrease factors that aggravate psoriasis  These include treating streptococcal infections, minimizing skin injuries, avoiding sun exposure if it exacerbates psoriasis, smoking, alcohol usage, decreasing stress, and maintaining an optimal body weight  Certain medications such as lithium, beta blockers, antimalarials, and NSAIDs have also been implicated  Suddenly stopping oral steroids or strong topical steroids can cause rebound disease  There are many categories of psoriasis treatments available  Topical therapy  Mild psoriasis is generally treated with topical agents alone  Which treatment is selected may depend on body site, extent and severity of psoriasis  • Emollients  • Coal tar preparations  • Dithranol  • Salicylic acid  • Vitamin D analogue (calcipotriol)  • Topical corticosteroids  • Calcineurin inhibitor (tacrolimus, pimecrolimus)  Phototherapy  Most psoriasis centres offer phototherapy with ultraviolet (UV) radiation, often in combination with topical or systemic agents   Types of phototherapy include  • Narrowband UVB  • Broadband UVB  • Photochemotherapy (PUVA)  • Targeted phototherapy  Systemic therapy  Moderate to severe psoriasis warrants treatment with a systemic agent and/or phototherapy  The most common treatments are:  • Methotrexate  • Ciclosporin  • Acitretin  Other medicines occasionally used for psoriasis include:  • Mycophenolate  • Apremilast  • Hydroxyurea  • Azathioprine  • 6-mercaptopurine  Systemic corticosteroids are best avoided due to a risk of severe withdrawal flare of psoriasis and adverse effects  Biologics or targeted therapies are reserved for conventional treatment-resistant severe psoriasis, mainly because of expense, as side effects compare favorably with other systemic agents  These include:  • Anti-tumour necrosis factor-alpha antagonists (anti-TNF?) infliximab, adalimumab and etanercept  • The interleukin (IL)-12/23 antagonist ustekinumab  • IL-17 antagonists such as secukinumab  Many other monoclonal antibodies are under investigation in the treatment of psoriasis        Scribe Attestation    I,:  Wilmer Urrutia am acting as a scribe while in the presence of the attending physician :       I,:  Adamaris Diaz MD personally performed the services described in this documentation    as scribed in my presence :

## 2023-06-15 NOTE — ASSESSMENT & PLAN NOTE
· MRI  [facility]): Severe spinal stenosis L3-L4, and L5-S1 with more moderate stenosis L2-  · Continue pain medications with oxycodone, gabapentin, Robaxin  · Patient keeps asking to increase the dose and frequency of oxycodone  Patient does not seem to be in distress  I explained that we need to limit opioid use    Encourage the patient to continue with PT   · Continue PT Render Risk Assessment In Note?: no Detail Level: Simple Comment: Gave him Tx options\\nFavor benign lipoma\\nSize on arm - amenable to removal in office, if pt wants will call to schedule excision wSA Comment: Favor cyst at inferior border of umbilicus \\nCannot r/o umb hernia recommend gen surg eval \\nDoxy + mupirocin

## 2023-08-23 ENCOUNTER — OFFICE VISIT (OUTPATIENT)
Dept: DERMATOLOGY | Facility: CLINIC | Age: 58
End: 2023-08-23
Payer: COMMERCIAL

## 2023-08-23 DIAGNOSIS — L40.9 PSORIASIS: Primary | ICD-10-CM

## 2023-08-23 PROCEDURE — 99213 OFFICE O/P EST LOW 20 MIN: CPT | Performed by: DERMATOLOGY

## 2023-08-23 NOTE — PROGRESS NOTES
[facility] Note     Patient Name: Mora Chappell  Encounter Date: 08/23/23    Have you been cared for by a [facility] Dermatologist in the last 3 years and, if so, which description applies to you? Yes. I have been here within the last 3 years, and my medical history has NOT changed since that time. I am MALE/not capable of bearing children. REVIEW OF SYSTEMS:  Have you recently had or currently have any of the following? No changes in my recent health. PAST MEDICAL HISTORY:  Have you personally ever had or currently have any of the following? If "YES," then please provide more detail. No changes in my medical history. FAMILY HISTORY:  Any "first degree relatives" (parent, brother, sister, or child) with the following? No changes in my family's known health. PATIENT EXPERIENCE:    Do you want the Dermatologist to perform a COMPLETE skin exam today including a clinical examination under the "bra and underwear" areas? NO  If necessary, do we have your permission to call and leave a detailed message on your Preferred Phone number that includes your specific medical information?   Yes      No Known Allergies   Current Outpatient Medications:   •  acetaminophen (TYLENOL) 325 mg tablet, Take 2 tablets (650 mg total) by mouth every 4 (four) hours as needed for mild pain, headaches or fever, Disp: , Rfl: 0  •  amLODIPine (NORVASC) 10 mg tablet, Take 1 tablet (10 mg total) by mouth daily, Disp: , Rfl: 0  •  diphenhydrAMINE (BENADRYL) 25 mg tablet, Take 1 tablet (25 mg total) by mouth every 6 (six) hours as needed for itching, Disp: 30 tablet, Rfl: 0  •  gabapentin (NEURONTIN) 400 mg capsule, Take 1 capsule (400 mg total) by mouth 3 (three) times a day, Disp: , Rfl: 0  •  hydrocortisone 2.5 % ointment, Apply topically 2 (two) times a day, Disp: 30 g, Rfl: 0  •  hydrOXYzine HCL (ATARAX) 25 mg tablet, Take 1 tablet (25 mg total) by mouth every 6 (six) hours as needed for anxiety, Disp: , Rfl: 0  •  lisinopril (ZESTRIL) 20 mg tablet, Take 1 tablet (20 mg total) by mouth daily, Disp: , Rfl: 0  •  methocarbamol (ROBAXIN) 500 mg tablet, Take 1 tablet (500 mg total) by mouth every 6 (six) hours as needed for muscle spasms, Disp: , Rfl: 0  •  methylPREDNISolone 4 MG tablet therapy pack, Use as directed on package, Disp: 21 tablet, Rfl: 0  •  nicotine (NICODERM CQ) 21 mg/24 hr TD 24 hr patch, Place 1 patch on the skin daily, Disp: 28 patch, Rfl: 0  •  nystatin (MYCOSTATIN) powder, Apply topically 2 (two) times a day, Disp: 15 g, Rfl: 0  •  ondansetron (ZOFRAN-ODT) 4 mg disintegrating tablet, Take 1 tablet (4 mg total) by mouth every 6 (six) hours as needed for nausea or vomiting, Disp: 20 tablet, Rfl: 0  •  polyethylene glycol (MIRALAX) 17 g packet, Take 17 g by mouth daily, Disp: , Rfl: 0  •  senna-docusate sodium (SENOKOT S) 8.6-50 mg per tablet, Take 1 tablet by mouth 2 (two) times a day, Disp: , Rfl: 0  •  tacrolimus (PROTOPIC) 0.1 % ointment, Apply topically 2 (two) times a day To affected areas of face, Disp: 100 g, Rfl: 0  •  triamcinolone (KENALOG) 0.1 % ointment, Apply topically 2 (two) times a day, Disp: 453 g, Rfl: 0  •  white petrolatum-mineral oil (EUCERIN,HYDROCERIN) cream, Apply topically 3 (three) times a day as needed (dry skin), Disp: , Rfl: 0  •  ARIPiprazole (ABILIFY) 10 mg tablet, Take 1 tablet (10 mg total) by mouth daily, Disp: , Rfl: 0  •  carbamide peroxide (DEBROX) 6.5 % otic solution, Administer 5 drops into both ears 2 (two) times a day as needed for ear pain, Disp: 15 mL, Rfl: 0  •  ciprofloxacin-dexamethasone (CIPRODEX) otic suspension, Administer 3 drops into both ears 2 (two) times a day for 7 days, Disp: 3 mL, Rfl: 0          Whom besides the patient is providing clinical information about today's encounter?    NO ADDITIONAL HISTORIAN (patient alone provided history)    Physical Exam and Assessment/Plan by Diagnosis:    PSORIASIS WITH SUSPECTED PSORIATIC ARTHRITIS     Physical Exam:  Anatomic Location Affected:  Scalp, ear  Morphological Description:  guttate flares  Severity: moderate  Body Percent Affected: 10%  Pertinent Positives: scalp, ear, torso, legs. Pertinent Negatives: Additional History of Present Condition:  Pt states he has had this for most of his life. Pt has tried Triamcinolone 0.1% cream, Betamethasone, calcipotriene, Embril and prednisone. History of chronic back pain and hand elbow pain, especially in AM    Assessment and Plan:  Based on a thorough discussion of this condition and the management approach to it (including a comprehensive discussion of the known risks, side effects and potential benefits of treatment), the patient (family) agrees to implement the following specific plan:  Continue tacrolimus ointment 2 times daily to affected area  Continue triamcinolone ointment 2 times daily to the affected areas. Well start Pre authorization for Humira moderate to  psoriasis with psoriatic arthritis, hand and spinal.  Indication is moderate psoriasis and psoriatic arthritis. Contact us in one month if no contact re prior authorization. Psoriasis is a chronic inflammatory condition that causes the body to make new skin cells in days rather than weeks. As these cells pile up on the surface of the skin, you may see thick, scaly patches of thickened skin. Psoriasis affects 2-4% of males and females. It can start at any age including childhood, with peaks of onset at 15-25 years and 50-60 years. It tends to persist lifelong, fluctuating in extent and severity. It is particularly common in Caucasians but may affect people of any race. About one-third of patients with psoriasis have family members with psoriasis. Psoriasis is multifactorial. It is classified as an immune-mediated inflammatory disease (IMID). Genetic factors are important and influence the type of psoriasis and response to treatment.      What are the signs and symptoms of psoriasis? There are many different types of psoriasis that each have present uniquely. The types of psoriasis include:    Plaque psoriasis: About 80% to 90% of people who have psoriasis develop this type. When plaque psoriasis appears, you may see:  Plaque psoriasis usually presents with symmetrically distributed, red, scaly plaques with well-defined edges. The scale is typically silvery white, except in skin folds where the plaques often appear shiny and they may have a moist peeling surface. The most common sites are scalp, elbows and knees, but any part of the skin can be involved. The plaques are usually very persistent without treatment. Itch is mostly mild but may be severe in some patients, leading to scratching and lichenification (thickened leathery skin with increased skin markings). Painful skin cracks or fissures may occur. When psoriatic plaques clear up, they may leave brown or pale marks that can be expected to fade over several months. Guttate psoriasis: When someone gets this type of psoriasis, you often see tiny bumps appear on the skin quite suddenly. The bumps tend to cover much of the torso, legs, and arms. Sometimes, the bumps also develop on the face, scalp, and ears. No matter where they appear, the bumps tend to be:   Small and scaly  [place]-colored to pink  Temporary, clearing in a few weeks or months without treatment  When guttate psoriasis clears, it may never return. Why this happens is still a bit of a mystery. Guttate psoriasis tends to develop in children and young adults who've had an infection, such as strep throat. It's possible that when the infection clears so does guttate psoriasis.   It's also possible to have:  Guttate psoriasis for life  See the guttate psoriasis clear and plaque psoriasis develop later in life  Plaque psoriasis when you develop guttate psoriasis  There's no way to predict what will happen after the first flare-up of guttate psoriasis clears. Inverse psoriasis: This type of psoriasis develops in areas where skin touches skin, such as the armpits, genitals, and crease of the buttocks. Where the inverse psoriasis appears, you're likely to notice:  Smooth, red patches of skin that look raw  Little, if any, silvery-white coating  Sore or painful skin  Other names for this type of psoriasis are intertriginous psoriasis or flexural psoriasis. Pustular psoriasis: This type of psoriasis causes pus-filled bumps that usually appear only on the feet and hands. While the pus-filled bumps may look like an infection, the skin is not infected. The bumps don't contain bacteria or anything else that could cause an infection. Where pustular psoriasis appears, you tend to notice:  Red, swollen skin that is dotted with pus-filled bumps  Extremely sore or painful skin  Brown dots (and sometimes scale) appear as the pus-filled bumps dry  Pustular psoriasis can make just about any activity that requires your hands or feet, such as typing or walking, unbearably painful. Pustular psoriasis (generalized): Serious and life-threatening, this rare type of psoriasis causes pus-filled bumps to develop on much of the skin. Also called von Zumbusch psoriasis, a flare-up causes this sequence of events:  Skin on most of the body suddenly turns dry, red, and tender. Within hours, pus-filled bumps cover most of the skin. Often within a day, the pus-filled bumps break open and pools of pus leak onto the skin. As the pus dries (usually within 24 to 48 hours), the skin dries out and peels (as shown in this picture). When the dried skin peels off, you see a smooth, glazed surface. In a few days or weeks, you may see a new crop of pus-filled bumps covering most of the skin, as the cycle repeats itself. Anyone with pustular psoriasis also feels very sick, and may develop a fever, headache, muscle weakness, and other symptoms.  Medical care is often necessary to save the person's life. Erythrodermic psoriasis: Serious and life-threatening, this type of psoriasis requires immediate medical care. When someone develops erythrodermic psoriasis, you may notice:  Skin on most of the body looks burnt  Chills, fever, and the person looks extremely ill  Muscle weakness, a rapid pulse, and severe itch  The person may also be unable to keep warm, so hypothermia can set in quickly. Most people who develop this type of psoriasis already have another type of psoriasis. Before developing erythrodermic psoriasis, they often notice that their psoriasis is worsening or not improving with treatment. If you notice either of these happening, see a board-certified dermatologist.    Nails    Nail psoriasis: With any type of psoriasis, you may see changes to your fingernails or toenails. About half of the people who have plaque psoriasis see signs of psoriasis on their fingernails at some point2. When psoriasis affects the nails, you may notice:  Tiny dents in your nails (called “nail pits”)  White, yellow, or brown discoloration under one or more nails  Crumbling, rough nails  A nail lifting up so that it's no longer attached  Buildup of skin cells beneath one or more nails, which lifts up the nail  Treatment and proper nail care can help you control nail psoriasis. Psoriatic arthritis: If you have psoriasis, it's important to pay attention to your joints. Some people who have psoriasis develop a type of arthritis called psoriatic arthritis. This is more likely to occur if you have severe psoriasis. Most people notice psoriasis on their skin years before they develop psoriatic arthritis. It's also possible to get psoriatic arthritis before psoriasis, but this is less common. When psoriatic arthritis develops, the signs can be subtle.  At first, you may notice:  A swollen and tender joint, especially in a finger or toe  Heel pain  Swelling on the back of your leg, just above your heel  Stiffness in the morning that fades during the day  Like psoriasis, psoriatic arthritis cannot be cured. Treatment can prevent psoriatic arthritis from worsening, which is important. Allowed to progress, psoriatic arthritis can become disabling. Diagnosis and treatment of psoriasis   Psoriasis is usually diagnosed by clinical features, and skin biopsy if necessary. It is important to decrease factors that aggravate psoriasis. These include treating streptococcal infections, minimizing skin injuries, avoiding sun exposure if it exacerbates psoriasis, smoking, alcohol usage, decreasing stress, and maintaining an optimal body weight. Certain medications such as lithium, beta blockers, antimalarials, and NSAIDs have also been implicated. Suddenly stopping oral steroids or strong topical steroids can cause rebound disease. There are many categories of psoriasis treatments available. Topical therapy  Mild psoriasis is generally treated with topical agents alone. Which treatment is selected may depend on body site, extent and severity of psoriasis. Emollients  Coal tar preparations  Dithranol  Salicylic acid  Vitamin D analogue (calcipotriol)  Topical corticosteroids  Calcineurin inhibitor (tacrolimus, pimecrolimus)  Phototherapy  Most psoriasis centres offer phototherapy with ultraviolet (UV) radiation, often in combination with topical or systemic agents. Types of phototherapy include  Narrowband UVB  Broadband UVB  Photochemotherapy (PUVA)  Targeted phototherapy  Systemic therapy  Moderate to severe psoriasis warrants treatment with a systemic agent and/or phototherapy. The most common treatments are:  Methotrexate  Ciclosporin  Acitretin  Other medicines occasionally used for psoriasis include:  Mycophenolate  Apremilast  Hydroxyurea  Azathioprine  6-mercaptopurine  Systemic corticosteroids are best avoided due to a risk of severe withdrawal flare of psoriasis and adverse effects.   Biologics or targeted therapies are reserved for conventional treatment-resistant severe psoriasis, mainly because of expense, as side effects compare favorably with other systemic agents. These include:  Anti-tumour necrosis factor-alpha antagonists (anti-TNFa) infliximab, adalimumab and etanercept  The interleukin (IL)-12/23 antagonist ustekinumab  IL-17 antagonists such as secukinumab  Many other monoclonal antibodies are under investigation in the treatment of psoriasis.     Scribe Attestation    I,:  Ankita Martinez am acting as a scribe while in the presence of the attending physician.:       I,:  Kaylin Sidhu MD personally performed the services described in this documentation    as scribed in my presence.:

## 2023-08-23 NOTE — PATIENT INSTRUCTIONS
Additional History of Present Condition:  Pt states he has had this for most of his life. Pt has tried Triamcinolone 0.1% cream, Betamethasone, calcipotriene, Embril and prednisone. Assessment and Plan:  Based on a thorough discussion of this condition and the management approach to it (including a comprehensive discussion of the known risks, side effects and potential benefits of treatment), the patient (family) agrees to implement the following specific plan:  Continue tacrolimus ointment 2 times daily to affected area  Continue triamcinolone ointment 2 times daily to the affected areas.   Well start Pre authorization for [facility] moderate to serve psoriasis with psoriatic arthritis, hand and spinal   Contact us in one month if no improvement

## 2023-08-29 ENCOUNTER — TELEPHONE (OUTPATIENT)
Age: 58
End: 2023-08-29

## 2023-08-29 DIAGNOSIS — L40.9 PSORIASIS: Primary | ICD-10-CM

## 2023-08-29 NOTE — TELEPHONE ENCOUNTER
Prior authorization was submitted to patients insurance for Humira through covermymeds.  Key#[id number]

## 2023-08-29 NOTE — TELEPHONE ENCOUNTER
Insurance called to get clarification of patient's medication starter dose for HUMIRA . Please give instructions for adminstering and also patient needs to complete TB labs as well. Once medication clarification has been updated please return phone call back [phone]. Any of their represenetive can take verbal clarification of HUMIRA.

## 2023-08-30 RX ORDER — ADALIMUMAB 40MG/0.8ML
KIT SUBCUTANEOUS
Qty: 2 EACH | Refills: 11 | Status: SHIPPED | OUTPATIENT
Start: 2023-09-06

## 2023-08-30 RX ORDER — ADALIMUMAB 80MG/0.8ML
KIT SUBCUTANEOUS
Qty: 1 EACH | Refills: 0 | Status: SHIPPED | OUTPATIENT
Start: 2023-08-30 | End: 2023-09-06

## 2023-08-30 NOTE — TELEPHONE ENCOUNTER
Received approval for the coverage of Humira. Letter was uploaded into patients chart. Unable to notify patient due to not having mychart set up and phone number in patients chart not being valid.

## 2023-08-30 NOTE — TELEPHONE ENCOUNTER
Insurance called to get clarification of patient's medication starter dose for HUMIRA , I have provided Dr. Graham Caban instructions . The intended dose for psoriasis in adult is 80 mg SC once , then, after 1 week, 40 mg SC every 2 weeks.

## 2023-09-07 ENCOUNTER — TELEPHONE (OUTPATIENT)
Dept: DERMATOLOGY | Facility: CLINIC | Age: 58
End: 2023-09-07

## 2023-09-08 ENCOUNTER — TELEPHONE (OUTPATIENT)
Age: 58
End: 2023-09-08

## 2023-09-08 NOTE — TELEPHONE ENCOUNTER
Please print out for me to sign when I am at [address] on Tuesday,    Zoraida Thayer it would help if these message went to clinical team so they can print out for me tot sign,  rather than asking me to foreward to staff

## 2023-09-08 NOTE — TELEPHONE ENCOUNTER
Anne Baptiste from [address] called requesting clarification on prescription sent for Humira - please see faxed scanned to chart/complete and fax or if needed can call [phone]

## 2024-03-28 ENCOUNTER — OFFICE VISIT (OUTPATIENT)
Dept: DERMATOLOGY | Facility: CLINIC | Age: 59
End: 2024-03-28
Payer: COMMERCIAL

## 2024-03-28 DIAGNOSIS — L40.9 PSORIASIS: Primary | ICD-10-CM

## 2024-03-28 PROCEDURE — 99214 OFFICE O/P EST MOD 30 MIN: CPT | Performed by: DERMATOLOGY

## 2024-03-28 NOTE — Clinical Note
Patient reported today for a follow up appointment. His facility told him that his shot would be administered in office today. There is no documentation. What usually happens for these injections?

## 2024-03-28 NOTE — PROGRESS NOTES
"[facility] Dermatology Clinic Note     Patient Name: Stanley Austin  Encounter Date: 3/28/24     Have you been cared for by a [facility] Dermatologist in the last 3 years and, if so, which description applies to you?    Yes.  I have been here within the last 3 years, and my medical history has NOT changed since that time.  I am MALE/not capable of bearing children.    REVIEW OF SYSTEMS:  Have you recently had or currently have any of the following? No changes in my recent health.   PAST MEDICAL HISTORY:  Have you personally ever had or currently have any of the following?  If \"YES,\" then please provide more detail. No changes in my medical history.   HISTORY OF IMMUNOSUPPRESSION: Do you have a history of any of the following:  Systemic Immunosuppression such as Diabetes, Biologic or Immunotherapy, Chemotherapy, Organ Transplantation, Bone Marrow Transplantation?  No     Answering \"YES\" requires the addition of the dotphrase \"IMMUNOSUPPRESSED\" as the first diagnosis of the patient's visit.   FAMILY HISTORY:  Any \"first degree relatives\" (parent, brother, sister, or child) with the following?    No changes in my family's known health.   PATIENT EXPERIENCE:    Do you want the Dermatologist to perform a COMPLETE skin exam today including a clinical examination under the \"bra and underwear\" areas?  NO  If necessary, do we have your permission to call and leave a detailed message on your Preferred Phone number that includes your specific medical information?  NO      No Known Allergies   Current Outpatient Medications:   •  acetaminophen (TYLENOL) 325 mg tablet, Take 2 tablets (650 mg total) by mouth every 4 (four) hours as needed for mild pain, headaches or fever, Disp: , Rfl: 0  •  amLODIPine (NORVASC) 10 mg tablet, Take 1 tablet (10 mg total) by mouth daily, Disp: , Rfl: 0  •  ARIPiprazole (ABILIFY) 10 mg tablet, Take 1 tablet (10 mg total) by mouth daily, Disp: , Rfl: 0  •  carbamide peroxide (DEBROX) 6.5 % otic " solution, Administer 5 drops into both ears 2 (two) times a day as needed for ear pain, Disp: 15 mL, Rfl: 0  •  diphenhydrAMINE (BENADRYL) 25 mg tablet, Take 1 tablet (25 mg total) by mouth every 6 (six) hours as needed for itching, Disp: 30 tablet, Rfl: 0  •  gabapentin (NEURONTIN) 400 mg capsule, Take 1 capsule (400 mg total) by mouth 3 (three) times a day, Disp: , Rfl: 0  •  hydrocortisone 2.5 % ointment, Apply topically 2 (two) times a day, Disp: 30 g, Rfl: 0  •  hydrOXYzine HCL (ATARAX) 25 mg tablet, Take 1 tablet (25 mg total) by mouth every 6 (six) hours as needed for anxiety, Disp: , Rfl: 0  •  lisinopril (ZESTRIL) 20 mg tablet, Take 1 tablet (20 mg total) by mouth daily, Disp: , Rfl: 0  •  methocarbamol (ROBAXIN) 500 mg tablet, Take 1 tablet (500 mg total) by mouth every 6 (six) hours as needed for muscle spasms, Disp: , Rfl: 0  •  methylPREDNISolone 4 MG tablet therapy pack, Use as directed on package, Disp: 21 tablet, Rfl: 0  •  nicotine (NICODERM CQ) 21 mg/24 hr TD 24 hr patch, Place 1 patch on the skin daily, Disp: 28 patch, Rfl: 0  •  nystatin (MYCOSTATIN) powder, Apply topically 2 (two) times a day, Disp: 15 g, Rfl: 0  •  ondansetron (ZOFRAN-ODT) 4 mg disintegrating tablet, Take 1 tablet (4 mg total) by mouth every 6 (six) hours as needed for nausea or vomiting, Disp: 20 tablet, Rfl: 0  •  polyethylene glycol (MIRALAX) 17 g packet, Take 17 g by mouth daily, Disp: , Rfl: 0  •  senna-docusate sodium (SENOKOT S) 8.6-50 mg per tablet, Take 1 tablet by mouth 2 (two) times a day, Disp: , Rfl: 0  •  tacrolimus (PROTOPIC) 0.1 % ointment, Apply topically 2 (two) times a day To affected areas of face, Disp: 100 g, Rfl: 0  •  triamcinolone (KENALOG) 0.1 % ointment, Apply topically 2 (two) times a day, Disp: 453 g, Rfl: 0  •  white petrolatum-mineral oil (EUCERIN,HYDROCERIN) cream, Apply topically 3 (three) times a day as needed (dry skin), Disp: , Rfl: 0  •  Adalimumab (Humira Pen) 40 MG/0.8ML PNKT, Inject 40mg  on week 2 then every other week there after for maintenance dose. Do not start before September 6, 2023., Disp: 2 each, Rfl: 11  •  ciprofloxacin-dexamethasone (CIPRODEX) otic suspension, Administer 3 drops into both ears 2 (two) times a day for 7 days, Disp: 3 mL, Rfl: 0          Whom besides the patient is providing clinical information about today's encounter?   NO ADDITIONAL HISTORIAN (patient alone provided history)    Physical Exam and Assessment/Plan by Diagnosis:    PSORIASIS WITH SUSPECTED PSORIATIC ARTHRITIS      Physical Exam:  Anatomic Location Affected:  Scalp, ear, face, back, legs  Morphological Description:  guttate flares  Severity: moderate  Body Percent Affected: 20%  Pertinent Positives: scalp, ear, torso, legs.     Pertinent Negatives:     Additional History of Present Condition:  Patient reports today for a psoriasis follow up. He reports that he has not gotten his Humira injection. His facility reports that he was supposed to be coming to the office to get his first injection.      Assessment and Plan:  Based on a thorough discussion of this condition and the management approach to it (including a comprehensive discussion of the known risks, side effects and potential benefits of treatment), the patient (family) agrees to implement the following specific plan:  Continue tacrolimus ointment 2 times daily to affected area  Continue triamcinolone ointment 2 times daily to the affected areas.  Once Humira is received, start treatment.              Psoriasis is a chronic inflammatory condition that causes the body to make new skin cells in days rather than weeks. As these cells pile up on the surface of the skin, you may see thick, scaly patches of thickened skin.   Psoriasis affects 2-4% of males and females. It can start at any age including childhood, with peaks of onset at 15-25 years and 50-60 years. It tends to persist lifelong, fluctuating in extent and severity. It is particularly common in  Caucasians but may affect people of any race. About one-third of patients with psoriasis have family members with psoriasis.  Psoriasis is multifactorial. It is classified as an immune-mediated inflammatory disease (IMID). Genetic factors are important and influence the type of psoriasis and response to treatment.      What are the signs and symptoms of psoriasis?     There are many different types of psoriasis that each have present uniquely. The types of psoriasis include:     Plaque psoriasis: About 80% to 90% of people who have psoriasis develop this type. When plaque psoriasis appears, you may see:  Plaque psoriasis usually presents with symmetrically distributed, red, scaly plaques with well-defined edges. The scale is typically silvery white, except in skin folds where the plaques often appear shiny and they may have a moist peeling surface. The most common sites are scalp, elbows and knees, but any part of the skin can be involved. The plaques are usually very persistent without treatment.  Itch is mostly mild but may be severe in some patients, leading to scratching and lichenification (thickened leathery skin with increased skin markings). Painful skin cracks or fissures may occur.  When psoriatic plaques clear up, they may leave brown or pale marks that can be expected to fade over several months.     Guttate psoriasis: When someone gets this type of psoriasis, you often see tiny bumps appear on the skin quite suddenly. The bumps tend to cover much of the torso, legs, and arms. Sometimes, the bumps also develop on the face, scalp, and ears. No matter where they appear, the bumps tend to be:   Small and scaly  [place]-colored to pink  Temporary, clearing in a few weeks or months without treatment  When guttate psoriasis clears, it may never return. Why this happens is still a bit of a mystery. Guttate psoriasis tends to develop in children and young adults who've had an infection, such as strep throat. It's  possible that when the infection clears so does guttate psoriasis.  It's also possible to have:  Guttate psoriasis for life  See the guttate psoriasis clear and plaque psoriasis develop later in life  Plaque psoriasis when you develop guttate psoriasis  There's no way to predict what will happen after the first flare-up of guttate psoriasis clears.     Inverse psoriasis: This type of psoriasis develops in areas where skin touches skin, such as the armpits, genitals, and crease of the buttocks. Where the inverse psoriasis appears, you're likely to notice:  Smooth, red patches of skin that look raw  Little, if any, silvery-white coating  Sore or painful skin  Other names for this type of psoriasis are intertriginous psoriasis or flexural psoriasis.  Pustular psoriasis: This type of psoriasis causes pus-filled bumps that usually appear only on the feet and hands. While the pus-filled bumps may look like an infection, the skin is not infected. The bumps don't contain bacteria or anything else that could cause an infection.  Where pustular psoriasis appears, you tend to notice:  Red, swollen skin that is dotted with pus-filled bumps  Extremely sore or painful skin  Brown dots (and sometimes scale) appear as the pus-filled bumps dry  Pustular psoriasis can make just about any activity that requires your hands or feet, such as typing or walking, unbearably painful.     Pustular psoriasis (generalized): Serious and life-threatening, this rare type of psoriasis causes pus-filled bumps to develop on much of the skin. Also called von Zumbusch psoriasis, a flare-up causes this sequence of events:  Skin on most of the body suddenly turns dry, red, and tender.  Within hours, pus-filled bumps cover most of the skin.  Often within a day, the pus-filled bumps break open and pools of pus leak onto the skin.  As the pus dries (usually within 24 to 48 hours), the skin dries out and peels (as shown in this picture).  When the dried skin  peels off, you see a smooth, glazed surface.  In a few days or weeks, you may see a new crop of pus-filled bumps covering most of the skin, as the cycle repeats itself.  Anyone with pustular psoriasis also feels very sick, and may develop a fever, headache, muscle weakness, and other symptoms. Medical care is often necessary to save the person's life.     Erythrodermic psoriasis: Serious and life-threatening, this type of psoriasis requires immediate medical care. When someone develops erythrodermic psoriasis, you may notice:  Skin on most of the body looks burnt  Chills, fever, and the person looks extremely ill  Muscle weakness, a rapid pulse, and severe itch  The person may also be unable to keep warm, so hypothermia can set in quickly.  Most people who develop this type of psoriasis already have another type of psoriasis. Before developing erythrodermic psoriasis, they often notice that their psoriasis is worsening or not improving with treatment. If you notice either of these happening, see a board-certified dermatologist.     Nails     Nail psoriasis: With any type of psoriasis, you may see changes to your fingernails or toenails. About half of the people who have plaque psoriasis see signs of psoriasis on their fingernails at some point2.  When psoriasis affects the nails, you may notice:  Tiny dents in your nails (called “nail pits”)  White, yellow, or brown discoloration under one or more nails  Crumbling, rough nails  A nail lifting up so that it's no longer attached  Buildup of skin cells beneath one or more nails, which lifts up the nail  Treatment and proper nail care can help you control nail psoriasis.     Psoriatic arthritis: If you have psoriasis, it's important to pay attention to your joints. Some people who have psoriasis develop a type of arthritis called psoriatic arthritis. This is more likely to occur if you have severe psoriasis.  Most people notice psoriasis on their skin years before they  develop psoriatic arthritis. It's also possible to get psoriatic arthritis before psoriasis, but this is less common.  When psoriatic arthritis develops, the signs can be subtle. At first, you may notice:  A swollen and tender joint, especially in a finger or toe  Heel pain  Swelling on the back of your leg, just above your heel  Stiffness in the morning that fades during the day  Like psoriasis, psoriatic arthritis cannot be cured. Treatment can prevent psoriatic arthritis from worsening, which is important. Allowed to progress, psoriatic arthritis can become disabling.     Diagnosis and treatment of psoriasis   Psoriasis is usually diagnosed by clinical features, and skin biopsy if necessary.   It is important to decrease factors that aggravate psoriasis. These include treating streptococcal infections, minimizing skin injuries, avoiding sun exposure if it exacerbates psoriasis, smoking, alcohol usage, decreasing stress, and maintaining an optimal body weight. Certain medications such as lithium, beta blockers, antimalarials, and NSAIDs have also been implicated. Suddenly stopping oral steroids or strong topical steroids can cause rebound disease.      There are many categories of psoriasis treatments available.      Topical therapy  Mild psoriasis is generally treated with topical agents alone. Which treatment is selected may depend on body site, extent and severity of psoriasis.  Emollients  [place] tar preparations  Dithranol  Salicylic acid  Vitamin D analogue (calcipotriol)  Topical corticosteroids  Calcineurin inhibitor (tacrolimus, pimecrolimus)  Phototherapy  Most psoriasis centres offer phototherapy with ultraviolet (UV) radiation, often in combination with topical or systemic agents. Types of phototherapy include  Narrowband UVB  Broadband UVB  Photochemotherapy (PUVA)  Targeted phototherapy  Systemic therapy  Moderate to severe psoriasis warrants treatment with a systemic agent and/or phototherapy. The  most common treatments are:  Methotrexate  Ciclosporin  Acitretin  Other medicines occasionally used for psoriasis include:  Mycophenolate  Apremilast  Hydroxyurea  Azathioprine  6-mercaptopurine  Systemic corticosteroids are best avoided due to a risk of severe withdrawal flare of psoriasis and adverse effects.  Biologics or targeted therapies are reserved for conventional treatment-resistant severe psoriasis, mainly because of expense, as side effects compare favorably with other systemic agents. These include:  Anti-tumour necrosis factor-alpha antagonists (anti-TNFa) infliximab, adalimumab and etanercept  The interleukin (IL)-12/23 antagonist ustekinumab  IL-17 antagonists such as secukinumab  Many other monoclonal antibodies are under investigation in the treatment of psoriasis.    Scribe Attestation    I,:  Malvin Alcazar am acting as a scribe while in the presence of the attending physician.:       I,:  Jarret Patel MD personally performed the services described in this documentation    as scribed in my presence.:

## 2024-03-29 ENCOUNTER — TELEPHONE (OUTPATIENT)
Age: 59
End: 2024-03-29

## 2024-03-29 DIAGNOSIS — L40.9 PSORIASIS: Primary | ICD-10-CM

## 2024-03-29 NOTE — TELEPHONE ENCOUNTER
Patient was approved for Humira in 2023 and authorization  2023. I did try to reach out to patient multiple times but numbers on patient's file were not correct, thy were either the wrong number or he no longer lives there. I did try and contact him again today and received the same responses. I do not have the patient's current insurance information on file, and am unable to complete authorization until a copy of his insurance card is provided. Message was routed to provide and clinical assistant that was present at time of visit.

## 2024-03-29 NOTE — TELEPHONE ENCOUNTER
----- Message from Malvin Alcazar sent at 3/28/2024  2:28 PM EDT -----  Patient reported today for a follow up appointment. His facility told him that his shot would be administered in office today. There is no documentation. What usually happens for these injections?

## 2024-03-29 NOTE — TELEPHONE ENCOUNTER
He is in a group home.  We probably have to route it to the nurse at the group home????  Right gretta???

## 2024-04-01 NOTE — TELEPHONE ENCOUNTER
PA for Humira 80mg + 40mg injection     Submitted via    [x]CMM-KEY [id number]  [][facility]-Case ID #   []Faxed to plan   []Other website   []Phone call Case ID #     Office notes sent, clinical questions answered. Awaiting determination    Turnaround time for your insurance to make a decision on your Prior Authorization can take 7-21 business days.

## 2024-04-01 NOTE — TELEPHONE ENCOUNTER
Contacted [facility] to obtain Insurance information, they provided the following:    Insurance- [facility]  I.D.# -[id number]  [id number]  RXPCN- [id number]  RXGRP- [id number]

## 2024-04-05 RX ORDER — ADALIMUMAB 4 MG/ML
KIT INJECTION
Qty: 2 EACH | Refills: 11 | Status: SHIPPED | OUTPATIENT
Start: 2024-04-05

## 2024-04-05 NOTE — TELEPHONE ENCOUNTER
Called pt to advise Medication Humira has been approved by the insurance. Your pharmacy has been made aware of your approval, please call them to see when your medication will be available for .    [x]Spoke with pt. Verbal understanding  []LMOM   []L/M to call office back. Communication consent not updated in chart  []Other Spoke with his nurse and he received the injection on Wednesday so they got the medication with no issues.

## 2024-04-05 NOTE — TELEPHONE ENCOUNTER
PA for Humira  80mg/0.8mL - 40mg/ 0.4mL auto injectors Approved   Date(s) approved 03/18/2024 - until further notice  Case #[id number]    Patient advised by [] [employer] Message                      [x] Phone call       Pharmacy advised by [x]Fax                                     []Phone call    Approval letter scanned into Media Yes

## 2024-04-05 NOTE — ADDENDUM NOTE
Addended by: WANDY SCANLON on: 4/5/2024 09:22 AM     Modules accepted: Orders     Topical Clindamycin Counseling: Patient counseled that this medication may cause skin irritation or allergic reactions.  In the event of skin irritation, the patient was advised to reduce the amount of the drug applied or use it less frequently.   The patient verbalized understanding of the proper use and possible adverse effects of clindamycin.  All of the patient's questions and concerns were addressed.

## 2024-04-08 NOTE — TELEPHONE ENCOUNTER
Received fax from [employer] for Medication Clarification on Humira. Please fill out form, sign, date and Fax back to [phone] Form scanned into [employer] for review.

## 2024-04-09 NOTE — TELEPHONE ENCOUNTER
[facility] special[facility] pharmacy called to see if they could do a quantity of 3 for the loading dose as it does not come in a quantity of 2 without breaking the package

## 2024-04-10 NOTE — TELEPHONE ENCOUNTER
Called and spoke with [facility] special[facility] pharmacy letting them know as long as it is the same dose the quantity can be 3

## 2024-04-22 ENCOUNTER — TELEPHONE (OUTPATIENT)
Age: 59
End: 2024-04-22

## 2024-04-22 NOTE — TELEPHONE ENCOUNTER
[facility] special[facility] pharmacy called to clarify the directions on the humira, he stated the first and 2nd dose are 2 weeks a part,  he said they should be a week apart please advise [phone]

## 2024-04-26 NOTE — TELEPHONE ENCOUNTER
Called and spoke with Amara from [facility] to clarify the directions of the anatoliy Ni said first injection should be 80 mg then 40mg in one week then 40 mg every 2 weeks

## 2025-01-15 NOTE — ASSESSMENT & PLAN NOTE
· Present on almost all toes and fingers  · Podiatry performed nail debridement on 01/27  · Continue Terbinafine 250 mg PO daily for 12 weeks (Ordered through 4/21)  · Check CMP weekly (Next check on 3/6) Prophylactic measure